# Patient Record
Sex: FEMALE | Race: WHITE | NOT HISPANIC OR LATINO | Employment: OTHER | URBAN - METROPOLITAN AREA
[De-identification: names, ages, dates, MRNs, and addresses within clinical notes are randomized per-mention and may not be internally consistent; named-entity substitution may affect disease eponyms.]

---

## 2023-07-10 LAB
ALANINE AMINOTRANSFERASE (SGPT) (U/L) IN SER/PLAS: 15 U/L (ref 7–45)
ALBUMIN (G/DL) IN SER/PLAS: 4.5 G/DL (ref 3.4–5)
ALBUMIN (MG/L) IN URINE: <7 MG/L
ALBUMIN/CREATININE (UG/MG) IN URINE: NORMAL UG/MG CRT (ref 0–30)
ALKALINE PHOSPHATASE (U/L) IN SER/PLAS: 101 U/L (ref 33–136)
ANION GAP IN SER/PLAS: 15 MMOL/L (ref 10–20)
APPEARANCE, URINE: CLEAR
ASPARTATE AMINOTRANSFERASE (SGOT) (U/L) IN SER/PLAS: 18 U/L (ref 9–39)
BASOPHILS (10*3/UL) IN BLOOD BY AUTOMATED COUNT: 0.03 X10E9/L (ref 0–0.1)
BASOPHILS/100 LEUKOCYTES IN BLOOD BY AUTOMATED COUNT: 0.4 % (ref 0–2)
BILIRUBIN TOTAL (MG/DL) IN SER/PLAS: 0.5 MG/DL (ref 0–1.2)
BILIRUBIN, URINE: NEGATIVE
BLOOD, URINE: NEGATIVE
CALCIDIOL (25 OH VITAMIN D3) (NG/ML) IN SER/PLAS: 43 NG/ML
CALCIUM (MG/DL) IN SER/PLAS: 9.2 MG/DL (ref 8.6–10.6)
CARBON DIOXIDE, TOTAL (MMOL/L) IN SER/PLAS: 23 MMOL/L (ref 21–32)
CHLORIDE (MMOL/L) IN SER/PLAS: 105 MMOL/L (ref 98–107)
CHOLESTEROL (MG/DL) IN SER/PLAS: 185 MG/DL (ref 0–199)
CHOLESTEROL IN HDL (MG/DL) IN SER/PLAS: 67.8 MG/DL
CHOLESTEROL/HDL RATIO: 2.7
COLOR, URINE: YELLOW
CREATININE (MG/DL) IN SER/PLAS: 1 MG/DL (ref 0.5–1.05)
CREATININE (MG/DL) IN URINE: 38.9 MG/DL (ref 20–320)
EOSINOPHILS (10*3/UL) IN BLOOD BY AUTOMATED COUNT: 0.09 X10E9/L (ref 0–0.7)
EOSINOPHILS/100 LEUKOCYTES IN BLOOD BY AUTOMATED COUNT: 1.3 % (ref 0–6)
ERYTHROCYTE DISTRIBUTION WIDTH (RATIO) BY AUTOMATED COUNT: 13 % (ref 11.5–14.5)
ERYTHROCYTE MEAN CORPUSCULAR HEMOGLOBIN CONCENTRATION (G/DL) BY AUTOMATED: 32.8 G/DL (ref 32–36)
ERYTHROCYTE MEAN CORPUSCULAR VOLUME (FL) BY AUTOMATED COUNT: 89 FL (ref 80–100)
ERYTHROCYTES (10*6/UL) IN BLOOD BY AUTOMATED COUNT: 4.57 X10E12/L (ref 4–5.2)
ESTIMATED AVERAGE GLUCOSE FOR HBA1C: 140 MG/DL
GAMMA GLUTAMYL TRANSFERASE (U/L) IN SER/PLAS: 22 U/L (ref 5–55)
GFR FEMALE: 64 ML/MIN/1.73M2
GLUCOSE (MG/DL) IN SER/PLAS: 149 MG/DL (ref 74–99)
GLUCOSE, URINE: NEGATIVE MG/DL
HEMATOCRIT (%) IN BLOOD BY AUTOMATED COUNT: 40.9 % (ref 36–46)
HEMOGLOBIN (G/DL) IN BLOOD: 13.4 G/DL (ref 12–16)
HEMOGLOBIN A1C/HEMOGLOBIN TOTAL IN BLOOD: 6.5 %
IGE (IU/L) IN SERUM OR PLASMA: <2 IU/ML (ref 0–214)
IMMATURE GRANULOCYTES/100 LEUKOCYTES IN BLOOD BY AUTOMATED COUNT: 0.3 % (ref 0–0.9)
IRON (UG/DL) IN SER/PLAS: 59 UG/DL (ref 35–150)
IRON BINDING CAPACITY (UG/DL) IN SER/PLAS: 366 UG/DL (ref 240–445)
IRON SATURATION (%) IN SER/PLAS: 16 % (ref 25–45)
KETONES, URINE: NEGATIVE MG/DL
LDL: 99 MG/DL (ref 0–99)
LEUKOCYTE ESTERASE, URINE: NEGATIVE
LEUKOCYTES (10*3/UL) IN BLOOD BY AUTOMATED COUNT: 6.8 X10E9/L (ref 4.4–11.3)
LYMPHOCYTES (10*3/UL) IN BLOOD BY AUTOMATED COUNT: 1.41 X10E9/L (ref 1.2–4.8)
LYMPHOCYTES/100 LEUKOCYTES IN BLOOD BY AUTOMATED COUNT: 20.8 % (ref 13–44)
MONOCYTES (10*3/UL) IN BLOOD BY AUTOMATED COUNT: 0.6 X10E9/L (ref 0.1–1)
MONOCYTES/100 LEUKOCYTES IN BLOOD BY AUTOMATED COUNT: 8.8 % (ref 2–10)
NEUTROPHILS (10*3/UL) IN BLOOD BY AUTOMATED COUNT: 4.63 X10E9/L (ref 1.2–7.7)
NEUTROPHILS/100 LEUKOCYTES IN BLOOD BY AUTOMATED COUNT: 68.4 % (ref 40–80)
NITRITE, URINE: NEGATIVE
NRBC (PER 100 WBCS) BY AUTOMATED COUNT: 0 /100 WBC (ref 0–0)
PH, URINE: 5 (ref 5–8)
PLATELETS (10*3/UL) IN BLOOD AUTOMATED COUNT: 222 X10E9/L (ref 150–450)
POTASSIUM (MMOL/L) IN SER/PLAS: 4.6 MMOL/L (ref 3.5–5.3)
PROTEIN TOTAL: 7.3 G/DL (ref 6.4–8.2)
PROTEIN, URINE: NEGATIVE MG/DL
RBC, URINE: NORMAL /HPF (ref 0–5)
SODIUM (MMOL/L) IN SER/PLAS: 138 MMOL/L (ref 136–145)
SPECIFIC GRAVITY, URINE: 1.01 (ref 1–1.03)
SQUAMOUS EPITHELIAL CELLS, URINE: <1 /HPF
THYROTROPIN (MIU/L) IN SER/PLAS BY DETECTION LIMIT <= 0.05 MIU/L: 1.13 MIU/L (ref 0.44–3.98)
TRIGLYCERIDE (MG/DL) IN SER/PLAS: 93 MG/DL (ref 0–149)
UREA NITROGEN (MG/DL) IN SER/PLAS: 20 MG/DL (ref 6–23)
UROBILINOGEN, URINE: <2 MG/DL (ref 0–1.9)
VLDL: 19 MG/DL (ref 0–40)
WBC, URINE: NORMAL /HPF (ref 0–5)

## 2023-07-13 LAB
FUNGAL CULTURE/SMEAR: ABNORMAL
FUNGAL SMEAR: ABNORMAL
RESPIRATORY CULTURE, CYSTIC FIBROSIS: ABNORMAL
RESPIRATORY CULTURE, CYSTIC FIBROSIS: ABNORMAL
VITAMIN A (RETINOL): 0.53 MG/L (ref 0.3–1.2)
VITAMIN A (RETINYL PALMITATE): <0.02 MG/L (ref 0–0.1)
VITAMIN A, INTERPRETATION: NORMAL
VITAMIN E (ALPHA-TOCOPHEROL): 6.2 MG/L (ref 5.5–18)
VITAMIN E (GAMMA-TOCOPHEROL): 2 MG/L (ref 0–6)

## 2023-10-02 DIAGNOSIS — G47.00 INSOMNIA, UNSPECIFIED TYPE: ICD-10-CM

## 2023-10-02 RX ORDER — TRAZODONE HYDROCHLORIDE 100 MG/1
100 TABLET ORAL NIGHTLY
Qty: 30 TABLET | Refills: 0 | Status: SHIPPED | OUTPATIENT
Start: 2023-10-02 | End: 2023-10-26

## 2023-10-02 RX ORDER — TRAZODONE HYDROCHLORIDE 100 MG/1
100 TABLET ORAL NIGHTLY
Status: CANCELLED | OUTPATIENT
Start: 2023-10-02

## 2023-10-25 DIAGNOSIS — G47.00 INSOMNIA, UNSPECIFIED TYPE: ICD-10-CM

## 2023-10-26 PROBLEM — E84.9 CYSTIC FIBROSIS (MULTI): Status: ACTIVE | Noted: 2023-10-26

## 2023-10-26 PROBLEM — E08.9 DIABETES MELLITUS RELATED TO CF (CYSTIC FIBROSIS) (MULTI): Status: ACTIVE | Noted: 2023-10-26

## 2023-10-26 PROBLEM — K86.81 EXOCRINE PANCREATIC INSUFFICIENCY (HHS-HCC): Status: ACTIVE | Noted: 2023-10-26

## 2023-10-26 PROBLEM — K59.00 CONSTIPATION: Status: ACTIVE | Noted: 2023-10-26

## 2023-10-26 PROBLEM — E84.8 DIABETES MELLITUS RELATED TO CF (CYSTIC FIBROSIS) (MULTI): Status: ACTIVE | Noted: 2023-10-26

## 2023-10-26 RX ORDER — TRAZODONE HYDROCHLORIDE 100 MG/1
100 TABLET ORAL NIGHTLY
Qty: 90 TABLET | Refills: 3 | Status: SHIPPED | OUTPATIENT
Start: 2023-10-26 | End: 2024-02-12 | Stop reason: ALTCHOICE

## 2023-11-06 PROBLEM — R68.81 EARLY SATIETY: Status: ACTIVE | Noted: 2023-11-06

## 2023-11-06 PROBLEM — R12 HEARTBURN: Status: ACTIVE | Noted: 2023-11-06

## 2023-11-06 PROBLEM — R13.10 DYSPHAGIA: Status: ACTIVE | Noted: 2023-11-06

## 2023-11-06 PROBLEM — K63.89 INTESTINAL BACTERIAL OVERGROWTH: Status: ACTIVE | Noted: 2023-11-06

## 2023-11-06 PROBLEM — D64.9 ANEMIA: Status: ACTIVE | Noted: 2023-11-06

## 2023-11-06 RX ORDER — BLOOD-GLUCOSE METER
KIT MISCELLANEOUS
COMMUNITY

## 2023-11-06 RX ORDER — ALBUTEROL SULFATE 90 UG/1
2 AEROSOL, METERED RESPIRATORY (INHALATION) EVERY 4 HOURS PRN
COMMUNITY
Start: 2023-07-10 | End: 2023-11-08 | Stop reason: ALTCHOICE

## 2023-11-06 RX ORDER — PANCRELIPASE 24000; 76000; 120000 [USP'U]/1; [USP'U]/1; [USP'U]/1
3-4 CAPSULE, DELAYED RELEASE PELLETS ORAL
COMMUNITY
End: 2024-02-12 | Stop reason: SDUPTHER

## 2023-11-06 RX ORDER — OMEPRAZOLE 40 MG/1
40 CAPSULE, DELAYED RELEASE ORAL DAILY
COMMUNITY
End: 2024-02-12 | Stop reason: SDUPTHER

## 2023-11-06 RX ORDER — PREDNISONE 20 MG/1
40 TABLET ORAL DAILY
COMMUNITY
Start: 2023-08-09 | End: 2023-11-08 | Stop reason: ALTCHOICE

## 2023-11-06 RX ORDER — CHOLECALCIFEROL (VITAMIN D3) 125 MCG
1 CAPSULE ORAL DAILY
COMMUNITY
Start: 2021-06-10

## 2023-11-06 RX ORDER — LOSARTAN POTASSIUM 100 MG/1
100 TABLET ORAL DAILY
COMMUNITY
Start: 2023-08-09 | End: 2024-02-12 | Stop reason: SDUPTHER

## 2023-11-06 RX ORDER — INSULIN GLARGINE 100 [IU]/ML
7 INJECTION, SOLUTION SUBCUTANEOUS DAILY
COMMUNITY
End: 2024-01-05 | Stop reason: SDUPTHER

## 2023-11-06 RX ORDER — HYDROXYZINE HYDROCHLORIDE 25 MG/1
TABLET, FILM COATED ORAL
COMMUNITY
Start: 2023-08-20 | End: 2024-05-29 | Stop reason: SDUPTHER

## 2023-11-06 RX ORDER — POLYETHYLENE GLYCOL 3350, SODIUM SULFATE ANHYDROUS, SODIUM BICARBONATE, SODIUM CHLORIDE, POTASSIUM CHLORIDE 236; 22.74; 6.74; 5.86; 2.97 G/4L; G/4L; G/4L; G/4L; G/4L
POWDER, FOR SOLUTION ORAL
COMMUNITY
Start: 2022-02-21

## 2023-11-06 RX ORDER — POLYETHYLENE GLYCOL 3350 17 G/17G
34 POWDER, FOR SOLUTION ORAL 2 TIMES DAILY
COMMUNITY
Start: 2022-05-09 | End: 2024-02-12 | Stop reason: SDUPTHER

## 2023-11-08 ENCOUNTER — SOCIAL WORK (OUTPATIENT)
Dept: PEDIATRIC PULMONOLOGY | Facility: HOSPITAL | Age: 63
End: 2023-11-08

## 2023-11-08 ENCOUNTER — HOSPITAL ENCOUNTER (OUTPATIENT)
Dept: RESPIRATORY THERAPY | Facility: HOSPITAL | Age: 63
Discharge: HOME | End: 2023-11-08
Payer: MEDICARE

## 2023-11-08 ENCOUNTER — MULTIDISCIPLINARY VISIT (OUTPATIENT)
Dept: PEDIATRIC PULMONOLOGY | Facility: HOSPITAL | Age: 63
End: 2023-11-08
Payer: MEDICARE

## 2023-11-08 VITALS
HEIGHT: 63 IN | WEIGHT: 123.02 LBS | TEMPERATURE: 98.7 F | OXYGEN SATURATION: 96 % | HEART RATE: 52 BPM | RESPIRATION RATE: 16 BRPM | SYSTOLIC BLOOD PRESSURE: 164 MMHG | DIASTOLIC BLOOD PRESSURE: 72 MMHG | BODY MASS INDEX: 21.8 KG/M2

## 2023-11-08 DIAGNOSIS — E84.9 CYSTIC FIBROSIS (MULTI): ICD-10-CM

## 2023-11-08 DIAGNOSIS — M85.80 OSTEOPENIA DETERMINED BY X-RAY: ICD-10-CM

## 2023-11-08 DIAGNOSIS — K59.00 CONSTIPATION, UNSPECIFIED CONSTIPATION TYPE: ICD-10-CM

## 2023-11-08 DIAGNOSIS — E84.8 DIABETES MELLITUS RELATED TO CF (CYSTIC FIBROSIS) (MULTI): ICD-10-CM

## 2023-11-08 DIAGNOSIS — Z00.00 HEALTHCARE MAINTENANCE: ICD-10-CM

## 2023-11-08 DIAGNOSIS — E08.9 DIABETES MELLITUS RELATED TO CF (CYSTIC FIBROSIS) (MULTI): ICD-10-CM

## 2023-11-08 DIAGNOSIS — K86.81 EXOCRINE PANCREATIC INSUFFICIENCY (HHS-HCC): ICD-10-CM

## 2023-11-08 DIAGNOSIS — I10 PRIMARY HYPERTENSION: Primary | ICD-10-CM

## 2023-11-08 DIAGNOSIS — F10.90 ALCOHOL USE DISORDER: ICD-10-CM

## 2023-11-08 LAB
ALBUMIN SERPL BCP-MCNC: 4.8 G/DL (ref 3.4–5)
ANION GAP SERPL CALC-SCNC: 17 MMOL/L (ref 10–20)
BUN SERPL-MCNC: 26 MG/DL (ref 6–23)
CALCIUM SERPL-MCNC: 10.2 MG/DL (ref 8.6–10.6)
CHLORIDE SERPL-SCNC: 104 MMOL/L (ref 98–107)
CO2 SERPL-SCNC: 26 MMOL/L (ref 21–32)
CREAT SERPL-MCNC: 0.94 MG/DL (ref 0.5–1.05)
FEF 25-75: 1.56 L/S
FEV1/FVC: 72 %
FEV1: 2.29 LITERS
FVC: 3.19 LITERS
GFR SERPL CREATININE-BSD FRML MDRD: 69 ML/MIN/1.73M*2
GLUCOSE SERPL-MCNC: 61 MG/DL (ref 74–99)
PEF: 6.49 L/S
PHOSPHATE SERPL-MCNC: 4.1 MG/DL (ref 2.5–4.9)
POTASSIUM SERPL-SCNC: 4.8 MMOL/L (ref 3.5–5.3)
SODIUM SERPL-SCNC: 142 MMOL/L (ref 136–145)

## 2023-11-08 PROCEDURE — 90686 IIV4 VACC NO PRSV 0.5 ML IM: CPT | Performed by: INTERNAL MEDICINE

## 2023-11-08 PROCEDURE — 87186 SC STD MICRODIL/AGAR DIL: CPT | Performed by: INTERNAL MEDICINE

## 2023-11-08 PROCEDURE — 82374 ASSAY BLOOD CARBON DIOXIDE: CPT | Performed by: INTERNAL MEDICINE

## 2023-11-08 PROCEDURE — 84597 ASSAY OF VITAMIN K: CPT | Performed by: INTERNAL MEDICINE

## 2023-11-08 PROCEDURE — 36415 COLL VENOUS BLD VENIPUNCTURE: CPT | Performed by: INTERNAL MEDICINE

## 2023-11-08 PROCEDURE — 99214 OFFICE O/P EST MOD 30 MIN: CPT | Performed by: INTERNAL MEDICINE

## 2023-11-08 PROCEDURE — 99214 OFFICE O/P EST MOD 30 MIN: CPT | Mod: 25 | Performed by: INTERNAL MEDICINE

## 2023-11-08 PROCEDURE — 94010 BREATHING CAPACITY TEST: CPT

## 2023-11-08 RX ORDER — LEVALBUTEROL TARTRATE 45 UG/1
2 AEROSOL, METERED ORAL EVERY 4 HOURS PRN
Qty: 15 G | Refills: 3 | Status: SHIPPED | OUTPATIENT
Start: 2023-11-08 | End: 2024-02-12 | Stop reason: SDUPTHER

## 2023-11-08 RX ORDER — DOXYCYCLINE 100 MG/1
100 TABLET ORAL 2 TIMES DAILY
Qty: 28 TABLET | Refills: 0 | Status: SHIPPED | OUTPATIENT
Start: 2023-11-08 | End: 2024-03-29 | Stop reason: SDUPTHER

## 2023-11-08 NOTE — PATIENT INSTRUCTIONS
"    It was very nice to see you today. We can offer you in-person and \"virtual\" appointments with your cystic fibrosis provider.    If you need to cancel or schedule an appointment, please call the  at the [facility] at [phone] between the hours of 8 AM and 5 PM, Monday-Friday.    To reach the CF nurse, Francesca, please call [phone]. Francesca has a secure voice mail account if you want to leave a message.    If you need to speak with an adult cystic fibrosis provider between the hours of 5 PM and 8 AM (overnight) or anytime on Saturday or Sunday, please call [phone]. When you call this number, you will be connected to an  with the [facility] and [facility] answering service. You should tell the  that you're an adult with cystic fibrosis who needs to speak to the \"cystic fibrosis doctor on-call.\" The  will take your contact information. You should then expect a call back from the cystic fibrosis doctor on-call within a short period of time.      Information on COVID-19 Vaccination:  Vaccines.gov (https://www.vaccines.gov/)  [place] Department of Health (https://gettheshot.coronavirus.[place].gov/)    If you have COVID-19 infection and we decide to treat with anti-viral medications, what pharmacies have medications in-stock?  COVID-19 Therapeutic Distribution  Map (https://covid-19-therapeutics--[facility].[email or web address].Insider Pages.itBit/)    Important Information:  Your CFTR mutations are: [id number] x 2 copies    Your percent-predicted FEV1 today was: 100% (down from 106% predicted)    Your weight adjusted for height (body mass index, BMI) today was: 21.8  (goal for adult males with CF = 23.0 kg/m2, goal for adult females with CF = 22.0 kg/m2)    You seem a little bit congested today, and your lung function is down a bit.  I am prescribing doxycycline 100 mg by mouth twice daily for 14 days.  You're doing a great job with surrounding yourself by people who care " about you. If you are comfortable, please have your counselor send notes to me for review. I think you would really benefit from getting a psychiatrist to help with medication management of the ADHD.  Let's switch from albuterol inhaler to levalbuterol (Xopenex). The Xopenex is not supposed to make you shaky and jittery after inhalation.  I'd like to do an ultrasound test on your kidney arteries to make sure they are not narrowed. That condition, called renal artery stenosis, can cause high blood pressure. I think that stress is certainly causing the blood pressure to be elevated.  Keep taking the MiraLAX daily and eating smaller, more frequent meals to keep bowels moving well.  We still have option of starting Trikafta to help with lung function.      Next appointment: January 2024 after your trips and the holidays.

## 2023-11-08 NOTE — PROGRESS NOTES
Mason Fontanez M.D. 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 Historically normal lung function so Trikafta deferred. 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 EPI on PERT. Constipation.     Other problems: alcohol use disorder (AA); GERD; HTN, insomnia on trazodone    HPI (11/8/2023): Plan at last visit was to increase losartan to 100 mg/day, home BP monitoring. Discussed referral to psychiatry to manage anxiety and depression. Increased trazodone to 100 mg at HS for insomnia. Needed to stick with MiraLAX for prophylaxis. Had poison ivy and put on brief course of prednisone.  Few weeks ago had relapse of drinking. Went out to bar, drank enough to black out, somehow drove home. Scared her. She connected with her sponsor and actually has appointment with a counselor this evening. Feeling increased chest congestion. Coughing occasionally. Thinks she might be headed toward lung infection. MiraLAX has prevented RLQ cramping.     HPI (8/8/2023): Had fun in [facility] but has been sick from respiratory perspective twice. I gave her a prescription for doxycycline to take with her on the trip. She started after returning due to chest congestion. Cleared. Then got poison ivy. Took 3 days of a steroid she had on-hand. Flared up after stopping. Currently having mild chest tightness and wheezing. Only using albuterol once daily, though. BP still higher than desirable. Has been on losartan 50 mg daily. Sleeping poorly on trazodone 50 mg. Took 100 mg one night and slept better.     HPI (7/10/2023): First visit, transfer care from Dr. Fortune. Last seen in November 2022. Issues at that point included intermittent abdominal pain (likely constipation), uncontrolled hypertension despite taking losartan 50 mg daily, CFRD followed by Dr. Ambrose, and recent Burkholderia multivorans infection, consideration of nebulized meropenem. Colonoscopy discussed, would apparently need to  be done in [employer] system for insurance reasons. No interval need for antibiotics. Used Pulmozyme in January for a few weeks due to congestion. Helpful. Still having tendency toward constipation, thinking about using MiraLAX. Sometimes experiences left-sided headache and blurry vision in the left eye when hypertensive. Frequency not accelerating. Thinks that meditation well help with her blood pressure. Leaving for trip to [place] tomorrow.    Current Use of Health Management Strategies:    Respiratory Interventions  Albuterol: Two times per day  Pulmozyme: Does not use  Hypertonic saline: Does not use   HFCWO (percussive vest): Does not use  Oscillatory PEP: Does not use  Exercise: No Regular Exercise  LTE antagonist: No  Azithromycin: Does not use  Aztreonam lysine ([place]): Does not use  Tobramycin: Does not use  Colistin: Does not use  Meropenem (inhaled): Does not use  Vancomycin (inhaled): Does not use  ICS: Does not use  ICS/LABA: Does not use  LAMA: No  CFTR modulator: Trikafta candidate by genotype but have deferred due to preserved lung function and infrequent pulmonary exacerbations, concern about potential effects on mood/cognition.  Oxygen: Does not use    Digestive Health Interventions    Acid-suppressing medication?: Yes  Using CF vitamins?: No  Taking pancreatic enzymes?: Yes - Creon 24; 3 to 4 capsules with meals and 1 to 2 capsules with snacks   Any diarrhea?: No  Any steatorrhea?: No  Any constipation?: Yes - does better with more regular use of MiraLAX  Using laxatives?: Yes  Feeding tube?: No  Supplemental enteral nutrition?: No    Pulmonary Function Test Results    PFT (11/8/2023): FEV1 2.29 L (100%), FVC 3.19 L (109%), FEV1/FVC 72; normal spirometry  PFT (7/10/2023): FEV1 2.45 L (106%), FVC 3.27 L (112%), FEV1/FVC 75; normal spirometry  PFT (10/10/2022): FEV1 2.47 L (106%), FVC 3.25 L (110%), FEV1/FVC 76; normal spirometry  PFT (2/21/2022): FEV1 2.48 L (106%), FVC 3.28 L (110%),  "FEV1/FVC 76; normal spirometry  PFT (6/23/2021): FEV1 2.48 L (103%), FVC 3.39 L (109%), FEV1/FVC 73; normal spirometry    Current Medications     Current Outpatient Medications   Medication Instructions    Basaglar KwikPen U-100 Insulin 7 Units, Daily    Creon 24,000-76,000 -120,000 unit capsule 3-4 capsules, oral, MOUTH WITH MEALS AND TAKE 1-2 CAPSULES WITH SNACKS    dornase Alpha (Pulmozyme) 1 mg/mL nebulizer solution INHALE CONTENTS OF 1 VIAL VIA NEBULIZER DAILY    doxycycline (ADOXA) 100 mg, oral, 2 times daily, Take with a full glass of water and do not lie down for at least 30 minutes after    FreeStyle Lite Strips strip  TEST 6-7 TIMES DAILY    hydrOXYzine HCL (Atarax) 25 mg tablet     levalbuterol (Xopenex HFA) 45 mcg/actuation inhaler 2 puffs, inhalation, Every 4 hours PRN    losartan (COZAAR) 100 mg, oral, Daily    mecobalamin, vitamin B12, 1,000 mcg tablet,disintegrating 1 tablet, sublingual, Daily    omeprazole (PRILOSEC) 40 mg, oral, Daily    polyethylene glycol (GLYCOLAX, MIRALAX) 34 g, oral, 2 times daily,  MIX 34 GM TWICE DAILY AND TAKE AS DIRECTED    polyethylene glycol-electrolytes (polyethylene glycol) 420 gram solution oral,  TAKE AS DIRECTED.    traZODone (DESYREL) 100 mg, oral, Nightly       Physical Examination     /72 (BP Location: Right arm, Patient Position: Sitting, BP Cuff Size: Adult)   Pulse 52   Temp 37.1 °C (98.7 °F) (Oral)   Resp 16   Ht 1.6 m (5' 2.99\")   Wt 55.8 kg (123 lb 0.3 oz)   SpO2 96%   BMI 21.80 kg/m²     General: Pleasant, well-appearing woman in no distress. Normal vocal character.  HEENT: normocephalic; anicteric sclerae; conjunctivae not injected; nasal mucosa was unremarkable; oropharynx was clear without evidence of thrush; (+) dental plate  Neck: supple; no lymphadenopathy or thyromegaly.  Chest: clear to auscultation bilaterally; no chest wall deformity.  Cardiac: regular rhythm; no gallop or murmur.  Abdomen: soft; non-tender; non-distended; no " hepatosplenomegaly.  Extremities: no leg edema; no digital clubbing; 2+ pulses  Psychiatric: Mildly anxious, remorseful. Good eye contact. Not withdrawn.     Metabolic Parameters     Sodium   Date/Time Value Ref Range Status   11/08/2023 11:46  136 - 145 mmol/L Final     Potassium   Date/Time Value Ref Range Status   11/08/2023 11:46 AM 4.8 3.5 - 5.3 mmol/L Final     Chloride   Date/Time Value Ref Range Status   11/08/2023 11:46  98 - 107 mmol/L Final     Bicarbonate   Date/Time Value Ref Range Status   11/08/2023 11:46 AM 26 21 - 32 mmol/L Final     Anion Gap   Date/Time Value Ref Range Status   11/08/2023 11:46 AM 17 10 - 20 mmol/L Final     Urea Nitrogen   Date/Time Value Ref Range Status   11/08/2023 11:46 AM 26 (H) 6 - 23 mg/dL Final     Creatinine   Date/Time Value Ref Range Status   11/08/2023 11:46 AM 0.94 0.50 - 1.05 mg/dL Final     GFR Female   Date/Time Value Ref Range Status   07/10/2023 01:28 PM 64 >90 mL/min/1.73m2 Final     Comment:      CALCULATIONS OF ESTIMATED GFR ARE PERFORMED   USING THE 2021 CKD-EPI STUDY REFIT EQUATION   WITHOUT THE RACE VARIABLE FOR THE IDMS-TRACEABLE   CREATININE METHODS.    https://jasn.asnjournals.org/content/early/2021/09/22/ASN.1159781020       Glucose   Date/Time Value Ref Range Status   11/08/2023 11:46 AM 61 (L) 74 - 99 mg/dL Final     Calcium   Date/Time Value Ref Range Status   11/08/2023 11:46 AM 10.2 8.6 - 10.6 mg/dL Final     Phosphorus   Date/Time Value Ref Range Status   11/08/2023 11:46 AM 4.1 2.5 - 4.9 mg/dL Final     Comment:     The performance characteristics of phosphorus testing in heparinized plasma have been validated by the individual  laboratory site where testing is performed. Testing on heparinized plasma is not approved by the FDA; however, such approval is not necessary.       Hematologic Parameters     WBC   Date/Time Value Ref Range Status   07/10/2023 01:28 PM 6.8 4.4 - 11.3 x10E9/L Final     Neutrophils Absolute   Date/Time Value  Ref Range Status   07/10/2023 01:28 PM 4.63 1.20 - 7.70 x10E9/L Final     Neutrophils %   Date/Time Value Ref Range Status   07/10/2023 01:28 PM 68.4 40.0 - 80.0 % Final     Lymphocytes %   Date/Time Value Ref Range Status   07/10/2023 01:28 PM 20.8 13.0 - 44.0 % Final     Monocytes %   Date/Time Value Ref Range Status   07/10/2023 01:28 PM 8.8 2.0 - 10.0 % Final     Basophils %   Date/Time Value Ref Range Status   07/10/2023 01:28 PM 0.4 0.0 - 2.0 % Final     Eosinophils Absolute   Date/Time Value Ref Range Status   07/10/2023 01:28 PM 0.09 0.00 - 0.70 x10E9/L Final     Eosinophils %   Date/Time Value Ref Range Status   07/10/2023 01:28 PM 1.3 0.0 - 6.0 % Final     Hemoglobin   Date/Time Value Ref Range Status   07/10/2023 01:28 PM 13.4 12.0 - 16.0 g/dL Final     Hematocrit   Date/Time Value Ref Range Status   07/10/2023 01:28 PM 40.9 36.0 - 46.0 % Final     RBC   Date/Time Value Ref Range Status   07/10/2023 01:28 PM 4.57 4.00 - 5.20 x10E12/L Final     MCV   Date/Time Value Ref Range Status   07/10/2023 01:28 PM 89 80 - 100 fL Final     MCHC   Date/Time Value Ref Range Status   07/10/2023 01:28 PM 32.8 32.0 - 36.0 g/dL Final     Platelets   Date/Time Value Ref Range Status   07/10/2023 01:28  150 - 450 x10E9/L Final       Fat-Soluble Vitamin Levels     Vitamin D, 25-Hydroxy   Date/Time Value Ref Range Status   07/10/2023 01:28 PM 43 ng/mL Final     Comment:     .  DEFICIENCY:         < 20   NG/ML  INSUFFICIENCY:      20-29  NG/ML  SUFFICIENCY:         NG/ML    THIS ASSAY ACCURATELY QUANTIFIES THE SUM OF  VITAMIN D3, 25-HYDROXY AND VIT D2,25-HYDROXY.       Vitamin A (Retinol)   Date/Time Value Ref Range Status   07/10/2023 01:28 PM 0.53 0.30 - 1.20 mg/L Final     Vitamin A, Interpretation   Date/Time Value Ref Range Status   07/10/2023 01:28 PM Normal  Final     Comment:     This test was developed and its performance characteristics   determined by [facility]. It has not been cleared or    approved by the US Food and Drug Administration. This test was   performed in a CLIA certified laboratory and is intended for   clinical purposes.  Performed By: [facility]  [address] [place] [id number]  : Tello Pacheco MD, PhD       Vitamin E (Alpha-Tocopherol)   Date/Time Value Ref Range Status   07/10/2023 01:28 PM 6.2 5.5 - 18.0 mg/L Final     Comment:     This test was developed and its performance characteristics   determined by [facility]. It has not been cleared or   approved by the US Food and Drug Administration. This test was   performed in a CLIA certified laboratory and is intended for   clinical purposes.       Vitamin E (Gamma-Tocopherol)   Date/Time Value Ref Range Status   07/10/2023 01:28 PM 2.0 0.0 - 6.0 mg/L Final     Comment:     Performed By: [facility]  [address] [place], [place] [id number]  : Tello Pacheco MD, PhD         LFT and Associated Parameters     AST   Date/Time Value Ref Range Status   07/10/2023 01:28 PM 18 9 - 39 U/L Final     ALT (SGPT)   Date/Time Value Ref Range Status   07/10/2023 01:28 PM 15 7 - 45 U/L Final     Comment:      Patients treated with Sulfasalazine may generate    falsely decreased results for ALT.       Alkaline Phosphatase   Date/Time Value Ref Range Status   07/10/2023 01:28  33 - 136 U/L Final     Total Bilirubin   Date/Time Value Ref Range Status   07/10/2023 01:28 PM 0.5 0.0 - 1.2 mg/dL Final     GGT   Date/Time Value Ref Range Status   07/10/2023 01:28 PM 22 5 - 55 U/L Final     Albumin   Date/Time Value Ref Range Status   07/10/2023 01:28 PM 4.5 3.4 - 5.0 g/dL Final     Total Protein   Date/Time Value Ref Range Status   07/10/2023 01:28 PM 7.3 6.4 - 8.2 g/dL Final       Coagulation Parameters     Protime   Date/Time Value Ref Range Status   11/16/2020 03:16 PM 11.0 10.1 - 13.3 sec Final     INR   Date/Time Value Ref Range Status   11/16/2020 03:16 PM 0.9 0.9 -  1.1 Final       Diabetes Laboratory Tests     Hemoglobin A1C   Date/Time Value Ref Range Status   07/10/2023 01:28 PM 6.5 (A) % Final     Comment:          Diagnosis of Diabetes-Adults   Non-Diabetic: < or = 5.6%   Increased risk for developing diabetes: 5.7-6.4%   Diagnostic of diabetes: > or = 6.5%  .       Monitoring of Diabetes                Age (y)     Therapeutic Goal (%)   Adults:          >18           <7.0   Pediatrics:    13-18           <7.5                   7-12           <8.0                   0- 6            7.5-8.5   American Diabetes Association. Diabetes Care 33(S1), Jan 2010.       Albumin/Creatine Ratio   Date/Time Value Ref Range Status   07/10/2023 01:28 PM SEE COMMENT 0.0 - 30.0 ug/mg crt Final     Comment:     One or more analytes used in this calculation   is outside of the analytical measurement range.  Calculation cannot be performed.          Immunology Laboratory Tests     IgE   Date/Time Value Ref Range Status   07/10/2023 01:28 PM <2 0 - 214 IU/mL Final       DEXA Scan     XR BONE density 10/10/2022    Narrative  Name: OMAR GIPSON  Patient ID: [id number] YOB: 1960 Height: 62.0 in.  Gender:     Female   Exam Date:  10/10/2022 Weight: 118.0 lbs.  Indications: Cystic Fibrosis, DIABETES, family history osteoporosis,  Hysterectomy, Post Menopausal  Fractures:    Treatments:  OMEPRAZOLE    LEFT FEMUR - TOTAL  The bone mineral density : 0.889 g/cm2  T-score : -0.9    % of young normal mean :88%  Z-score : 0.0    % of age matched mean : 100%  % change vs. Previous : N/A    % change vs. Baseline : baseline    LEFT FEMUR - NECK  The bone mineral density : 0.843 g/cm2  T-score : -1.4    % of young normal mean: 81%  Z-score : -0.1    % of age matched mean : 98%  % change vs. Previous : N/A    % change vs. Baseline : baseline    SPINE L1-L4  The bone mineral density is 1.147 g/cm2  T-score : -0.3   % of young normal mean is 97%  Z-score : 1.0    % of age matched mean is 111%  %  "change vs. Previous : -    % change vs. Baseline : baseline    World Health Organization (WHO) criteria for post-menopausal,   Women:  Normal:       T-score at or above -1 SD  Osteopenia:   T-score between -1 and -2.5 SD  Osteoporosis: T-score at or below -2.5 SD    10-Year Fracture Risk:  Major Osteoporotic Fracture 4.0%  Hip Fracture                0.4%  Reported Risk Factors       None    Interpretation:  According to World Health Organization criteria, classification is low bone mass.    Followup recommended on October 2024 or sooner as clinically warranted.     Chest Radiograph     XR chest 2 view 10/10/2022    Narrative  MRN: [id number]  Patient Name: OMAR GIPSON    STUDY:  TH CHEST 2 VIEW PA AND LAT;    INDICATION:  cystic fibrosis, malabsorption  E84.9: Cystic fibrosis E84.0: Cystic fibrosis with pulmonary manifestations.    COMPARISON:  Chest radiograph 05/09/2016    ACCESSION NUMBER(S):  [id number]    ORDERING CLINICIAN:  CELIA MIN    FINDINGS:  Frontal, lateral, and dual energy radiographs of the chest were  provided.  The cardiac silhouette size is within normal limits.  There is no focal consolidation, edema or pneumothorax.  No sizeable pleural effusion.  No acute osseous abnormality.    Impression  No acute cardiopulmonary process.    I personally reviewed the images/study and I agree with the findings as stated.     CF Sputum Culture     No results found for: \"AFBCX\"   Respiratory Culture, Cystic Fibrosis   Date/Time Value Ref Range Status   07/07/2023 09:46 AM 3+ NORMAL THROAT EULALIO. (A)  Final   07/07/2023 09:46 AM Staphylococcus aureus (A)  Final     Comment:       1+~METHICILLIN(OXACILLIN)SUSCEPTIBLE STAPHYLOCOCCI ~ARE SUSCEPTIBLE TO SEMI-SYNTHETIC PENICILLINS~(OXACILLIN,NAFCILLIN, ETC),BETA-LACTAM/BETA-LACTAMASE~INHIBITOR COMBINATIONS(INCLUDING AMPICILLIN/SULBACTAM,~AMOXICILLIN/CLAVULANATE AND   PIPERACILLIN/TAZOBACTAM),~CARBAPENEMS AND CEPHALOSPORINS APPROVED FOR USE BY ~THE FDA " FOR STAPHYLOCOCCAL INFECTIONS.       CF respiratory culture (11/28/2022): MRSA  CF respiratory culture (10/10/2022): MRSA, Burkholderia multivorans  CF respiratory culture (5/9/2022): MRSA  CF respiratory culture (6/3/2015): MSSA, Pseudomonas putida    Susceptibility data from last 2000 days.  Collected Specimen Info Organism Clindamycin Erythromycin Linezolid Oxacillin Tetracycline Trimethoprim/Sulfamethoxazole Vancomycin   07/07/23 Respiratory Staphylococcus aureus R R S S S S S   07/07/23 Respiratory Candida albicans              Problem List Items Addressed This Visit       Alcohol use disorder    Current Assessment & Plan     Lucia has good insight into reasons why she relapsed. Trying to extricate herself from toxic relationship. Now connected with her AA sponsor and seeing counselor. I strongly recommended that Lucia get connected with a psychiatrist to help manage anxiety and depression and to possibly start a medication to promote sobriety, if indicated.         Constipation    Current Assessment & Plan     Should continue to take MiraLAX 17 gm daily to prevent constipation.         Cystic fibrosis (CMS/[facility])    Current Assessment & Plan     ppFEV1 down a bit from baseline and increased respiratory symptoms. Would consider her to have mild pulmonary exacerbation.  Start doxycycline 100 mg PO BID x 14 days.  Should resume taking Pulmozyme daily and performing some form of airway clearance.  Feels shaky (tremulous) and has palpitations with albuterol. She requires some form of bronchodilator. I think that Xopenex (levalbuterol) would be better option for her due to these typical side effects of racemic albuterol.  Surveillance respiratory tract culture today.         Relevant Medications    doxycycline (Adoxa) 100 mg tablet    dornase Alpha (Pulmozyme) 1 mg/mL nebulizer solution    levalbuterol (Xopenex HFA) 45 mcg/actuation inhaler    Other Relevant Orders    Respiratory Culture, Cystic Fibrosis    Flu  "vaccine (IIV4) age 6 months and greater, preservative free (Completed)    Diabetes mellitus related to CF (cystic fibrosis) (CMS/[facility])    Current Assessment & Plan     On basal insulin managed by Dr. Ambrose  No evidence of microalbuminuria  Hemoglobin A1c 6.5%, relatively stable  I think she might have escaped from clinic today without seeing Dr. Ambrose. If this is true, perhaps they can meet by telemedicine.         Exocrine pancreatic insufficiency    Current Assessment & Plan     Continue PERT. Not having obvious symptoms of malabsorption.         Relevant Orders    Vitamin K    Healthcare maintenance    Current Assessment & Plan     DEXA scan (10/10/2022): Osteopenia  Colonoscopy (5/12/2016): Internal and external hemorrhoids, poor prep but negative random biopsies; had adenomatous polyp removed in 2011  EGD (5/12/2016): peptic duodenitis  Influenza vaccination given on 11/8/2023  SARS CoV-2 vaccination: needs         Osteopenia determined by x-ray    Current Assessment & Plan     Vitamin D sufficient; consider checking DCP level  No indication for antiresorptive therapy.  Weightbearing exercise         Primary hypertension - Primary    Current Assessment & Plan     Sounds like component of \"white coat hypertension\" because her self-reported systolic blood pressure readings are in 120-130's at home.   Continue losartan 100 mg daily. Renal function panel is normal today.  Renal artery duplex, exclude renal artery stenosis.         Relevant Orders    Renal Function Panel (Completed)    Vascular US renal artery duplex complete        Follow-up:  2/5/2024    Nicolás Harris MD   11/08/2023  "

## 2023-11-08 NOTE — PROGRESS NOTES
"Mansi is a 63 y.o. female presenting to cystic fibrosis outpatient clinic. Patient reports a relapse in sobriety with alcohol and is 16 days sober. Patient begins somatic therapy tonight with \"Lety\" and is excited. SW will meet with patient during next outpatient visit to check on progress with therapy and sobriety.   -GALE [place]   "

## 2023-11-09 PROBLEM — M85.80 OSTEOPENIA DETERMINED BY X-RAY: Status: ACTIVE | Noted: 2023-11-09

## 2023-11-09 PROBLEM — Z00.00 HEALTHCARE MAINTENANCE: Status: ACTIVE | Noted: 2023-11-09

## 2023-11-09 PROBLEM — F10.90 ALCOHOL USE DISORDER: Status: ACTIVE | Noted: 2023-11-09

## 2023-11-09 NOTE — ASSESSMENT & PLAN NOTE
DEXA scan (10/10/2022): Osteopenia  Colonoscopy (5/12/2016): Internal and external hemorrhoids, poor prep but negative random biopsies; had adenomatous polyp removed in 2011  EGD (5/12/2016): peptic duodenitis  Influenza vaccination given on 11/8/2023  SARS CoV-2 vaccination: needs

## 2023-11-09 NOTE — ASSESSMENT & PLAN NOTE
ppFEV1 down a bit from baseline and increased respiratory symptoms. Would consider her to have mild pulmonary exacerbation.  Start doxycycline 100 mg PO BID x 14 days.  Should resume taking Pulmozyme daily and performing some form of airway clearance.  Feels shaky (tremulous) and has palpitations with albuterol. She requires some form of bronchodilator. I think that Xopenex (levalbuterol) would be better option for her due to these typical side effects of racemic albuterol.  Surveillance respiratory tract culture today.

## 2023-11-09 NOTE — ASSESSMENT & PLAN NOTE
Vitamin D sufficient; consider checking DCP level  No indication for antiresorptive therapy.  Weightbearing exercise

## 2023-11-09 NOTE — ASSESSMENT & PLAN NOTE
Lucia has good insight into reasons why she relapsed. Trying to extricate herself from toxic relationship. Now connected with her AA sponsor and seeing counselor. I strongly recommended that Lucia get connected with a psychiatrist to help manage anxiety and depression and to possibly start a medication to promote sobriety, if indicated.

## 2023-11-09 NOTE — ASSESSMENT & PLAN NOTE
On basal insulin managed by Dr. Ambrose  No evidence of microalbuminuria  Hemoglobin A1c 6.5%, relatively stable  I think she might have escaped from clinic today without seeing Dr. Ambrose. If this is true, perhaps they can meet by telemedicine.

## 2023-11-09 NOTE — ASSESSMENT & PLAN NOTE
"Sounds like component of \"white coat hypertension\" because her self-reported systolic blood pressure readings are in 120-130's at home.   Continue losartan 100 mg daily. Renal function panel is normal today.  Renal artery duplex, exclude renal artery stenosis.  "

## 2023-11-11 LAB
BACTERIA SPT CF RESP CULT: ABNORMAL
BACTERIA SPT CF RESP CULT: ABNORMAL

## 2023-11-21 ENCOUNTER — TELEMEDICINE (OUTPATIENT)
Dept: PEDIATRIC ENDOCRINOLOGY | Facility: HOSPITAL | Age: 63
End: 2023-11-21
Payer: MEDICARE

## 2023-11-21 ENCOUNTER — TELEPHONE (OUTPATIENT)
Dept: PEDIATRIC PULMONOLOGY | Facility: HOSPITAL | Age: 63
End: 2023-11-21
Payer: MEDICARE

## 2023-11-21 DIAGNOSIS — E08.9 DIABETES MELLITUS RELATED TO CF (CYSTIC FIBROSIS) (MULTI): Primary | ICD-10-CM

## 2023-11-21 DIAGNOSIS — E84.8 DIABETES MELLITUS RELATED TO CF (CYSTIC FIBROSIS) (MULTI): Primary | ICD-10-CM

## 2023-11-21 PROCEDURE — 99214 OFFICE O/P EST MOD 30 MIN: CPT | Performed by: PEDIATRICS

## 2023-11-21 RX ORDER — HYDROCHLOROTHIAZIDE 25 MG/1
25 TABLET ORAL
COMMUNITY
Start: 2023-11-20 | End: 2024-02-12 | Stop reason: SDUPTHER

## 2023-11-21 NOTE — ASSESSMENT & PLAN NOTE
62 yo female with CF, PI, CFRD x 10 years, HTN with h/o TIA. A1c of 6.5% which is at target. Her A1c levels have never been significantly elevated. She is only taking a low dose of basal insulin (0.15 unit/kg). Some fasting hypoglycemia when she does not eat during the day but with her AM glucoses up to 130s I do not want to cut her dose down. I would increase her basal insulin to 8 units if she is waking with BG of 140 and above consistently.     Screening: normal urine albumin, has not had RAKEL.  HTN on losartan and hydrochlorothiazide--seeing PCP for further HTN management; likely needs multiple medications which we discussed.   Normal lipids July 2023    Recoimmend:  --continue basaglar 7 units daily. Increase to 8 units if fasting glucoses increase to 140+  --check some glucoses after dinner/before bed to assess need for mealtime insulin  --continue treatment of mild hypoglycemia  --referral for ophtho placed for annual eye exam  --discussed tingling of hands/feet--could be r/t CFRD or not--has not had significantly sustained elevated BG. Does not want trial of gapapentin  --discussed HTN as another risk factor +diabetes for CVD. Should follow with PCP to optimize mamnagement.   --normal lipids in July 2023    RTC 3 mos

## 2023-11-21 NOTE — TELEPHONE ENCOUNTER
Spoke to Lucia today.  She wanted to let you know that she may have had a TIA over the weekend.  She had a pounding headache and neck, blurry vision, slight disorientation and dizziness.  She went to ER, but declined a scan because of the costs.  BP was 210/108.  Saw GP and he ordered a new medication - hydrochlorothiazide 40mg.  Her blood pressure has been slowly coming down.

## 2023-11-21 NOTE — PATIENT INSTRUCTIONS
Great to see you, Lucia!  --continue basaglar 7 units daily. Increase to 8 units if fasting glucoses increase to 140 on most days  --check some glucoses after dinner/before bed  --continue treatment of mild hypoglycemia  --referral placed for annual eye exam  --discussed tingling of hands/feet--could be CFRD or not--has not had significantly sustained elevated BG. No gabapentin for now.  --Should follow with PCP to optimize management of blood pressure. It is a risk factor for heart disease and stroke. Diabetes is also a risk factor.   --normal cholesterol and kidney test in July 2023    Come back and see me in 3 mos    CF Related Diabetes Team Contact Information:  Phone: [phone]  Email: [email or web address]  Answering Service: [phone] (evenings and weekends)  Fax: [phone]    [facility] Physician: Lanie Ambrose MD  [facility] Nurses: Virginia Lewis RN, [facility] and Terese Santos, RN, [facility]  [facility] Medical Assistant: Tamir Malhotra

## 2023-11-21 NOTE — PROGRESS NOTES
Virtual or Telephone Consent    An interactive audio and video telecommunication system which permits real time communications between the patient (at the originating site) and provider (at the distant site) was utilized to provide this telehealth service.   Verbal consent was requested and obtained from Mansi Morrison on this date, 11/21/23 for a telehealth visit.       Subjective    Mansi Morrison is a 63 y.o. year old with a history of Cystic Fibrosis, pancreatic insufficiency (Delta F508 homozygote) presenting for follow up of Cystic Fibrosis-Related diabetes (CFRD).   CFRD was diagnosed in March 2013.  Last A1C was 6.5% (July 2023)  Last visit August 2022.     HPI     Had a TIA on 11/17/2023 with elevated /108; head pounding and neck pain. Negative labs and CXR. Glucose 78. On losartan 100 mg already. Added hydrochlorothiazide 25 mg.      Recent Diabetes Hx:   -130 in AM. No other BG checks. Still has tingling in hands and feet--have discussed this for several years. Better with CBD cream. A1c has never been high but she has had DM x 10 years. She's had a reaction to gabapentin in the past and does not want to try it.   Asked about effects of DM on BP.    Diabetes is treated with:  Insulin Instructions  Fixed Dose Injections   Basaglar KwikPen U-100 Insulin 100 unit/mL (3 mL) insulin pen   Last edited by Lanie Ambrose MD on 11/21/2023 at 11:51 AM      Time of Day Dose (units)   10 pm 7        BG trends:   The patient is currently checking the blood glucose.  Patient is using: glucometer    Diet Hx:   Weight: unchanged  Pulmonary status: Declined dropped from 106-->90 on abx for infection currently    Hypoglycemia:  Hypoglycemia: once a week  Hypoglycemia frequency: occasional  Hypoglycemia awareness: Yes   Symptoms of Hypoglycemia: confusion and weak and nauseated  Timing of Hypoglycemia: Fasting and Before meals--usually before meals if she hasn't eaten  Treats hypoglycemia with: Other: RENÉE  juice  Glucagon prescription:  No       SCREENING FOR COMPLICATIONS:   Urine albumin:  normal in July 2023  Eye doctor:  hasn't been in years---placed referral.     Family History   Problem Relation Name Age of Onset    Other (CF Carrier) Mother      Other (CF Carrier) Father      Colon cancer Father      Esophageal cancer Other      Stomach cancer Other        The patient does not have a known family history of diabetes.  Sister with pre-diabetes          Objective   PHYSICAL EXAM:  There were no vitals taken for this visit.   Physical Exam   General: Well nourished, no acute distress  HEENT: NCAT, MMM, eye movements grossly intact  Neck: Supple  Pulm: Non labored breathing  Skin: No visible rash  MSK: normal ROM  Ext: WWP  Neuro: CN grossly intact  Psych: alert, normal mood     LABS:  A1c:   Hemoglobin A1C   Date Value Ref Range Status   07/10/2023 6.5 (A) % Final     Comment:          Diagnosis of Diabetes-Adults   Non-Diabetic: < or = 5.6%   Increased risk for developing diabetes: 5.7-6.4%   Diagnostic of diabetes: > or = 6.5%  .       Monitoring of Diabetes                Age (y)     Therapeutic Goal (%)   Adults:          >18           <7.0   Pediatrics:    13-18           <7.5                   7-12           <8.0                   0- 6            7.5-8.5   American Diabetes Association. Diabetes Care 33(S1), Jan 2010.       Urine Albumin:   Albumin/Creatine Ratio   Date/Time Value Ref Range Status   07/10/2023 01:28 PM SEE COMMENT 0.0 - 30.0 ug/mg crt Final     Comment:     One or more analytes used in this calculation   is outside of the analytical measurement range.  Calculation cannot be performed.          GLUCOSE DATA:  Patient reported AM glucoses 110-130      ASSESSMENT/PLAN:   Assessment/Plan   Problem List Items Addressed This Visit             ICD-10-CM    Diabetes mellitus related to CF (cystic fibrosis) (CMS/[facility]) - Primary E84.8, E08.9     62 yo female with CF, PI, CFRD x 10 years, HTN with h/o  TIA. A1c of 6.5% which is at target. Her A1c levels have never been significantly elevated. She is only taking a low dose of basal insulin (0.15 unit/kg). Some fasting hypoglycemia when she does not eat during the day but with her AM glucoses up to 130s I do not want to cut her dose down. I would increase her basal insulin to 8 units if she is waking with BG of 140 and above consistently.     Screening: normal urine albumin, has not had RAKEL.  HTN on losartan and hydrochlorothiazide--seeing PCP for further HTN management; likely needs multiple medications which we discussed.   Normal lipids July 2023    Recoimmend:  --continue basaglar 7 units daily. Increase to 8 units if fasting glucoses increase to 140+  --check some glucoses after dinner/before bed to assess need for mealtime insulin  --continue treatment of mild hypoglycemia  --referral for ophtho placed for annual eye exam  --discussed tingling of hands/feet--could be r/t CFRD or not--has not had significantly sustained elevated BG. Does not want trial of gapapentin  --discussed HTN as another risk factor +diabetes for CVD. Should follow with PCP to optimize mamnagement.   --normal lipids in July 2023    RTC 3 mos           Relevant Orders    Referral to Ophthalmology       Insulin Instructions  Fixed Dose Injections   Basaglar KwikPen U-100 Insulin 100 unit/mL (3 mL) insulin pen   Last edited by Lanie Ambrose MD on 11/21/2023 at 11:51 AM      Time of Day Dose (units)   10 pm 7           CF Related Diabetes Team Contact Information:  Phone: [phone]  Email: [email or web address]  Answering Service: [phone] (evenings and weekends)  Fax: [phone]    [facility] Phys[facility]: Lanie Ambrose MD  [facility] Nurses: Virginia Lewis RN, [facility] and Terese Santos, RN, [facility] Medical Assistant: Tamir Malhotra

## 2023-12-05 ENCOUNTER — APPOINTMENT (OUTPATIENT)
Dept: VASCULAR MEDICINE | Facility: CLINIC | Age: 63
End: 2023-12-05
Payer: MEDICARE

## 2023-12-18 DIAGNOSIS — E84.9 CYSTIC FIBROSIS (MULTI): Primary | ICD-10-CM

## 2023-12-18 DIAGNOSIS — R68.89 FLU-LIKE SYMPTOMS: ICD-10-CM

## 2023-12-18 RX ORDER — OSELTAMIVIR PHOSPHATE 75 MG/1
75 CAPSULE ORAL EVERY 12 HOURS
Qty: 10 CAPSULE | Refills: 0 | Status: SHIPPED | OUTPATIENT
Start: 2023-12-18 | End: 2023-12-23

## 2023-12-18 RX ORDER — DOXYCYCLINE 100 MG/1
100 TABLET ORAL 2 TIMES DAILY
Qty: 28 TABLET | Refills: 0 | Status: SHIPPED | OUTPATIENT
Start: 2023-12-18 | End: 2024-01-01

## 2023-12-18 NOTE — TELEPHONE ENCOUNTER
Yesterday, Lucia started with a horrible cough and fever of 103. Last night she was coughing so bad she threw up. Today, cough is still bad, intermittent fever, dizzy, general malaise, body aches, bad headache. She started pulmozyme. Advised Xopenex with vestand pulmozyme at least BID, tylenol and motrin alternating, and start Tamiflu. Recommended getting COVID tested, but I don't think she plans on leaving her house or asking her friend to get her one. She is wondering if this is RSV. Do you want to start Doxy? Her last culture grew MRSA.

## 2023-12-18 NOTE — TELEPHONE ENCOUNTER
COPIED RESPONSE FROM DR. BROWN (secure message)    Sounds like influenza, so agree with Tamiflu. Less likely RSV based on syndrome (and no good treatment anyway). Would start doxycycline 100 mg PO BID x 14 days. She was recently seen at [facility] for hypertensive urgency. I hope she's taking her blood pressure medications and perhaps got those adjusted by her PCP.

## 2023-12-18 NOTE — TELEPHONE ENCOUNTER
Pulmonary Attending  Cystic Fibrosis Service    Requested Prescriptions     Signed Prescriptions Disp Refills    oseltamivir (Tamiflu) 75 mg capsule 10 capsule 0     Sig: Take 1 capsule (75 mg) by mouth every 12 hours for 5 days.     Authorizing Provider: NICOLÁS HARRIS    doxycycline (Adoxa) 100 mg tablet 28 tablet 0     Sig: Take 1 tablet (100 mg) by mouth 2 times a day for 14 days. Take with a full glass of water and do not lie down for at least 30 minutes after     Authorizing Provider: NICOLÁS HARRIS     [facility]/pharmacy #[id number] [address] AT [place] [address]  Phone: [phone] Fax: [phone]    Nicolás Harris MD  12/18/2023  12:26 PM

## 2023-12-20 ENCOUNTER — TELEPHONE (OUTPATIENT)
Dept: PEDIATRIC PULMONOLOGY | Facility: HOSPITAL | Age: 63
End: 2023-12-20
Payer: MEDICARE

## 2023-12-20 NOTE — TELEPHONE ENCOUNTER
"Lucia just called again. She told me that she spoke to Francesca on Monday. She feels like she's \"hit a wall\" Not feeling any better. She has only had 2 days of the antibiotic but continues with fevers, post-cough emesis, productive cough and SOB. She is not eating because of the vomiting and isn't drinking much. She is not sleeping because of the coughing. Doing her vest and pulmozyme BID. Should I have her go to the ED?     Per Dr. Harris, pt should go to the ED, pt may be dehydrated or at high risk.    Called pt with plan, pt reluctantly agreed to go to a  ED. I also explained that she should have ED contact Dr. Harris. Pt verbalized understanding.  "

## 2024-01-04 DIAGNOSIS — E08.9 DIABETES MELLITUS RELATED TO CF (CYSTIC FIBROSIS) (MULTI): ICD-10-CM

## 2024-01-04 DIAGNOSIS — E84.8 DIABETES MELLITUS RELATED TO CF (CYSTIC FIBROSIS) (MULTI): ICD-10-CM

## 2024-01-05 RX ORDER — INSULIN GLARGINE 100 [IU]/ML
INJECTION, SOLUTION SUBCUTANEOUS
Qty: 15 ML | Refills: 6 | Status: SHIPPED | OUTPATIENT
Start: 2024-01-05 | End: 2024-04-16 | Stop reason: SDUPTHER

## 2024-01-17 ENCOUNTER — APPOINTMENT (OUTPATIENT)
Dept: OPHTHALMOLOGY | Facility: CLINIC | Age: 64
End: 2024-01-17
Payer: MEDICARE

## 2024-02-01 ENCOUNTER — APPOINTMENT (OUTPATIENT)
Dept: RESPIRATORY THERAPY | Facility: HOSPITAL | Age: 64
End: 2024-02-01
Payer: MEDICARE

## 2024-02-05 ENCOUNTER — APPOINTMENT (OUTPATIENT)
Dept: RESPIRATORY THERAPY | Facility: HOSPITAL | Age: 64
End: 2024-02-05
Payer: MEDICARE

## 2024-02-05 ENCOUNTER — APPOINTMENT (OUTPATIENT)
Dept: PEDIATRIC PULMONOLOGY | Facility: HOSPITAL | Age: 64
End: 2024-02-05
Payer: MEDICARE

## 2024-02-09 DIAGNOSIS — E84.9 CYSTIC FIBROSIS (MULTI): ICD-10-CM

## 2024-02-11 PROBLEM — H02.831 DERMATOCHALASIS OF BOTH UPPER EYELIDS: Status: ACTIVE | Noted: 2019-09-27

## 2024-02-11 PROBLEM — R51.9 HEADACHE: Status: ACTIVE | Noted: 2019-08-27

## 2024-02-11 PROBLEM — L90.0 LICHEN SCLEROSUS ET ATROPHICUS: Status: ACTIVE | Noted: 2018-08-07

## 2024-02-11 PROBLEM — D12.6 TUBULAR ADENOMA OF COLON: Status: ACTIVE | Noted: 2017-04-17

## 2024-02-11 PROBLEM — K44.9 HIATAL HERNIA: Status: ACTIVE | Noted: 2018-03-27

## 2024-02-11 PROBLEM — H02.834 DERMATOCHALASIS OF BOTH UPPER EYELIDS: Status: ACTIVE | Noted: 2019-09-27

## 2024-02-11 PROBLEM — Z22.322 MRSA (METHICILLIN RESISTANT STAPH AUREUS) CULTURE POSITIVE: Status: ACTIVE | Noted: 2020-10-28

## 2024-02-11 PROBLEM — K86.1 CHRONIC PANCREATITIS (MULTI): Status: ACTIVE | Noted: 2017-03-15

## 2024-02-11 PROBLEM — F41.1 GAD (GENERALIZED ANXIETY DISORDER): Status: ACTIVE | Noted: 2017-03-15

## 2024-02-11 PROBLEM — H57.813 BROW PTOSIS, BILATERAL: Status: ACTIVE | Noted: 2019-09-27

## 2024-02-11 PROBLEM — G43.109 MIGRAINE WITH AURA AND WITHOUT STATUS MIGRAINOSUS, NOT INTRACTABLE: Status: ACTIVE | Noted: 2019-08-28

## 2024-02-11 PROBLEM — S36.209S: Status: ACTIVE | Noted: 2020-08-16

## 2024-02-11 PROBLEM — F33.2 SEVERE EPISODE OF RECURRENT MAJOR DEPRESSIVE DISORDER, WITHOUT PSYCHOTIC FEATURES (MULTI): Status: ACTIVE | Noted: 2017-05-16

## 2024-02-11 PROBLEM — E13.9: Status: ACTIVE | Noted: 2020-08-16

## 2024-02-11 PROBLEM — E08.21: Status: ACTIVE | Noted: 2020-08-16

## 2024-02-11 PROBLEM — G62.9 PERIPHERAL POLYNEUROPATHY: Status: ACTIVE | Noted: 2017-03-17

## 2024-02-11 NOTE — PROGRESS NOTES
Mason Fontanez M.D. 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 Historically normal lung function (ppFEV1 100-106%) so Trikafta deferred. 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 EPI on PERT. Constipation.  Other problems: alcohol use disorder (AA); GERD; osteopenia; generalized anxiety disorder (2017); poorly-controlled hypertension, insomnia on trazodone    HPI (2/12/2024): ED at [facility] on 11/17/2023 with hypertensive urgency (SBP ~200 at home, /94 documented); had headache and chest pain; troponin, BNP, EKG (sinus bradycardia), CXR were unremarkable.  Saw PCP, tried amlodipine added to the losartan, was having symptomatic hypotension, switched to hydrochlorothiazide plus losartan and stopped the amlodipine, much better.  Started seeing new therapist after last visit, very helpful, good relationship.  Says that she has been sober for 3 months and 4 days.  Brought on a colleague to help with work.  Had influenza diagnosed by NP rapid-antigen test at a pharmacy, around 12/20/2023. Felt really poorly, had fever, cough, post-tussive emesis, dyspnea, anorexia. Started doing Pulmozyme and HFCWO again on most days. Prescribed course of doxycycline. Since that infection, however, generally feels more central chest mucus and pressure. Because ppFEV1 was also down today, she is concerned about deteriorating lung function. Generally feels tired since the influenza. Weight down 1.3 kg since August 2023.     HPI (11/8/2023): Plan at last visit was to increase losartan to 100 mg/day, home BP monitoring. Discussed referral to psychiatry to manage anxiety and depression. Increased trazodone to 100 mg at HS for insomnia. Needed to stick with MiraLAX for prophylaxis. Had poison ivy and put on brief course of prednisone.  Few weeks ago had relapse of drinking. Went out to bar, drank enough to black out, somehow drove home. Scared her. She connected with her sponsor  and actually has appointment with a counselor this evening. Feeling increased chest congestion. Coughing occasionally. Thinks she might be headed toward lung infection. MiraLAX has prevented RLQ cramping.     HPI (8/8/2023): Had fun in [facility] but has been sick from respiratory perspective twice. I gave her a prescription for doxycycline to take with her on the trip. She started after returning due to chest congestion. Cleared. Then got poison ivy. Took 3 days of a steroid she had on-hand. Flared up after stopping. Currently having mild chest tightness and wheezing. Only using albuterol once daily, though. BP still higher than desirable. Has been on losartan 50 mg daily. Sleeping poorly on trazodone 50 mg. Took 100 mg one night and slept better.     HPI (7/10/2023): First visit, transfer care from Dr. Fortune. Last seen in November 2022. Issues at that point included intermittent abdominal pain (likely constipation), uncontrolled hypertension despite taking losartan 50 mg daily, CFRD followed by Dr. Ambrose, and recent Burkholderia multivorans infection, consideration of nebulized meropenem. Colonoscopy discussed, would apparently need to be done in [facility] for insurance reasons. No interval need for antibiotics. Used Pulmozyme in January for a few weeks due to congestion. Helpful. Still having tendency toward constipation, thinking about using MiraLAX. Sometimes experiences left-sided headache and blurry vision in the left eye when hypertensive. Frequency not accelerating. Thinks that meditation well help with her blood pressure. Leaving for trip to [place] tomorrow.    Current Use of Health Management Strategies:    Respiratory Interventions  Albuterol: Two times per day  Pulmozyme: Once daily - every other day since influenza in December 2023  Hypertonic saline: Does not use   HFCWO (percussive vest): The Vest ([place]) Daily - every other day   Oscillatory PEP: Does not use  Exercise: No  Regular Exercise  LTE antagonist: No  Azithromycin: Does not use  Aztreonam lysine ([place]): Does not use  Tobramycin: Does not use  Colistin: Does not use  Meropenem (inhaled): Does not use  Vancomycin (inhaled): Does not use  ICS: Does not use  ICS/LABA: Does not use  LAMA: No  CFTR modulator: Trikafta candidate by genotype but have deferred due to preserved lung function and infrequent pulmonary exacerbations, concern about potential effects on mood/cognition.  Oxygen: Does not use    Digestive Health Interventions    Acid-suppressing medication?: Yes  Using CF vitamins?: No  Taking pancreatic enzymes?: Yes - Creon 24; 3 to 4 capsules with meals and 1 to 2 capsules with snacks   Any diarrhea?: No  Any steatorrhea?: No  Any constipation?: Yes - does better with more regular use of MiraLAX  Using laxatives?: Yes  Feeding tube?: No  Supplemental enteral nutrition?: No    Pulmonary Function Test Results    PFT (2/12/2024): FEV1 2.29 L (94%), FVC 3.19 L (109%), FEV1/FVC 72; normal spirometry  PFT (11/8/2023): FEV1 2.29 L (100%), FVC 3.19 L (109%), FEV1/FVC 72; normal spirometry  PFT (7/10/2023): FEV1 2.45 L (106%), FVC 3.27 L (112%), FEV1/FVC 75; normal spirometry  PFT (10/10/2022): FEV1 2.47 L (106%), FVC 3.25 L (110%), FEV1/FVC 76; normal spirometry  PFT (2/21/2022): FEV1 2.48 L (106%), FVC 3.28 L (110%), FEV1/FVC 76; normal spirometry  PFT (6/23/2021): FEV1 2.48 L (103%), FVC 3.39 L (109%), FEV1/FVC 73; normal spirometry    Current Medications     Current Outpatient Medications   Medication Instructions    Creon 24,000-76,000 -120,000 unit capsule 3-4 capsules, oral, 3 times daily with meals, MOUTH WITH MEALS AND TAKE 1-2 CAPSULES WITH SNACKS    dornase Alpha (Pulmozyme) 1 mg/mL nebulizer solution INHALE CONTENTS OF 1 VIAL VIA NEBULIZER DAILY    elexacaftor-tezacaftor-ivacaft (Trikafta) 100-50-75 mg tablet Take 2 orange tablets by mouth in the morning with fat-containing food and 1 blue tablet by mouth in the  "evening with fat-containing food, approximately 12 hours apart.    FreeStyle Lite Strips strip  TEST 6-7 TIMES DAILY    hydroCHLOROthiazide (HYDRODIURIL) 25 mg, oral, Daily    hydrOXYzine HCL (Atarax) 25 mg tablet     insulin glargine (Basaglar KwikPen U-100 Insulin) 100 unit/mL (3 mL) pen Inject 8 units daily as directed    levalbuterol (Xopenex HFA) 45 mcg/actuation inhaler 2 puffs, inhalation, Every 4 hours PRN    losartan (COZAAR) 100 mg, oral, Daily    mecobalamin, vitamin B12, 1,000 mcg tablet,disintegrating 1 tablet, sublingual, Daily    omeprazole (PRILOSEC) 40 mg, oral, Daily    polyethylene glycol (GLYCOLAX, MIRALAX) 34 g, oral, 2 times daily,  MIX 34 GM TWICE DAILY AND TAKE AS DIRECTED    polyethylene glycol-electrolytes (polyethylene glycol) 420 gram solution oral,  TAKE AS DIRECTED.    traZODone (DESYREL) 50 mg, oral, Nightly PRN       Physical Examination     /74 (BP Location: Right arm, Patient Position: Sitting, BP Cuff Size: Adult)   Pulse 60   Temp 36.4 °C (97.6 °F) (Oral)   Resp 20   Ht 1.572 m (5' 1.89\")   Wt 55.3 kg (121 lb 12.9 oz)   SpO2 95%   BMI 22.36 kg/m²     General: Pleasant, tired-appearing woman in no distress. Normal vocal character.  HEENT: normocephalic; anicteric sclerae; conjunctivae not injected; nasal mucosa was unremarkable; oropharynx was clear without evidence of thrush; (+) dental plate  Neck: supple; no lymphadenopathy or thyromegaly.  Chest: clear to auscultation bilaterally; no chest wall deformity.  Cardiac: regular rhythm; no gallop or murmur.  Abdomen: soft; non-tender; non-distended; no hepatosplenomegaly, no renal bruit.  Extremities: no leg edema; no digital clubbing; 2+ pulses  Psychiatric: Good eye contact; spontaneous social smile     Metabolic Parameters     Sodium   Date/Time Value Ref Range Status   02/12/2024 02:43  136 - 145 mmol/L Final     Potassium   Date/Time Value Ref Range Status   02/12/2024 02:43 PM 4.0 3.5 - 5.3 mmol/L Final "     Chloride   Date/Time Value Ref Range Status   02/12/2024 02:43  98 - 107 mmol/L Final     Bicarbonate   Date/Time Value Ref Range Status   02/12/2024 02:43 PM 29 21 - 32 mmol/L Final     Anion Gap   Date/Time Value Ref Range Status   02/12/2024 02:43 PM 13 10 - 20 mmol/L Final     Urea Nitrogen   Date/Time Value Ref Range Status   02/12/2024 02:43 PM 17 6 - 23 mg/dL Final     Creatinine   Date/Time Value Ref Range Status   02/12/2024 02:43 PM 0.88 0.50 - 1.05 mg/dL Final     GFR Female   Date/Time Value Ref Range Status   07/10/2023 01:28 PM 64 >90 mL/min/1.73m2 Final     Comment:      CALCULATIONS OF ESTIMATED GFR ARE PERFORMED   USING THE 2021 CKD-EPI STUDY REFIT EQUATION   WITHOUT THE RACE VARIABLE FOR THE IDMS-TRACEABLE   CREATININE METHODS.    https://jasn.asnjournals.org/content/early/2021/09/22/ASN.[email or web address]       Glucose   Date/Time Value Ref Range Status   02/12/2024 02:43  (H) 74 - 99 mg/dL Final     Calcium   Date/Time Value Ref Range Status   02/12/2024 02:43 PM 9.9 8.6 - 10.6 mg/dL Final     Thyroid Stimulating Hormone   Date/Time Value Ref Range Status   02/12/2024 02:47 PM 1.02 0.44 - 3.98 mIU/L Final     TSH   Date/Time Value Ref Range Status   07/10/2023 01:28 PM 1.13 0.44 - 3.98 mIU/L Final     Comment:      TSH testing is performed using different testing    methodology at [facility] than at other    system hospitals. Direct result comparisons should    only be made within the same method.     02/21/2022 09:12 AM 2.97 0.44 - 3.98 mIU/L Final     Comment:      TSH testing is performed using different testing    methodology at [facility] than at other    system hospitals. Direct result comparisons should    only be made within the same method.       Phosphorus   Date/Time Value Ref Range Status   02/12/2024 02:43 PM 3.1 2.5 - 4.9 mg/dL Final     Comment:     The performance characteristics of phosphorus testing in heparinized plasma have been validated by  the individual  laboratory site where testing is performed. Testing on heparinized plasma is not approved by the FDA; however, such approval is not necessary.       Hematologic Parameters     WBC   Date/Time Value Ref Range Status   02/12/2024 02:43 PM 7.8 4.4 - 11.3 x10*3/uL Final     Neutrophils Absolute   Date/Time Value Ref Range Status   02/12/2024 02:43 PM 5.08 1.20 - 7.70 x10*3/uL Final     Comment:     Percent differential counts (%) should be interpreted in the context of the absolute cell counts (cells/uL).     Neutrophils %   Date/Time Value Ref Range Status   02/12/2024 02:43 PM 65.2 40.0 - 80.0 % Final     Lymphocytes %   Date/Time Value Ref Range Status   02/12/2024 02:43 PM 25.5 13.0 - 44.0 % Final     Monocytes %   Date/Time Value Ref Range Status   02/12/2024 02:43 PM 6.8 2.0 - 10.0 % Final     Basophils %   Date/Time Value Ref Range Status   02/12/2024 02:43 PM 0.5 0.0 - 2.0 % Final     Eosinophils Absolute   Date/Time Value Ref Range Status   02/12/2024 02:43 PM 0.13 0.00 - 0.70 x10*3/uL Final     Eosinophils %   Date/Time Value Ref Range Status   02/12/2024 02:43 PM 1.7 0.0 - 6.0 % Final     Hemoglobin   Date/Time Value Ref Range Status   02/12/2024 02:43 PM 13.6 12.0 - 16.0 g/dL Final     Hematocrit   Date/Time Value Ref Range Status   02/12/2024 02:43 PM 39.8 36.0 - 46.0 % Final     RBC   Date/Time Value Ref Range Status   02/12/2024 02:43 PM 4.45 4.00 - 5.20 x10*6/uL Final     MCV   Date/Time Value Ref Range Status   02/12/2024 02:43 PM 89 80 - 100 fL Final     MCHC   Date/Time Value Ref Range Status   02/12/2024 02:43 PM 34.2 32.0 - 36.0 g/dL Final     Platelets   Date/Time Value Ref Range Status   02/12/2024 02:43  150 - 450 x10*3/uL Final       Fat-Soluble Vitamin Levels     Vitamin D, 25-Hydroxy, Total   Date/Time Value Ref Range Status   02/12/2024 02:43 PM 52 30 - 100 ng/mL Final     Vitamin A (Retinol)   Date/Time Value Ref Range Status   07/10/2023 01:28 PM 0.53 0.30 - 1.20  mg/L Final     Vitamin A, Interpretation   Date/Time Value Ref Range Status   07/10/2023 01:28 PM Normal  Final     Comment:     This test was developed and its performance characteristics   determined by [facility]. It has not been cleared or   approved by the US Food and Drug Administration. This test was   performed in a CLIA certified laboratory and is intended for   clinical purposes.  Performed By: [facility]  [address] [place], [place] [id number]  : Tello Pacheco MD, PhD       Vitamin E (Alpha-Tocopherol)   Date/Time Value Ref Range Status   07/10/2023 01:28 PM 6.2 5.5 - 18.0 mg/L Final     Comment:     This test was developed and its performance characteristics   determined by [facility]. It has not been cleared or   approved by the US Food and Drug Administration. This test was   performed in a CLIA certified laboratory and is intended for   clinical purposes.       Vitamin E (Gamma-Tocopherol)   Date/Time Value Ref Range Status   07/10/2023 01:28 PM 2.0 0.0 - 6.0 mg/L Final     Comment:     Performed By: [facility]  [address] [place], [place] [id number]  : Tello Pacheco MD, PhD         LFT and Associated Parameters     AST   Date/Time Value Ref Range Status   02/12/2024 02:43 PM 16 9 - 39 U/L Final   07/10/2023 01:28 PM 18 9 - 39 U/L Final   11/21/2022 12:04 PM 18 9 - 39 U/L Final   10/10/2022 01:05 PM 23 9 - 39 U/L Final     ALT   Date/Time Value Ref Range Status   02/12/2024 02:43 PM 17 7 - 45 U/L Final     Comment:     Patients treated with Sulfasalazine may generate falsely decreased results for ALT.     ALT (SGPT)   Date/Time Value Ref Range Status   07/10/2023 01:28 PM 15 7 - 45 U/L Final     Comment:      Patients treated with Sulfasalazine may generate    falsely decreased results for ALT.     11/21/2022 12:04 PM 18 7 - 45 U/L Final     Comment:      Patients treated with Sulfasalazine may generate    falsely  decreased results for ALT.     10/10/2022 01:05 PM 19 7 - 45 U/L Final     Comment:      Patients treated with Sulfasalazine may generate    falsely decreased results for ALT.       Alkaline Phosphatase   Date/Time Value Ref Range Status   02/12/2024 02:43 PM 94 33 - 136 U/L Final   07/10/2023 01:28  33 - 136 U/L Final   11/21/2022 12:04  33 - 136 U/L Final   10/10/2022 01:05  33 - 136 U/L Final     Bilirubin, Total   Date/Time Value Ref Range Status   02/12/2024 02:43 PM 0.3 0.0 - 1.2 mg/dL Final     Total Bilirubin   Date/Time Value Ref Range Status   07/10/2023 01:28 PM 0.5 0.0 - 1.2 mg/dL Final   11/21/2022 12:04 PM 0.6 0.0 - 1.2 mg/dL Final   10/10/2022 01:05 PM 0.5 0.0 - 1.2 mg/dL Final     Bilirubin, Direct   Date/Time Value Ref Range Status   02/12/2024 02:43 PM 0.1 0.0 - 0.3 mg/dL Final     GGT   Date/Time Value Ref Range Status   02/12/2024 02:43 PM 26 5 - 55 U/L Final   07/10/2023 01:28 PM 22 5 - 55 U/L Final   02/21/2022 09:12 AM 33 5 - 55 U/L Final   11/16/2020 03:16 PM 34 5 - 55 U/L Final     Albumin   Date/Time Value Ref Range Status   02/12/2024 02:43 PM 4.5 3.4 - 5.0 g/dL Final   11/08/2023 11:46 AM 4.8 3.4 - 5.0 g/dL Final   07/10/2023 01:28 PM 4.5 3.4 - 5.0 g/dL Final   11/21/2022 12:04 PM 4.4 3.4 - 5.0 g/dL Final   10/10/2022 01:05 PM 4.2 3.4 - 5.0 g/dL Final     Total Protein   Date/Time Value Ref Range Status   02/12/2024 02:43 PM 7.2 6.4 - 8.2 g/dL Final   07/10/2023 01:28 PM 7.3 6.4 - 8.2 g/dL Final   11/21/2022 12:04 PM 7.3 6.4 - 8.2 g/dL Final   10/10/2022 01:05 PM 7.2 6.4 - 8.2 g/dL Final       Coagulation Parameters     Protime   Date/Time Value Ref Range Status   11/16/2020 03:16 PM 11.0 10.1 - 13.3 sec Final     INR   Date/Time Value Ref Range Status   11/16/2020 03:16 PM 0.9 0.9 - 1.1 Final       Diabetes Laboratory Tests     Hemoglobin A1C   Date/Time Value Ref Range Status   02/12/2024 02:43 PM 6.4 (H) see below % Final     Albumin, Urine Random   Date/Time Value  Ref Range Status   02/12/2024 02:43 PM <7.0 Not established mg/L Final     Creatinine, Urine Random   Date/Time Value Ref Range Status   02/12/2024 02:43 PM 67.4 20.0 - 320.0 mg/dL Final     Creatinine, Urine   Date/Time Value Ref Range Status   07/10/2023 01:28 PM 38.9 20.0 - 320.0 mg/dL Final   06/23/2021 01:54 PM 60.7 20.0 - 320.0 mg/dL Final     Albumin/Creatine Ratio   Date/Time Value Ref Range Status   02/12/2024 02:43 PM   Final     Comment:     One or more analytes used in this calculation is outside of the analytical measurement range. Calculation cannot be performed.   07/10/2023 01:28 PM SEE COMMENT 0.0 - 30.0 ug/mg crt Final     Comment:     One or more analytes used in this calculation   is outside of the analytical measurement range.  Calculation cannot be performed.     06/23/2021 01:54 PM SEE COMMENT 0.0 - 30.0 ug/mg crt Final     Comment:     One or more analytes used in this calculation   is outside of the analytical measurement range.  Calculation cannot be performed.          Immunology Laboratory Tests     IgE   Date/Time Value Ref Range Status   02/12/2024 02:43 PM <2 0 - 214 IU/mL Final       DEXA Scan     XR BONE density 10/10/2022    Narrative  Name: OMAR GIPSON  Patient ID: [id number] YOB: 1960 Height: 62.0 in.  Gender:     Female   Exam Date:  10/10/2022 Weight: 118.0 lbs.  Indications: Cystic Fibrosis, DIABETES, family history osteoporosis,  Hysterectomy, Post Menopausal  Fractures:    Treatments:  OMEPRAZOLE    LEFT FEMUR - TOTAL  The bone mineral density : 0.889 g/cm2  T-score : -0.9    % of young normal mean :88%  Z-score : 0.0    % of age matched mean : 100%  % change vs. Previous : N/A    % change vs. Baseline : baseline    LEFT FEMUR - NECK  The bone mineral density : 0.843 g/cm2  T-score : -1.4    % of young normal mean: 81%  Z-score : -0.1    % of age matched mean : 98%  % change vs. Previous : N/A    % change vs. Baseline : baseline    SPINE L1-L4  The bone mineral  "density is 1.147 g/cm2  T-score : -0.3   % of young normal mean is 97%  Z-score : 1.0    % of age matched mean is 111%  % change vs. Previous : -    % change vs. Baseline : baseline    World Health Organization (WHO) criteria for post-menopausal,   Women:  Normal:       T-score at or above -1 SD  Osteopenia:   T-score between -1 and -2.5 SD  Osteoporosis: T-score at or below -2.5 SD    10-Year Fracture Risk:  Major Osteoporotic Fracture 4.0%  Hip Fracture                0.4%  Reported Risk Factors       None    Interpretation:  According to World Health Organization criteria, classification is low bone mass.  Followup recommended on October 2024 or sooner as clinically warranted.     Chest Radiograph     XR chest 2 view 10/10/2022    Narrative  MRN: [id number]  Patient Name: OMAR GIPSON    STUDY:  TH CHEST 2 VIEW PA AND LAT;    INDICATION:  cystic fibrosis, malabsorption  E84.9: Cystic fibrosis E84.0: Cystic fibrosis with pulmonary manifestations.    COMPARISON:  Chest radiograph 05/09/2016    ACCESSION NUMBER(S):  [id number]    ORDERING CLINICIAN:  CELIA MIN    FINDINGS:  Frontal, lateral, and dual energy radiographs of the chest were  provided.  The cardiac silhouette size is within normal limits.  There is no focal consolidation, edema or pneumothorax.  No sizeable pleural effusion.  No acute osseous abnormality.    Impression  No acute cardiopulmonary process.    I personally reviewed the images/study and I agree with the findings as stated.     CF Sputum Culture     No results found for: \"AFBCX\"   Respiratory Culture, Cystic Fibrosis   Date/Time Value Ref Range Status   11/08/2023 12:48 PM (A)  Final    (1+) Rare Methicillin Resistant Staphylococcus aureus (MRSA)     Comment:     Methicillin (Oxacillin) resistant Staphylococci are resistant to all currently available Penicillins, Beta-lactam/Beta-lactamase inhibitor combinations (including Ampicillin/Sulbactam, Amoxicillin/Clavulanate and " Pipercillin/Tazobactam), Carbapenems and   Cephalosporins (except Ceftaroline).   11/08/2023 12:48 PM (2+) Few Normal throat perla  Final     CF respiratory culture (11/28/2022): MRSA  CF respiratory culture (10/10/2022): MRSA, Burkholderia multivorans  CF respiratory culture (5/9/2022): MRSA  CF respiratory culture (6/3/2015): MSSA, Pseudomonas putida    Susceptibility data from last 2000 days.  Collected Specimen Info Organism Clindamycin Erythromycin Linezolid Oxacillin Tetracycline Trimethoprim/Sulfamethoxazole Vancomycin   11/08/23 Fluid from Throat Swab Methicillin Resistant Staphylococcus aureus (MRSA) S R  R S S S     Normal throat perla          07/07/23 Respiratory Staphylococcus aureus R R S S S S S   07/07/23 Respiratory Candida albicans              Problem List Items Addressed This Visit       Alcohol use disorder    Current Assessment & Plan     Congratulated her on sobriety for over 3 months. Counseling relationship seems to be working out very well.   Discussed potential benefit of establishing care with a psychiatrist who could prescribe medications to promote abstinence from alcohol (acamprosate or naltrexone)   Have checked hepatic function panel and Phosphatidylethanol (PEth), Whole Blood, Quantitative to contextualize any test results that could be influenced by regular alcohol consumption.         Relevant Orders    Phosphatidylethanol (PEth), Whole Blood, Quantitative    Constipation    Current Assessment & Plan     Self-managing well with MiraLAX.         Relevant Medications    polyethylene glycol (Glycolax, Miralax) 17 gram/dose powder    Cystic fibrosis (CMS/HCC) - Primary    Current Assessment & Plan     ppFEV1 down although in normal range. Discussed options. She could start Trikafta, which may have more dramatic benefit than continuation of mucoactive agent (Pulmozyme) coupled with chest physiotherapy. The latter is obviously more burdensome than taking Trikafta. Reviewed more common  side effects of Trikafta with Lucia. I think it would be good idea for CF pharmacist, Chantelle Naidu, to call patient later this week to ensure no questions about starting modulator. We decided to pursue Trikafta. [facility]  Start doxycycline 100 mg PO BID x 14 days.  Continue Pulmozyme daily and performing HFCWO.  Xopenex (levalbuterol) has been better for her than racemic albuterol - no tremulousness.  Surveillance respiratory tract culture today.         Relevant Medications    doxycycline (Adoxa) 100 mg tablet    Other Relevant Orders    Hepatic Function Panel (Completed)    Renal Function Panel    Gamma-Glutamyl Transferase (Completed)    Immunoglobulin IgE (Completed)    CBC and Auto Differential (Completed)    Basic Metabolic Panel (Completed)    Phosphorus (Completed)    Diabetes mellitus related to CF (cystic fibrosis) (CMS/[facility])    Overview     CFRD Hx: Diagnosed in March 2013 when A1c was 7.6%. Fasting glucose in the office in April 2013 was 252. we started her on Levemir.   June 2021: 6.9%  Sept 2021: 6.4%  April 2022: 7.3%  July 2023: 6.5%  Microalbumin: WNL 7/2023         Current Assessment & Plan     Taking basal insulin. Follows with Dr. Ambrose. Due for follow-up with Dr. Ambrose. Will alert her.  No evidence of microalbuminuria today.  Hgb A1C was 6.4% today.         Relevant Orders    Hemoglobin A1C (Completed)    Albumin , Urine Random (Completed)    Exocrine pancreatic insufficiency    Current Assessment & Plan     Continue Creon 24, 3-4 capsules by mouth with meals.  Follow-up serum levels of fat-soluble vitamins.         Relevant Medications    Creon 24,000-76,000 -120,000 unit capsule    Other Relevant Orders    Vitamin D 25-Hydroxy,Total (for eval of Vitamin D levels) (Completed)    Vitamin E    Vitamin A    Job-Gamma-Carboxy Prothrombin, Serum (DCP); [facility]; DCP - Miscellaneous Test    Gastroesophageal reflux disease without esophagitis    Relevant  Medications    omeprazole (PriLOSEC) 40 mg DR capsule    MRSA (methicillin resistant staph aureus) culture positive    Current Assessment & Plan     Sick plan - doxycycline 100 mg by mouth BID x 14 days.         Other fatigue    Current Assessment & Plan     Clinically and biochemical euthyroid today.  Hemoglobin normal, refutes anemia.  Consider referral for polysomnography to exclude obstructive sleep apnea.         Relevant Orders    TSH with reflex to Free T4 if abnormal (Completed)    Primary hypertension    Current Assessment & Plan     Blood pressure is better on the combination of hydrochlorothiazide and losartan.  Now managed by her primary care physician.         Relevant Medications    losartan (Cozaar) 100 mg tablet    hydroCHLOROthiazide (HYDRODiuril) 25 mg tablet    Other Relevant Orders    Albumin , Urine Random (Completed)    Primary insomnia    Relevant Medications    traZODone (Desyrel) 50 mg tablet    Tubular adenoma of colon    Overview     Formatting of this note might be different from the original. 2011 Coloscopy. Repeat in 2016 normal- report scanned in to epic. Repeat in 2021 recommended.         Current Assessment & Plan     Reminded Lucia that she is due for colonoscopy to follow-up this finding.             Follow-up: 4/17/2024    Nicolás Harris MD   02/12/2024

## 2024-02-12 ENCOUNTER — DOCUMENTATION (OUTPATIENT)
Dept: PHYSICAL THERAPY | Facility: HOSPITAL | Age: 64
End: 2024-02-12

## 2024-02-12 ENCOUNTER — SOCIAL WORK (OUTPATIENT)
Dept: PEDIATRIC PULMONOLOGY | Facility: HOSPITAL | Age: 64
End: 2024-02-12
Payer: MEDICARE

## 2024-02-12 ENCOUNTER — MULTIDISCIPLINARY VISIT (OUTPATIENT)
Dept: PEDIATRIC PULMONOLOGY | Facility: HOSPITAL | Age: 64
End: 2024-02-12
Payer: MEDICARE

## 2024-02-12 ENCOUNTER — HOSPITAL ENCOUNTER (OUTPATIENT)
Dept: RESPIRATORY THERAPY | Facility: HOSPITAL | Age: 64
Discharge: HOME | End: 2024-02-12
Payer: MEDICARE

## 2024-02-12 ENCOUNTER — DOCUMENTATION (OUTPATIENT)
Dept: PEDIATRIC PULMONOLOGY | Facility: HOSPITAL | Age: 64
End: 2024-02-12

## 2024-02-12 VITALS
SYSTOLIC BLOOD PRESSURE: 145 MMHG | RESPIRATION RATE: 20 BRPM | DIASTOLIC BLOOD PRESSURE: 74 MMHG | HEIGHT: 62 IN | BODY MASS INDEX: 22.41 KG/M2 | HEART RATE: 60 BPM | TEMPERATURE: 97.6 F | OXYGEN SATURATION: 95 % | WEIGHT: 121.8 LBS

## 2024-02-12 DIAGNOSIS — K21.9 GASTROESOPHAGEAL REFLUX DISEASE WITHOUT ESOPHAGITIS: ICD-10-CM

## 2024-02-12 DIAGNOSIS — F10.90 ALCOHOL USE DISORDER: ICD-10-CM

## 2024-02-12 DIAGNOSIS — R53.83 OTHER FATIGUE: ICD-10-CM

## 2024-02-12 DIAGNOSIS — D12.6 TUBULAR ADENOMA OF COLON: ICD-10-CM

## 2024-02-12 DIAGNOSIS — Z22.322 MRSA (METHICILLIN RESISTANT STAPH AUREUS) CULTURE POSITIVE: ICD-10-CM

## 2024-02-12 DIAGNOSIS — F51.01 PRIMARY INSOMNIA: ICD-10-CM

## 2024-02-12 DIAGNOSIS — E08.9 DIABETES MELLITUS RELATED TO CF (CYSTIC FIBROSIS) (MULTI): ICD-10-CM

## 2024-02-12 DIAGNOSIS — K86.81 EXOCRINE PANCREATIC INSUFFICIENCY (HHS-HCC): ICD-10-CM

## 2024-02-12 DIAGNOSIS — E84.9 CF (CYSTIC FIBROSIS) (MULTI): ICD-10-CM

## 2024-02-12 DIAGNOSIS — K59.01 SLOW TRANSIT CONSTIPATION: ICD-10-CM

## 2024-02-12 DIAGNOSIS — E84.9 CYSTIC FIBROSIS (MULTI): Primary | ICD-10-CM

## 2024-02-12 DIAGNOSIS — E84.8 DIABETES MELLITUS RELATED TO CF (CYSTIC FIBROSIS) (MULTI): ICD-10-CM

## 2024-02-12 DIAGNOSIS — I10 PRIMARY HYPERTENSION: ICD-10-CM

## 2024-02-12 PROBLEM — F33.2 SEVERE EPISODE OF RECURRENT MAJOR DEPRESSIVE DISORDER, WITHOUT PSYCHOTIC FEATURES (MULTI): Status: RESOLVED | Noted: 2017-05-16 | Resolved: 2024-02-12

## 2024-02-12 LAB
25(OH)D3 SERPL-MCNC: 52 NG/ML (ref 30–100)
ALBUMIN SERPL BCP-MCNC: 4.5 G/DL (ref 3.4–5)
ALP SERPL-CCNC: 94 U/L (ref 33–136)
ALT SERPL W P-5'-P-CCNC: 17 U/L (ref 7–45)
ANION GAP SERPL CALC-SCNC: 13 MMOL/L (ref 10–20)
AST SERPL W P-5'-P-CCNC: 16 U/L (ref 9–39)
BASOPHILS # BLD AUTO: 0.04 X10*3/UL (ref 0–0.1)
BASOPHILS NFR BLD AUTO: 0.5 %
BILIRUB DIRECT SERPL-MCNC: 0.1 MG/DL (ref 0–0.3)
BILIRUB SERPL-MCNC: 0.3 MG/DL (ref 0–1.2)
BUN SERPL-MCNC: 17 MG/DL (ref 6–23)
CALCIUM SERPL-MCNC: 9.9 MG/DL (ref 8.6–10.6)
CHLORIDE SERPL-SCNC: 103 MMOL/L (ref 98–107)
CO2 SERPL-SCNC: 29 MMOL/L (ref 21–32)
CREAT SERPL-MCNC: 0.88 MG/DL (ref 0.5–1.05)
CREAT UR-MCNC: 67.4 MG/DL (ref 20–320)
EGFRCR SERPLBLD CKD-EPI 2021: 74 ML/MIN/1.73M*2
EOSINOPHIL # BLD AUTO: 0.13 X10*3/UL (ref 0–0.7)
EOSINOPHIL NFR BLD AUTO: 1.7 %
ERYTHROCYTE [DISTWIDTH] IN BLOOD BY AUTOMATED COUNT: 13.2 % (ref 11.5–14.5)
EST. AVERAGE GLUCOSE BLD GHB EST-MCNC: 137 MG/DL
GGT SERPL-CCNC: 26 U/L (ref 5–55)
GLUCOSE SERPL-MCNC: 110 MG/DL (ref 74–99)
HBA1C MFR BLD: 6.4 %
HCT VFR BLD AUTO: 39.8 % (ref 36–46)
HGB BLD-MCNC: 13.6 G/DL (ref 12–16)
IGE SERPL-ACNC: <2 IU/ML (ref 0–214)
IMM GRANULOCYTES # BLD AUTO: 0.02 X10*3/UL (ref 0–0.7)
IMM GRANULOCYTES NFR BLD AUTO: 0.3 % (ref 0–0.9)
LYMPHOCYTES # BLD AUTO: 1.99 X10*3/UL (ref 1.2–4.8)
LYMPHOCYTES NFR BLD AUTO: 25.5 %
MCH RBC QN AUTO: 30.6 PG (ref 26–34)
MCHC RBC AUTO-ENTMCNC: 34.2 G/DL (ref 32–36)
MCV RBC AUTO: 89 FL (ref 80–100)
MICROALBUMIN UR-MCNC: <7 MG/L
MICROALBUMIN/CREAT UR: NORMAL MG/G{CREAT}
MONOCYTES # BLD AUTO: 0.53 X10*3/UL (ref 0.1–1)
MONOCYTES NFR BLD AUTO: 6.8 %
NEUTROPHILS # BLD AUTO: 5.08 X10*3/UL (ref 1.2–7.7)
NEUTROPHILS NFR BLD AUTO: 65.2 %
NRBC BLD-RTO: 0 /100 WBCS (ref 0–0)
PHOSPHATE SERPL-MCNC: 3.1 MG/DL (ref 2.5–4.9)
PLATELET # BLD AUTO: 231 X10*3/UL (ref 150–450)
POTASSIUM SERPL-SCNC: 4 MMOL/L (ref 3.5–5.3)
PROT SERPL-MCNC: 7.2 G/DL (ref 6.4–8.2)
RBC # BLD AUTO: 4.45 X10*6/UL (ref 4–5.2)
SODIUM SERPL-SCNC: 141 MMOL/L (ref 136–145)
TSH SERPL-ACNC: 1.02 MIU/L (ref 0.44–3.98)
WBC # BLD AUTO: 7.8 X10*3/UL (ref 4.4–11.3)

## 2024-02-12 PROCEDURE — 36415 COLL VENOUS BLD VENIPUNCTURE: CPT | Performed by: INTERNAL MEDICINE

## 2024-02-12 PROCEDURE — 94010 BREATHING CAPACITY TEST: CPT

## 2024-02-12 PROCEDURE — 82306 VITAMIN D 25 HYDROXY: CPT | Performed by: INTERNAL MEDICINE

## 2024-02-12 PROCEDURE — 87015 SPECIMEN INFECT AGNT CONCNTJ: CPT | Performed by: INTERNAL MEDICINE

## 2024-02-12 PROCEDURE — 82248 BILIRUBIN DIRECT: CPT | Performed by: INTERNAL MEDICINE

## 2024-02-12 PROCEDURE — 99214 OFFICE O/P EST MOD 30 MIN: CPT | Performed by: INTERNAL MEDICINE

## 2024-02-12 PROCEDURE — 94010 BREATHING CAPACITY TEST: CPT | Performed by: INTERNAL MEDICINE

## 2024-02-12 PROCEDURE — 80321 ALCOHOLS BIOMARKERS 1OR 2: CPT | Performed by: INTERNAL MEDICINE

## 2024-02-12 PROCEDURE — 84590 ASSAY OF VITAMIN A: CPT | Performed by: INTERNAL MEDICINE

## 2024-02-12 PROCEDURE — 83036 HEMOGLOBIN GLYCOSYLATED A1C: CPT | Performed by: INTERNAL MEDICINE

## 2024-02-12 PROCEDURE — 82785 ASSAY OF IGE: CPT | Performed by: INTERNAL MEDICINE

## 2024-02-12 PROCEDURE — 82043 UR ALBUMIN QUANTITATIVE: CPT | Performed by: INTERNAL MEDICINE

## 2024-02-12 PROCEDURE — 82977 ASSAY OF GGT: CPT | Performed by: INTERNAL MEDICINE

## 2024-02-12 PROCEDURE — 84100 ASSAY OF PHOSPHORUS: CPT | Performed by: INTERNAL MEDICINE

## 2024-02-12 PROCEDURE — 84446 ASSAY OF VITAMIN E: CPT | Performed by: INTERNAL MEDICINE

## 2024-02-12 PROCEDURE — 84443 ASSAY THYROID STIM HORMONE: CPT | Performed by: INTERNAL MEDICINE

## 2024-02-12 PROCEDURE — 87070 CULTURE OTHR SPECIMN AEROBIC: CPT | Performed by: INTERNAL MEDICINE

## 2024-02-12 PROCEDURE — 85025 COMPLETE CBC W/AUTO DIFF WBC: CPT | Performed by: INTERNAL MEDICINE

## 2024-02-12 PROCEDURE — 87102 FUNGUS ISOLATION CULTURE: CPT | Performed by: INTERNAL MEDICINE

## 2024-02-12 RX ORDER — HYDROCHLOROTHIAZIDE 25 MG/1
25 TABLET ORAL DAILY
Start: 2024-02-12

## 2024-02-12 RX ORDER — PANCRELIPASE 24000; 76000; 120000 [USP'U]/1; [USP'U]/1; [USP'U]/1
3-4 CAPSULE, DELAYED RELEASE PELLETS ORAL
Start: 2024-02-12

## 2024-02-12 RX ORDER — OMEPRAZOLE 40 MG/1
40 CAPSULE, DELAYED RELEASE ORAL DAILY
Start: 2024-02-12

## 2024-02-12 RX ORDER — POLYETHYLENE GLYCOL 3350 17 G/17G
34 POWDER, FOR SOLUTION ORAL 2 TIMES DAILY
Start: 2024-02-12

## 2024-02-12 RX ORDER — DOXYCYCLINE 100 MG/1
100 TABLET ORAL 2 TIMES DAILY
Qty: 28 TABLET | Refills: 0 | Status: SHIPPED | OUTPATIENT
Start: 2024-02-12 | End: 2024-02-26

## 2024-02-12 RX ORDER — LEVALBUTEROL TARTRATE 45 UG/1
2 AEROSOL, METERED ORAL EVERY 4 HOURS PRN
Qty: 15 G | Refills: 3 | Status: SHIPPED | OUTPATIENT
Start: 2024-02-12 | End: 2024-04-04 | Stop reason: SDUPTHER

## 2024-02-12 RX ORDER — TRAZODONE HYDROCHLORIDE 50 MG/1
50 TABLET ORAL NIGHTLY PRN
Qty: 90 TABLET | Refills: 2 | Status: SHIPPED | OUTPATIENT
Start: 2024-02-12

## 2024-02-12 RX ORDER — ELEXACAFTOR, TEZACAFTOR, AND IVACAFTOR 100-50-75
KIT ORAL
Qty: 84 TABLET | Refills: 3 | Status: SHIPPED | OUTPATIENT
Start: 2024-02-12 | End: 2024-03-13 | Stop reason: SDUPTHER

## 2024-02-12 RX ORDER — LOSARTAN POTASSIUM 100 MG/1
100 TABLET ORAL DAILY
Start: 2024-02-12

## 2024-02-12 ASSESSMENT — PULMONARY FUNCTION TESTS
FEV1 (%PREDICTED): 94
FEV1/FVC: 0.02
FVC (LITERS): 2.87
FVC_PERCENT_PREDICTED: 99
FEV1 (LITERS): 2.17

## 2024-02-12 NOTE — PROGRESS NOTES
Subjective   Patient ID: Mansi Morrison is a 64 y.o. female who presents for Cystic Fibrosis in outpatient clinic.    Social Work Visit Type: This is a Follow-Up visit for Mansi Morrison  Location: [facility]    Identifying Information                              : 1960  Assessment date: 2024    Objective     Patient accompanied by:  Self    Family System / Parents:    Raised by:  with biological parent(s)  Mother:  Name:  Cleopatra ()  Father:  Name:  Arun ()  Siblings & Children Information:  Siblings: brothers: 2, 1  and sisters: 1  Had stepson for 14 years with ex, until he passed away, was killed when a electric wire fell on him    Relationships / Social History:  Patient lives with: patient lives with dog Oly  Relationship status: Relationship Status / Family Dynamic: Single:  Yes  Marital/relationship status:  stabilized since last visit  Does patient have adequate housing?:Owns home  Does patient feel safe in home?: Yes  Supportive Relationships: friends/neighbors, [place] community, and [facility] groups    Education:  Education: Highest Level of Education: Some College  Employer information: 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 no, interested in learning more. Gave info to [facility] to connect with [facility] Legal.     Adherence and Understanding of Health:  Knowledge of health:  fair, Overall adherence:  good, Adherence with medications:  good, Managed by: patient, Understanding of medication:  good, and Adherence with appointments:   good    Cognition/Mood/Affect:  The patient was neatly groomed, appropriately dressed and adequately nourished.  What are the patient's psychosocial stressors:  Brother has MS, personal codependency issues with friends who are not sober, work , concerned about lungs  "    Mood:   How has your mood been lately?: \"good\"  How do you manage stress?: Working to \"let go\" and codependency, pickleball, yoga, walk dog on beach, finding new ways to connect with people   What are your goals for this year?: Go to [place], build business, be \"more of service\", be \"active\" and try new things  What about having CF do you find challenging?  fatigue, lung capacity  How does living with CF affect your social life?  feels tired to hang out with friends    Thought Processes   Organized?  yes  Have you ever been hospitalized in a psychiatric hospital? yes  *If yes, date: age 23 Symptoms: suicide attempt, to monitor, over dose and first time got sober   Do you currently or have you ever seen a therapist/counselor/psychiatrist? yes  *If yes, indicate name/contact information: Seejayy Davidson in [facility]  Have you used medications for mental health issues, sleep and/or pain now or in the past?  yes  *If yes, please indicate names/dosages/prescribing provider information:  see chart - counselor mentioned interest in medication for focus.     Substance Use:  sober    Advance Directives  Do you have an advance directive/living will?  no  has NO advanced directive  - add't info requested. Referral to [facility]: yes - can complete when ready  Strengths:  Working on boundaries, sobriety, sees a counselor she likes, great group of friends.  What are the patient's coping behaviors?  Keeps busy, has peace and quiet time, works on emotional regulation.     Barriers:  working on codependency    Impression:  Patient reports she is 3 months and 4 days sober. Hosts \"Mindful Monday\" group with friends at her house. Group of 5 to talk, provide emotional and sober support. Read \"Four Thousand Weeks: Time Management for Mortals\" and found it helpful.   SW engaged in active listening and supportive counseling. SW facilitated the expression of thoughts and feeling related to the issues noted above. SW reviewed contact " information and availability for on-going support in between CF clinic appointments.      SW participated in a CF clinic huddle during which time the patients care plan, treatment needs and issues were discussed. The information noted above has been shared with the CF team during CF clinic.     PHQ9: 7  (July 2023 was 18)  GAD7: 4  (July 2023 was 14)    Assessment/Plan     Plan: Patient wrote down info for [facility]. SW available for support.   On-going psychosocial and emotional support, supportive counseling and referrals as indicated to maximize adjustment to living with cystic fibrosis.    Heidi Ngo, DUTCH  February 12, 2024

## 2024-02-12 NOTE — PROGRESS NOTES
Respiratory Note:    Patient's Airway Clearance: hill rom vest   Frequency: PRN  Exercise: yes very actice  Oscillating Device: yes flutter - using   Vang Cough: yes PRN reviewed technique today  Primary: exercise  Secondary: flutter    Aerosols: Name of medication, frequency, type of nebulizer used, Mask or Mouthpiece?  Bronchodilators: levalbuterol nebs  Pulmozyme: yes once a day  Hypertonic Saline: no  Antibiotics: no  ICS/Combinations: no    Patient Assistance Program: yes pulmozyme co pay card    Oxygen: no    CPAP, BIPAP, Assisted Breathing (use at home or in-patient): no    Have you smoked cigarettes in the last year?: no     Are you exposed to second hand smoke: no    Does anyone in your household smoke cigarettes?: no    Did you use electronic cigarettes (vape) this year?: no    During the reporting year, how often was this patient vaping?: not at all      Comments:      Requested refills for pulmozyme - done [facility] to [facility]    Reviewed vang cough    Went to [place] for week - had a nice time

## 2024-02-12 NOTE — PROGRESS NOTES
Peripheral venipuncture for labs. Procedure followed per protocal for blood draw. Patient's skin prepared,  Rt AC    Lab specimen obtained per providers orders.  RN applied clean dry adhesive dressing to exit site.  Pt tolerated the procedure with no problems. Advised pt to keep the dressing intact for at least an hour after the draw.

## 2024-02-12 NOTE — LETTER
February 12, 2024       No Recipients    Patient: Mansi Morrison   YOB: 1960   Date of Visit: 2/12/2024       Dear Dr. Crooks:    Thank you for referring Mansi Morrison to me for evaluation. Below are my notes for this consultation.  If you have questions, please do not hesitate to call me. I look forward to following your patient along with you.       Sincerely,     Nicolás Harris MD      CC:   No Recipients  ______________________________________________________________________________________        Mason Fontanez M.D. 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 Historically normal lung function (ppFEV1 100-106%) so Trikafta deferred. 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 EPI on PERT. Constipation.  Other problems: alcohol use disorder (AA); GERD; osteopenia; generalized anxiety disorder (2017); poorly-controlled hypertension, insomnia on trazodone    HPI (2/12/2024): ED at [facility] on 11/17/2023 with hypertensive urgency (SBP ~200 at home, /94 documented); had headache and chest pain; troponin, BNP, EKG (sinus bradycardia), CXR were unremarkable.  Saw PCP, tried amlodipine added to the losartan, was having symptomatic hypotension, switched to hydrochlorothiazide plus losartan and stopped the amlodipine, much better.  Started seeing new therapist after last visit, very helpful, good relationship.  Says that she has been sober for 3 months and 4 days.  Brought on a colleague to help with work.  Had influenza diagnosed by NP rapid-antigen test at a pharmacy, around 12/20/2023. Felt really poorly, had fever, cough, post-tussive emesis, dyspnea, anorexia. Started doing Pulmozyme and HFCWO again on most days. Prescribed course of doxycycline. Since that infection, however, generally feels more central chest mucus and pressure. Because ppFEV1 was also down today, she is concerned about deteriorating lung function. Generally feels tired since  the influenza. Weight down 1.3 kg since August 2023.     HPI (11/8/2023): Plan at last visit was to increase losartan to 100 mg/day, home BP monitoring. Discussed referral to psychiatry to manage anxiety and depression. Increased trazodone to 100 mg at HS for insomnia. Needed to stick with MiraLAX for prophylaxis. Had poison ivy and put on brief course of prednisone.  Few weeks ago had relapse of drinking. Went out to bar, drank enough to black out, somehow drove home. Scared her. She connected with her sponsor and actually has appointment with a counselor this evening. Feeling increased chest congestion. Coughing occasionally. Thinks she might be headed toward lung infection. MiraLAX has prevented RLQ cramping.     HPI (8/8/2023): Had fun in [facility] but has been sick from respiratory perspective twice. I gave her a prescription for doxycycline to take with her on the trip. She started after returning due to chest congestion. Cleared. Then got poison ivy. Took 3 days of a steroid she had on-hand. Flared up after stopping. Currently having mild chest tightness and wheezing. Only using albuterol once daily, though. BP still higher than desirable. Has been on losartan 50 mg daily. Sleeping poorly on trazodone 50 mg. Took 100 mg one night and slept better.     HPI (7/10/2023): First visit, transfer care from Dr. Fortune. Last seen in November 2022. Issues at that point included intermittent abdominal pain (likely constipation), uncontrolled hypertension despite taking losartan 50 mg daily, CFRD followed by Dr. Ambrose, and recent Burkholderia multivorans infection, consideration of nebulized meropenem. Colonoscopy discussed, would apparently need to be done in [facility] system for insurance reasons. No interval need for antibiotics. Used Pulmozyme in January for a few weeks due to congestion. Helpful. Still having tendency toward constipation, thinking about using MiraLAX. Sometimes experiences left-sided  headache and blurry vision in the left eye when hypertensive. Frequency not accelerating. Thinks that meditation well help with her blood pressure. Leaving for trip to [place] tomorrow.    Current Use of Health Management Strategies:    Respiratory Interventions  Albuterol: Two times per day  Pulmozyme: Once daily - every other day since influenza in December 2023  Hypertonic saline: Does not use   HFCWO (percussive vest): The Vest ([place]) Daily - every other day   Oscillatory PEP: Does not use  Exercise: No Regular Exercise  LTE antagonist: No  Azithromycin: Does not use  Aztreonam lysine ([place]): Does not use  Tobramycin: Does not use  Colistin: Does not use  Meropenem (inhaled): Does not use  Vancomycin (inhaled): Does not use  ICS: Does not use  ICS/LABA: Does not use  LAMA: No  CFTR modulator: Trikafta candidate by genotype but have deferred due to preserved lung function and infrequent pulmonary exacerbations, concern about potential effects on mood/cognition.  Oxygen: Does not use    Digestive Health Interventions    Acid-suppressing medication?: Yes  Using CF vitamins?: No  Taking pancreatic enzymes?: Yes - Creon 24; 3 to 4 capsules with meals and 1 to 2 capsules with snacks   Any diarrhea?: No  Any steatorrhea?: No  Any constipation?: Yes - does better with more regular use of MiraLAX  Using laxatives?: Yes  Feeding tube?: No  Supplemental enteral nutrition?: No    Pulmonary Function Test Results    PFT (2/12/2024): FEV1 2.29 L (94%), FVC 3.19 L (109%), FEV1/FVC 72; normal spirometry  PFT (11/8/2023): FEV1 2.29 L (100%), FVC 3.19 L (109%), FEV1/FVC 72; normal spirometry  PFT (7/10/2023): FEV1 2.45 L (106%), FVC 3.27 L (112%), FEV1/FVC 75; normal spirometry  PFT (10/10/2022): FEV1 2.47 L (106%), FVC 3.25 L (110%), FEV1/FVC 76; normal spirometry  PFT (2/21/2022): FEV1 2.48 L (106%), FVC 3.28 L (110%), FEV1/FVC 76; normal spirometry  PFT (6/23/2021): FEV1 2.48 L (103%), FVC 3.39 L (109%), FEV1/FVC 73;  "normal spirometry    Current Medications     Current Outpatient Medications   Medication Instructions   • Creon 24,000-76,000 -120,000 unit capsule 3-4 capsules, oral, 3 times daily with meals, MOUTH WITH MEALS AND TAKE 1-2 CAPSULES WITH SNACKS   • dornase Alpha (Pulmozyme) 1 mg/mL nebulizer solution INHALE CONTENTS OF 1 VIAL VIA NEBULIZER DAILY   • elexacaftor-tezacaftor-ivacaft (Trikafta) 100-50-75 mg tablet Take 2 orange tablets by mouth in the morning with fat-containing food and 1 blue tablet by mouth in the evening with fat-containing food, approximately 12 hours apart.   • FreeStyle Lite Strips strip  TEST 6-7 TIMES DAILY   • hydroCHLOROthiazide (HYDRODIURIL) 25 mg, oral, Daily   • hydrOXYzine HCL (Atarax) 25 mg tablet    • insulin glargine (Basaglar KwikPen U-100 Insulin) 100 unit/mL (3 mL) pen Inject 8 units daily as directed   • levalbuterol (Xopenex HFA) 45 mcg/actuation inhaler 2 puffs, inhalation, Every 4 hours PRN   • losartan (COZAAR) 100 mg, oral, Daily   • mecobalamin, vitamin B12, 1,000 mcg tablet,disintegrating 1 tablet, sublingual, Daily   • omeprazole (PRILOSEC) 40 mg, oral, Daily   • polyethylene glycol (GLYCOLAX, MIRALAX) 34 g, oral, 2 times daily,  MIX 34 GM TWICE DAILY AND TAKE AS DIRECTED   • polyethylene glycol-electrolytes (polyethylene glycol) 420 gram solution oral,  TAKE AS DIRECTED.   • traZODone (DESYREL) 50 mg, oral, Nightly PRN       Physical Examination     /74 (BP Location: Right arm, Patient Position: Sitting, BP Cuff Size: Adult)   Pulse 60   Temp 36.4 °C (97.6 °F) (Oral)   Resp 20   Ht 1.572 m (5' 1.89\")   Wt 55.3 kg (121 lb 12.9 oz)   SpO2 95%   BMI 22.36 kg/m²     General: Pleasant, tired-appearing woman in no distress. Normal vocal character.  HEENT: normocephalic; anicteric sclerae; conjunctivae not injected; nasal mucosa was unremarkable; oropharynx was clear without evidence of thrush; (+) dental plate  Neck: supple; no lymphadenopathy or " thyromegaly.  Chest: clear to auscultation bilaterally; no chest wall deformity.  Cardiac: regular rhythm; no gallop or murmur.  Abdomen: soft; non-tender; non-distended; no hepatosplenomegaly, no renal bruit.  Extremities: no leg edema; no digital clubbing; 2+ pulses  Psychiatric: Good eye contact; spontaneous social smile     Metabolic Parameters     Sodium   Date/Time Value Ref Range Status   02/12/2024 02:43  136 - 145 mmol/L Final     Potassium   Date/Time Value Ref Range Status   02/12/2024 02:43 PM 4.0 3.5 - 5.3 mmol/L Final     Chloride   Date/Time Value Ref Range Status   02/12/2024 02:43  98 - 107 mmol/L Final     Bicarbonate   Date/Time Value Ref Range Status   02/12/2024 02:43 PM 29 21 - 32 mmol/L Final     Anion Gap   Date/Time Value Ref Range Status   02/12/2024 02:43 PM 13 10 - 20 mmol/L Final     Urea Nitrogen   Date/Time Value Ref Range Status   02/12/2024 02:43 PM 17 6 - 23 mg/dL Final     Creatinine   Date/Time Value Ref Range Status   02/12/2024 02:43 PM 0.88 0.50 - 1.05 mg/dL Final     GFR Female   Date/Time Value Ref Range Status   07/10/2023 01:28 PM 64 >90 mL/min/1.73m2 Final     Comment:      CALCULATIONS OF ESTIMATED GFR ARE PERFORMED   USING THE 2021 CKD-EPI STUDY REFIT EQUATION   WITHOUT THE RACE VARIABLE FOR THE IDMS-TRACEABLE   CREATININE METHODS.    https://jasn.asnjournals.org/content/early/2021/09/22/ASN.[email or web address]       Glucose   Date/Time Value Ref Range Status   02/12/2024 02:43  (H) 74 - 99 mg/dL Final     Calcium   Date/Time Value Ref Range Status   02/12/2024 02:43 PM 9.9 8.6 - 10.6 mg/dL Final     Thyroid Stimulating Hormone   Date/Time Value Ref Range Status   02/12/2024 02:47 PM 1.02 0.44 - 3.98 mIU/L Final     TSH   Date/Time Value Ref Range Status   07/10/2023 01:28 PM 1.13 0.44 - 3.98 mIU/L Final     Comment:      TSH testing is performed using different testing    methodology at [facility] than at other    [facility]. Direct result  comparisons should    only be made within the same method.     02/21/2022 09:12 AM 2.97 0.44 - 3.98 mIU/L Final     Comment:      TSH testing is performed using different testing    methodology at [facility] than at other    [facility]. Direct result comparisons should    only be made within the same method.       Phosphorus   Date/Time Value Ref Range Status   02/12/2024 02:43 PM 3.1 2.5 - 4.9 mg/dL Final     Comment:     The performance characteristics of phosphorus testing in heparinized plasma have been validated by the individual  laboratory site where testing is performed. Testing on heparinized plasma is not approved by the FDA; however, such approval is not necessary.       Hematologic Parameters     WBC   Date/Time Value Ref Range Status   02/12/2024 02:43 PM 7.8 4.4 - 11.3 x10*3/uL Final     Neutrophils Absolute   Date/Time Value Ref Range Status   02/12/2024 02:43 PM 5.08 1.20 - 7.70 x10*3/uL Final     Comment:     Percent differential counts (%) should be interpreted in the context of the absolute cell counts (cells/uL).     Neutrophils %   Date/Time Value Ref Range Status   02/12/2024 02:43 PM 65.2 40.0 - 80.0 % Final     Lymphocytes %   Date/Time Value Ref Range Status   02/12/2024 02:43 PM 25.5 13.0 - 44.0 % Final     Monocytes %   Date/Time Value Ref Range Status   02/12/2024 02:43 PM 6.8 2.0 - 10.0 % Final     Basophils %   Date/Time Value Ref Range Status   02/12/2024 02:43 PM 0.5 0.0 - 2.0 % Final     Eosinophils Absolute   Date/Time Value Ref Range Status   02/12/2024 02:43 PM 0.13 0.00 - 0.70 x10*3/uL Final     Eosinophils %   Date/Time Value Ref Range Status   02/12/2024 02:43 PM 1.7 0.0 - 6.0 % Final     Hemoglobin   Date/Time Value Ref Range Status   02/12/2024 02:43 PM 13.6 12.0 - 16.0 g/dL Final     Hematocrit   Date/Time Value Ref Range Status   02/12/2024 02:43 PM 39.8 36.0 - 46.0 % Final     RBC   Date/Time Value Ref Range Status   02/12/2024 02:43 PM 4.45 4.00 -  5.20 x10*6/uL Final     MCV   Date/Time Value Ref Range Status   02/12/2024 02:43 PM 89 80 - 100 fL Final     MCHC   Date/Time Value Ref Range Status   02/12/2024 02:43 PM 34.2 32.0 - 36.0 g/dL Final     Platelets   Date/Time Value Ref Range Status   02/12/2024 02:43  150 - 450 x10*3/uL Final       Fat-Soluble Vitamin Levels     Vitamin D, 25-Hydroxy, Total   Date/Time Value Ref Range Status   02/12/2024 02:43 PM 52 30 - 100 ng/mL Final     Vitamin A (Retinol)   Date/Time Value Ref Range Status   07/10/2023 01:28 PM 0.53 0.30 - 1.20 mg/L Final     Vitamin A, Interpretation   Date/Time Value Ref Range Status   07/10/2023 01:28 PM Normal  Final     Comment:     This test was developed and its performance characteristics   determined by [facility]. It has not been cleared or   approved by the US Food and Drug Administration. This test was   performed in a CLIA certified laboratory and is intended for   clinical purposes.  Performed By: [facility]  [address] [place] [address]  : Tello Pacheco MD, PhD       Vitamin E (Alpha-Tocopherol)   Date/Time Value Ref Range Status   07/10/2023 01:28 PM 6.2 5.5 - 18.0 mg/L Final     Comment:     This test was developed and its performance characteristics   determined by [facility]. It has not been cleared or   approved by the US Food and Drug Administration. This test was   performed in a CLIA certified laboratory and is intended for   clinical purposes.       Vitamin E (Gamma-Tocopherol)   Date/Time Value Ref Range Status   07/10/2023 01:28 PM 2.0 0.0 - 6.0 mg/L Final     Comment:     Performed By: [facility]  [address] [place], [place] [id number]  : Tello Pacheco MD, PhD         LFT and Associated Parameters     AST   Date/Time Value Ref Range Status   02/12/2024 02:43 PM 16 9 - 39 U/L Final   07/10/2023 01:28 PM 18 9 - 39 U/L Final   11/21/2022 12:04 PM 18 9 - 39 U/L Final    10/10/2022 01:05 PM 23 9 - 39 U/L Final     ALT   Date/Time Value Ref Range Status   02/12/2024 02:43 PM 17 7 - 45 U/L Final     Comment:     Patients treated with Sulfasalazine may generate falsely decreased results for ALT.     ALT (SGPT)   Date/Time Value Ref Range Status   07/10/2023 01:28 PM 15 7 - 45 U/L Final     Comment:      Patients treated with Sulfasalazine may generate    falsely decreased results for ALT.     11/21/2022 12:04 PM 18 7 - 45 U/L Final     Comment:      Patients treated with Sulfasalazine may generate    falsely decreased results for ALT.     10/10/2022 01:05 PM 19 7 - 45 U/L Final     Comment:      Patients treated with Sulfasalazine may generate    falsely decreased results for ALT.       Alkaline Phosphatase   Date/Time Value Ref Range Status   02/12/2024 02:43 PM 94 33 - 136 U/L Final   07/10/2023 01:28  33 - 136 U/L Final   11/21/2022 12:04  33 - 136 U/L Final   10/10/2022 01:05  33 - 136 U/L Final     Bilirubin, Total   Date/Time Value Ref Range Status   02/12/2024 02:43 PM 0.3 0.0 - 1.2 mg/dL Final     Total Bilirubin   Date/Time Value Ref Range Status   07/10/2023 01:28 PM 0.5 0.0 - 1.2 mg/dL Final   11/21/2022 12:04 PM 0.6 0.0 - 1.2 mg/dL Final   10/10/2022 01:05 PM 0.5 0.0 - 1.2 mg/dL Final     Bilirubin, Direct   Date/Time Value Ref Range Status   02/12/2024 02:43 PM 0.1 0.0 - 0.3 mg/dL Final     GGT   Date/Time Value Ref Range Status   02/12/2024 02:43 PM 26 5 - 55 U/L Final   07/10/2023 01:28 PM 22 5 - 55 U/L Final   02/21/2022 09:12 AM 33 5 - 55 U/L Final   11/16/2020 03:16 PM 34 5 - 55 U/L Final     Albumin   Date/Time Value Ref Range Status   02/12/2024 02:43 PM 4.5 3.4 - 5.0 g/dL Final   11/08/2023 11:46 AM 4.8 3.4 - 5.0 g/dL Final   07/10/2023 01:28 PM 4.5 3.4 - 5.0 g/dL Final   11/21/2022 12:04 PM 4.4 3.4 - 5.0 g/dL Final   10/10/2022 01:05 PM 4.2 3.4 - 5.0 g/dL Final     Total Protein   Date/Time Value Ref Range Status   02/12/2024 02:43 PM 7.2 6.4 -  8.2 g/dL Final   07/10/2023 01:28 PM 7.3 6.4 - 8.2 g/dL Final   11/21/2022 12:04 PM 7.3 6.4 - 8.2 g/dL Final   10/10/2022 01:05 PM 7.2 6.4 - 8.2 g/dL Final       Coagulation Parameters     Protime   Date/Time Value Ref Range Status   11/16/2020 03:16 PM 11.0 10.1 - 13.3 sec Final     INR   Date/Time Value Ref Range Status   11/16/2020 03:16 PM 0.9 0.9 - 1.1 Final       Diabetes Laboratory Tests     Hemoglobin A1C   Date/Time Value Ref Range Status   02/12/2024 02:43 PM 6.4 (H) see below % Final     Albumin, Urine Random   Date/Time Value Ref Range Status   02/12/2024 02:43 PM <7.0 Not established mg/L Final     Creatinine, Urine Random   Date/Time Value Ref Range Status   02/12/2024 02:43 PM 67.4 20.0 - 320.0 mg/dL Final     Creatinine, Urine   Date/Time Value Ref Range Status   07/10/2023 01:28 PM 38.9 20.0 - 320.0 mg/dL Final   06/23/2021 01:54 PM 60.7 20.0 - 320.0 mg/dL Final     Albumin/Creatine Ratio   Date/Time Value Ref Range Status   02/12/2024 02:43 PM   Final     Comment:     One or more analytes used in this calculation is outside of the analytical measurement range. Calculation cannot be performed.   07/10/2023 01:28 PM SEE COMMENT 0.0 - 30.0 ug/mg crt Final     Comment:     One or more analytes used in this calculation   is outside of the analytical measurement range.  Calculation cannot be performed.     06/23/2021 01:54 PM SEE COMMENT 0.0 - 30.0 ug/mg crt Final     Comment:     One or more analytes used in this calculation   is outside of the analytical measurement range.  Calculation cannot be performed.          Immunology Laboratory Tests     IgE   Date/Time Value Ref Range Status   02/12/2024 02:43 PM <2 0 - 214 IU/mL Final       DEXA Scan     XR BONE density 10/10/2022    Narrative  Name: OMAR GIPSON  Patient ID: [id number] YOB: 1960 Height: 62.0 in.  Gender:     Female   Exam Date:  10/10/2022 Weight: 118.0 lbs.  Indications: Cystic Fibrosis, DIABETES, family history  osteoporosis,  Hysterectomy, Post Menopausal  Fractures:    Treatments:  OMEPRAZOLE    LEFT FEMUR - TOTAL  The bone mineral density : 0.889 g/cm2  T-score : -0.9    % of young normal mean :88%  Z-score : 0.0    % of age matched mean : 100%  % change vs. Previous : N/A    % change vs. Baseline : baseline    LEFT FEMUR - NECK  The bone mineral density : 0.843 g/cm2  T-score : -1.4    % of young normal mean: 81%  Z-score : -0.1    % of age matched mean : 98%  % change vs. Previous : N/A    % change vs. Baseline : baseline    SPINE L1-L4  The bone mineral density is 1.147 g/cm2  T-score : -0.3   % of young normal mean is 97%  Z-score : 1.0    % of age matched mean is 111%  % change vs. Previous : -    % change vs. Baseline : baseline    World Health Organization (WHO) criteria for post-menopausal,   Women:  Normal:       T-score at or above -1 SD  Osteopenia:   T-score between -1 and -2.5 SD  Osteoporosis: T-score at or below -2.5 SD    10-Year Fracture Risk:  Major Osteoporotic Fracture 4.0%  Hip Fracture                0.4%  Reported Risk Factors       None    Interpretation:  According to World Health Organization criteria, classification is low bone mass.  Followup recommended on October 2024 or sooner as clinically warranted.     Chest Radiograph     XR chest 2 view 10/10/2022    Narrative  MRN: [id number]  Patient Name: OMAR GIPSON    STUDY:   CHEST 2 VIEW PA AND LAT;    INDICATION:  cystic fibrosis, malabsorption  E84.9: Cystic fibrosis E84.0: Cystic fibrosis with pulmonary manifestations.    COMPARISON:  Chest radiograph 05/09/2016    ACCESSION NUMBER(S):  [id number]    ORDERING CLINICIAN:  CELIA MIN    FINDINGS:  Frontal, lateral, and dual energy radiographs of the chest were  provided.  The cardiac silhouette size is within normal limits.  There is no focal consolidation, edema or pneumothorax.  No sizeable pleural effusion.  No acute osseous abnormality.    Impression  No acute cardiopulmonary  "process.    I personally reviewed the images/study and I agree with the findings as stated.     CF Sputum Culture     No results found for: \"AFBCX\"   Respiratory Culture, Cystic Fibrosis   Date/Time Value Ref Range Status   11/08/2023 12:48 PM (A)  Final    (1+) Rare Methicillin Resistant Staphylococcus aureus (MRSA)     Comment:     Methicillin (Oxacillin) resistant Staphylococci are resistant to all currently available Penicillins, Beta-lactam/Beta-lactamase inhibitor combinations (including Ampicillin/Sulbactam, Amoxicillin/Clavulanate and Pipercillin/Tazobactam), Carbapenems and   Cephalosporins (except Ceftaroline).   11/08/2023 12:48 PM (2+) Few Normal throat perla  Final     CF respiratory culture (11/28/2022): MRSA  CF respiratory culture (10/10/2022): MRSA, Burkholderia multivorans  CF respiratory culture (5/9/2022): MRSA  CF respiratory culture (6/3/2015): MSSA, Pseudomonas putida    Susceptibility data from last 2000 days.  Collected Specimen Info Organism Clindamycin Erythromycin Linezolid Oxacillin Tetracycline Trimethoprim/Sulfamethoxazole Vancomycin   11/08/23 Fluid from Throat Swab Methicillin Resistant Staphylococcus aureus (MRSA) S R  R S S S     Normal throat perla          07/07/23 Respiratory Staphylococcus aureus R R S S S S S   07/07/23 Respiratory Candida albicans              Problem List Items Addressed This Visit       Alcohol use disorder    Current Assessment & Plan     Congratulated her on sobriety for over 3 months. Counseling relationship seems to be working out very well.   Discussed potential benefit of establishing care with a psychiatrist who could prescribe medications to promote abstinence from alcohol (acamprosate or naltrexone)   Have checked hepatic function panel and Phosphatidylethanol (PEth), Whole Blood, Quantitative to contextualize any test results that could be influenced by regular alcohol consumption.         Relevant Orders    Phosphatidylethanol (PEth), Whole " Blood, Quantitative    Constipation    Current Assessment & Plan     Self-managing well with MiraLAX.         Relevant Medications    polyethylene glycol (Glycolax, Miralax) 17 gram/dose powder    Cystic fibrosis (CMS/[facility]) - Primary    Current Assessment & Plan     ppFEV1 down although in normal range. Discussed options. She could start Trikafta, which may have more dramatic benefit than continuation of mucoactive agent (Pulmozyme) coupled with chest physiotherapy. The latter is obviously more burdensome than taking Trikafta. Reviewed more common side effects of Trikafta with Lucia. I think it would be good idea for CF pharmacist, Chantelle Naidu, to call patient later this week to ensure no questions about starting modulator. We decided to pursue Trikafta. [facility]cription [facility]  Start doxycycline 100 mg PO BID x 14 days.  Continue Pulmozyme daily and performing HFCWO.  Xopenex (levalbuterol) has been better for her than racemic albuterol - no tremulousness.  Surveillance respiratory tract culture today.         Relevant Medications    doxycycline (Adoxa) 100 mg tablet    Other Relevant Orders    Hepatic Function Panel (Completed)    Renal Function Panel    Gamma-Glutamyl Transferase (Completed)    Immunoglobulin IgE (Completed)    CBC and Auto Differential (Completed)    Basic Metabolic Panel (Completed)    Phosphorus (Completed)    Diabetes mellitus related to CF (cystic fibrosis) (CMS/[facility])    Overview     CFRD Hx: Diagnosed in March 2013 when A1c was 7.6%. Fasting glucose in the office in April 2013 was 252. we started her on Levemir.   June 2021: 6.9%  Sept 2021: 6.4%  April 2022: 7.3%  July 2023: 6.5%  Microalbumin: WNL 7/2023         Current Assessment & Plan     Taking basal insulin. Follows with Dr. Ambrose. Due for follow-up with Dr. Ambrose. Will alert her.  No evidence of microalbuminuria today.  Hgb A1C was 6.4% today.         Relevant Orders    Hemoglobin A1C (Completed)    Albumin  , Urine Random (Completed)    Exocrine pancreatic insufficiency    Current Assessment & Plan     Continue Creon 24, 3-4 capsules by mouth with meals.  Follow-up serum levels of fat-soluble vitamins.         Relevant Medications    Creon 24,000-76,000 -120,000 unit capsule    Other Relevant Orders    Vitamin D 25-Hydroxy,Total (for eval of Vitamin D levels) (Completed)    Vitamin E    Vitamin A    Job-Gamma-Carboxy Prothrombin, Serum (DCP); [facility]; DCP - Miscellaneous Test    Gastroesophageal reflux disease without esophagitis    Relevant Medications    omeprazole (PriLOSEC) 40 mg DR capsule    MRSA (methicillin resistant staph aureus) culture positive    Current Assessment & Plan     Sick plan - doxycycline 100 mg by mouth BID x 14 days.         Other fatigue    Current Assessment & Plan     Clinically and biochemical euthyroid today.  Hemoglobin normal, refutes anemia.  Consider referral for polysomnography to exclude obstructive sleep apnea.         Relevant Orders    TSH with reflex to Free T4 if abnormal (Completed)    Primary hypertension    Current Assessment & Plan     Blood pressure is better on the combination of hydrochlorothiazide and losartan.  Now managed by her primary care physician.         Relevant Medications    losartan (Cozaar) 100 mg tablet    hydroCHLOROthiazide (HYDRODiuril) 25 mg tablet    Other Relevant Orders    Albumin , Urine Random (Completed)    Primary insomnia    Relevant Medications    traZODone (Desyrel) 50 mg tablet    Tubular adenoma of colon    Overview     Formatting of this note might be different from the original. 2011 Coloscopy. Repeat in 2016 normal- report scanned in to epic. Repeat in 2021 recommended.         Current Assessment & Plan     Reminded Lucia that she is due for colonoscopy to follow-up this finding.             Follow-up: 4/17/2024    Nicolás Harris MD   02/12/2024

## 2024-02-12 NOTE — PATIENT INSTRUCTIONS
"    It was very nice to see you today. We can offer you in-person and \"virtual\" appointments with your cystic fibrosis provider.    If you need to cancel or schedule an appointment, please call the  at the [facility] at [phone] between the hours of 8 AM and 5 PM, Monday-Friday.    To reach the CF nurse, Francesca, please call [phone]. Francesca has a secure voice mail account if you want to leave a message.    If you need to speak with an adult cystic fibrosis provider between the hours of 5 PM and 8 AM (overnight) or anytime on Saturday or Sunday, please call [phone]. When you call this number, you will be connected to an  with the [facility] and [facility] answering service. You should tell the  that you're an adult with cystic fibrosis who needs to speak to the \"cystic fibrosis doctor on-call.\" The  will take your contact information. You should then expect a call back from the cystic fibrosis doctor on-call within a short period of time.      Information on COVID-19 Vaccination:  Vaccines.gov (https://www.vaccines.gov/)  [place] Department of Health (https://gettheshot.coronavirus.[place].gov/)    If you have COVID-19 infection and we decide to treat with anti-viral medications, what pharmacies have medications in-stock?  COVID-19 Therapeutic Distribution  Map (https://covid-19-therapeutics--[facility].hub.[employer].myinfoQ/)    Important Information:  Your CFTR mutations are: [id number] x 2 copies    Your percent-predicted FEV1 today was: 94% (down from 106% in July 2023)    Your weight adjusted for height (body mass index, BMI) today was: 22.4  (goal for adult males with CF = 23.0 kg/m2, goal for adult females with CF = 22.0 kg/m2)    Sick plan (please call our office if you think you need to take antibiotics or be admitted to the hospital):     Doxycycline 100 mg by mouth twice daily x 14 days    Start Trikafta, 2 orange tablets by mouth in the " morning and 1 blue tablet by mouth in the evening. If you have any side effects from the medication (headache, rash, weight gain, change in mood), please call our office for advice.  Go ahead and take the doxycycline antibiotic.  Do the best you can with doing the Pulmozyme and percussive vest together because you will be more likely to mobilize mucus from your airways.  Very glad to hear about your abstinence from alcohol and support from psychologist.  Blood pressure looks better on the losartan and hydrochlorothiazide.  We will check several blood tests today that investigate fatigue, will exclude anemia, hypothyroidism, kidney or liver problems.  I refilled your trazodone for insomnia.    Next appointment: Late March - Early April

## 2024-02-12 NOTE — Clinical Note
Chantelle - starting Trikafta. Could you call Lucia in near future just to make sure she doesn't have any questions about the medication? Thanks. Lanie - possible for you to see Lucia on 4/17/2024 when she is supposed to come back to CF clinic? She's overdue to check-in. Thanks.

## 2024-02-13 ENCOUNTER — SPECIALTY PHARMACY (OUTPATIENT)
Dept: PHARMACY | Facility: CLINIC | Age: 64
End: 2024-02-13

## 2024-02-13 DIAGNOSIS — E84.9 CYSTIC FIBROSIS (MULTI): ICD-10-CM

## 2024-02-13 DIAGNOSIS — E84.0 CYSTIC FIBROSIS WITH PULMONARY MANIFESTATIONS (MULTI): Primary | ICD-10-CM

## 2024-02-13 NOTE — ASSESSMENT & PLAN NOTE
Clinically and biochemical euthyroid today.  Hemoglobin normal, refutes anemia.  Consider referral for polysomnography to exclude obstructive sleep apnea.

## 2024-02-13 NOTE — ASSESSMENT & PLAN NOTE
Continue Creon 24, 3-4 capsules by mouth with meals.  Follow-up serum levels of fat-soluble vitamins.

## 2024-02-13 NOTE — ASSESSMENT & PLAN NOTE
Congratulated her on sobriety for over 3 months. Counseling relationship seems to be working out very well.   Discussed potential benefit of establishing care with a psychiatrist who could prescribe medications to promote abstinence from alcohol (acamprosate or naltrexone)   Have checked hepatic function panel and Phosphatidylethanol (PEth), Whole Blood, Quantitative to contextualize any test results that could be influenced by regular alcohol consumption.

## 2024-02-13 NOTE — ASSESSMENT & PLAN NOTE
Blood pressure is better on the combination of hydrochlorothiazide and losartan.  Now managed by her primary care physician.

## 2024-02-13 NOTE — ASSESSMENT & PLAN NOTE
ppFEV1 down although in normal range. Discussed options. She could start Trikafta, which may have more dramatic benefit than continuation of mucoactive agent (Pulmozyme) coupled with chest physiotherapy. The latter is obviously more burdensome than taking Trikafta. Reviewed more common side effects of Trikafta with Lucia. I think it would be good idea for CF pharmacist, Chantelle Naidu, to call patient later this week to ensure no questions about starting modulator. We decided to pursue Trikafta. [facility]  Start doxycycline 100 mg PO BID x 14 days.  Continue Pulmozyme daily and performing HFCWO.  Xopenex (levalbuterol) has been better for her than racemic albuterol - no tremulousness.  Surveillance respiratory tract culture today.

## 2024-02-13 NOTE — ASSESSMENT & PLAN NOTE
Taking basal insulin. Follows with Dr. Ambrose. Due for follow-up with Dr. Ambrose. Will alert her.  No evidence of microalbuminuria today.  Hgb A1C was 6.4% today.

## 2024-02-14 NOTE — TELEPHONE ENCOUNTER
Pulmonary Attending  Cystic Fibrosis Service    Requested Prescriptions     Signed Prescriptions Disp Refills    dornase Alpha (Pulmozyme) 1 mg/mL nebulizer solution 75 mL 11     Sig: INHALE CONTENTS OF 1 VIAL VIA NEBULIZER DAILY     Authorizing Provider: NICOLÁS HARRIS     [address] [address]  Phone: [phone] Fax: [phone]      Nicolás Harris MD  2/13/2024  7:27 PM

## 2024-02-15 LAB
A-TOCOPHEROL VIT E SERPL-MCNC: 6.6 MG/L (ref 5.5–18)
ANNOTATION COMMENT IMP: NORMAL
BETA+GAMMA TOCOPHEROL SERPL-MCNC: 1.6 MG/L (ref 0–6)
RETINYL PALMITATE SERPL-MCNC: <0.02 MG/L (ref 0–0.1)
VIT A SERPL-MCNC: 0.52 MG/L (ref 0.3–1.2)

## 2024-02-16 LAB
BACTERIA SPT CF RESP CULT: ABNORMAL
BACTERIA SPT CF RESP CULT: ABNORMAL

## 2024-02-20 PROBLEM — B48.8: Status: ACTIVE | Noted: 2024-02-12

## 2024-03-01 LAB
FUNGUS SPEC CULT: ABNORMAL
FUNGUS SPEC CULT: ABNORMAL
FUNGUS SPEC FUNGUS STN: ABNORMAL
LABORATORY COMMENT REPORT: ABNORMAL

## 2024-03-13 DIAGNOSIS — E84.9 CYSTIC FIBROSIS (MULTI): ICD-10-CM

## 2024-03-13 RX ORDER — ELEXACAFTOR, TEZACAFTOR, AND IVACAFTOR 100-50-75
KIT ORAL
Qty: 84 TABLET | Refills: 3 | Status: SHIPPED | OUTPATIENT
Start: 2024-03-13

## 2024-03-13 NOTE — PROGRESS NOTES
Socorro Odonnell  P [facility] Pulmonol[facility] Clinical Support Staff  Hello,    We received a prescription for Trikafta for Mansi Morrison that has been approved until 6/8/24. Her insurance requires her to fill at [facility].    Can you send the prescription there?    Thanks,  Charla Odonnell   [facility]

## 2024-03-28 ENCOUNTER — TELEPHONE (OUTPATIENT)
Dept: PEDIATRIC PULMONOLOGY | Facility: HOSPITAL | Age: 64
End: 2024-03-28
Payer: MEDICARE

## 2024-03-28 DIAGNOSIS — E84.9 CYSTIC FIBROSIS (MULTI): ICD-10-CM

## 2024-03-28 RX ORDER — DOXYCYCLINE 100 MG/1
100 TABLET ORAL 2 TIMES DAILY
Qty: 28 TABLET | Refills: 0 | Status: SHIPPED | OUTPATIENT
Start: 2024-03-28 | End: 2024-04-17 | Stop reason: WASHOUT

## 2024-03-28 NOTE — TELEPHONE ENCOUNTER
From Kervin Sanchez: If the doxy helped, lets do another week of doxycycline 100mg PO BID. Can she come in for a visit in person next Wednesday at 1pm?     Lucia verbalized understanding and acceptance of plan of care. Very willing to come in Wed. At 1300.

## 2024-03-28 NOTE — TELEPHONE ENCOUNTER
Finished 2 week course of doxycyline 9 days ago.    Doesn't feel good. Body ache, headache, fever last night. Wet, productive cough (green mucus). Went back on Pulmozyme and vest BID. Feels like she's bringing more up with treatments, but not able to get it all out.     Feels like it's been a decline overall since December.    Been working mostly at home, no exposure to illness that she is aware of.    Feels like a lung infection.

## 2024-03-29 RX ORDER — DOXYCYCLINE 100 MG/1
TABLET ORAL
Qty: 28 TABLET | Refills: 0 | Status: SHIPPED | OUTPATIENT
Start: 2024-03-29 | End: 2024-04-17 | Stop reason: ALTCHOICE

## 2024-04-03 ENCOUNTER — DOCUMENTATION (OUTPATIENT)
Dept: PEDIATRIC PULMONOLOGY | Facility: HOSPITAL | Age: 64
End: 2024-04-03

## 2024-04-03 ENCOUNTER — NUTRITION (OUTPATIENT)
Dept: PEDIATRIC PULMONOLOGY | Facility: HOSPITAL | Age: 64
End: 2024-04-03
Payer: MEDICARE

## 2024-04-03 ENCOUNTER — MULTIDISCIPLINARY VISIT (OUTPATIENT)
Dept: PEDIATRIC PULMONOLOGY | Facility: HOSPITAL | Age: 64
End: 2024-04-03
Payer: MEDICARE

## 2024-04-03 ENCOUNTER — HOSPITAL ENCOUNTER (OUTPATIENT)
Dept: RESPIRATORY THERAPY | Facility: HOSPITAL | Age: 64
Discharge: HOME | End: 2024-04-03
Payer: MEDICARE

## 2024-04-03 VITALS
BODY MASS INDEX: 22.21 KG/M2 | SYSTOLIC BLOOD PRESSURE: 139 MMHG | WEIGHT: 120.7 LBS | HEART RATE: 57 BPM | TEMPERATURE: 97.5 F | DIASTOLIC BLOOD PRESSURE: 78 MMHG | HEIGHT: 62 IN | RESPIRATION RATE: 19 BRPM | OXYGEN SATURATION: 97 %

## 2024-04-03 DIAGNOSIS — E84.0 CYSTIC FIBROSIS WITH PULMONARY MANIFESTATIONS (MULTI): ICD-10-CM

## 2024-04-03 DIAGNOSIS — E84.0 CYSTIC FIBROSIS WITH PULMONARY EXACERBATION (MULTI): Primary | ICD-10-CM

## 2024-04-03 DIAGNOSIS — E84.9 CYSTIC FIBROSIS (MULTI): ICD-10-CM

## 2024-04-03 DIAGNOSIS — Z22.322 MRSA (METHICILLIN RESISTANT STAPH AUREUS) CULTURE POSITIVE: ICD-10-CM

## 2024-04-03 DIAGNOSIS — K59.00 CONSTIPATION, UNSPECIFIED CONSTIPATION TYPE: ICD-10-CM

## 2024-04-03 DIAGNOSIS — E84.8 DIABETES MELLITUS RELATED TO CF (CYSTIC FIBROSIS) (MULTI): ICD-10-CM

## 2024-04-03 DIAGNOSIS — E84.0 CYSTIC FIBROSIS WITH PULMONARY MANIFESTATIONS (MULTI): Primary | ICD-10-CM

## 2024-04-03 DIAGNOSIS — F41.1 GAD (GENERALIZED ANXIETY DISORDER): ICD-10-CM

## 2024-04-03 DIAGNOSIS — K86.81 EXOCRINE PANCREATIC INSUFFICIENCY (HHS-HCC): ICD-10-CM

## 2024-04-03 DIAGNOSIS — F10.90 ALCOHOL USE DISORDER: ICD-10-CM

## 2024-04-03 DIAGNOSIS — E08.9 DIABETES MELLITUS RELATED TO CF (CYSTIC FIBROSIS) (MULTI): ICD-10-CM

## 2024-04-03 DIAGNOSIS — I10 PRIMARY HYPERTENSION: ICD-10-CM

## 2024-04-03 LAB
ACID FAST STN SPEC: NORMAL
ALBUMIN SERPL BCP-MCNC: 4.7 G/DL (ref 3.4–5)
ALP SERPL-CCNC: 91 U/L (ref 33–136)
ALT SERPL W P-5'-P-CCNC: 16 U/L (ref 7–45)
ANION GAP SERPL CALC-SCNC: 17 MMOL/L (ref 10–20)
AST SERPL W P-5'-P-CCNC: 19 U/L (ref 9–39)
BASOPHILS # BLD AUTO: 0.05 X10*3/UL (ref 0–0.1)
BASOPHILS NFR BLD AUTO: 0.8 %
BILIRUB DIRECT SERPL-MCNC: 0.1 MG/DL (ref 0–0.3)
BILIRUB SERPL-MCNC: 0.5 MG/DL (ref 0–1.2)
BUN SERPL-MCNC: 21 MG/DL (ref 6–23)
CALCIUM SERPL-MCNC: 9.8 MG/DL (ref 8.6–10.6)
CHLORIDE SERPL-SCNC: 102 MMOL/L (ref 98–107)
CO2 SERPL-SCNC: 26 MMOL/L (ref 21–32)
CREAT SERPL-MCNC: 0.95 MG/DL (ref 0.5–1.05)
CRP SERPL-MCNC: <0.1 MG/DL
EGFRCR SERPLBLD CKD-EPI 2021: 68 ML/MIN/1.73M*2
EOSINOPHIL # BLD AUTO: 0.12 X10*3/UL (ref 0–0.7)
EOSINOPHIL NFR BLD AUTO: 1.9 %
ERYTHROCYTE [DISTWIDTH] IN BLOOD BY AUTOMATED COUNT: 12.9 % (ref 11.5–14.5)
ERYTHROCYTE [SEDIMENTATION RATE] IN BLOOD BY WESTERGREN METHOD: 27 MM/H (ref 0–30)
GLUCOSE SERPL-MCNC: 94 MG/DL (ref 74–99)
HCT VFR BLD AUTO: 42.2 % (ref 36–46)
HGB BLD-MCNC: 13.7 G/DL (ref 12–16)
IMM GRANULOCYTES # BLD AUTO: 0.02 X10*3/UL (ref 0–0.7)
IMM GRANULOCYTES NFR BLD AUTO: 0.3 % (ref 0–0.9)
LYMPHOCYTES # BLD AUTO: 1.71 X10*3/UL (ref 1.2–4.8)
LYMPHOCYTES NFR BLD AUTO: 27.5 %
MCH RBC QN AUTO: 28.8 PG (ref 26–34)
MCHC RBC AUTO-ENTMCNC: 32.5 G/DL (ref 32–36)
MCV RBC AUTO: 89 FL (ref 80–100)
MONOCYTES # BLD AUTO: 0.51 X10*3/UL (ref 0.1–1)
MONOCYTES NFR BLD AUTO: 8.2 %
MYCOBACTERIUM SPEC CULT: NORMAL
NEUTROPHILS # BLD AUTO: 3.81 X10*3/UL (ref 1.2–7.7)
NEUTROPHILS NFR BLD AUTO: 61.3 %
NRBC BLD-RTO: 0 /100 WBCS (ref 0–0)
PHOSPHATE SERPL-MCNC: 4.2 MG/DL (ref 2.5–4.9)
PLATELET # BLD AUTO: 200 X10*3/UL (ref 150–450)
POTASSIUM SERPL-SCNC: 4.5 MMOL/L (ref 3.5–5.3)
PROT SERPL-MCNC: 7.7 G/DL (ref 6.4–8.2)
RBC # BLD AUTO: 4.76 X10*6/UL (ref 4–5.2)
SODIUM SERPL-SCNC: 140 MMOL/L (ref 136–145)
WBC # BLD AUTO: 6.2 X10*3/UL (ref 4.4–11.3)

## 2024-04-03 PROCEDURE — 80048 BASIC METABOLIC PNL TOTAL CA: CPT | Performed by: NURSE PRACTITIONER

## 2024-04-03 PROCEDURE — 36415 COLL VENOUS BLD VENIPUNCTURE: CPT | Performed by: NURSE PRACTITIONER

## 2024-04-03 PROCEDURE — 87116 MYCOBACTERIA CULTURE: CPT | Performed by: NURSE PRACTITIONER

## 2024-04-03 PROCEDURE — 84100 ASSAY OF PHOSPHORUS: CPT | Performed by: NURSE PRACTITIONER

## 2024-04-03 PROCEDURE — 80321 ALCOHOLS BIOMARKERS 1OR 2: CPT | Performed by: NURSE PRACTITIONER

## 2024-04-03 PROCEDURE — 87070 CULTURE OTHR SPECIMN AEROBIC: CPT | Performed by: NURSE PRACTITIONER

## 2024-04-03 PROCEDURE — 99215 OFFICE O/P EST HI 40 MIN: CPT | Performed by: NURSE PRACTITIONER

## 2024-04-03 PROCEDURE — 86140 C-REACTIVE PROTEIN: CPT | Performed by: NURSE PRACTITIONER

## 2024-04-03 PROCEDURE — 85652 RBC SED RATE AUTOMATED: CPT | Performed by: NURSE PRACTITIONER

## 2024-04-03 PROCEDURE — 82248 BILIRUBIN DIRECT: CPT | Performed by: NURSE PRACTITIONER

## 2024-04-03 PROCEDURE — 87102 FUNGUS ISOLATION CULTURE: CPT | Performed by: NURSE PRACTITIONER

## 2024-04-03 PROCEDURE — 85025 COMPLETE CBC W/AUTO DIFF WBC: CPT | Performed by: NURSE PRACTITIONER

## 2024-04-03 NOTE — PATIENT INSTRUCTIONS
"    It was very nice to see you today. We can offer you in-person and \"virtual\" appointments with your cystic fibrosis provider.    If you need to cancel or schedule an appointment, please call the  at the [facility] at [phone] between the hours of 8 AM and 5 PM, Monday-Friday.    To reach the CF nurse, Bisi, please call [phone]. Bisi has a secure voice mail account if you want to leave a message.    If you need to speak with an adult cystic fibrosis provider between the hours of 5 PM and 8 AM (overnight) or anytime on Saturday or Sunday, please call [phone]. When you call this number, you will be connected to an  with the [facility] and [facility] answering service. You should tell the  that you're an adult with cystic fibrosis who needs to speak to the \"cystic fibrosis doctor on-call.\" The  will take your contact information. You should then expect a call back from the cystic fibrosis doctor on-call within a short period of time.      Plan from today's visit:  - Follow up on sputum culture results from today. If you are still growing the fungal organism, then I will treat it with voriconazole.  - Finish the rest of the Doxycycline.  - Use levalbuterol twice a day scheduled and can use it every 6 hours PRN.  - Use your vest at least once a day, ideally twice a day.  - Continue Pulmozyme once daily.  - We will add hypertonic saline 3% to use twice a day inhaled to help thin the mucous that you feel stuck in your chest.  - Since you have been getting sick more frequently, I ordered a cat scan of your chest without contrast. Someone should call you to schedule or you can schedule through Epic.  - We are getting labs today. Will follow up on results with you.  - Will have you come back to clinic in a couple weeks.      Important Information:  Your CFTR variants (mutations) are: [id number]     Your percent-predicted FEV1 today was: Unable " to perform today    Your weight adjusted for height (body mass index, BMI) today was: 22.16 kg/m2  (goal for adult males with CF = 23.0 kg/m2, goal for adult females with CF = 22.0 kg/m2)    Next appointment: Visit date not found

## 2024-04-03 NOTE — PROGRESS NOTES
PT Evaluation: CF Clinic    Patient Name:Mansi Morrison  MRN:[id number]  Today's Date: 2/12/2024     Plan to follow up annually, or sooner if indicated.  Sit to stand=50th percentile  FEV1: preserved lung function at 94%    Pain  0/10    Social History  Lives alone in a 4 story house. Works from home, has own business selling construction equipment. Has many hobbies/interests. Runs meditation groups. [place]     Respiratory  What do you do for Airway clearance?  VEST, has been doing daily recently. Has not been feeling great after having pneumonia in Dec, and has been experiencing more lung involvement than usual. Having more SOB on stairs and feeling 'increased lung pressure'. Noticed her spO2 response to activity has been in the mid 80's when sick.     Modulator Therapy: none (considering starting trikafta)                                          Exercise: current program  Daily dog walks , yoga, ping pong 2x/week, pickleball 1x/week, bike riding in the summer, enjoys flying kites.     FEV1 today: 94%  Baseline FEV1: 106%      Urinary Incontinence/Pelvic Floor  No issues    Vestibular:  No issues     Musculoskeletal    Standing lateral side bend: equal on both sides     Standing trunk rotation, flexion, extension : WFL      MMT:   R LE : hip flex 4/5, knee ext/flex 4/5, ankle DF/PF 5/5  L LE : hip flex 4-/5, knee ext/flex 4/5, ankle DF/PF 5/5    Exercise Tests/Outcome Measures: RPD/RPE on all tests    Pre mobility: HR 56 bpm, spO2 95% on RA    1)Sit to Stand  60 sec STS : 35x  HR 86, spO2 96% on room air   50th percentile for age/sex matched norms  RPD 3/10, RPE 13/20

## 2024-04-03 NOTE — PROGRESS NOTES
[facility] M.D[facility]    Background: Mansi is a 65 y/o female with a history of cystic fibrosis, baseline FEV1 100%, last FEV1 94%, who has been having more frequent pulmonary exacerbations after having pneumonia in December of 2023. 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    HPI (4/4/2024):   Since December she has been sick. Once she gets off the abx, she starts getting sick about a week later.  Gets a high fever, headache, trouble breathing and feels very fatigued.   Fever gets up to 103, then breaks after a day.  All last week she was in bed.  Lungs are filling up. Can't breathe. Recently, she started using Pulmozyme and her vest.  Has also noticed pain in her joints when she feels sick.  Feels mucous is stuck and lungs feel sticky. Something in there and she can't cough it up.    Adding the doxycyline stopped her from coughing up as much. Was able to work all day the other day which is an improvement.    Did not start Trikafta because she was waiting to see if she continues getting sick.    PCP is at [facility]. Manages BP.   The other day BP was 205/98 - had a horrible headache, throbbing. Went to the ED.  Had a TIA years ago.  BP running around 160/80's  Was taking a supplement (tumeric) for her joints and it dropped her BP, so she stopped taking it.    Checking BG every morning, around 110-130.    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     Current Use of Health Management Strategies:    Respiratory Interventions  Albuterol: Two times per day (levalbuterol) - ran out the last 8 days due to back order  Pulmozyme: Once daily  Hypertonic saline: Does not use    HFCWO (percussive vest): The Vest ([place]) Daily  Oscillatory PEP: Aerobika Does not use  Exercise:  Walking 2-3 times per week  LTE antagonist: No  Azithromycin: Does not use  Aztreonam lysine ([place]): Does not  use  Tobramycin: Does not use  Does not use  Colistin: Does not use  Meropenem (inhaled): Does not use  Vancomycin (inhaled): Does not use  ICS: Does not use  ICS/LABA: Does not use  LAMA: No  CFTR modulator: has not started Trikafta yet but willing to consider it  High-dose ibuprofen: No   Oxygen: Does not use  Chronic oral antibiotics: Does not use  Does not use    Digestive Health Interventions    Acid-suppressing medication?: Yes  Using CF vitamins?: No  Taking pancreatic enzymes?: Yes  Any diarrhea?: No  Any steatorrhea?: No  Any constipation?: Yes - taking miralax 1 cap daily; last PETER episode was years ago  Using laxatives?: Yes  Feeding tube?: No  Supplemental enteral nutrition?: No    Pulmonary Function Test Results   Unable to perform because she does not feel well    Current Medications     Current Outpatient Medications   Medication Instructions    Creon 24,000-76,000 -120,000 unit capsule 3-4 capsules, oral, 3 times daily with meals, MOUTH WITH MEALS AND TAKE 1-2 CAPSULES WITH SNACKS    dornase Alpha (Pulmozyme) 1 mg/mL nebulizer solution INHALE CONTENTS OF 1 VIAL VIA NEBULIZER DAILY    doxycycline (Adoxa) 100 mg tablet TAKE 1 TAB BY MOUTH 2X A DAY FOR 14 DAYS. TAKE WITH A FULL GLASS OF WATER AND DO NOT LIE DOWN FOR AT LEAST 30 MINS AFTER    doxycycline (ADOXA) 100 mg, oral, 2 times daily, Take with a full glass of water and do not lie down for at least 30 minutes after    elexacaftor-tezacaftor-ivacaft (Trikafta) 100-50-75 mg tablet Take 2 orange tablets by mouth in the morning with fat-containing food and 1 blue tablet by mouth in the evening with fat-containing food, approximately 12 hours apart.    FreeStyle Lite Strips strip  TEST 6-7 TIMES DAILY    hydroCHLOROthiazide (HYDRODIURIL) 25 mg, oral, Daily    hydrOXYzine HCL (Atarax) 25 mg tablet     insulin glargine (Basaglar KwikPen U-100 Insulin) 100 unit/mL (3 mL) pen Inject 8 units daily as directed    losartan (COZAAR) 100 mg, oral, Daily     "mecobalamin, vitamin B12, 1,000 mcg tablet,disintegrating 1 tablet, sublingual, Daily    omeprazole (PRILOSEC) 40 mg, oral, Daily    polyethylene glycol (GLYCOLAX, MIRALAX) 34 g, oral, 2 times daily,  MIX 34 GM TWICE DAILY AND TAKE AS DIRECTED    polyethylene glycol-electrolytes (polyethylene glycol) 420 gram solution oral,  TAKE AS DIRECTED.    sodium chloride 3% nebulizer solution 4 mL, nebulization, 2 times daily    traZODone (DESYREL) 50 mg, oral, Nightly PRN    Xopenex HFA 45 mcg/actuation inhaler 2 puffs, inhalation, Every 4 hours PRN       Physical Examination     /78 (BP Location: Left arm, Patient Position: Sitting, BP Cuff Size: Adult)   Pulse 57   Temp 36.4 °C (97.5 °F) (Oral)   Resp 19   Ht 1.572 m (5' 1.89\")   Wt 54.8 kg (120 lb 11.2 oz)   SpO2 97%   BMI 22.16 kg/m²     General: well developed; in no distress  HEENT: normocephalic; anicteric sclerae; conjunctivae not injected; nasal mucosa was unremarkable; oropharynx was clear; TM pearly-grey, not injected, no fluid  Neck: supple; no lymphadenopathy or thyromegaly  Chest: clear to auscultation bilaterally; no crackles or wheezes  Cardiac: regular rhythm; no gallop or murmur  Abdomen: soft; non-tender; non-distended; no hepatosplenomegaly  Extremities: no leg edema; no digital clubbing; 2+ pulses  Psychiatric: Tearful at times; similing at times     Metabolic Parameters     Results for orders placed or performed in visit on 04/03/24 (from the past 96 hour(s))   CBC and Auto Differential   Result Value Ref Range    WBC 6.2 4.4 - 11.3 x10*3/uL    nRBC 0.0 0.0 - 0.0 /100 WBCs    RBC 4.76 4.00 - 5.20 x10*6/uL    Hemoglobin 13.7 12.0 - 16.0 g/dL    Hematocrit 42.2 36.0 - 46.0 %    MCV 89 80 - 100 fL    MCH 28.8 26.0 - 34.0 pg    MCHC 32.5 32.0 - 36.0 g/dL    RDW 12.9 11.5 - 14.5 %    Platelets 200 150 - 450 x10*3/uL    Neutrophils % 61.3 40.0 - 80.0 %    Immature Granulocytes %, Automated 0.3 0.0 - 0.9 %    Lymphocytes % 27.5 13.0 - 44.0 %    " Monocytes % 8.2 2.0 - 10.0 %    Eosinophils % 1.9 0.0 - 6.0 %    Basophils % 0.8 0.0 - 2.0 %    Neutrophils Absolute 3.81 1.20 - 7.70 x10*3/uL    Immature Granulocytes Absolute, Automated 0.02 0.00 - 0.70 x10*3/uL    Lymphocytes Absolute 1.71 1.20 - 4.80 x10*3/uL    Monocytes Absolute 0.51 0.10 - 1.00 x10*3/uL    Eosinophils Absolute 0.12 0.00 - 0.70 x10*3/uL    Basophils Absolute 0.05 0.00 - 0.10 x10*3/uL   Sedimentation Rate   Result Value Ref Range    Sedimentation Rate 27 0 - 30 mm/h   C-Reactive Protein   Result Value Ref Range    C-Reactive Protein <0.10 <1.00 mg/dL   Hepatic Function Panel   Result Value Ref Range    Albumin 4.7 3.4 - 5.0 g/dL    Bilirubin, Total 0.5 0.0 - 1.2 mg/dL    Bilirubin, Direct 0.1 0.0 - 0.3 mg/dL    Alkaline Phosphatase 91 33 - 136 U/L    ALT 16 7 - 45 U/L    AST 19 9 - 39 U/L    Total Protein 7.7 6.4 - 8.2 g/dL   Fungal Culture/Smear    Specimen: SPUTUM; Swab   Result Value Ref Range    Fungal Smear No fungal elements seen    Respiratory Culture, Cystic Fibrosis    Specimen: Throat Swab; Fluid   Result Value Ref Range    Respiratory Culture, Cystic Fibrosis (3+) Moderate Normal throat perla    Basic Metabolic Panel   Result Value Ref Range    Glucose 94 74 - 99 mg/dL    Sodium 140 136 - 145 mmol/L    Potassium 4.5 3.5 - 5.3 mmol/L    Chloride 102 98 - 107 mmol/L    Bicarbonate 26 21 - 32 mmol/L    Anion Gap 17 10 - 20 mmol/L    Urea Nitrogen 21 6 - 23 mg/dL    Creatinine 0.95 0.50 - 1.05 mg/dL    eGFR 68 >60 mL/min/1.73m*2    Calcium 9.8 8.6 - 10.6 mg/dL   Phosphorus   Result Value Ref Range    Phosphorus 4.2 2.5 - 4.9 mg/dL      Chest Radiograph     XR chest 2 views 11/17/2023    Narrative  EXAMINATION: XR CHEST PA+LAT 2 VIEWS 11/17/2023 12:54 PM  CLINICAL HISTORY: Chest pain  ASSOCIATED DIAGNOSIS: Chest pain  ORDERING PROVIDER: ROBIN MURRELL  TECHNOLOGISTS NOTE:    COMPARISON: None    FINDINGS:  Cardiomediastinal silhouette: Normal heart size. There is a moderate hiatal  hernia.    Lungs/Pleura: No focal pulmonary consolidation, effusion or pneumothorax. There is subsegmental atelectasis at the right hilum    Musculoskeletal: Degenerative spurring within the thoracic spine.    IMPRESSION:  No acute cardiopulmonary abnormality identified.    MACRO: None     CF Sputum Culture     AFB Culture   Date Value Ref Range Status   02/12/2024 No Mycobacteria isolated.  Final      Respiratory Culture, Cystic Fibrosis   Date/Time Value Ref Range Status   04/03/2024 03:02 PM (3+) Moderate Normal throat perla  Preliminary   02/12/2024 03:36 PM (A)  Final    (3+) Moderate Methicillin Resistant Staphylococcus aureus (MRSA)     Comment:     Methicillin (Oxacillin) resistant Staphylococci are resistant to all currently available Penicillins, Beta-lactam/Beta-lactamase inhibitor combinations (including Ampicillin/Sulbactam, Amoxicillin/Clavulanate and Pipercillin/Tazobactam), Carbapenems and   Cephalosporins (except Ceftaroline).   02/12/2024 03:36 PM (3+) Moderate Normal throat perla  Final     Susceptibility data from last 90 days.  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 Fasting glucose in the office in April 2013 was 252. we started her on Levemir.   June 2021: 6.9%  Sept 2021: 6.4%  April 2022: 7.3%  July 2023: 6.5%  Microalbumin: WNL 7/2023         Current Assessment & Plan      On Lantus         Exocrine pancreatic insufficiency    Relevant Orders    Job-Gamma-Carboxy Prothrombin, Serum (DCP); [facility]; DCP - Miscellaneous Test    Primary hypertension    Overview     Managed by her PCP at [facility]         Alcohol use disorder    Relevant Orders    Phosphatidylethanol (PEth), Whole Blood, Quantitative    BRYON (generalized anxiety disorder)    MRSA (methicillin resistant staph aureus) culture positive    Current Assessment & Plan     Continue Doxycycline 100mg PO twice daily         Cystic fibrosis with pulmonary exacerbation (CMS/HCC) - Primary    Current Assessment & Plan     Has been needing more frequent courses of antibiotics  Grows staph which has been treated with Doxy (currently on)  Has Scedosporium boydii that she grew in Feb 2024 and question if this is a pathogen and why she is getting sick so frequently  Repeat AFB, fungal and bacterial cx today. Would consider treating the scedoporium with voriconazole (would be ideal since she is not on Trikafta now so we don't have to worry about adjusting)  Ordered a noncontrast CT chest given frequent illnesses  Hold off on starting Trikafta right now but would like to start in the near future; will need education by the pharmacist on this (obtained baseline LFTs today)  Has not been cleaning RT equipment properly so Dalila, RT, reviewed proper cleaning  Possible mold in her house so question if that is a problem?         Relevant Orders    Fungal Culture/Smear (Completed)    AFB Culture/Smear    Hepatic Function Panel (Completed)    Renal Function Panel    CBC and Auto Differential (Completed)    C-Reactive Protein (Completed)    Sedimentation Rate (Completed)    Basic Metabolic Panel (Completed)    Phosphorus (Completed)     Other Visit Diagnoses       Cystic fibrosis with pulmonary manifestations (CMS/HCC)        Relevant Orders    Fungal Culture/Smear (Completed)    CT chest wo IV contrast    AFB Culture/Smear    Hepatic  Function Panel (Completed)    Renal Function Panel    CBC and Auto Differential (Completed)    C-Reactive Protein (Completed)    Sedimentation Rate (Completed)    Basic Metabolic Panel (Completed)    Phosphorus (Completed)           Follow-up:  Follow up in x3 weeks.    MARISOL Dorsey-ROSEMARIE   04/03/2024

## 2024-04-03 NOTE — PROGRESS NOTES
Respiratory Note:    Patient's Airway Clearance: hill rom vest 103  Frequency: PRN has been using once a day due to illness, instructed to increase to bid  Settings: pre set 15-20 minutes  Exercise: yes but not lately due to illness  Vang Cough: yes PRN - did today - brought up mucus    Aerosols: Name of medication, frequency, type of nebulizer used, Mask or Mouthpiece?  Bronchodilators: yes levalbuterol inhaler - been out of for 8 days - on back order  Pulmozyme: yes every day during illness  Hypertonic Saline: yes starting today 3%    Order of medication w/airway clearance:  Pulmozyme, vest    Spacer: yes given today vortex    Method of Cleaning Neb Cups: reviewed today    Patient Assistance Program: yes pulmozyme co pay     ID: [id number]  BIN: [id number]  PIN# [id number]  Grp: [id number]      Comments:      - Lucia has not been feeling well. Appointment today with Kervin Sanchez - Plan: increase vest bid, try 3%, get levalbuterol inhaler, review cleaning nebulizer cups    - Reviewed recommended order: Levalbuterol X2 puffs with spacer, 3% during vest, pulmozyme    - reviewed [employer] recommendations for cleaning and disinfecting neb cups - handout given    - X2 sidestreams given    - spacer given - technique reviewed - handout and marlys vortex spacer given    - [facility] phone number given to call and set-up delivery, reviewed pulmozyme co pay card     - levalbuterol generic inhaler on back order, new script sent to  [facility] for brand name Xopenex (confirmed in stock)  
1 pair

## 2024-04-03 NOTE — PROGRESS NOTES
Peripheral venipuncture for labs. Procedure followed per protocal for blood draw. Patient's skin prepared,  Rt AC.  Lab specimen obtained per providers orders.  RN applied clean dry adhesive dressing to exit site.  Pt tolerated the procedure with no problems. Advised pt to keep the dressing intact for at least an hour after the draw.

## 2024-04-03 NOTE — PROGRESS NOTES
Pt. Is here alone today. She celebrated Easter with friends.    Pt. Reports that she has been getting sick lately and feeling fatigued. She has been eating more convenience foods and ordering out because she finds that she is too tired to cook. Pt. Expressed that she would like to eat more fruits and vegetables. RD discussed purchasing frozen and canned fruits and vegetables as these will last longer, can be bought in bulk, and they are less work to prepare. Pt. Was agreeable to this. She reports that when she has been cooking, she does get good protein.    Pt. Reports that she has been taking Miralax to stay regular with her bowels. RD also recommended physical activity and hydration. Pt. Reports that her exercise has decreased because of her level of fatigue, but she would like to go back to that.     Pt. Had no further concerns at this time. RD provided contact info and encouraged pt. To reach out as nutritional concerns arise. Pt. Was agreeable.      Impression:  BMI at goal  Pt. Has decreased fruit and vegetable intake  Pt. Has decreased level of physical activity  Pt. Has been regulating bowels with Miralax    Recommendations:  Recommend purchasing frozen and canned vegetables and bagged salads to increase fruit and vegetable intake  Recommend increase physical activity as able  Recommend continue regular Miralax use  Recommend reach out to RD as nutritional concerns arise

## 2024-04-04 ENCOUNTER — SPECIALTY PHARMACY (OUTPATIENT)
Dept: PHARMACY | Facility: CLINIC | Age: 64
End: 2024-04-04

## 2024-04-04 DIAGNOSIS — E84.0 CYSTIC FIBROSIS WITH PULMONARY MANIFESTATIONS (MULTI): Primary | ICD-10-CM

## 2024-04-04 DIAGNOSIS — E84.9 CYSTIC FIBROSIS (MULTI): ICD-10-CM

## 2024-04-04 RX ORDER — SODIUM CHLORIDE FOR INHALATION 3 %
4 VIAL, NEBULIZER (ML) INHALATION 2 TIMES DAILY
Qty: 240 ML | Refills: 6 | Status: SHIPPED | OUTPATIENT
Start: 2024-04-04 | End: 2024-04-17 | Stop reason: SDUPTHER

## 2024-04-04 RX ORDER — LEVALBUTEROL TARTRATE 45 UG/1
2 AEROSOL, METERED ORAL EVERY 4 HOURS PRN
Qty: 15 G | Refills: 6 | Status: SHIPPED | OUTPATIENT
Start: 2024-04-04 | End: 2024-04-17 | Stop reason: SDUPTHER

## 2024-04-04 NOTE — ASSESSMENT & PLAN NOTE
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 Would consider treating the scedoporium with voriconazole (would be ideal since she is not on Trikafta now so we don't have to worry about adjusting)  Ordered a noncontrast CT chest given frequent illnesses  Hold off on starting Trikafta right now but would like to start in the near future; will need education by the pharmacist on this (obtained baseline LFTs today)  Has not been cleaning RT equipment properly so Dalila, RT, reviewed proper cleaning  Possible mold in her house so question if that is a problem?

## 2024-04-06 LAB
BACTERIA SPT CF RESP CULT: ABNORMAL
BACTERIA SPT CF RESP CULT: ABNORMAL
LABORATORY REPORT: NORMAL
PETH INTERPRETATION: NORMAL
PLPETH BLD-MCNC: 65 NG/ML
POPETH BLD-MCNC: 64 NG/ML

## 2024-04-08 LAB — SCAN RESULT: NORMAL

## 2024-04-09 LAB
FUNGUS SPEC CULT: ABNORMAL
FUNGUS SPEC CULT: ABNORMAL
FUNGUS SPEC FUNGUS STN: ABNORMAL

## 2024-04-10 ENCOUNTER — TELEPHONE (OUTPATIENT)
Dept: PEDIATRIC PULMONOLOGY | Facility: HOSPITAL | Age: 64
End: 2024-04-10
Payer: MEDICARE

## 2024-04-10 NOTE — TELEPHONE ENCOUNTER
----- Message from MARISOL Dorsey-CNP sent at 4/9/2024 10:20 AM EDT -----  Lucia is growing scedosporium boydii again. I tried calling her Friday to check in and left her a message to continue the Doxy in the meantime and that Bisi would be checking in this week. I don't know how she is feeling. Bisi, did you get to touch base with her? I think we may need to treat the scedosporium with voriconazole.

## 2024-04-11 ENCOUNTER — DOCUMENTATION (OUTPATIENT)
Dept: PEDIATRIC PULMONOLOGY | Facility: HOSPITAL | Age: 64
End: 2024-04-11
Payer: MEDICARE

## 2024-04-11 NOTE — PROGRESS NOTES
I called Mansi to check in again. She is feeling much better and has her energy back (at baseline). She has been taking Doxycycline once a day everyday since I prescribed it prior to her last visit. She didn't realize she was to only continue taking it for 7 days. She has also been doing aerosols more consistently and in the proper order. Feels that since Dalila, RT, showed her the proper order of airway clearance, it has been effective.     Mansi did admit that prior to her last apt she was feeling overwhelmed and she had a day where she drank alcohol but that was an isolated event that was due to stress. She has been seeing her therapist and has a sponsor so she feels she is in a better spot.    I discussed with Mansi that her fungal culture was growing scedosporium again and that I think we need to treat it. We discussed given the current situation of her feeling overwhelmed, and she is feeling better that she would like to hold off on starting voriconazole. She would like to see how she feels at her follow up visit on 4/17. I did let her know that I would like to see the noncontrast CT scan and that starting voriconazole may just be what we need to do. She verbalized understanding but again wants to see how she feels off of the doxy. Now too, she is properly doing aerosols. Discussed it would be prudent to continue doing this consistently and to keep up the good work. I did bring up Trikafta again and this is something she is still considering but can be further discussed at her follow up. Will need education on it.    Kervin Sanchez, CNP

## 2024-04-12 NOTE — PROGRESS NOTES
"    Mason Fontanez M.D. 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 Historically normal lung function (ppFEV1 100-106%) so Trikafta deferred. 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 EPI on PERT. Constipation.  Other problems: alcohol use disorder (AA); GERD; osteopenia; generalized anxiety disorder (2017); poorly-controlled hypertension, insomnia on trazodone    HPI (4/17/2024): Two-week sick follow-up.  Lucia met with Kervin Sanchez on 4/3/2024.  Symptoms as below.  They discussed the sputum culture in February 2024 (+) for Scedosporium.  Another sputum sample was collected on 4/3/2024, and it also grew Scedosporium and Candida albicans.  CT chest ordered.  Treated with 2-week course of doxycycline targeting MRSA. Due to the possibility of starting Trikafta, hepatic function panel and phosphatidylethanol (Peth) were checked, and the latter suggested recent alcohol ingestion.  Lucia subsequently acknowledged a relapse, now has counseling and sponsor contact.  Today, Lucia says that she never started the nebulized 3% hypertonic saline; took doxycycline course prescribed by Kervin Sanchez; ended 4/12/2024; felt a lot better after the doxycycline, but she felt unusually fatigued yesterday afternoon, which is unlike her. Denies hemoptysis, fevers, chills, pleuritic chest pain.  Occasional wheezing alleviated by Xopenex.  Sputum is light yellow but less voluminous.  She has been doing HFCWO, but she has one of the first [employer] \"The Vest\" models. Also doing Pulmozyme twice daily.  Blood sugars have been pretty good.  Reports -120 before breakfast.  Needs to see Dr. Ambrose for routine follow-up.    HPI (4/4/2024, Kervin Sanchez): \"Since December she has been sick. Once she gets off the abx, she starts getting sick about a week later. Gets a high fever, headache, trouble breathing and feels very fatigued. Fever gets up to 103, then breaks after a day.  All last week " "she was in bed. Lungs are filling up. Can't breathe. Recently, she started using Pulmozyme and her vest. Has also noticed pain in her joints when she feels sick. Feels mucous is stuck and lungs feel sticky. Something in there and she can't cough it up. Adding the doxycyline stopped her from coughing up as much. Was able to work all day the other day which is an improvement. Did not start Trikafta because she was waiting to see if she continues getting sick.\"    HPI (2/12/2024): ED at [facility] on 11/17/2023 with hypertensive urgency (SBP ~200 at home, /94 documented); had headache and chest pain; troponin, BNP, EKG (sinus bradycardia), CXR were unremarkable.  Saw PCP, tried amlodipine added to the losartan, was having symptomatic hypotension, switched to hydrochlorothiazide plus losartan and stopped the amlodipine, much better.  Started seeing new therapist after last visit, very helpful, good relationship.  Says that she has been sober for 3 months and 4 days.  Brought on a colleague to help with work.  Had influenza diagnosed by NP rapid-antigen test at a pharmacy, around 12/20/2023. Felt really poorly, had fever, cough, post-tussive emesis, dyspnea, anorexia. Started doing Pulmozyme and HFCWO again on most days. Prescribed course of doxycycline. Since that infection, however, generally feels more central chest mucus and pressure. Because ppFEV1 was also down today, she is concerned about deteriorating lung function. Generally feels tired since the influenza. Weight down 1.3 kg since August 2023.     HPI (11/8/2023): Plan at last visit was to increase losartan to 100 mg/day, home BP monitoring. Discussed referral to psychiatry to manage anxiety and depression. Increased trazodone to 100 mg at HS for insomnia. Needed to stick with MiraLAX for prophylaxis. Had poison ivy and put on brief course of prednisone.  Few weeks ago had relapse of drinking. Went out to bar, drank enough to black out, somehow drove " home. Scared her. She connected with her sponsor and actually has appointment with a counselor this evening. Feeling increased chest congestion. Coughing occasionally. Thinks she might be headed toward lung infection. MiraLAX has prevented RLQ cramping.     HPI (8/8/2023): Had fun in [facility] but has been sick from respiratory perspective twice. I gave her a prescription for doxycycline to take with her on the trip. She started after returning due to chest congestion. Cleared. Then got poison ivy. Took 3 days of a steroid she had on-hand. Flared up after stopping. Currently having mild chest tightness and wheezing. Only using albuterol once daily, though. BP still higher than desirable. Has been on losartan 50 mg daily. Sleeping poorly on trazodone 50 mg. Took 100 mg one night and slept better.     HPI (7/10/2023): First visit, transfer care from Dr. Fortune. Last seen in November 2022. Issues at that point included intermittent abdominal pain (likely constipation), uncontrolled hypertension despite taking losartan 50 mg daily, CFRD followed by Dr. Ambrose, and recent Burkholderia multivorans infection, consideration of nebulized meropenem. Colonoscopy discussed, would apparently need to be done in [facility] system for insurance reasons. No interval need for antibiotics. Used Pulmozyme in January for a few weeks due to congestion. Helpful. Still having tendency toward constipation, thinking about using MiraLAX. Sometimes experiences left-sided headache and blurry vision in the left eye when hypertensive. Frequency not accelerating. Thinks that meditation well help with her blood pressure. Leaving for trip to [place] tomorrow.    Current Use of Health Management Strategies:    Respiratory Interventions  Albuterol: Two times per day  Pulmozyme: Once daily   Hypertonic saline: Two times per day (to start now - was unavailable since last visit)  HFCWO (percussive vest): The Vest (Chen) Daily - everyday with  Pulmozyme  Oscillatory PEP: Does not use  Exercise: No Regular Exercise  LTE antagonist: No  Azithromycin: Does not use  Aztreonam lysine (Cayston): Does not use  Tobramycin: Does not use  Colistin: Does not use  Meropenem (inhaled): Does not use  Vancomycin (inhaled): Does not use  ICS: Does not use  ICS/LABA: Does not use  LAMA: No  CFTR modulator: Trikafta candidate by genotype but have deferred due to preserved lung function and infrequent pulmonary exacerbations, concern about potential effects on mood/cognition.  Oxygen: Does not use    Digestive Health Interventions    Acid-suppressing medication?: Yes  Using CF vitamins?: No  Taking pancreatic enzymes?: Yes - Creon 24; 3 to 4 capsules with meals and 1 to 2 capsules with snacks   Any diarrhea?: No  Any steatorrhea?: No  Any constipation?: Yes - does better with more regular use of MiraLAX  Using laxatives?: Yes  Feeding tube?: No  Supplemental enteral nutrition?: No    Pulmonary Function Test Results    PFT (4/17/2024): FEV1 2.42 L (106%), FVC 3.27 L (113%), FEV1/FVC 74; normal spirometry  PFT (4/3/2024): did not perform, feeling poorly  PFT (2/12/2024): FEV1 2.29 L (94%), FVC 3.19 L (109%), FEV1/FVC 72; normal spirometry  PFT (11/8/2023): FEV1 2.29 L (100%), FVC 3.19 L (109%), FEV1/FVC 72; normal spirometry  PFT (7/10/2023): FEV1 2.45 L (106%), FVC 3.27 L (112%), FEV1/FVC 75; normal spirometry  PFT (10/10/2022): FEV1 2.47 L (106%), FVC 3.25 L (110%), FEV1/FVC 76; normal spirometry  PFT (2/21/2022): FEV1 2.48 L (106%), FVC 3.28 L (110%), FEV1/FVC 76; normal spirometry  PFT (6/23/2021): FEV1 2.48 L (103%), FVC 3.39 L (109%), FEV1/FVC 73; normal spirometry    Current Medications     Current Outpatient Medications   Medication Instructions    Creon 24,000-76,000 -120,000 unit capsule 3-4 capsules, oral, 3 times daily with meals, MOUTH WITH MEALS AND TAKE 1-2 CAPSULES WITH SNACKS    dornase Alpha (Pulmozyme) 1 mg/mL nebulizer solution INHALE CONTENTS OF 1 VIAL  "VIA NEBULIZER DAILY    doxycycline (Adoxa) 100 mg tablet TAKE 1 TAB BY MOUTH 2X A DAY FOR 14 DAYS. TAKE WITH A FULL GLASS OF WATER AND DO NOT LIE DOWN FOR AT LEAST 30 MINS AFTER    elexacaftor-tezacaftor-ivacaft (Trikafta) 100-50-75 mg tablet Take 2 orange tablets by mouth in the morning with fat-containing food and 1 blue tablet by mouth in the evening with fat-containing food, approximately 12 hours apart.    FreeStyle Lite Strips strip  TEST 6-7 TIMES DAILY    hydroCHLOROthiazide (HYDRODIURIL) 25 mg, oral, Daily    hydrOXYzine HCL (Atarax) 25 mg tablet     insulin glargine (Basaglar KwikPen U-100 Insulin) 100 unit/mL (3 mL) pen Inject 8 units daily as directed    losartan (COZAAR) 100 mg, oral, Daily    mecobalamin, vitamin B12, 1,000 mcg tablet,disintegrating 1 tablet, sublingual, Daily    omeprazole (PRILOSEC) 40 mg, oral, Daily    polyethylene glycol (GLYCOLAX, MIRALAX) 34 g, oral, 2 times daily,  MIX 34 GM TWICE DAILY AND TAKE AS DIRECTED    polyethylene glycol-electrolytes (polyethylene glycol) 420 gram solution oral,  TAKE AS DIRECTED.    sodium chloride 3 % nebulizer solution Inhale 4 mL by nebulization 2 times a day.    traZODone (DESYREL) 50 mg, oral, Nightly PRN    Xopenex HFA 45 mcg/actuation inhaler 2 puffs, inhalation, Every 4 hours PRN       Physical Examination     /84 (BP Location: Right arm, Patient Position: Sitting, BP Cuff Size: Adult)   Pulse 56   Temp 36.8 °C (98.3 °F) (Oral)   Resp 20   Ht 1.571 m (5' 1.85\")   Wt 55 kg (121 lb 4.1 oz)   SpO2 95%   BMI 22.29 kg/m²      11/28/22 11:02 07/10/23 12:31 08/09/23 14:42 11/08/23 11:32 02/12/24 11:31 04/03/24 12:59 04/17/24 12:25   Weight 55.2 kg (121 lb 11.1 oz) 56.6 kg (124 lb 12.5 oz) 56.6 kg (124 lb 12.5 oz) 55.8 kg (123 lb 0.3 oz) 55.3 kg (121 lb 12.9 oz) 54.8 kg (120 lb 11.2 oz) 55 kg (121 lb 4.1 oz)     General: Looked a bit more tired that usual but otherwise similar  HEENT: normocephalic; anicteric sclerae; conjunctivae not " injected; nasal mucosa was unremarkable; oropharynx was clear without evidence of thrush; (+) dental plate  Neck: supple; no lymphadenopathy or thyromegaly.  Chest: (+) soft, distant expiratory wheeze in mid-fields posteriorly; otherwise clear to auscultation.  Cardiac: regular rhythm; no gallop or murmur.  Abdomen: soft; non-tender; non-distended; no hepatosplenomegaly, no renal bruit.  Extremities: no leg edema; no digital clubbing; 2+ pulses  Psychiatric: Good eye contact; little more restricted and remorseful when discussing the recent relapse with alcohol     Metabolic Parameters     Sodium   Date/Time Value Ref Range Status   04/03/2024 03:02  136 - 145 mmol/L Final     Potassium   Date/Time Value Ref Range Status   04/03/2024 03:02 PM 4.5 3.5 - 5.3 mmol/L Final     Chloride   Date/Time Value Ref Range Status   04/03/2024 03:02  98 - 107 mmol/L Final     Bicarbonate   Date/Time Value Ref Range Status   04/03/2024 03:02 PM 26 21 - 32 mmol/L Final     Anion Gap   Date/Time Value Ref Range Status   04/03/2024 03:02 PM 17 10 - 20 mmol/L Final     Urea Nitrogen   Date/Time Value Ref Range Status   04/03/2024 03:02 PM 21 6 - 23 mg/dL Final     Creatinine   Date/Time Value Ref Range Status   04/03/2024 03:02 PM 0.95 0.50 - 1.05 mg/dL Final     GFR Female   Date/Time Value Ref Range Status   07/10/2023 01:28 PM 64 >90 mL/min/1.73m2 Final     Comment:      CALCULATIONS OF ESTIMATED GFR ARE PERFORMED   USING THE 2021 CKD-EPI STUDY REFIT EQUATION   WITHOUT THE RACE VARIABLE FOR THE IDMS-TRACEABLE   CREATININE METHODS.    https://jasn.asnjournals.org/content/early/2021/09/22/[email or web address]       Glucose   Date/Time Value Ref Range Status   04/03/2024 03:02 PM 94 74 - 99 mg/dL Final     Calcium   Date/Time Value Ref Range Status   04/03/2024 03:02 PM 9.8 8.6 - 10.6 mg/dL Final     Thyroid Stimulating Hormone   Date/Time Value Ref Range Status   02/12/2024 02:47 PM 1.02 0.44 - 3.98 mIU/L Final     TSH    Date/Time Value Ref Range Status   07/10/2023 01:28 PM 1.13 0.44 - 3.98 mIU/L Final     Comment:      TSH testing is performed using different testing    methodology at [facility] than at other    [facility]. Direct result comparisons should    only be made within the same method.     02/21/2022 09:12 AM 2.97 0.44 - 3.98 mIU/L Final     Comment:      TSH testing is performed using different testing    methodology at [facility] than at other    [facility]. Direct result comparisons should    only be made within the same method.       Phosphorus   Date/Time Value Ref Range Status   04/03/2024 03:02 PM 4.2 2.5 - 4.9 mg/dL Final     Comment:     The performance characteristics of phosphorus testing in heparinized plasma have been validated by the individual  laboratory site where testing is performed. Testing on heparinized plasma is not approved by the FDA; however, such approval is not necessary.       Hematologic Parameters     WBC   Date/Time Value Ref Range Status   04/03/2024 03:02 PM 6.2 4.4 - 11.3 x10*3/uL Final     Neutrophils Absolute   Date/Time Value Ref Range Status   04/03/2024 03:02 PM 3.81 1.20 - 7.70 x10*3/uL Final     Comment:     Percent differential counts (%) should be interpreted in the context of the absolute cell counts (cells/uL).     Neutrophils %   Date/Time Value Ref Range Status   04/03/2024 03:02 PM 61.3 40.0 - 80.0 % Final     Lymphocytes %   Date/Time Value Ref Range Status   04/03/2024 03:02 PM 27.5 13.0 - 44.0 % Final     Monocytes %   Date/Time Value Ref Range Status   04/03/2024 03:02 PM 8.2 2.0 - 10.0 % Final     Basophils %   Date/Time Value Ref Range Status   04/03/2024 03:02 PM 0.8 0.0 - 2.0 % Final     Eosinophils Absolute   Date/Time Value Ref Range Status   04/03/2024 03:02 PM 0.12 0.00 - 0.70 x10*3/uL Final     Eosinophils %   Date/Time Value Ref Range Status   04/03/2024 03:02 PM 1.9 0.0 - 6.0 % Final     Hemoglobin   Date/Time  Value Ref Range Status   04/03/2024 03:02 PM 13.7 12.0 - 16.0 g/dL Final     Hematocrit   Date/Time Value Ref Range Status   04/03/2024 03:02 PM 42.2 36.0 - 46.0 % Final     RBC   Date/Time Value Ref Range Status   04/03/2024 03:02 PM 4.76 4.00 - 5.20 x10*6/uL Final     MCV   Date/Time Value Ref Range Status   04/03/2024 03:02 PM 89 80 - 100 fL Final     MCHC   Date/Time Value Ref Range Status   04/03/2024 03:02 PM 32.5 32.0 - 36.0 g/dL Final     Platelets   Date/Time Value Ref Range Status   04/03/2024 03:02  150 - 450 x10*3/uL Final       Fat-Soluble Vitamin Levels     Vitamin D, 25-Hydroxy, Total   Date/Time Value Ref Range Status   02/12/2024 02:43 PM 52 30 - 100 ng/mL Final     Vitamin A (Retinol)   Date/Time Value Ref Range Status   02/12/2024 02:43 PM 0.52 0.30 - 1.20 mg/L Final     Vitamin A, Interpretation   Date/Time Value Ref Range Status   02/12/2024 02:43 PM Normal  Final     Comment:       This test was developed and its performance characteristics   determined by [facility]. It has not been cleared or   approved by the US Food and Drug Administration. This test was   performed in a CLIA certified laboratory and is intended for   clinical purposes.  Performed By: [facility]  [address] [place] [id number]  : Tello Pacheco MD, PhD  CLIA Number: [id number]     Vitamin E (Alpha-Tocopherol)   Date/Time Value Ref Range Status   02/12/2024 02:43 PM 6.6 5.5 - 18.0 mg/L Final     Comment:       This test was developed and its performance characteristics   determined by [facility]. It has not been cleared or   approved by the US Food and Drug Administration. This test was   performed in a CLIA certified laboratory and is intended for   clinical purposes.     Vitamin E (Gamma-Tocopherol)   Date/Time Value Ref Range Status   02/12/2024 02:43 PM 1.6 0.0 - 6.0 mg/L Final     Comment:     Performed By: [facility]  [address] [place] [address]  [place] [address]  : Tello Pacheco MD, PhD  IA Number: [id number]       LFT and Associated Parameters     AST   Date/Time Value Ref Range Status   04/03/2024 03:02 PM 19 9 - 39 U/L Final   02/12/2024 02:43 PM 16 9 - 39 U/L Final   07/10/2023 01:28 PM 18 9 - 39 U/L Final   11/21/2022 12:04 PM 18 9 - 39 U/L Final   10/10/2022 01:05 PM 23 9 - 39 U/L Final     ALT   Date/Time Value Ref Range Status   04/03/2024 03:02 PM 16 7 - 45 U/L Final     Comment:     Patients treated with Sulfasalazine may generate falsely decreased results for ALT.   02/12/2024 02:43 PM 17 7 - 45 U/L Final     Comment:     Patients treated with Sulfasalazine may generate falsely decreased results for ALT.     ALT (SGPT)   Date/Time Value Ref Range Status   07/10/2023 01:28 PM 15 7 - 45 U/L Final     Comment:      Patients treated with Sulfasalazine may generate    falsely decreased results for ALT.     11/21/2022 12:04 PM 18 7 - 45 U/L Final     Comment:      Patients treated with Sulfasalazine may generate    falsely decreased results for ALT.     10/10/2022 01:05 PM 19 7 - 45 U/L Final     Comment:      Patients treated with Sulfasalazine may generate    falsely decreased results for ALT.       Alkaline Phosphatase   Date/Time Value Ref Range Status   04/03/2024 03:02 PM 91 33 - 136 U/L Final   02/12/2024 02:43 PM 94 33 - 136 U/L Final   07/10/2023 01:28  33 - 136 U/L Final   11/21/2022 12:04  33 - 136 U/L Final   10/10/2022 01:05  33 - 136 U/L Final     Bilirubin, Total   Date/Time Value Ref Range Status   04/03/2024 03:02 PM 0.5 0.0 - 1.2 mg/dL Final   02/12/2024 02:43 PM 0.3 0.0 - 1.2 mg/dL Final     Total Bilirubin   Date/Time Value Ref Range Status   07/10/2023 01:28 PM 0.5 0.0 - 1.2 mg/dL Final   11/21/2022 12:04 PM 0.6 0.0 - 1.2 mg/dL Final   10/10/2022 01:05 PM 0.5 0.0 - 1.2 mg/dL Final     Bilirubin, Direct   Date/Time Value Ref Range Status   04/03/2024 03:02 PM 0.1 0.0 - 0.3 mg/dL Final    02/12/2024 02:43 PM 0.1 0.0 - 0.3 mg/dL Final     GGT   Date/Time Value Ref Range Status   02/12/2024 02:43 PM 26 5 - 55 U/L Final   07/10/2023 01:28 PM 22 5 - 55 U/L Final   02/21/2022 09:12 AM 33 5 - 55 U/L Final   11/16/2020 03:16 PM 34 5 - 55 U/L Final     Albumin   Date/Time Value Ref Range Status   04/03/2024 03:02 PM 4.7 3.4 - 5.0 g/dL Final   02/12/2024 02:43 PM 4.5 3.4 - 5.0 g/dL Final   11/08/2023 11:46 AM 4.8 3.4 - 5.0 g/dL Final     Total Protein   Date/Time Value Ref Range Status   04/03/2024 03:02 PM 7.7 6.4 - 8.2 g/dL Final   02/12/2024 02:43 PM 7.2 6.4 - 8.2 g/dL Final   07/10/2023 01:28 PM 7.3 6.4 - 8.2 g/dL Final   11/21/2022 12:04 PM 7.3 6.4 - 8.2 g/dL Final   10/10/2022 01:05 PM 7.2 6.4 - 8.2 g/dL Final       Coagulation Parameters     Protime   Date/Time Value Ref Range Status   11/16/2020 03:16 PM 11.0 10.1 - 13.3 sec Final     INR   Date/Time Value Ref Range Status   11/16/2020 03:16 PM 0.9 0.9 - 1.1 Final       Diabetes Laboratory Tests     Hemoglobin A1C   Date/Time Value Ref Range Status   02/12/2024 02:43 PM 6.4 (H) see below % Final     Albumin, Urine Random   Date/Time Value Ref Range Status   02/12/2024 02:43 PM <7.0 Not established mg/L Final     Creatinine, Urine Random   Date/Time Value Ref Range Status   02/12/2024 02:43 PM 67.4 20.0 - 320.0 mg/dL Final     Creatinine, Urine   Date/Time Value Ref Range Status   07/10/2023 01:28 PM 38.9 20.0 - 320.0 mg/dL Final   06/23/2021 01:54 PM 60.7 20.0 - 320.0 mg/dL Final     Albumin/Creatine Ratio   Date/Time Value Ref Range Status   02/12/2024 02:43 PM   Final     Comment:     One or more analytes used in this calculation is outside of the analytical measurement range. Calculation cannot be performed.   07/10/2023 01:28 PM SEE COMMENT 0.0 - 30.0 ug/mg crt Final     Comment:     One or more analytes used in this calculation   is outside of the analytical measurement range.  Calculation cannot be performed.     06/23/2021 01:54 PM SEE  COMMENT 0.0 - 30.0 ug/mg crt Final     Comment:     One or more analytes used in this calculation   is outside of the analytical measurement range.  Calculation cannot be performed.          Immunology Laboratory Tests     IgE   Date/Time Value Ref Range Status   02/12/2024 02:43 PM <2 0 - 214 IU/mL Final     Alcohol Abstinence Monitoring     EER PEth   Date Value Ref Range Status   04/03/2024 See Note  Final     Comment:     Authorized individuals can access the [facility]   Enhanced Report using the following link:      [email or web address]     PEth 16:0/18:1 (POPEth)   Date Value Ref Range Status   04/03/2024 64 ng/mL Final     Comment:     PEth 16:0/18:1 (POPEth)  Less than 10 ng/mL............Not detected  Less than 20 ng/mL............Abstinence or light alcohol   consumption  20 - 200 ng/mL................Moderate alcohol consumption  Greater than 200 ng/mL........Heavy alcohol consumption or chronic   alcohol use    (Reference: DANTE Stacy and SUHAS Carballo 2018 J. Forensic Sci)     PEth 16:0/18:2 (PLPEth)   Date Value Ref Range Status   04/03/2024 65 ng/mL Final     Comment:     Reference ranges are not well established.     PEth Interpretation   Date Value Ref Range Status   04/03/2024 See Comment  Final     Comment:     Phosphatidylethanol (PEth) is a group of phospholipids formed in   the presence of ethanol, phospholipase D and phosphatidylcholine.   PEth is known to be a direct alcohol biomarker. The predominant   PEth homologues are PEth 16:0/18:1 (POPEth) and PEth 16:0/18:2   (PLPEth), which account for 37-46% and 26-28% of the total PEth   homologues, respectively. PEth is incorporated into the   phospholipid membrane of red blood cells and has a general   half-life of 4-10 days and a window of detection of 2-4 weeks.   However, the window of detection is longer in individuals who   chronically or excessively consume alcohol. The limit of   quantification is 10 ng/mL. Serial  monitoring of PEth may be   helpful in monitoring alcohol abstinence over time. PEth results   should be interpreted in the context of the patient's clinical and   behavioral history.  Patients with advanced liver disease may have falsely elevated   PEth concentrations (Senait STAUFFER et al 2018, Alcoholism Clinical &   Experimental Research).    This test was developed and its performance characteristics   determined by [facility]. It has not been cleared or   approved by the U.S. Food and Drug Administration. This test was   performed in a CLIA-certified laboratory and is intended for   clinical purposes.  Performed By: [facility]  [address] [place] [id number]  : Tello Pacheco MD, PhD  CLIA Number: [id number]     DEXA Scan     XR BONE density 10/10/2022    Narrative  Name: OMAR GIPSON  Patient ID: [id number] YOB: 1960 Height: 62.0 in.  Gender:     Female   Exam Date:  10/10/2022 Weight: 118.0 lbs.  Indications: Cystic Fibrosis, DIABETES, family history osteoporosis,  Hysterectomy, Post Menopausal  Fractures:    Treatments:  OMEPRAZOLE    LEFT FEMUR - TOTAL  The bone mineral density : 0.889 g/cm2  T-score : -0.9    % of young normal mean :88%  Z-score : 0.0    % of age matched mean : 100%  % change vs. Previous : N/A    % change vs. Baseline : baseline    LEFT FEMUR - NECK  The bone mineral density : 0.843 g/cm2  T-score : -1.4    % of young normal mean: 81%  Z-score : -0.1    % of age matched mean : 98%  % change vs. Previous : N/A    % change vs. Baseline : baseline    SPINE L1-L4  The bone mineral density is 1.147 g/cm2  T-score : -0.3   % of young normal mean is 97%  Z-score : 1.0    % of age matched mean is 111%  % change vs. Previous : -    % change vs. Baseline : baseline    World Health Organization (WHO) criteria for post-menopausal,   Women:  Normal:       T-score at or above -1 SD  Osteopenia:   T-score between -1 and -2.5  SD  Osteoporosis: T-score at or below -2.5 SD    10-Year Fracture Risk:  Major Osteoporotic Fracture 4.0%  Hip Fracture                0.4%  Reported Risk Factors       None    Interpretation:  According to World Health Organization criteria, classification is low bone mass.  Followup recommended on October 2024 or sooner as clinically warranted.     Chest Radiograph     XR chest 2 view 10/10/2022    Narrative  MRN: [id number]  Patient Name: OMAR GIPSON    STUDY:  TH CHEST 2 VIEW PA AND LAT;    INDICATION:  cystic fibrosis, malabsorption  E84.9: Cystic fibrosis E84.0: Cystic fibrosis with pulmonary manifestations.    COMPARISON:  Chest radiograph 05/09/2016    ACCESSION NUMBER(S):  [id number]    ORDERING CLINICIAN:  CELIA MIN    FINDINGS:  Frontal, lateral, and dual energy radiographs of the chest were  provided.  The cardiac silhouette size is within normal limits.  There is no focal consolidation, edema or pneumothorax.  No sizeable pleural effusion.  No acute osseous abnormality.    Impression  No acute cardiopulmonary process.  I personally reviewed the images/study and I agree with the findings as stated.     CF Sputum Culture     AFB Culture   Date Value Ref Range Status   04/03/2024   Preliminary    Culture in progress and will be examined weekly. A result will be issued either when positive or after 8 weeks incubation.   02/12/2024 No Mycobacteria isolated.  Final      Respiratory Culture, Cystic Fibrosis   Date/Time Value Ref Range Status   04/03/2024 03:02 PM (3+) Moderate Normal throat perla  Final   04/03/2024 03:02 PM (A)  Final    (4+) Abundant Methicillin Resistant Staphylococcus aureus (MRSA)     Comment:     Methicillin (Oxacillin) resistant Staphylococci are resistant to all currently available Penicillins, Beta-lactam/Beta-lactamase inhibitor combinations (including Ampicillin/Sulbactam, Amoxicillin/Clavulanate and Pipercillin/Tazobactam), Carbapenems and   Cephalosporins (except Ceftaroline).      CF respiratory culture (11/28/2022): MRSA  CF respiratory culture (10/10/2022): MRSA, Burkholderia multivorans  CF respiratory culture (5/9/2022): MRSA  CF respiratory culture (6/3/2015): MSSA, Pseudomonas putida    Susceptibility data from last 2000 days.  Collected Specimen Info Organism Clindamycin Erythromycin Linezolid Oxacillin Tetracycline Trimethoprim/Sulfamethoxazole Vancomycin   04/03/24 Swab from SPUTUM Candida albicans            Scedosporium boydii          04/03/24 Fluid from Throat Swab Methicillin Resistant Staphylococcus aureus (MRSA) S R  R S S S     Normal throat perla          02/12/24 Fluid from SPUTUM Methicillin Resistant Staphylococcus aureus (MRSA) S R  R S S S     Normal throat perla          02/12/24 Swab from SPUTUM Candida albicans            Scedosporium boydii          11/08/23 Fluid from Throat Swab Methicillin Resistant Staphylococcus aureus (MRSA) S R  R S S S     Normal throat perla          07/07/23 Respiratory Staphylococcus aureus R R S S S S S   07/07/23 Respiratory Candida albicans                  Problem List Items Addressed This Visit       Alcohol use disorder, moderate, in early remission (Multi)    Current Assessment & Plan     Lucia has turned to her support system and has stopped drinking.         Constipation    Current Assessment & Plan     Self-managing well with MiraLAX.  No current problems.         Cystic fibrosis (Multi)    Current Assessment & Plan     Again discussed her candidacy for Trikafta, which could independently improve lung function, reduce risk of pulmonary exacerbations, and improve daily symptom burden.  She would like to keep this in her back pocket for now.  Continue Pulmozyme twice daily and performing HFCWO.  See about getting a new vest, possibly one of the portable models.  Xopenex (levalbuterol) has been better for her than racemic albuterol - no tremulousness.  Start nebulized hypertonic saline 3% twice a day  Surveillance respiratory  tract culture today.         Relevant Medications    sodium chloride 3 % nebulizer solution    Xopenex HFA 45 mcg/actuation inhaler    Cystic fibrosis with pulmonary manifestations (Multi)    Relevant Medications    sodium chloride 3 % nebulizer solution    Xopenex HFA 45 mcg/actuation inhaler    Diabetes mellitus related to CF (cystic fibrosis) (Multi) - Primary    Overview     CFRD Hx: Diagnosed in March 2013 when A1c was 7.6%. Fasting glucose in the office in April 2013 was 252. we started her on Levemir.   June 2021: 6.9%  Sept 2021: 6.4%  April 2022: 7.3%  July 2023: 6.5%  Microalbumin: WNL 7/2023         Current Assessment & Plan     Needs to see Dr. Ambrose    Insulin Instructions  Fixed Dose Injections   Basaglar KwikPen U-100 Insulin 100 unit/mL (3 mL) insulin pen   Last edited by Lanie Ambrose MD on 11/21/2023 at 11:51 AM      Time of Day Dose (units)   10 pm 7              Exocrine pancreatic insufficiency (HHS-HCC)    Current Assessment & Plan     Continue Creon 24, 3-4 capsules by mouth with meals.  Serum levels of fat-soluble vitamins were in good range in February 2024.         MRSA infection (methicillin-resistant Staphylococcus aureus)    Current Assessment & Plan     Recent favorable clinical responses to doxycycline suggest that this pathogen is causing problems for her.  MRSA is associated with accelerated rate of lung function decline, and in some studies, increased mortality risk.  Sick plan: doxycycline 100 mg by mouth twice daily x 14 days  Could consider nebulized vancomycin as suppressive strategy.         Primary hypertension    Overview     Managed [facility]         Current Assessment & Plan     Blood pressure is better on the combination of hydrochlorothiazide and losartan.  Now managed by her primary care physician.         Scedosporiosis (Multi)    Overview     Sputum sample collected 2/12/2024 in CF Clinic grew 1+ 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)          Current Assessment & Plan     We have (+) fungal sputum cultures from 2/12/2024 and 4/3/2024.  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   Will obtain the chest CT scan to evaluate lung parenchyma.  Hold off on antifungal for now.            Follow-up: 4/17/2024    Nicolás Harris MD   04/17/2024

## 2024-04-16 DIAGNOSIS — E08.9 DIABETES MELLITUS RELATED TO CF (CYSTIC FIBROSIS) (MULTI): ICD-10-CM

## 2024-04-16 DIAGNOSIS — E84.8 DIABETES MELLITUS RELATED TO CF (CYSTIC FIBROSIS) (MULTI): ICD-10-CM

## 2024-04-16 RX ORDER — INSULIN GLARGINE 100 [IU]/ML
INJECTION, SOLUTION SUBCUTANEOUS
Qty: 15 ML | Refills: 3 | Status: SHIPPED | OUTPATIENT
Start: 2024-04-16 | End: 2025-04-16

## 2024-04-17 ENCOUNTER — DOCUMENTATION (OUTPATIENT)
Dept: PHYSICAL THERAPY | Facility: HOSPITAL | Age: 64
End: 2024-04-17

## 2024-04-17 ENCOUNTER — HOSPITAL ENCOUNTER (OUTPATIENT)
Dept: RESPIRATORY THERAPY | Facility: HOSPITAL | Age: 64
Discharge: HOME | End: 2024-04-17
Payer: MEDICARE

## 2024-04-17 ENCOUNTER — MULTIDISCIPLINARY VISIT (OUTPATIENT)
Dept: PEDIATRIC PULMONOLOGY | Facility: HOSPITAL | Age: 64
End: 2024-04-17
Payer: MEDICARE

## 2024-04-17 ENCOUNTER — APPOINTMENT (OUTPATIENT)
Dept: RESPIRATORY THERAPY | Facility: HOSPITAL | Age: 64
End: 2024-04-17
Payer: MEDICARE

## 2024-04-17 ENCOUNTER — SOCIAL WORK (OUTPATIENT)
Dept: PEDIATRIC PULMONOLOGY | Facility: HOSPITAL | Age: 64
End: 2024-04-17
Payer: MEDICARE

## 2024-04-17 ENCOUNTER — SPECIALTY PHARMACY (OUTPATIENT)
Dept: PHARMACY | Facility: CLINIC | Age: 64
End: 2024-04-17

## 2024-04-17 ENCOUNTER — DOCUMENTATION (OUTPATIENT)
Dept: PEDIATRIC PULMONOLOGY | Facility: HOSPITAL | Age: 64
End: 2024-04-17

## 2024-04-17 VITALS
HEIGHT: 62 IN | OXYGEN SATURATION: 95 % | WEIGHT: 121.25 LBS | DIASTOLIC BLOOD PRESSURE: 84 MMHG | HEART RATE: 56 BPM | BODY MASS INDEX: 22.31 KG/M2 | TEMPERATURE: 98.3 F | RESPIRATION RATE: 20 BRPM | SYSTOLIC BLOOD PRESSURE: 143 MMHG

## 2024-04-17 DIAGNOSIS — E84.8 DIABETES MELLITUS RELATED TO CF (CYSTIC FIBROSIS) (MULTI): Primary | ICD-10-CM

## 2024-04-17 DIAGNOSIS — A49.02 MRSA INFECTION (METHICILLIN-RESISTANT STAPHYLOCOCCUS AUREUS): ICD-10-CM

## 2024-04-17 DIAGNOSIS — B48.8: ICD-10-CM

## 2024-04-17 DIAGNOSIS — E08.9 DIABETES MELLITUS RELATED TO CF (CYSTIC FIBROSIS) (MULTI): Primary | ICD-10-CM

## 2024-04-17 DIAGNOSIS — I10 PRIMARY HYPERTENSION: ICD-10-CM

## 2024-04-17 DIAGNOSIS — E84.0 CYSTIC FIBROSIS WITH PULMONARY MANIFESTATIONS (MULTI): ICD-10-CM

## 2024-04-17 DIAGNOSIS — E84.9 CF (CYSTIC FIBROSIS) (MULTI): ICD-10-CM

## 2024-04-17 DIAGNOSIS — K59.00 CONSTIPATION, UNSPECIFIED CONSTIPATION TYPE: ICD-10-CM

## 2024-04-17 DIAGNOSIS — F10.21 ALCOHOL USE DISORDER, MODERATE, IN EARLY REMISSION (MULTI): ICD-10-CM

## 2024-04-17 DIAGNOSIS — K86.81 EXOCRINE PANCREATIC INSUFFICIENCY (HHS-HCC): ICD-10-CM

## 2024-04-17 DIAGNOSIS — E84.9 CYSTIC FIBROSIS (MULTI): ICD-10-CM

## 2024-04-17 LAB
FEF 25-75: 1.82 L/S
FEV1/FVC: 74 %
FEV1: 2.42 LITERS
FVC: 3.27 LITERS
PEF: 6.17 L/S

## 2024-04-17 PROCEDURE — 87186 SC STD MICRODIL/AGAR DIL: CPT | Performed by: INTERNAL MEDICINE

## 2024-04-17 PROCEDURE — 94010 BREATHING CAPACITY TEST: CPT | Performed by: INTERNAL MEDICINE

## 2024-04-17 PROCEDURE — 94010 BREATHING CAPACITY TEST: CPT

## 2024-04-17 PROCEDURE — 99214 OFFICE O/P EST MOD 30 MIN: CPT | Performed by: INTERNAL MEDICINE

## 2024-04-17 PROCEDURE — 99214 OFFICE O/P EST MOD 30 MIN: CPT | Mod: 25 | Performed by: INTERNAL MEDICINE

## 2024-04-17 RX ORDER — SODIUM CHLORIDE FOR INHALATION 3 %
4 VIAL, NEBULIZER (ML) INHALATION 2 TIMES DAILY
Qty: 240 ML | Refills: 6 | Status: SHIPPED | OUTPATIENT
Start: 2024-04-17

## 2024-04-17 RX ORDER — LEVALBUTEROL TARTRATE 45 UG/1
2 AEROSOL, METERED ORAL EVERY 4 HOURS PRN
Qty: 15 G | Refills: 6 | Status: SHIPPED | OUTPATIENT
Start: 2024-04-17 | End: 2024-05-29 | Stop reason: SDUPTHER

## 2024-04-17 NOTE — PROGRESS NOTES
[facility]  Patient Management Program- New start education Trikafta      Clinical Intervention: New Start Patient Education    Medication receipt date: n/a Planned Initiation date: still unsure of when Trikafta will be initiated. This visit was to inform Mansi about Trikafta to decide on risk/benefit    Date of education: 4/17/24  I spoke with Mansi about the relevant patient medication education for the new medication Trikafta.  We discussed indications, mechanism of action, dose, frequency, administer with fat containing food, drug interactions, avoid grapefruit, common side effects, monitoring parameters, potential benefits, what to do if a dose is missed, and how supplied. We also discussed the [employer] patient assistance program.   Mansi expressed expressed understanding of the educational points. Prior authorization was already obtained for Trikafta and likely will be filled through [facility] pharmacy if it is started.    Patient was educated on the administration, warnings, precautions, common adverse effects, proper storage, handling, and disposal of Trikafta. Discussed patient's goals of therapy for treatment with a CFTR modulator.  Please see details below.  Follow up needed: decide whether to start Trikafta or not    Patient Discussion:     Introduction:   - Patient contacted in person to conduct first fill assessment and new start patient education on Trikafta      Clinical Background:  - CFTR Mutations: [id number] and [id number]      - The Patient's medication list, allergies, and comorbid conditions were verified. No contraindications to therapy noted at this time.  - Patient advised to contact the pharmacy if there are any changes to her medication list, including prescriptions, OTC medications, herbal products, or supplements.   - Current clinically significant drug-drug interactions include:   Strong CYP3A inducers should not be used, Moderate to strong inhibitors of  CYP3A necessitate dose adjustment    - Labs were reviewed and no concerns were noted regarding her therapy with Trikafta  Drug specific labs: Baseline LFTs and every 3 months x 1 year, then annually      -Medication is clinically appropriate.    Education/Discussion:  Patient accepted the pharmacist's offer of counseling.    Indication: Cystic Fibrosis treatment    Trikafta    Elexacaftor/tezacaftor/ivacaftor 100-50-75mg tablets: DOSE: Take 2 orange tablets every morning  Ivacaftor 150mg tablets: DOSE: Take 1 blue tablet every evening    Mechanism of action: CFTR Modulator    Duration of therapy: Life-long      Administration:   Dietary considerations:  Take with fat-containing food or drink    Does the patient have any barriers to self-administration (including physical and mental)? No       Patient/caregiver counseled on proper technique for self-administration: Yes      Missed Doses:  Importance of adherence discussed with patient and they were advised to use a calendar to keep track of doses. Missed doses should be taken as soon as possible after remembering, but no closer than 6 hours between doses.  If the morning dose was missed, and nighttime dose cannot be taken at least 6 hours later, take only the morning dose and skip nighttime dose for that day. Resume usual dosing the next day.    Encouraged patient to call their physician office if they have a question on a missed dose.    What barriers to adherence does the patient have? (answer Y/N for all):  Patient has a difficult time remembering to take medications? N  Difficult administration technique? N  Medication cost? N  Patient does not think medication is beneficial? N  Other?     What actions were taken to address barriers?      Warnings, Precautions, and Adverse Reactions:   - Discussed common adverse effects, warnings and precautions pertinent to the medication including but not limited to: headache, upper respiratory tract infection symptoms, stomach  pain, diarrhea, rash, nasal congestion, and increases in liver enzymes  - Patient was encouraged to reach out to their doctor's office if they develop: any severe side effects such as head-to-toe rash      Handling and Storage   Store in the original container at room temperature in a cool dry place.     Disposal:  Unused,  or damaged doses should be properly disposed    Goals of therapy:  Improve quality of life  Improve lung function    Efficacy timeline:   Every person responds and reacts to therapy differently  Adverse effects or efficacy to treatment can occur at any point during therapy      Conclusion:  - Quality of life: doing better    - High risk status (pregnancy, geriatric, pediatric): n/a    Patient was advised of [facility]'s dispensing process, refill timeline, contact information ([phone]), and patient management follow up. Likely will need to fill Trikafta through [facility], may be getting levalbuterol from  Specialty.    Patient confirmed understanding of education conducted during assessment. All patient questions and concerns were addressed to the best of my ability. Patient was encouraged to contact the specialty pharmacy with any questions or concerns.

## 2024-04-17 NOTE — PATIENT INSTRUCTIONS
"    It was very nice to see you today. We can offer you in-person and \"virtual\" appointments with your cystic fibrosis provider.    If you need to cancel or schedule an appointment, please call the  at the [facility] at [phone] between the hours of 8 AM and 5 PM, Monday-Friday.    To reach the CF nurse, Bisi, please call [phone]. Bisi has a secure voice mail account if you want to leave a message.    If you need to speak with an adult cystic fibrosis provider between the hours of 5 PM and 8 AM (overnight) or anytime on Saturday or Sunday, please call [phone]. When you call this number, you will be connected to an  with the [facility] and [facility] answering service. You should tell the  that you're an adult with cystic fibrosis who needs to speak to the \"cystic fibrosis doctor on-call.\" The  will take your contact information. You should then expect a call back from the cystic fibrosis doctor on-call within a short period of time.      Plan from today's visit:    - If sickness, start doxycycline 100 mg by mouth twice daily x 14 days  - Use levalbuterol twice a day scheduled and can use it every 6 hours PRN.  - Use your vest at least once a day  - Continue Pulmozyme once daily.  - We will add hypertonic saline 3% to use twice a day inhaled to help thin the mucous that you feel stuck in your chest.  - CAT scan of chest.  - Schedule appointment with Dr. Ambrose at the desk.    Important Information:  Your CFTR variants (mutations) are: [id number]     Your percent-predicted FEV1 today was: 106%    Your weight adjusted for height (body mass index, BMI) today was: 22.16 kg/m2  (goal for adult males with CF = 23.0 kg/m2, goal for adult females with CF = 22.0 kg/m2)    Next appointment: 2-3 months; Dr. Harris  "

## 2024-04-17 NOTE — PROGRESS NOTES
RT Check in:    Lucia is feeling better. She is balancing her health and work, eating healthier and walking more.     She has been batch cleaning neb cups, I let Lucia know she can also use the  to disinfect neb cups.    She has a few puffs left on her xopenex inhaler    I let Lucia know, unfortunately generic xopenex is on back order, brand is available, prior authorization done and approved, but co pay is $64-70. 4/22/24 local pharmacy ordering today, should be ready tomorrow    3% saline ordered and ready to be picked up at local pharmacy - confirmed 4/22/24    Kayleigh s trek given today xpressnebs - explained it has a 3 year warranty    Dr. Harris discussed portable vests - brochures given on both monarch and afflo vest - Lucia to review - option to try out next clinic visit if interested or can arrange separate appointment with CF RT - recommend trying before ordering

## 2024-04-17 NOTE — PROGRESS NOTES
Sw met with pt today at clinic. 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 Pt stated she is getting tired and has not  feel well in several months. Pt descries has having a good support of friends and friends who encourage her sobriety. Pt admitted that   she went out wiht friends and she did drink and did drink the Sunday before her labs were drawn. Pt seemed to recognize the reason why  she went back out drinking and did call her sponsor. Pt is working on setting limits  and boundaries for herself so she is not in those situations in the future . Pt appreciative of the team and all the support. SW will continue to follow for support. Pt was engaged and receptive to SW

## 2024-04-19 PROBLEM — E84.0 CYSTIC FIBROSIS WITH PULMONARY MANIFESTATIONS (MULTI): Status: ACTIVE | Noted: 2024-04-19

## 2024-04-19 PROBLEM — F10.21 ALCOHOL USE DISORDER, MODERATE, IN EARLY REMISSION (MULTI): Status: ACTIVE | Noted: 2024-04-19

## 2024-04-19 PROBLEM — A49.02 MRSA INFECTION (METHICILLIN-RESISTANT STAPHYLOCOCCUS AUREUS): Status: ACTIVE | Noted: 2020-10-28

## 2024-04-19 PROBLEM — F10.90 ALCOHOL USE DISORDER: Status: RESOLVED | Noted: 2023-11-09 | Resolved: 2024-04-19

## 2024-04-19 NOTE — ASSESSMENT & PLAN NOTE
Continue Creon 24, 3-4 capsules by mouth with meals.  Serum levels of fat-soluble vitamins were in good range in February 2024.

## 2024-04-19 NOTE — ASSESSMENT & PLAN NOTE
Again discussed her candidacy for Trikafta, which could independently improve lung function, reduce risk of pulmonary exacerbations, and improve daily symptom burden.  She would like to keep this in her back pocket for now.  Continue Pulmozyme twice daily and performing HFCWO.  See about getting a new vest, possibly one of the portable models.  Xopenex (levalbuterol) has been better for her than racemic albuterol - no tremulousness.  Start nebulized hypertonic saline 3% twice a day  Surveillance respiratory tract culture today.

## 2024-04-19 NOTE — ASSESSMENT & PLAN NOTE
Recent favorable clinical responses to doxycycline suggest that this pathogen is causing problems for her.  MRSA is associated with accelerated rate of lung function decline, and in some studies, increased mortality risk.  Sick plan: doxycycline 100 mg by mouth twice daily x 14 days  Could consider nebulized vancomycin as suppressive strategy.

## 2024-04-19 NOTE — ASSESSMENT & PLAN NOTE
We have (+) fungal sputum cultures from 2/12/2024 and 4/3/2024.  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   Will obtain the chest CT scan to evaluate lung parenchyma.  Hold off on antifungal for now.

## 2024-04-19 NOTE — ASSESSMENT & PLAN NOTE
Needs to see Dr. Ambrose    Insulin Instructions  Fixed Dose Injections   Basaglar KwikPen U-100 Insulin 100 unit/mL (3 mL) insulin pen   Last edited by Lanie Ambrose MD on 11/21/2023 at 11:51 AM      Time of Day Dose (units)   10 pm 7

## 2024-04-20 LAB
BACTERIA SPT CF RESP CULT: ABNORMAL
BACTERIA SPT CF RESP CULT: ABNORMAL

## 2024-04-24 ENCOUNTER — TELEMEDICINE (OUTPATIENT)
Dept: PEDIATRIC ENDOCRINOLOGY | Facility: HOSPITAL | Age: 64
End: 2024-04-24
Payer: MEDICARE

## 2024-04-24 DIAGNOSIS — E08.9 DIABETES MELLITUS RELATED TO CF (CYSTIC FIBROSIS) (MULTI): Primary | ICD-10-CM

## 2024-04-24 DIAGNOSIS — E84.8 DIABETES MELLITUS RELATED TO CF (CYSTIC FIBROSIS) (MULTI): Primary | ICD-10-CM

## 2024-04-24 PROCEDURE — 99214 OFFICE O/P EST MOD 30 MIN: CPT | Performed by: PEDIATRICS

## 2024-04-24 PROCEDURE — 4010F ACE/ARB THERAPY RXD/TAKEN: CPT | Performed by: PEDIATRICS

## 2024-04-24 PROCEDURE — 3044F HG A1C LEVEL LT 7.0%: CPT | Performed by: PEDIATRICS

## 2024-04-24 PROCEDURE — 3062F POS MACROALBUMINURIA REV: CPT | Performed by: PEDIATRICS

## 2024-04-24 RX ORDER — AMLODIPINE BESYLATE 2.5 MG/1
2.5 TABLET ORAL DAILY
COMMUNITY
Start: 2024-04-04

## 2024-04-24 NOTE — PROGRESS NOTES
"Virtual or Telephone Consent    An interactive audio and video telecommunication system which permits real time communications between the patient (at the originating site) and provider (at the distant site) was utilized to provide this telehealth service.   Verbal consent was requested and obtained from Mansi Morrison on this date, 04/25/24 for a telehealth visit.       Subjective    Mansi Morrison is a 64 y.o. year old with a history of Cystic Fibrosis, pancreatic insufficiency (Delta F508 homozygote) presenting for follow up of Cystic Fibrosis-Related diabetes (CFRD).   CFRD was diagnosed in March 2013.  Last A1C was 6.4% (Feb 2024)  Last visit Nov 2023.     HPI     Doing really well. Feeling a lot, lot better!   Last seen in Nov 2023 just after a TIA. Had a rough winter with repeated infections in Dec. Now seems to have \"come out the other end\".  Reports that she is consistently doing her treatments, taking insulin, checking morning glucoses.   Diabetes is stable.  Glucoses in morning are around 115 mg/dl.    HTN: previously on losartan 100 mg and  hydrochlorothiazide 25 mg.  Amlodipine 2.5 mg added 2 months ago.   BPs at home are running 145/80 now (improved from 170s).    Tingling in hands and feet is drastically improved--feels this is related to dietary changes.  Has resuced processed foods and take-out. Cooking more and eating more nutritious, whole-foods. Feels that it must be improving her inflammation. Has a good friend who is helping her with this and motivating her.    Diabetes is treated with:  Insulin Instructions  Fixed Dose Injections   Basaglar KwikPen U-100 Insulin 100 unit/mL (3 mL) insulin pen   Last edited by Lanie Ambrose MD on 11/21/2023 at 11:51 AM      Time of Day Dose (units)   10 pm 8        BG trends:   The patient is currently checking the blood glucose.  Patient is using: glucometer    Diet Hx:   Weight: unchanged  Pulmonary status: Improved--back to baseline last week "     Hypoglycemia:  Hypoglycemia: none recently   Hypoglycemia frequency: occasional  Hypoglycemia awareness: Yes   Symptoms of Hypoglycemia: confusion and weak and nauseated  Timing of Hypoglycemia: Fasting and Before meals--usually before meals if she hasn't eaten  Treats hypoglycemia with: Other: PBJ, juice  Glucagon prescription:  No       SCREENING FOR COMPLICATIONS:   Urine albumin:  normal in July 2023  Eye doctor: fundoscopic exam normal, 4/18/24 at [facility]    Family History   Problem Relation Name Age of Onset    Other (CF Carrier) Mother      Other (CF Carrier) Father      Colon cancer Father      Esophageal cancer Other      Stomach cancer Other        The patient does not have a known family history of diabetes.  Sister with pre-diabetes    Social History     Tobacco Use    Smoking status: Never    Smokeless tobacco: Never   Substance Use Topics    Alcohol use: Not Currently        Objective   PHYSICAL EXAM:  There were no vitals taken for this visit.   Physical Exam   General: Well nourished, no acute distress. Looks good--very upbeat and happy  HEENT: NCAT, MMM, eye movements grossly intact  Neck: Supple  Pulm: Non labored breathing  Skin: No visible rash  MSK: normal ROM  Ext: WWP  Neuro: CN grossly intact  Psych: alert, normal mood     LABS:  A1c:   Hemoglobin A1C   Date Value Ref Range Status   02/12/2024 6.4 (H) see below % Final     Urine Albumin:   Albumin/Creatine Ratio   Date/Time Value Ref Range Status   02/12/2024 02:43 PM   Final     Comment:     One or more analytes used in this calculation is outside of the analytical measurement range. Calculation cannot be performed.        GLUCOSE DATA:  Patient reported AM glucoses 110-115      ASSESSMENT/PLAN:   Assessment/Plan   Problem List Items Addressed This Visit             ICD-10-CM    Diabetes mellitus related to CF (cystic fibrosis) (Multi) - Primary E84.8, E08.9     65 yo female with CF, PI, CFRD x 11 years, HTN with h/o TIA. A1c of 6.4% which  is at target. Her A1c levels have never been significantly elevated. Reported fasting glucoses in range. No current evidence of diabetic retinopathy (normal exam 4/18/24) or diabetic kidney disease (DKD) other than HTN.     Screening: normal urine albumin, normal RAKEL  HTN on losartan, hydrochlorothiazide, amlodipine--followed by PCP. GFR 68  Normal lipids July 2023 (LDL 99)  Tingling of extremities improved with dietary changes.     Recoimmend:  --continue basaglar 8 units daily.  --check some glucoses after lunch/dinner to assess need for mealtime insulin  --continue treatment of mild hypoglycemia  --annual eye exam  --annual urine albumin--no evidence of DKD now but can have non-proteinuric DKD with decline in GFR aj with HTN.   --Should follow with PCP to optimize HTN management.   --normal lipids in July 2023    RTC 3 mos (can see me in conjunction with Dr. Harris on 6/12 if convenient)           Relevant Orders    Follow Up In Pediatric Endocrinology         Insulin Instructions  Fixed Dose Injections   Basaglar KwikPen U-100 Insulin 100 unit/mL (3 mL) insulin pen   Last edited by Lanie Ambrose MD on 4/24/2024 at 3:47 PM      Time of Day Dose (units)   10 pm 8           CF Related Diabetes Team Contact Information:  Phone: [phone]  Email: [email or web address]  Answering Service: [phone] (evenings and weekends)  Fax: [phone]    [facility] Physician: Lanie Ambrose MD  [facility] Nurses: Virginia Lewis RN, [facility] and Terese Santos, ANIKA, [facility] Medical Assistant: Tamir Malhotra

## 2024-04-25 NOTE — ASSESSMENT & PLAN NOTE
65 yo female with CF, PI, CFRD x 11 years, HTN with h/o TIA. A1c of 6.4% which is at target. Her A1c levels have never been significantly elevated. Reported fasting glucoses in range. No current evidence of diabetic retinopathy (normal exam 4/18/24) or diabetic kidney disease (DKD) other than HTN.     Screening: normal urine albumin, normal RAKEL  HTN on losartan, hydrochlorothiazide, amlodipine--followed by PCP. GFR 68  Normal lipids July 2023 (LDL 99)  Tingling of extremities improved with dietary changes.     Recoimmend:  --continue basaglar 8 units daily.  --check some glucoses after lunch/dinner to assess need for mealtime insulin  --continue treatment of mild hypoglycemia  --annual eye exam  --annual urine albumin--no evidence of DKD now but can have non-proteinuric DKD with decline in GFR aj with HTN.   --Should follow with PCP to optimize HTN management.   --normal lipids in July 2023    RTC 3 mos (can see me in conjunction with Dr. Harris on 6/12 if convenient)

## 2024-04-26 ENCOUNTER — HOSPITAL ENCOUNTER (OUTPATIENT)
Dept: RADIOLOGY | Facility: CLINIC | Age: 64
Discharge: HOME | End: 2024-04-26
Payer: MEDICARE

## 2024-04-26 DIAGNOSIS — E84.0 CYSTIC FIBROSIS WITH PULMONARY MANIFESTATIONS (MULTI): ICD-10-CM

## 2024-04-26 PROCEDURE — 71250 CT THORAX DX C-: CPT

## 2024-04-26 PROCEDURE — 71250 CT THORAX DX C-: CPT | Performed by: STUDENT IN AN ORGANIZED HEALTH CARE EDUCATION/TRAINING PROGRAM

## 2024-05-01 ENCOUNTER — DOCUMENTATION (OUTPATIENT)
Dept: PEDIATRIC PULMONOLOGY | Facility: HOSPITAL | Age: 64
End: 2024-05-01
Payer: MEDICARE

## 2024-05-01 NOTE — PROGRESS NOTES
"Pulmonary Attending  Cystic Fibrosis Service    Patient recently had chest CT scan done to work-up more frequent pulmonary exacerbations over the past 6-12 months and evaluate lung parenchyma in light of (+) sputum cultures for Scedosporium boydii on 2/12/2024 and 4/3/2024.  The lung parenchyma has changes consistent with CF bronchiectasis. No cavitary fungal disease.  The literature pertaining to Scedosporium boydii infection is largely populated by case-series and retrospective case-control studies (and their attendant limitations to causal inference).  This fungal species is not clearly associated with worse health outcomes in people with CF.  The more curious finding on the scan is the hiatal hernia.  This is not new, but my question now is whether she is having frequent and perhaps subclinical gastroesophageal reflux and/or aspiration events causing symptoms that \"look like\" a CF pulmonary exacerbation.  She had an EGD in May 2016 by Dr. Greenberg.  The study showed diverticulum in lower third of esophagus, Nissen fundoplication, antral gastritis, and erosive gastropathy in the fundus.  I recommend evaluation by Dr. Latonia Snyder at an upcoming CF clinic with question of whether she agrees with the aforementioned hypothesis and recommendations for diagnosis and treatment.    Nicolás Harris MD  5/1/2024  4:03 PM    CT scan of abdomen and pelvis - 5/4/2016    CT scan of chest - 4/26/2024        Contrast Esophagram - 10/25/2013     Contrast Esophagram - 10/25/2013                   "

## 2024-05-01 NOTE — Clinical Note
Nita or Reba - I'd like Lucia to see Dr. Snyder in CF clinic soon. I'm scheduled to see Lucia on 6/12/2024.  If Dr. Snyder has clinic that day, please schedule with her. If Dr. Snyder is unavailable on 6/12/2024, please book Lucia with soonest available, and I'll possibly adjust my appointment date to coincide.  Bisi - could you let Lucia know why I'd like her to see Dr. Snyder (see my note)?  Thanks.

## 2024-05-13 NOTE — PROGRESS NOTES
PT Follow Up: CF Clinic    Patient Name:Mansi Morrison  MRN:[id number]  Today's Date: 4/17/2024       PT followed up with Lucia today, during CF clinic appointment.  At last visit, Lucia was able to perform a 1 min sit to stand test , ranking in the 50th percentile for age/sex matched norms. Lucia was able to again perform a sit to stand with similar values (35-->34x), which is not  a clinically significant change.  Lucia has been trying to get back to regular activities/exercise  but still not feeling her best. Will plan to follow up with Lucia at future visits, to complete exercise testing when she is back to her baseline.     Pain  0/10, no pain, but feeling achy in her bones and tired today     Social History  No changes from eval     Respiratory  What do you do for Airway clearance?  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

## 2024-05-22 LAB
ACID FAST STN SPEC: NORMAL
MYCOBACTERIUM SPEC CULT: NORMAL

## 2024-05-23 ENCOUNTER — MULTIDISCIPLINARY MEETING (OUTPATIENT)
Dept: PEDIATRIC PULMONOLOGY | Facility: HOSPITAL | Age: 64
End: 2024-05-23
Payer: MEDICARE

## 2024-05-23 NOTE — PROGRESS NOTES
Cystic Fibrosis Multidisciplinary Care Team Flowsheet    Today's Date: 5/24/2024  Attendees:      Education        Nursing  Confirmatory Genetics: Yes ([id number])  Confirmatory Genetics Date: 10/09/62  Sweat Test: Yes (108)  Sweat Test Date: 10/09/62  Number of Visits this Year: 1  Next appointment: 05/29/24  Last Visit: 04/17/24  Last/frequent culture growth: MSRA  Sick plan: Doxycycline (Could consider nebulized vancomycin as suppressive strategy)  Annual labs: Yes  Annual labs Date: 04/03/24    Nutrition  RD Annual Assessment: No  Nutritional Status: BMI at goal  Programs: Careforward  Enzymes: Creon  Diet: Low vegetable intake  Calcium: Inadequate-Recommended increasing dairy intake  Labs (vit, lipids, CBC/iron): Yes (WNL; GRP 0.24, APRI 0.24)  OGTT: Not Applicable  GI: Yes  GI Date: 05/29/24  Endo: Yes  Endo Date: 04/24/24 (F/U 7/24)  Body Composition: Yes (SMM 46.1 (fit), PBF 31.6%)  Body Composition Date:  (Repeat due)  DXA: Yes  DXA Date:  (Osteopenia 2022; Repeat 25)  Colonoscopy: Yes (WNL 16)  Colonoscopy Date:  (Repeat due)  Liver Ultrasound: Yes  Liver Ultrasound Date:  (2022)  Preg flow sheet, wt chart, food safety: Not Applicable  Transplant food safety: Not Applicable  Body Image Concerns: No  Needs to see GI - question aspiration events through hiatal hernia on CT scan.  Annual    Insulin Instructions  Fixed Dose Injections   Basaglar KwikPen U-100 Insulin 100 unit/mL (3 mL) insulin pen   Last edited by Lanie Ambrose MD on 4/24/2024 at 3:47 PM      Time of Day Dose (units)   10 pm 8       Respiratory Care     Follow-up on upping the game with aerosols and airway clearance.    Pharmacy     She has had full education about Trika[employer] (has not started)  Psychosocial     Drinking alcohol more frequently than acknowledged? Peth level. Acamprosate/naltrexone?    Physical Therapy       Chronic Respiratory Infection Symptom Score (GREG)       Sino-Nasal Outcomes Test (SNOT-22)

## 2024-05-28 LAB
FEF 25-75: 1.72 L/S
FEV1/FVC: 75 %
FEV1: 2.17 LITERS
FVC: 2.87 LITERS
PEF: 5.66 L/S

## 2024-05-28 NOTE — PROGRESS NOTES
Mason Fontanez M.D. 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 Historically normal lung function (ppFEV1 100-106%) so Trikafta deferred. 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 EPI on PERT. Constipation.  Other problems: alcohol use disorder (AA); GERD; osteopenia; generalized anxiety disorder (2017); poorly-controlled hypertension, insomnia treated with trazodone    HPI (5/29/2024): Unaccompanied.  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  Has been doing the aerosolized mucoactive medications and HFCWO with The Vest, although burdensome.  Still has not started Trikafta.  She had one alcoholic beverage on Memorial Day.  I asked her how she managed to stop at one, and she said that she had her support system and felt guilty about it.  Generally more anxious.  Had telehealth visit with Dr. Ambrose in April.  Plan to continue Basaglar 8 units daily and perform prandial FSG measurements.  She felt feverish again a couple times since last visit.  Sputum is light yellow, no hemoptysis.  Not expectorating all the time.     HPI (5/1/2024): Patient recently had chest CT scan done to work-up more frequent pulmonary exacerbations over the past 6-12 months and evaluate lung parenchyma in light of (+) sputum cultures for Scedosporium boydii on 2/12/2024 and 4/3/2024.  The lung parenchyma has changes consistent with CF bronchiectasis. No cavitary fungal disease.  The literature pertaining to Scedosporium boydii infection is largely populated by case-series and retrospective case-control studies (and their attendant limitations to causal inference).  This fungal species is not clearly associated with worse health outcomes in people with CF.  The more curious finding on the scan is the hiatal hernia.  This is not new, but my question now is whether she is having frequent and perhaps subclinical gastroesophageal reflux and/or aspiration  "events causing symptoms that \"look like\" a CF pulmonary exacerbation.  She had an EGD in May 2016 by Dr. Greenberg.  The study showed diverticulum in lower third of esophagus, Nissen fundoplication, antral gastritis, and erosive gastropathy in the fundus.  I recommend evaluation by Dr. Latonia Snyder at an upcoming CF clinic with question of whether she agrees with the aforementioned hypothesis and recommendations for diagnosis and treatment.     HPI (4/17/2024): Two-week sick follow-up.  Lucia met with Kervin Sanchez on 4/3/2024.  Symptoms as below.  They discussed the sputum culture in February 2024 (+) for Scedosporium.  Another sputum sample was collected on 4/3/2024, and it also grew Scedosporium and Candida albicans.  CT chest ordered.  Treated with 2-week course of doxycycline targeting MRSA. Due to the possibility of starting Trikafta, hepatic function panel and phosphatidylethanol (Peth) were checked, and the latter suggested recent alcohol ingestion.  Lucia subsequently acknowledged a relapse, now has counseling and sponsor contact.  Today, Lucia says that she never started the nebulized 3% hypertonic saline; took doxycycline course prescribed by Kervin Sanchez; ended 4/12/2024; felt a lot better after the doxycycline, but she felt unusually fatigued yesterday afternoon, which is unlike her. Denies hemoptysis, fevers, chills, pleuritic chest pain.  Occasional wheezing alleviated by Xopenex.  Sputum is light yellow but less voluminous.  She has been doing HFCWO, but she has one of the first [employer] \"The Vest\" models. Also doing Pulmozyme twice daily.  Blood sugars have been pretty good.  Reports -120 before breakfast.  Needs to see Dr. Ambrose for routine follow-up.    HPI (4/4/2024, Kervin Sanchez): \"Since December she has been sick. Once she gets off the abx, she starts getting sick about a week later. Gets a high fever, headache, trouble breathing and feels very fatigued. Fever gets up to 103, then breaks " "after a day.  All last week she was in bed. Lungs are filling up. Can't breathe. Recently, she started using Pulmozyme and her vest. Has also noticed pain in her joints when she feels sick. Feels mucous is stuck and lungs feel sticky. Something in there and she can't cough it up. Adding the doxycyline stopped her from coughing up as much. Was able to work all day the other day which is an improvement. Did not start Trikafta because she was waiting to see if she continues getting sick.\"    HPI (2/12/2024): ED at [facility] on 11/17/2023 with hypertensive urgency (SBP ~200 at home, /94 documented); had headache and chest pain; troponin, BNP, EKG (sinus bradycardia), CXR were unremarkable.  Saw PCP, tried amlodipine added to the losartan, was having symptomatic hypotension, switched to hydrochlorothiazide plus losartan and stopped the amlodipine, much better.  Started seeing new therapist after last visit, very helpful, good relationship.  Says that she has been sober for 3 months and 4 days.  Brought on a colleague to help with work.  Had influenza diagnosed by NP rapid-antigen test at a pharmacy, around 12/20/2023. Felt really poorly, had fever, cough, post-tussive emesis, dyspnea, anorexia. Started doing Pulmozyme and HFCWO again on most days. Prescribed course of doxycycline. Since that infection, however, generally feels more central chest mucus and pressure. Because ppFEV1 was also down today, she is concerned about deteriorating lung function. Generally feels tired since the influenza. Weight down 1.3 kg since August 2023.     HPI (11/8/2023): Plan at last visit was to increase losartan to 100 mg/day, home BP monitoring. Discussed referral to psychiatry to manage anxiety and depression. Increased trazodone to 100 mg at HS for insomnia. Needed to stick with MiraLAX for prophylaxis. Had poison ivy and put on brief course of prednisone.  Few weeks ago had relapse of drinking. Went out to bar, drank enough to " black out, somehow drove home. Scared her. She connected with her sponsor and actually has appointment with a counselor this evening. Feeling increased chest congestion. Coughing occasionally. Thinks she might be headed toward lung infection. MiraLAX has prevented RLQ cramping.     HPI (8/8/2023): Had fun in [facility] but has been sick from respiratory perspective twice. I gave her a prescription for doxycycline to take with her on the trip. She started after returning due to chest congestion. Cleared. Then got poison ivy. Took 3 days of a steroid she had on-hand. Flared up after stopping. Currently having mild chest tightness and wheezing. Only using albuterol once daily, though. BP still higher than desirable. Has been on losartan 50 mg daily. Sleeping poorly on trazodone 50 mg. Took 100 mg one night and slept better.     HPI (7/10/2023): First visit, transfer care from Dr. Fortune. Last seen in November 2022. Issues at that point included intermittent abdominal pain (likely constipation), uncontrolled hypertension despite taking losartan 50 mg daily, CFRD followed by Dr. Ambrose, and recent Burkholderia multivorans infection, consideration of nebulized meropenem. Colonoscopy discussed, would apparently need to be done in [facility] system for insurance reasons. No interval need for antibiotics. Used Pulmozyme in January for a few weeks due to congestion. Helpful. Still having tendency toward constipation, thinking about using MiraLAX. Sometimes experiences left-sided headache and blurry vision in the left eye when hypertensive. Frequency not accelerating. Thinks that meditation well help with her blood pressure. Leaving for trip to [place] tomorrow.    Current Use of Health Management Strategies:    Respiratory Interventions  Albuterol: Two times per day  Pulmozyme: Twice daily   Hypertonic saline: Two times per day  HFCWO (percussive vest): Soiba The Vest - now non-functional  Oscillatory PEP: Does not  use  Exercise: No Regular Exercise  LTE antagonist: No  Azithromycin: Does not use  Aztreonam lysine ([place]): Does not use  Tobramycin: Does not use  Colistin: Does not use  Meropenem (inhaled): Does not use  Vancomycin (inhaled): Does not use  ICS: Does not use  ICS/LABA: Does not use  LAMA: No  CFTR modulator: Trikafta candidate by genotype but have deferred due to preserved lung function and infrequent pulmonary exacerbations, concern about potential effects on mood/cognition.  Oxygen: Does not use    Digestive Health Interventions    Acid-suppressing medication?: Yes  Using CF vitamins?: No  Taking pancreatic enzymes?: Yes - Creon 24; 3 to 4 capsules with meals and 1 to 2 capsules with snacks   Any diarrhea?: No  Any steatorrhea?: No  Any constipation?: Yes - does better with more regular use of MiraLAX  Using laxatives?: Yes  Feeding tube?: No  Supplemental enteral nutrition?: No    Pulmonary Function Test Results    PFT (5/29/2024): FEV1 2.17 L (95%), FVC 3.07 L (106%), FEV1/FVC 71; normal spirometry  PFT (4/17/2024): FEV1 2.42 L (106%), FVC 3.27 L (113%), FEV1/FVC 74; normal spirometry  PFT (4/3/2024): did not perform, feeling poorly  PFT (2/12/2024): FEV1 2.29 L (94%), FVC 3.19 L (109%), FEV1/FVC 72; normal spirometry  PFT (11/8/2023): FEV1 2.29 L (100%), FVC 3.19 L (109%), FEV1/FVC 72; normal spirometry  PFT (7/10/2023): FEV1 2.45 L (106%), FVC 3.27 L (112%), FEV1/FVC 75; normal spirometry  PFT (10/10/2022): FEV1 2.47 L (106%), FVC 3.25 L (110%), FEV1/FVC 76; normal spirometry  PFT (2/21/2022): FEV1 2.48 L (106%), FVC 3.28 L (110%), FEV1/FVC 76; normal spirometry  PFT (6/23/2021): FEV1 2.48 L (103%), FVC 3.39 L (109%), FEV1/FVC 73; normal spirometry    Current Medications     Current Outpatient Medications   Medication Instructions    amLODIPine (NORVASC) 2.5 mg, oral, Daily    bisacodyl (DULCOLAX) 10 mg, oral, Daily, Do not crush, chew, or split.    Creon 24,000-76,000 -120,000 unit capsule 3-4 capsules,  "oral, 3 times daily (morning, midday, late afternoon), MOUTH WITH MEALS AND TAKE 1-2 CAPSULES WITH SNACKS    dornase Alpha (Pulmozyme) 1 mg/mL nebulizer solution INHALE CONTENTS OF 1 VIAL VIA NEBULIZER DAILY    doxycycline (ADOXA) 100 mg, oral, 2 times daily, Take with a full glass of water and do not lie down for at least 30 minutes after.  Start taking after you finish the linezolid.    elexacaftor-tezacaftor-ivacaft (Trikafta) 100-50-75 mg tablet Take 2 orange tablets by mouth in the morning with fat-containing food and 1 blue tablet by mouth in the evening with fat-containing food, approximately 12 hours apart.    famotidine (PEPCID) 40 mg, oral, 2 times daily PRN, Take as needed twice a day in the week prior to EGD/Bravo test.    FreeStyle Lite Strips strip  TEST 6-7 TIMES DAILY    hydroCHLOROthiazide (HYDRODIURIL) 25 mg, oral, Daily    hydrOXYzine HCL (ATARAX) 25 mg, oral, Every 8 hours PRN    insulin glargine (Basaglar KwikPen U-100 Insulin) 100 unit/mL (3 mL) pen Inject 8 units daily as directed    linezolid (ZYVOX) 600 mg, oral, 2 times daily, Take until finished, then take doxycycline as prescribed.    losartan (COZAAR) 100 mg, oral, Daily    mecobalamin, vitamin B12, 1,000 mcg tablet,disintegrating 1 tablet, sublingual, Daily    omeprazole (PRILOSEC) 40 mg, oral, Daily    polyethylene glycol (GLYCOLAX, MIRALAX) 34 g, oral, 2 times daily,  MIX 34 GM TWICE DAILY AND TAKE AS DIRECTED    polyethylene glycol-electrolytes (polyethylene glycol) 420 gram solution oral,  TAKE AS DIRECTED.    sodium chloride 3 % nebulizer solution Inhale 4 mL by nebulization 2 times a day.    traZODone (DESYREL) 50 mg, oral, Nightly PRN    Xopenex HFA 45 mcg/actuation inhaler 2 puffs, inhalation, Every 4 hours PRN       Physical Examination     BP (!) 148/92 (BP Location: Right arm, Patient Position: Sitting, BP Cuff Size: Adult)   Pulse 55   Temp 36.7 °C (98 °F) (Oral)   Resp 20   Ht 1.58 m (5' 2.21\")   Wt 55.7 kg (122 lb " 14.5 oz)   SpO2 95%   BMI 22.33 kg/m²     General: Tired appearing, no coughing or wheezing.  HEENT: normocephalic; anicteric sclerae; conjunctivae not injected; nasal mucosa was unremarkable; oropharynx was clear without evidence of thrush; (+) dental plate  Neck: supple; no lymphadenopathy or thyromegaly.  Chest: clear to auscultation today.  Cardiac: regular rhythm; no gallop or murmur.  Abdomen: soft; non-tender; non-distended; no hepatosplenomegaly, no renal bruit.  Extremities: no leg edema; no digital clubbing; 2+ pulses  Psychiatric: Good eye contact; little more anxious today     Metabolic Parameters     Sodium   Date/Time Value Ref Range Status   04/03/2024 03:02  136 - 145 mmol/L Final     Potassium   Date/Time Value Ref Range Status   04/03/2024 03:02 PM 4.5 3.5 - 5.3 mmol/L Final     Chloride   Date/Time Value Ref Range Status   04/03/2024 03:02  98 - 107 mmol/L Final     Bicarbonate   Date/Time Value Ref Range Status   04/03/2024 03:02 PM 26 21 - 32 mmol/L Final     Anion Gap   Date/Time Value Ref Range Status   04/03/2024 03:02 PM 17 10 - 20 mmol/L Final     Urea Nitrogen   Date/Time Value Ref Range Status   04/03/2024 03:02 PM 21 6 - 23 mg/dL Final     Creatinine   Date/Time Value Ref Range Status   04/03/2024 03:02 PM 0.95 0.50 - 1.05 mg/dL Final     GFR Female   Date/Time Value Ref Range Status   07/10/2023 01:28 PM 64 >90 mL/min/1.73m2 Final     Comment:      CALCULATIONS OF ESTIMATED GFR ARE PERFORMED   USING THE 2021 CKD-EPI STUDY REFIT EQUATION   WITHOUT THE RACE VARIABLE FOR THE IDMS-TRACEABLE   CREATININE METHODS.    https://jasn.asnjournals.org/content/early/2021/09/22/[email or web address]       Glucose   Date/Time Value Ref Range Status   04/03/2024 03:02 PM 94 74 - 99 mg/dL Final     Calcium   Date/Time Value Ref Range Status   04/03/2024 03:02 PM 9.8 8.6 - 10.6 mg/dL Final     Thyroid Stimulating Hormone   Date/Time Value Ref Range Status   02/12/2024 02:47 PM 1.02 0.44 - 3.98  mIU/L Final     TSH   Date/Time Value Ref Range Status   07/10/2023 01:28 PM 1.13 0.44 - 3.98 mIU/L Final     Comment:      TSH testing is performed using different testing    methodology at [facility] than at other    [facility]. Direct result comparisons should    only be made within the same method.     02/21/2022 09:12 AM 2.97 0.44 - 3.98 mIU/L Final     Comment:      TSH testing is performed using different testing    methodology at [facility] than at other    [facility]. Direct result comparisons should    only be made within the same method.       Phosphorus   Date/Time Value Ref Range Status   04/03/2024 03:02 PM 4.2 2.5 - 4.9 mg/dL Final     Comment:     The performance characteristics of phosphorus testing in heparinized plasma have been validated by the individual  laboratory site where testing is performed. Testing on heparinized plasma is not approved by the FDA; however, such approval is not necessary.       Hematologic Parameters     WBC   Date/Time Value Ref Range Status   04/03/2024 03:02 PM 6.2 4.4 - 11.3 x10*3/uL Final     Neutrophils Absolute   Date/Time Value Ref Range Status   04/03/2024 03:02 PM 3.81 1.20 - 7.70 x10*3/uL Final     Comment:     Percent differential counts (%) should be interpreted in the context of the absolute cell counts (cells/uL).     Neutrophils %   Date/Time Value Ref Range Status   04/03/2024 03:02 PM 61.3 40.0 - 80.0 % Final     Lymphocytes %   Date/Time Value Ref Range Status   04/03/2024 03:02 PM 27.5 13.0 - 44.0 % Final     Monocytes %   Date/Time Value Ref Range Status   04/03/2024 03:02 PM 8.2 2.0 - 10.0 % Final     Basophils %   Date/Time Value Ref Range Status   04/03/2024 03:02 PM 0.8 0.0 - 2.0 % Final     Eosinophils Absolute   Date/Time Value Ref Range Status   04/03/2024 03:02 PM 0.12 0.00 - 0.70 x10*3/uL Final     Eosinophils %   Date/Time Value Ref Range Status   04/03/2024 03:02 PM 1.9 0.0 - 6.0 % Final      Hemoglobin   Date/Time Value Ref Range Status   04/03/2024 03:02 PM 13.7 12.0 - 16.0 g/dL Final     Hematocrit   Date/Time Value Ref Range Status   04/03/2024 03:02 PM 42.2 36.0 - 46.0 % Final     RBC   Date/Time Value Ref Range Status   04/03/2024 03:02 PM 4.76 4.00 - 5.20 x10*6/uL Final     MCV   Date/Time Value Ref Range Status   04/03/2024 03:02 PM 89 80 - 100 fL Final     MCHC   Date/Time Value Ref Range Status   04/03/2024 03:02 PM 32.5 32.0 - 36.0 g/dL Final     Platelets   Date/Time Value Ref Range Status   04/03/2024 03:02  150 - 450 x10*3/uL Final       Fat-Soluble Vitamin Levels     Vitamin D, 25-Hydroxy, Total   Date/Time Value Ref Range Status   02/12/2024 02:43 PM 52 30 - 100 ng/mL Final     Vitamin A (Retinol)   Date/Time Value Ref Range Status   02/12/2024 02:43 PM 0.52 0.30 - 1.20 mg/L Final     Vitamin A, Interpretation   Date/Time Value Ref Range Status   02/12/2024 02:43 PM Normal  Final     Comment:       This test was developed and its performance characteristics   determined by [facility]. It has not been cleared or   approved by the US Food and Drug Administration. This test was   performed in a CLIA certified laboratory and is intended for   clinical purposes.  Performed By: [facility]  [address] [place] [address]  : Tello Pacheco MD, PhD  CLIA Number: [id number]     Vitamin E (Alpha-Tocopherol)   Date/Time Value Ref Range Status   02/12/2024 02:43 PM 6.6 5.5 - 18.0 mg/L Final     Comment:       This test was developed and its performance characteristics   determined by [facility]. It has not been cleared or   approved by the US Food and Drug Administration. This test was   performed in a CLIA certified laboratory and is intended for   clinical purposes.     Vitamin E (Gamma-Tocopherol)   Date/Time Value Ref Range Status   02/12/2024 02:43 PM 1.6 0.0 - 6.0 mg/L Final     Comment:     Performed By: [facility]  [facility] [place] [address]  : Tello Pacheco MD, PhD  CLIA Number: [id number]       LFT and Associated Parameters     AST   Date/Time Value Ref Range Status   04/03/2024 03:02 PM 19 9 - 39 U/L Final   02/12/2024 02:43 PM 16 9 - 39 U/L Final   07/10/2023 01:28 PM 18 9 - 39 U/L Final   11/21/2022 12:04 PM 18 9 - 39 U/L Final   10/10/2022 01:05 PM 23 9 - 39 U/L Final     ALT   Date/Time Value Ref Range Status   04/03/2024 03:02 PM 16 7 - 45 U/L Final     Comment:     Patients treated with Sulfasalazine may generate falsely decreased results for ALT.   02/12/2024 02:43 PM 17 7 - 45 U/L Final     Comment:     Patients treated with Sulfasalazine may generate falsely decreased results for ALT.     ALT (SGPT)   Date/Time Value Ref Range Status   07/10/2023 01:28 PM 15 7 - 45 U/L Final     Comment:      Patients treated with Sulfasalazine may generate    falsely decreased results for ALT.     11/21/2022 12:04 PM 18 7 - 45 U/L Final     Comment:      Patients treated with Sulfasalazine may generate    falsely decreased results for ALT.     10/10/2022 01:05 PM 19 7 - 45 U/L Final     Comment:      Patients treated with Sulfasalazine may generate    falsely decreased results for ALT.       Alkaline Phosphatase   Date/Time Value Ref Range Status   04/03/2024 03:02 PM 91 33 - 136 U/L Final   02/12/2024 02:43 PM 94 33 - 136 U/L Final   07/10/2023 01:28  33 - 136 U/L Final   11/21/2022 12:04  33 - 136 U/L Final   10/10/2022 01:05  33 - 136 U/L Final     Bilirubin, Total   Date/Time Value Ref Range Status   04/03/2024 03:02 PM 0.5 0.0 - 1.2 mg/dL Final   02/12/2024 02:43 PM 0.3 0.0 - 1.2 mg/dL Final     Total Bilirubin   Date/Time Value Ref Range Status   07/10/2023 01:28 PM 0.5 0.0 - 1.2 mg/dL Final   11/21/2022 12:04 PM 0.6 0.0 - 1.2 mg/dL Final   10/10/2022 01:05 PM 0.5 0.0 - 1.2 mg/dL Final     Bilirubin, Direct   Date/Time Value Ref Range Status   04/03/2024 03:02 PM 0.1 0.0  - 0.3 mg/dL Final   02/12/2024 02:43 PM 0.1 0.0 - 0.3 mg/dL Final     GGT   Date/Time Value Ref Range Status   02/12/2024 02:43 PM 26 5 - 55 U/L Final   07/10/2023 01:28 PM 22 5 - 55 U/L Final   02/21/2022 09:12 AM 33 5 - 55 U/L Final   11/16/2020 03:16 PM 34 5 - 55 U/L Final     Albumin   Date/Time Value Ref Range Status   04/03/2024 03:02 PM 4.7 3.4 - 5.0 g/dL Final   02/12/2024 02:43 PM 4.5 3.4 - 5.0 g/dL Final   11/08/2023 11:46 AM 4.8 3.4 - 5.0 g/dL Final     Total Protein   Date/Time Value Ref Range Status   04/03/2024 03:02 PM 7.7 6.4 - 8.2 g/dL Final   02/12/2024 02:43 PM 7.2 6.4 - 8.2 g/dL Final   07/10/2023 01:28 PM 7.3 6.4 - 8.2 g/dL Final   11/21/2022 12:04 PM 7.3 6.4 - 8.2 g/dL Final   10/10/2022 01:05 PM 7.2 6.4 - 8.2 g/dL Final       Coagulation Parameters           Diabetes Laboratory Tests     Hemoglobin A1C   Date/Time Value Ref Range Status   02/12/2024 02:43 PM 6.4 (H) see below % Final   07/10/2023 01:28 PM 6.5 (A) % Final     Comment:          Diagnosis of Diabetes-Adults   Non-Diabetic: < or = 5.6%   Increased risk for developing diabetes: 5.7-6.4%   Diagnostic of diabetes: > or = 6.5%  .       Monitoring of Diabetes                Age (y)     Therapeutic Goal (%)   Adults:          >18           <7.0   Pediatrics:    13-18           <7.5                   7-12           <8.0                   0- 6            7.5-8.5   American Diabetes Association. Diabetes Care 33(S1), Jan 2010.     04/09/2022 05:28 AM 7.0 (H) 4.0 - 5.6 % Final   11/16/2020 03:16 PM 6.4 % Final     Comment:          Diagnosis of Diabetes-Adults   Non-Diabetic: < or = 5.6%   Increased risk for developing diabetes: 5.7-6.4%   Diagnostic of diabetes: > or = 6.5%  .       Monitoring of Diabetes                Age (y)     Therapeutic Goal (%)   Adults:          >18           <7.0   Pediatrics:    13-18           <7.5                   7-12           <8.0                   0- 6            7.5-8.5   American Diabetes Association.  Diabetes Care 33(S1), Jan 2010.       Albumin, Urine Random   Date/Time Value Ref Range Status   02/12/2024 02:43 PM <7.0 Not established mg/L Final     Creatinine, Urine Random   Date/Time Value Ref Range Status   02/12/2024 02:43 PM 67.4 20.0 - 320.0 mg/dL Final     Creatinine, Urine   Date/Time Value Ref Range Status   07/10/2023 01:28 PM 38.9 20.0 - 320.0 mg/dL Final   06/23/2021 01:54 PM 60.7 20.0 - 320.0 mg/dL Final     Albumin/Creatine Ratio   Date/Time Value Ref Range Status   02/12/2024 02:43 PM   Final     Comment:     One or more analytes used in this calculation is outside of the analytical measurement range. Calculation cannot be performed.   07/10/2023 01:28 PM SEE COMMENT 0.0 - 30.0 ug/mg crt Final     Comment:     One or more analytes used in this calculation   is outside of the analytical measurement range.  Calculation cannot be performed.     06/23/2021 01:54 PM SEE COMMENT 0.0 - 30.0 ug/mg crt Final     Comment:     One or more analytes used in this calculation   is outside of the analytical measurement range.  Calculation cannot be performed.          Immunology Laboratory Tests     IgE   Date/Time Value Ref Range Status   02/12/2024 02:43 PM <2 0 - 214 IU/mL Final     Alcohol Abstinence Monitoring     EER PEth   Date Value Ref Range Status   04/03/2024 See Note  Final     Comment:     Authorized individuals can access the [facility]   Enhanced Report using the following link:      [email or web address]     PEth 16:0/18:1 (POPEth)   Date Value Ref Range Status   04/03/2024 64 ng/mL Final     Comment:     PEth 16:0/18:1 (POPEth)  Less than 10 ng/mL............Not detected  Less than 20 ng/mL............Abstinence or light alcohol   consumption  20 - 200 ng/mL................Moderate alcohol consumption  Greater than 200 ng/mL........Heavy alcohol consumption or chronic   alcohol use    (Reference: DANTE Stacy and SUHAS Carballo 2018 J. Forensic Sci)     PEth 16:0/18:2 (PLPEth)    Date Value Ref Range Status   04/03/2024 65 ng/mL Final     Comment:     Reference ranges are not well established.     PEth Interpretation   Date Value Ref Range Status   04/03/2024 See Comment  Final     Comment:     Phosphatidylethanol (PEth) is a group of phospholipids formed in   the presence of ethanol, phospholipase D and phosphatidylcholine.   PEth is known to be a direct alcohol biomarker. The predominant   PEth homologues are PEth 16:0/18:1 (POPEth) and PEth 16:0/18:2   (PLPEth), which account for 37-46% and 26-28% of the total PEth   homologues, respectively. PEth is incorporated into the   phospholipid membrane of red blood cells and has a general   half-life of 4-10 days and a window of detection of 2-4 weeks.   However, the window of detection is longer in individuals who   chronically or excessively consume alcohol. The limit of   quantification is 10 ng/mL. Serial monitoring of PEth may be   helpful in monitoring alcohol abstinence over time. PEth results   should be interpreted in the context of the patient's clinical and   behavioral history.  Patients with advanced liver disease may have falsely elevated   PEth concentrations (Senait STAUFFER et al 2018, Alcoholism Clinical &   Experimental Research).    This test was developed and its performance characteristics   determined by [facility]. It has not been cleared or   approved by the U.S. Food and Drug Administration. This test was   performed in a CLIA-certified laboratory and is intended for   clinical purposes.  Performed By: [facility]  [address] [place] [address]  : Tello Pacheco MD, PhD  CLIA Number: [id number]     DEXA Scan     XR BONE density 10/10/2022    Narrative  Name: OMAR GIPSON  Patient ID: [id number] YOB: 1960 Height: 62.0 in.  Gender:     Female   Exam Date:  10/10/2022 Weight: 118.0 lbs.  Indications: Cystic Fibrosis, DIABETES, family history  osteoporosis,  Hysterectomy, Post Menopausal  Fractures:    Treatments:  OMEPRAZOLE    LEFT FEMUR - TOTAL  The bone mineral density : 0.889 g/cm2  T-score : -0.9    % of young normal mean :88%  Z-score : 0.0    % of age matched mean : 100%  % change vs. Previous : N/A    % change vs. Baseline : baseline    LEFT FEMUR - NECK  The bone mineral density : 0.843 g/cm2  T-score : -1.4    % of young normal mean: 81%  Z-score : -0.1    % of age matched mean : 98%  % change vs. Previous : N/A    % change vs. Baseline : baseline    SPINE L1-L4  The bone mineral density is 1.147 g/cm2  T-score : -0.3   % of young normal mean is 97%  Z-score : 1.0    % of age matched mean is 111%  % change vs. Previous : -    % change vs. Baseline : baseline    World Health Organization (WHO) criteria for post-menopausal,   Women:  Normal:       T-score at or above -1 SD  Osteopenia:   T-score between -1 and -2.5 SD  Osteoporosis: T-score at or below -2.5 SD    10-Year Fracture Risk:  Major Osteoporotic Fracture 4.0%  Hip Fracture                0.4%  Reported Risk Factors       None    Interpretation:  According to World Health Organization criteria, classification is low bone mass.  Followup recommended on October 2024 or sooner as clinically warranted.     Chest Radiograph     XR chest 2 view 10/10/2022    Narrative  MRN: [id number]  Patient Name: OMAR GIPSON    STUDY:   CHEST 2 VIEW PA AND LAT;    INDICATION:  cystic fibrosis, malabsorption  E84.9: Cystic fibrosis E84.0: Cystic fibrosis with pulmonary manifestations.    COMPARISON:  Chest radiograph 05/09/2016    ACCESSION NUMBER(S):  [id number]    ORDERING CLINICIAN:  CELIA MIN    FINDINGS:  Frontal, lateral, and dual energy radiographs of the chest were  provided.  The cardiac silhouette size is within normal limits.  There is no focal consolidation, edema or pneumothorax.  No sizeable pleural effusion.  No acute osseous abnormality.    Impression  No acute cardiopulmonary  process.  I personally reviewed the images/study and I agree with the findings as stated.     CF Sputum Culture     AFB Culture   Date Value Ref Range Status   04/03/2024 No Mycobacteria isolated.  Final   02/12/2024 No Mycobacteria isolated.  Final      Respiratory Culture, Cystic Fibrosis   Date/Time Value Ref Range Status   05/29/2024 12:35 PM (3+) Moderate Normal throat perla  Preliminary   05/29/2024 12:35 PM (A)  Preliminary    (2+) Few Methicillin Resistant Staphylococcus aureus (MRSA)     Comment:     Methicillin (Oxacillin) resistant Staphylococci are resistant to all currently available Penicillins, Beta-lactam/Beta-lactamase inhibitor combinations (including Ampicillin/Sulbactam, Amoxicillin/Clavulanate and Pipercillin/Tazobactam), Carbapenems and   Cephalosporins (except Ceftaroline).     CF respiratory culture (11/28/2022): MRSA  CF respiratory culture (10/10/2022): MRSA, Burkholderia multivorans  CF respiratory culture (5/9/2022): MRSA  CF respiratory culture (6/3/2015): MSSA, Pseudomonas putida    Susceptibility data from last 2000 days.  Collected Specimen Info Organism Clindamycin Erythromycin Linezolid Oxacillin Tetracycline Trimethoprim/Sulfamethoxazole Vancomycin   05/29/24 Fluid from Throat Swab Methicillin Resistant Staphylococcus aureus (MRSA)  S  R   R  S  S  S     Normal throat perla          04/17/24 Fluid from Throat Swab Methicillin Resistant Staphylococcus aureus (MRSA) S R  R S S S     Normal throat perla          04/03/24 Swab from SPUTUM Candida albicans            Scedosporium boydii          04/03/24 Fluid from Throat Swab Methicillin Resistant Staphylococcus aureus (MRSA) S R  R S S S     Normal throat perla          02/12/24 Fluid from SPUTUM Methicillin Resistant Staphylococcus aureus (MRSA) S R  R S S S     Normal throat perla          02/12/24 Swab from SPUTUM Candida albicans            Scedosporium boydii          11/08/23 Fluid from Throat Swab Methicillin Resistant  Staphylococcus aureus (MRSA) S R  R S S S     Normal throat perla          07/07/23 Respiratory Staphylococcus aureus R R S S S S S   07/07/23 Respiratory Candida albicans                  Problem List Items Addressed This Visit       Tubular adenoma of colon    Overview     Formatting of this note might be different from the original. 2011 Coloscopy. Repeat in 2016 normal- report scanned in to epic. Repeat in 2021 recommended.         Current Assessment & Plan     Lucia discussed colonoscopy with Dr. Snyder today.  I told Lucia that we could admit her for respiratory optimization prior to general anesthesia, during which time she can also prep for the procedure with benefits of having options for symptom-relieving medications.         Primary hypertension    Overview     Managed by [facility] P[facility] at [facility]         Current Assessment & Plan     Blood pressure still not quite at goal, but taking amlodipine, hydrochlorothiazide, and losartan with room for dose increases.  Now managed by her primary care physician.         MRSA (methicillin resistant Staphylococcus aureus) infection    Current Assessment & Plan     We now see persistent infection with MRSA, and I think this pathogen has been undermining her lung function and contributing to symptoms, both of which are associated with this pathogen in the literature.  She is struggling to manage burden of increased aerosolized medication and chest physiotherapy regimen, so I don't think adding nebulized vancomycin is good option, at least right now.  I try to avoid cycling oral antibiotics due to potential hastening of antibiotic resistance, but I think we need to be more aggressive with MRSA treatment.  I told Lucia that the eradication regimens that have been shown to work in children with CF are very difficult to accomplish and are less likely to work in adults with CF due to extent of lung damage.  Instructions:  I would like you to start taking the linezolid (Zyvox).   "This antibiotic is designed to fight MRSA.  As we discussed, I think that MRSA has been making you sick over the past few months, and it has not spontaneously gone away in your respiratory cultures.  Please see the handout for a review of potential side effects of linezolid (Zyvox).  Assuming you tolerate the Zyvox, finish the 14-day course.  Right after you finish the Zyvox, start taking a 14-day course of doxycycline.  Having you do a full month of oral antibiotics targeting MRSA is meant to see if you feel better (no fevers, better energy, less mucus, and less frequent cough).         Relevant Medications    doxycycline (Adoxa) 100 mg tablet    linezolid (Zyvox) 600 mg tablet    Hiatal hernia    Current Assessment & Plan     We reviewed your CT scan of the chest.  I think that the hiatal hernia could be causing reflux again because the Nissen fundoplication has loosened.  See my note from 5/1/2024 for details, but question aspiration pneumonia/pneumonitis events as cause of these \"fever blips.\"  Lucia met with Dr. Snyder today.  Plan for EGD and Bravo pH probe off proton-pump inhibitor.         Diabetes mellitus related to CF (cystic fibrosis) (Multi)    Overview     CFRD Hx: Diagnosed in March 2013 when A1c was 7.6%. Fasting glucose in the office in April 2013 was 252. we started her on Levemir.   June 2021: 6.9%  Sept 2021: 6.4%  April 2022: 7.3%  July 2023: 6.5%  Feb 2024: 6.4%  Microalbumin: WNL 7/2023, 2/2024 (undetectable)         Current Assessment & Plan     Met with Dr. Ambrose on 4/24/2024 with plan for 3-month follow-up.  Need to ensure that appointment is scheduled (not currently listed).  To continue Basaglar.  Should be instructed to abstain from long-acting insulin before fasting for the endoscopic procedures.         Cystic fibrosis (Multi) - Primary    Current Assessment & Plan     Again discussed her candidacy for Trikafta, which could independently improve lung function, reduce risk of " pulmonary exacerbations, and improve daily symptom burden.  She would like to keep this in her back pocket for now.  Continue Pulmozyme twice daily and performing HFCWO.  Lucia tried the AffloVest during her clinic visit today, 5/29/24.  Lucia's current vest system, the [employer], quit working and does not have a warranty for replacement.  Lucia felt that the AffloVest was more effective for mobilizing mucous as compared to her older HFCWO system. Airway clearance therapies (ACT) are designed to loosen thick, sticky mucus so it can be cleared from the lungs by coughing.  ACT is considered a cornerstone therapy for CF patients that aims to minimize mucus buildup to help prevent inflammation and infection. I would like to order the AffloVest for in-home ACT for Lucia.   Xopenex (levalbuterol) has been better for her than racemic albuterol - no tremulousness.  Continue nebulized hypertonic saline 3% twice a day  Surveillance respiratory tract culture today.         Relevant Medications    doxycycline (Adoxa) 100 mg tablet    linezolid (Zyvox) 600 mg tablet    Xopenex HFA 45 mcg/actuation inhaler    Anxiety    Current Assessment & Plan     Not controlled at present due to stressors.  I prescribed/refilled hydroxyzine because patient said it had worked for her in the past.         Relevant Medications    hydrOXYzine HCL (Atarax) 25 mg tablet    Alcohol use disorder, moderate, in early remission (Multi)    Current Assessment & Plan     Reviewed importance of maintaining sobriety.  She relies on her support system and went through 12-step plan employed by AA and other groups.  I mentioned that there are medications like naloxone and acamprosate that might be useful if she has never tried them.  She would need a referral to psychiatrist, though, as I do not manage these medications.            Follow-up: 7/24/2024    Nicolás Harris MD   05/29/2024

## 2024-05-29 ENCOUNTER — ALLIED HEALTH (OUTPATIENT)
Dept: PEDIATRIC PULMONOLOGY | Facility: HOSPITAL | Age: 64
End: 2024-05-29

## 2024-05-29 ENCOUNTER — MULTIDISCIPLINARY VISIT (OUTPATIENT)
Dept: PEDIATRIC PULMONOLOGY | Facility: HOSPITAL | Age: 64
End: 2024-05-29
Payer: MEDICARE

## 2024-05-29 ENCOUNTER — MULTIDISCIPLINARY VISIT (OUTPATIENT)
Dept: GASTROENTEROLOGY | Facility: HOSPITAL | Age: 64
End: 2024-05-29
Payer: MEDICARE

## 2024-05-29 ENCOUNTER — NUTRITION (OUTPATIENT)
Dept: PEDIATRIC PULMONOLOGY | Facility: HOSPITAL | Age: 64
End: 2024-05-29
Payer: MEDICARE

## 2024-05-29 ENCOUNTER — HOSPITAL ENCOUNTER (OUTPATIENT)
Dept: RESPIRATORY THERAPY | Facility: HOSPITAL | Age: 64
Discharge: HOME | End: 2024-05-29
Payer: MEDICARE

## 2024-05-29 ENCOUNTER — SOCIAL WORK (OUTPATIENT)
Dept: PEDIATRIC PULMONOLOGY | Facility: HOSPITAL | Age: 64
End: 2024-05-29
Payer: MEDICARE

## 2024-05-29 VITALS
OXYGEN SATURATION: 95 % | TEMPERATURE: 98 F | SYSTOLIC BLOOD PRESSURE: 148 MMHG | BODY MASS INDEX: 22.62 KG/M2 | WEIGHT: 122.91 LBS | HEART RATE: 55 BPM | RESPIRATION RATE: 20 BRPM | HEIGHT: 62 IN | DIASTOLIC BLOOD PRESSURE: 92 MMHG

## 2024-05-29 DIAGNOSIS — F10.21 ALCOHOL USE DISORDER, MODERATE, IN EARLY REMISSION (MULTI): ICD-10-CM

## 2024-05-29 DIAGNOSIS — E84.0 CYSTIC FIBROSIS WITH PULMONARY MANIFESTATIONS (MULTI): ICD-10-CM

## 2024-05-29 DIAGNOSIS — D12.6 TUBULAR ADENOMA OF COLON: ICD-10-CM

## 2024-05-29 DIAGNOSIS — F41.9 ANXIETY: ICD-10-CM

## 2024-05-29 DIAGNOSIS — E84.9 CYSTIC FIBROSIS (MULTI): Primary | ICD-10-CM

## 2024-05-29 DIAGNOSIS — K44.9 HIATAL HERNIA: ICD-10-CM

## 2024-05-29 DIAGNOSIS — Z86.010 HX OF ADENOMATOUS POLYP OF COLON: ICD-10-CM

## 2024-05-29 DIAGNOSIS — K59.00 CONSTIPATION, UNSPECIFIED CONSTIPATION TYPE: ICD-10-CM

## 2024-05-29 DIAGNOSIS — A49.02 MRSA (METHICILLIN RESISTANT STAPHYLOCOCCUS AUREUS) INFECTION: ICD-10-CM

## 2024-05-29 DIAGNOSIS — E84.8 DIABETES MELLITUS RELATED TO CF (CYSTIC FIBROSIS) (MULTI): ICD-10-CM

## 2024-05-29 DIAGNOSIS — K21.9 GASTROESOPHAGEAL REFLUX DISEASE WITHOUT ESOPHAGITIS: ICD-10-CM

## 2024-05-29 DIAGNOSIS — E84.9 CF (CYSTIC FIBROSIS) (MULTI): ICD-10-CM

## 2024-05-29 DIAGNOSIS — I10 PRIMARY HYPERTENSION: ICD-10-CM

## 2024-05-29 DIAGNOSIS — E84.19 CYSTIC FIBROSIS WITH GASTROINTESTINAL MANIFESTATIONS (MULTI): ICD-10-CM

## 2024-05-29 DIAGNOSIS — E08.9 DIABETES MELLITUS RELATED TO CF (CYSTIC FIBROSIS) (MULTI): ICD-10-CM

## 2024-05-29 LAB
FEF 25-75: 1.43 L/S
FEV1/FVC: 71 %
FEV1: 2.17 LITERS
FVC: 3.07 LITERS
PEF: 5.67 L/S

## 2024-05-29 PROCEDURE — 99417 PROLNG OP E/M EACH 15 MIN: CPT | Performed by: INTERNAL MEDICINE

## 2024-05-29 PROCEDURE — 87181 SC STD AGAR DILUTION PER AGT: CPT | Performed by: INTERNAL MEDICINE

## 2024-05-29 PROCEDURE — 99215 OFFICE O/P EST HI 40 MIN: CPT | Performed by: INTERNAL MEDICINE

## 2024-05-29 PROCEDURE — 99214 OFFICE O/P EST MOD 30 MIN: CPT | Performed by: INTERNAL MEDICINE

## 2024-05-29 PROCEDURE — 94010 BREATHING CAPACITY TEST: CPT | Performed by: INTERNAL MEDICINE

## 2024-05-29 PROCEDURE — 94010 BREATHING CAPACITY TEST: CPT

## 2024-05-29 RX ORDER — FAMOTIDINE 40 MG/1
40 TABLET, FILM COATED ORAL 2 TIMES DAILY PRN
Qty: 14 TABLET | Refills: 0 | Status: SHIPPED | OUTPATIENT
Start: 2024-05-29 | End: 2024-06-05

## 2024-05-29 RX ORDER — LEVALBUTEROL TARTRATE 45 UG/1
2 AEROSOL, METERED ORAL EVERY 4 HOURS PRN
Qty: 15 G | Refills: 6 | Status: SHIPPED | OUTPATIENT
Start: 2024-05-29 | End: 2024-06-28

## 2024-05-29 RX ORDER — BISACODYL 5 MG
10 TABLET, DELAYED RELEASE (ENTERIC COATED) ORAL DAILY
Qty: 180 TABLET | Refills: 3 | Status: SHIPPED | OUTPATIENT
Start: 2024-05-29 | End: 2025-05-29

## 2024-05-29 RX ORDER — HYDROXYZINE HYDROCHLORIDE 25 MG/1
25 TABLET, FILM COATED ORAL EVERY 8 HOURS PRN
Qty: 90 TABLET | Refills: 3 | Status: SHIPPED | OUTPATIENT
Start: 2024-05-29 | End: 2025-05-29

## 2024-05-29 RX ORDER — POLYETHYLENE GLYCOL-3350 AND ELECTROLYTES WITH FLAVOR PACK 240; 5.84; 2.98; 6.72; 22.72 G/278.26G; G/278.26G; G/278.26G; G/278.26G; G/278.26G
8000 POWDER, FOR SOLUTION ORAL ONCE
Qty: 8000 ML | Refills: 0 | Status: SHIPPED | OUTPATIENT
Start: 2024-05-29 | End: 2024-05-29

## 2024-05-29 RX ORDER — BISACODYL 5 MG
15 TABLET, DELAYED RELEASE (ENTERIC COATED) ORAL ONCE
Qty: 3 TABLET | Refills: 0 | Status: SHIPPED | OUTPATIENT
Start: 2024-05-29 | End: 2024-05-29

## 2024-05-29 RX ORDER — DOXYCYCLINE 100 MG/1
100 TABLET ORAL 2 TIMES DAILY
Qty: 56 TABLET | Refills: 0 | Status: SHIPPED | OUTPATIENT
Start: 2024-05-29 | End: 2024-07-02

## 2024-05-29 RX ORDER — LINEZOLID 600 MG/1
600 TABLET, FILM COATED ORAL 2 TIMES DAILY
Qty: 28 TABLET | Refills: 0 | Status: SHIPPED | OUTPATIENT
Start: 2024-05-29 | End: 2024-06-17

## 2024-05-29 NOTE — PROGRESS NOTES
"Cystic Fibrosis Nutrition Consult  Subjective    Feeling tired with infections, new antibiotic; Selling construction equipment, hired new employee    Nutrition Concerns/Questions:  A lot of GI concerns; Saw GI today, has new meds prescribed, planning to do BRAVO test, EGD, and colonoscopy    Exercise: Pickle ball; Walks dog daily    Anthropometrics:  BMI      Estimated body mass index is 22.33 kg/m² as calculated from the following:    Height as of an earlier encounter on 5/29/24: 1.58 m (5' 2.21\").    Weight as of an earlier encounter on 5/29/24: 55.7 kg (122 lb 14.5 oz).     Highest wt? 56.6 kg  Pt goal for wt: Maintain      Comment:  Not concerned, G\"get in shape\"    Seeing GI?  Yes      (last appt: 5/29/24)  Liver US: 2022      Additional Diagnosis  CFRD: Yes  Osteoporosis/penia: Osteopenia  Kidney Stones: Yes-Has received handout  PETER: Yes-2012 (Reviewed prevention)  Pancreatitis: No  Fracture: No  Food allergies/intolerances: NKFA  Sinus affecting taste or smell: No  Colonoscopy: Repeat due  Other/describe: No    Current/Past Feeding History:  Appetite: excellent    Diet Recall: Meals/day: 2-3 Snacks/Day: 0  B: 1/2, Bagel, tomato, avocado, egg  L:   D: [place], salad; Eats out  Snack: Fruit  Drinks: Juice, tea, carbonated soda water    Supplements: None  Calcium intake: cottage cheese, yogurt, cheese-Willing to add more  Sodium intake: Yes      CF Specific Vitamins: None   Other: Vitamin B12, MVI, Vit D  Acid Blocker: Omeprazole  Modulators (taking w fat/enzymes?): None  Other PERT medications / Appetite stimulants / insulin / ADHD / CAM: Basaglar     Gastrointestinal History:  Enzyme: Brand Creon  Dose: w/meal 3     w/snacks: 2         Lipase units/kg/meal: 1292.6 Lipase units/kg/day: 3447.0  Enzyme Program: Careforward  Takes enzymes at: Beginning     Adjusting dose for size/fat content? Sometimes    GI / Absorption (GI symptom tracker date:   5/29/24)  Sx:   Abdominal Pain     GERD   Gas/Bloating    " Nausea/stomachaches     Passes large stools   Oily, light-colored loose stools   Strong-smelling stools     Constipation      # stools/d:  1    Description Varies, looser, Hard    CFRD/Endocrine  Endo MD and year diagnosed: Arnol Mcguire  Last seen: 4/24/24  Blood sugar range: 113-115 mornings  Lows? Rarely-candy or juice, RD recommended juice   Reactive Hypoglycemia? No      Objective   Labs:         Fat soluble Vits Measured: WNL   Liver Enzymes: WNL  DEXA: Repeat 25  Fasting Glu / HgbA1C: 94/6.4  Other: GRP 0.24, APRI 0.24    PF Readings from Last 1 Encounters:   No data found for PF       Vitamin D, 25-Hydroxy, Total   Date/Time Value Ref Range Status   02/12/2024 02:43 PM 52 30 - 100 ng/mL Final     Vitamin A (Retinol)   Date/Time Value Ref Range Status   02/12/2024 02:43 PM 0.52 0.30 - 1.20 mg/L Final     Vitamin A, Interpretation   Date/Time Value Ref Range Status   02/12/2024 02:43 PM Normal  Final     Comment:       This test was developed and its performance characteristics   determined by [facility]. It has not been cleared or   approved by the US Food and Drug Administration. This test was   performed in a CLIA certified laboratory and is intended for   clinical purposes.  Performed By: [facility]  [address] [place] [id number]  : Tello Pacheco MD, PhD  CLIA Number: [id number]     Vitamin E (Alpha-Tocopherol)   Date/Time Value Ref Range Status   02/12/2024 02:43 PM 6.6 5.5 - 18.0 mg/L Final     Comment:       This test was developed and its performance characteristics   determined by [facility]. It has not been cleared or   approved by the US Food and Drug Administration. This test was   performed in a CLIA certified laboratory and is intended for   clinical purposes.     Vitamin E (Gamma-Tocopherol)   Date/Time Value Ref Range Status   02/12/2024 02:43 PM 1.6 0.0 - 6.0 mg/L Final     Comment:     Performed By: [facility]  [address] [place] [address]  [place] [address]  : Tello Pacheco MD, PhD  CLIA Number: [id number]         AST   Date/Time Value Ref Range Status   04/03/2024 03:02 PM 19 9 - 39 U/L Final     ALT   Date/Time Value Ref Range Status   04/03/2024 03:02 PM 16 7 - 45 U/L Final     Comment:     Patients treated with Sulfasalazine may generate falsely decreased results for ALT.     Alkaline Phosphatase   Date/Time Value Ref Range Status   04/03/2024 03:02 PM 91 33 - 136 U/L Final     Bilirubin, Total   Date/Time Value Ref Range Status   04/03/2024 03:02 PM 0.5 0.0 - 1.2 mg/dL Final     Bilirubin, Direct   Date/Time Value Ref Range Status   04/03/2024 03:02 PM 0.1 0.0 - 0.3 mg/dL Final     GGT   Date/Time Value Ref Range Status   02/12/2024 02:43 PM 26 5 - 55 U/L Final     Albumin   Date/Time Value Ref Range Status   04/03/2024 03:02 PM 4.7 3.4 - 5.0 g/dL Final     Total Protein   Date/Time Value Ref Range Status   04/03/2024 03:02 PM 7.7 6.4 - 8.2 g/dL Final       WBC   Date/Time Value Ref Range Status   04/03/2024 03:02 PM 6.2 4.4 - 11.3 x10*3/uL Final     Neutrophils Absolute   Date/Time Value Ref Range Status   04/03/2024 03:02 PM 3.81 1.20 - 7.70 x10*3/uL Final     Comment:     Percent differential counts (%) should be interpreted in the context of the absolute cell counts (cells/uL).     Neutrophils %   Date/Time Value Ref Range Status   04/03/2024 03:02 PM 61.3 40.0 - 80.0 % Final     Lymphocytes %   Date/Time Value Ref Range Status   04/03/2024 03:02 PM 27.5 13.0 - 44.0 % Final     Monocytes %   Date/Time Value Ref Range Status   04/03/2024 03:02 PM 8.2 2.0 - 10.0 % Final     Basophils %   Date/Time Value Ref Range Status   04/03/2024 03:02 PM 0.8 0.0 - 2.0 % Final     Eosinophils Absolute   Date/Time Value Ref Range Status   04/03/2024 03:02 PM 0.12 0.00 - 0.70 x10*3/uL Final     Eosinophils %   Date/Time Value Ref Range Status   04/03/2024 03:02 PM 1.9 0.0 - 6.0 % Final     Hemoglobin   Date/Time Value Ref  Range Status   04/03/2024 03:02 PM 13.7 12.0 - 16.0 g/dL Final     Hematocrit   Date/Time Value Ref Range Status   04/03/2024 03:02 PM 42.2 36.0 - 46.0 % Final     RBC   Date/Time Value Ref Range Status   04/03/2024 03:02 PM 4.76 4.00 - 5.20 x10*6/uL Final     MCV   Date/Time Value Ref Range Status   04/03/2024 03:02 PM 89 80 - 100 fL Final     MCHC   Date/Time Value Ref Range Status   04/03/2024 03:02 PM 32.5 32.0 - 36.0 g/dL Final     Platelets   Date/Time Value Ref Range Status   04/03/2024 03:02  150 - 450 x10*3/uL Final       Sodium   Date/Time Value Ref Range Status   04/03/2024 03:02  136 - 145 mmol/L Final     Potassium   Date/Time Value Ref Range Status   04/03/2024 03:02 PM 4.5 3.5 - 5.3 mmol/L Final     Chloride   Date/Time Value Ref Range Status   04/03/2024 03:02  98 - 107 mmol/L Final     Bicarbonate   Date/Time Value Ref Range Status   04/03/2024 03:02 PM 26 21 - 32 mmol/L Final     Anion Gap   Date/Time Value Ref Range Status   04/03/2024 03:02 PM 17 10 - 20 mmol/L Final     Urea Nitrogen   Date/Time Value Ref Range Status   04/03/2024 03:02 PM 21 6 - 23 mg/dL Final     Creatinine   Date/Time Value Ref Range Status   04/03/2024 03:02 PM 0.95 0.50 - 1.05 mg/dL Final     GFR Female   Date/Time Value Ref Range Status   07/10/2023 01:28 PM 64 >90 mL/min/1.73m2 Final     Comment:      CALCULATIONS OF ESTIMATED GFR ARE PERFORMED   USING THE 2021 CKD-EPI STUDY REFIT EQUATION   WITHOUT THE RACE VARIABLE FOR THE IDMS-TRACEABLE   CREATININE METHODS.    https://jasn.asnjournals.org/content/early/2021/09/22/ASN.[email or web address]       Glucose   Date/Time Value Ref Range Status   04/03/2024 03:02 PM 94 74 - 99 mg/dL Final     Calcium   Date/Time Value Ref Range Status   04/03/2024 03:02 PM 9.8 8.6 - 10.6 mg/dL Final     Phosphorus   Date/Time Value Ref Range Status   04/03/2024 03:02 PM 4.2 2.5 - 4.9 mg/dL Final     Comment:     The performance characteristics of phosphorus testing in heparinized  "plasma have been validated by the individual  laboratory site where testing is performed. Testing on heparinized plasma is not approved by the FDA; however, such approval is not necessary.       Protime   Date/Time Value Ref Range Status   11/16/2020 03:16 PM 11.0 10.1 - 13.3 sec Final     INR   Date/Time Value Ref Range Status   11/16/2020 03:16 PM 0.9 0.9 - 1.1 Final       Hemoglobin A1C   Date/Time Value Ref Range Status   02/12/2024 02:43 PM 6.4 (H) see below % Final     Albumin/Creatine Ratio   Date/Time Value Ref Range Status   02/12/2024 02:43 PM   Final     Comment:     One or more analytes used in this calculation is outside of the analytical measurement range. Calculation cannot be performed.       Lab Results   Component Value Date    CHOL 185 07/10/2023    CHOL 198 02/21/2022     Lab Results   Component Value Date    HDL 67.8 07/10/2023    HDL 74.2 02/21/2022     No results found for: \"LDLCALC\"  Lab Results   Component Value Date    TRIG 93 07/10/2023    TRIG 75 02/21/2022     No components found for: \"CHOLHDL\"    No results found for this or any previous visit from the past 1000 days.      Assessment/Plan       Assessment:  Nutritional Status Category: low    Goals:  Eating better, drink more water, add ice cream    Plan:  BMI at goal  Vitamins WNL  BS well-managed  Pt. Has low calcium intake  Pt. Is physically active    Altered GI function related to pancreatic insufficiency as evidenced by signs and symptoms of malabsorption    Adequate weight as evidenced by BMI > 22 (Cystic Fibrosis Foundation goal)    Recommendations:   Recommend maintain level of physical activity  Recommend adding water at meals and snacks  Recommend have ice cream daily for calcium intake  Recommend regular F/U with GI and Endo  Recommend complete tests ordered by GI  Recommend reach out to RD as nutritional concerns arise  "

## 2024-05-29 NOTE — PROGRESS NOTES
ESTABLISHED CF GI PATIENT NOTE    Mansi Morrison  1960   [id number]     Chief Complaint:   Chief Complaint   Patient presents with    Cystic Fibrosis      HPI:   Patient is a 64 y.o. year old female with a PMH significant for cystic fibrosis (genotype [id number] homozygous)    not on modulator therapy , exocrine pancreatic insufficiency, adenomatous colon polyp, Fhx CRC, constipation here for followup.  Patient was last seen in CF GI clinic 5/9/22.  At that visit, patient was reporting change in bowel habits and early satiety.  Patient was overdue for surveillance colonoscopy and due to early satiety both EGD and colonoscopy were ordered.  It does not appear that patient has had these procedures done.  For insurance purposes she needed these procedures done through [facility] but now states that her insurance issue is resolved and she should be able to have her procedures at  now.  She had an upper respiratory illness in December 2023 and since then has had a lot of pulmonary symptoms.  She has been having aches, joint pains, and fever.  Had a fever 2 weeks ago.  She has had a lot of coughing and thinks this is contributing to the hiatal hernia recurring.    Has nocturnal GERD symptoms.  She has been taking omeprazole once a day in the morning.  She has changed her diet - reduced bread intake, eating more vegetables and fruit.  Also helps to eat a smaller amount of food.  Eats dinner around 7:00 PM. Lies down around 9:00 PM.  No real early satiety as long as she doesn't eat too much at one time.  Sometimes has a feeling that something is stuck.  No GERD symptoms during the day.    Has alternating diarrhea and constipation.  Sometimes has a tear and sees blood in stool. Then takes Miralax, stops Miralax once the stool gets loose.  Occasionally has grease in stool.  Takes Creon 24,000 3 with meals.  Does not really snack much.  Had a lot of abdominal pain last night though.    She drinks about 24 ounces of water  and 3 cups of tea per day.  Drinks soda water sometimes.    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  She just recently hired someone to assist her.    No family history of esophagus or stomach cancer.      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 MOUTH WITH MEALS AND TAKE 1-2 CAPSULES WITH SNACKS, Disp: , Rfl:     dornase Alpha (Pulmozyme) 1 mg/mL nebulizer solution, INHALE CONTENTS OF 1 VIAL VIA NEBULIZER DAILY, Disp: 75 mL, Rfl: 11    elexacaftor-tezacaftor-ivacaft (Trikafta) 100-50-75 mg tablet, Take 2 orange tablets by mouth in the morning with fat-containing food and 1 blue tablet by mouth in the evening with fat-containing food, approximately 12 hours apart., Disp: 84 tablet, Rfl: 3    FreeStyle Lite Strips strip, TEST 6-7 TIMES DAILY, Disp: , Rfl:     hydroCHLOROthiazide (HYDRODiuril) 25 mg tablet, Take 1 tablet (25 mg) by mouth once daily., Disp: , Rfl:     hydrOXYzine HCL (Atarax) 25 mg tablet, , Disp: , Rfl:     insulin glargine (Basaglar KwikPen U-100 Insulin) 100 unit/mL (3 mL) pen, Inject 8 units daily as directed, Disp: 15 mL, Rfl: 3    losartan (Cozaar) 100 mg tablet, Take 1 tablet (100 mg) by mouth once daily., Disp: , Rfl:     mecobalamin, vitamin B12, 1,000 mcg tablet,disintegrating, Place 1 tablet under the tongue once daily., Disp: , Rfl:     omeprazole (PriLOSEC) 40 mg DR capsule, Take 1 capsule (40 mg) by mouth once daily., Disp: , Rfl:      "polyethylene glycol (Glycolax, Miralax) 17 gram/dose powder, Take 34 g by mouth 2 times a day.  MIX 34 GM TWICE DAILY AND TAKE AS DIRECTED, Disp: , Rfl:     polyethylene glycol-electrolytes (polyethylene glycol) 420 gram solution, Take by mouth.  TAKE AS DIRECTED., Disp: , Rfl:     sodium chloride 3 % nebulizer solution, Inhale 4 mL by nebulization 2 times a day., Disp: 240 mL, Rfl: 6    traZODone (Desyrel) 50 mg tablet, Take 1 tablet (50 mg) by mouth as needed at bedtime for sleep., Disp: 90 tablet, Rfl: 2    Xopenex HFA 45 mcg/actuation inhaler, Inhale 2 puffs every 4 hours if needed for wheezing or shortness of breath., Disp: 15 g, Rfl: 6    Past Medical History:  Past Medical History:   Diagnosis Date    Alcohol use disorder 11/09/2023    Personal history of other benign neoplasm 05/09/2022    History of other benign neoplasm    Scedosporiosis (Multi) 02/12/2024    Sputum sample collected 2/12/2024 in CF Clinic grew 1+ 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)       Past Surgical History:  Past Surgical History:   Procedure Laterality Date    HYSTERECTOMY  05/27/2016    Hysterectomy    OTHER SURGICAL HISTORY  12/04/2013    Repair Of Paraesophageal Hiatus Hernia       Family History:  Family History   Problem Relation Name Age of Onset    Other (CF Carrier) Mother      Other (CF Carrier) Father      Colon cancer Father      Esophageal cancer Other      Stomach cancer Other          Vitals      7/10/2023    12:31 PM 8/9/2023     2:42 PM 11/8/2023    11:32 AM 2/12/2024    11:31 AM 4/3/2024    12:59 PM 4/17/2024    12:25 PM 5/29/2024    10:09 AM   Vitals   Systolic 161 152 164 145 139 143 148   Diastolic 74 81 72 74 78 84 92   Heart Rate 62 60 52 60 57 56 55   Temp 36.6 °C (97.8 °F) 36.5 °C (97.7 °F) 37.1 °C (98.7 °F) 36.4 °C (97.6 °F) 36.4 °C (97.5 °F) 36.8 °C (98.3 °F) 36.7 °C (98 °F)   Resp 16 19 16 20 19 20 20   Height (in) 1.575 m (5' 2.01\") 1.575 m (5' 2.01\") 1.6 m (5' 2.99\") 1.572 m (5' 1.89\") 1.572 m " "(5' 1.89\") 1.571 m (5' 1.85\") 1.58 m (5' 2.21\")   Weight (lb) 124.78 124.78 123.02 121.8 120.7 121.25 122.91   BMI 22.82 kg/m2 22.82 kg/m2 21.8 kg/m2 22.36 kg/m2 22.16 kg/m2 22.29 kg/m2 22.33 kg/m2   BSA (m2) 1.57 m2 1.57 m2 1.57 m2 1.55 m2 1.55 m2 1.55 m2 1.56 m2     Physical Exam:  No acute distress  No scleral icterus  Oropharynx clear  Lungs CTA bilaterally  RRR S1 S2  Abd soft, mild tenderness on the right side (RUQ and RLQ)  No edema    Labs:    WBC   Date/Time Value Ref Range Status   04/03/2024 03:02 PM 6.2 4.4 - 11.3 x10*3/uL Final     Hemoglobin   Date/Time Value Ref Range Status   04/03/2024 03:02 PM 13.7 12.0 - 16.0 g/dL Final     Hematocrit   Date/Time Value Ref Range Status   04/03/2024 03:02 PM 42.2 36.0 - 46.0 % Final     MCV   Date/Time Value Ref Range Status   04/03/2024 03:02 PM 89 80 - 100 fL Final     Platelets   Date/Time Value Ref Range Status   04/03/2024 03:02  150 - 450 x10*3/uL Final        Sodium   Date/Time Value Ref Range Status   04/03/2024 03:02  136 - 145 mmol/L Final     Potassium   Date/Time Value Ref Range Status   04/03/2024 03:02 PM 4.5 3.5 - 5.3 mmol/L Final     Chloride   Date/Time Value Ref Range Status   04/03/2024 03:02  98 - 107 mmol/L Final     Urea Nitrogen   Date/Time Value Ref Range Status   04/03/2024 03:02 PM 21 6 - 23 mg/dL Final     Creatinine   Date/Time Value Ref Range Status   04/03/2024 03:02 PM 0.95 0.50 - 1.05 mg/dL Final     Glucose   Date/Time Value Ref Range Status   04/03/2024 03:02 PM 94 74 - 99 mg/dL Final       AST   Date/Time Value Ref Range Status   04/03/2024 03:02 PM 19 9 - 39 U/L Final     ALT   Date/Time Value Ref Range Status   04/03/2024 03:02 PM 16 7 - 45 U/L Final     Comment:     Patients treated with Sulfasalazine may generate falsely decreased results for ALT.     Alkaline Phosphatase   Date/Time Value Ref Range Status   04/03/2024 03:02 PM 91 33 - 136 U/L Final     Bilirubin, Total   Date/Time Value Ref Range Status "   04/03/2024 03:02 PM 0.5 0.0 - 1.2 mg/dL Final     Bilirubin, Direct   Date/Time Value Ref Range Status   04/03/2024 03:02 PM 0.1 0.0 - 0.3 mg/dL Final     GGT   Date/Time Value Ref Range Status   02/12/2024 02:43 PM 26 5 - 55 U/L Final     Albumin   Date/Time Value Ref Range Status   04/03/2024 03:02 PM 4.7 3.4 - 5.0 g/dL Final     Total Protein   Date/Time Value Ref Range Status   04/03/2024 03:02 PM 7.7 6.4 - 8.2 g/dL Final        Protime   Date/Time Value Ref Range Status   11/16/2020 03:16 PM 11.0 10.1 - 13.3 sec Final     INR   Date/Time Value Ref Range Status   11/16/2020 03:16 PM 0.9 0.9 - 1.1 Final        Vitamin D, 25-Hydroxy, Total   Date/Time Value Ref Range Status   02/12/2024 02:43 PM 52 30 - 100 ng/mL Final     Vitamin A (Retinol)   Date/Time Value Ref Range Status   02/12/2024 02:43 PM 0.52 0.30 - 1.20 mg/L Final     Vitamin A, Interpretation   Date/Time Value Ref Range Status   02/12/2024 02:43 PM Normal  Final     Comment:       This test was developed and its performance characteristics   determined by [facility]. It has not been cleared or   approved by the [place] Food and Drug Administration. This test was   performed in a CLIA certified laboratory and is intended for   clinical purposes.  Performed By: [facility]  [address] [place] [id number]  : Tello Pacheco MD, PhD  CLIA Number: [id number]     Vitamin E (Alpha-Tocopherol)   Date/Time Value Ref Range Status   02/12/2024 02:43 PM 6.6 5.5 - 18.0 mg/L Final     Comment:       This test was developed and its performance characteristics   determined by [facility]. It has not been cleared or   approved by the US Food and Drug Administration. This test was   performed in a CLIA certified laboratory and is intended for   clinical purposes.     Vitamin E (Gamma-Tocopherol)   Date/Time Value Ref Range Status   02/12/2024 02:43 PM 1.6 0.0 - 6.0 mg/L Final     Comment:     Performed By: [facility]  [address] [place] [id number]  : Tello Pacheco MD, PhD  CLIA Number: [id number]        No results found for: \"PANCRELASTFE\"    Ferritin   Date/Time Value Ref Range Status   2022 09:12 AM 47 8 - 150 ug/L Final     Iron   Date/Time Value Ref Range Status   07/10/2023 01:28 PM 59 35 - 150 ug/dL Final     TIBC   Date/Time Value Ref Range Status   07/10/2023 01:28  240 - 445 ug/dL Final     Iron Saturation   Date/Time Value Ref Range Status   07/10/2023 01:28 PM 16 (L) 25 - 45 % Final       Imagin/1/13 Gastric emptying study - normal    22 - Abd ultrasound ([facility]):  EXAMINATION: US LIVER/GALL BLADDER/PANCREAS  CLINICAL HISTORY: Reason for Exam: as recommended per CT; history of inflammatory changes along the falciform  ligament in chronically dilated common bile duct  ASSOCIATED DIAGNOSIS:  COMPARISON: Gallbladder ultrasound 2017; CT abdomen/pelvis 2022  TECHNIQUE: Ultrasound real time scan with image documentation of the right upper quadrant including the liver,  gallbladder, and pancreas was performed.  FINDINGS:  Liver  Craniocaudal length: 14.8 cm.  Echogenicity: Normal  Surface nodularity: Present  Mass (size and location): None.  Bile ducts  Intrahepatic ducts: No biliary dilatation.  Common bile duct: Diameter 14 mm, stable compared to 2017 ultrasound.  Gallbladder  Size and morphology: The gallbladder is contracted limiting evaluation.  Cholelithiasis: Multiple echogenic foci with posterior acoustic shadowing, compatible with gallstones.  Pericholecystic fluid: None.  Sonographic Arrington sign: Absent.  Pancreas  Pancreas is not visualized consistent with the complete fatty replacement on recent CT.  Other findings  A right pleural effusion is incidentally noted.  IMPRESSION:  1. Chronically dilated CBD measuring approximately 14 mm. No choledocholithiasis seen.  2. The liver has a lobulated contour which may " relate to underlying cirrhosis. Further evaluation with shear  wave elastography or fibroscan can be obtained to obtain a Metavir score.  3. Contracted gallbladder with cholelithiasis. No sonographic findings of acute cholecystitis.  4. Partially visualized right pleural effusion.  5. Pancreas is not visualized consistent with the complete fatty replacement on recent CT.     4/11/22 ([facility]) Renal stone protocol CT  FINDINGS:  Included images of the lower thorax: No focal lung consolidation or pleural effusion.  Hepatobiliary: There is nonspecific fat stranding in the fissure for the falciform ligament and adjacent to the  falciform ligament (series 2 image 11). There appears to be mild edematous gallbladder wall thickening. Common  bile duct measures up to 1.5 cm, previously 0.7 cm.  Pancreas: Fatty replacement of the pancreas consistent with history of cystic fibrosis. The previously described  cystic lesion in the region of the inferior pancreatic head is stable measuring up to 1.6 cm series 2 image 36.  Spleen: Unremarkable  Adrenal Glands: Unremarkable  Kidneys, ureters, and bladder: There is a 1.5 x 1.0 x 2.1 cm obstructing calculus just distal to the left  ureteropelvic junction causing moderate left hydronephrosis. There are multiple other nonobstructing calculi in  the left kidney collecting system measuring up to 0.7 cm. Stable left kidney upper pole cortical scarring. No  right-sided urinary tract stones are identified.  Abdominal and pelvic vasculature: Unremarkable  GI tract: No evidence of obstruction. The appendix is within normal limits.  Peritoneum and retroperitoneum: There is small volume ascites in the pelvis.  Lymph Nodes: No abdominal or pelvic lymphadenopathy is evident.  Uterus and adnexa: Status post hysterectomy.  Visualized musculoskeletal structures: No acute fracture or destructive osseous lesion is identified.  IMPRESSION:  1. There is a 2.1 cm obstructing proximal left ureteral  stone causing moderate left hydronephrosis. The stone is  just distal to the left ureteropelvic junction.  2. Multiple left-sided kidney stones.  3. Nonspecific fat stranding about the falciform ligament, potentially due to fluid overload. Increased common  bile duct dilatation and possible gallbladder wall thickening. Correlation with liver function tests is  recommended. Findings may also be further evaluated by right upper quadrant ultrasound if clinically indicated.  4. Small volume ascites in the pelvis.     4/26/24 Chest CT without IV contrast - large hiatal hernia, fatty replacement of the pancreas, colonic diverticulosis    Previous Endoscopic Evaluation:  6/3/16 Hydrogen breath test showed high baseline hydrogen level (12 ppm) without any increase which could be consistent with small intestinal bacterial overgrowth due to elevated baseline level.     5/12/16 - EGD - evidence of Nissen fundoplication; erosive gastropathy; antral gastritis; normal duodenum  5/12/16 - Colonoscopy - BBPS 6, fair prep, normal TI, stool lavaged resulting in clearance with fair visualization; internal and external hemorrhoids     A. DUODENUM, BIOPSY:  --PEPTIC DUODENITIS.     B. STOMACH, ANTRUM, BIOPSY:  --CHEMICAL GASTROPATHY, NO HELICOBACTER PYLORI ORGANISMS IDENTIFIED.     C. TERMINAL ILEUM, BIOPSY:  --ILEAL MUCOSA WITH NO SIGNIFICANT PATHOLOGIC FINDINGS.     D. RIGHT COLON, BIOPSY:  --COLONIC MUCOSA WITH NO SIGNIFICANT PATHOLOGIC FINDINGS.     E. TRANSVERSE, BIOPSY:  --COLONIC MUCOSA WITH NO SIGNIFICANT PATHOLOGIC FINDINGS.     F. LEFT COLON, BIOPSY:  --COLONIC MUCOSA WITH NO SIGNIFICANT PATHOLOGIC FINDINGS.     11/13/13 EGD (Dr. Casillas) - normal esophagus, erosions in antrum, normal duodenum, small posterior paraesophageal hernia on retroflexion (gastric biopsies showed reactive gastropathy; esophageal biopsies revealed esophageal squamous mucosa and gastric type mucosa, negative for intestinal metaplasia     4/30/13 EGD  (Dr. Santoyo) - large 6 cm hiatal hernia, atrophic gastritis (biopsies showed reactive gastropathy), normal duodenum       10/4/11 Colonoscopy (Dr. Snyder) - prep good in colon except cecum which had fair prep; 2 mm transverse colon tubular adenoma; solid stool at IC valve    Assessment and Plan:  Patient is a 64 y.o. year old female with  cystic fibrosis (genotype [id number] homozygous)    not on modulator therapy , exocrine pancreatic insufficiency, adenomatous colon polyp, Fhx CRC, constipation here for followup.  Has not had EGD/colonoscopy ordered 2 years ago.  Now having alternating diarrhea and constipation as well as hematochezia.     GERD - Change timing of omeprazole to 30 minutes prior to dinner since patient's worst symptoms are at night.  Recommended eating at least 3 hours prior to bedtime.  Plan for EGD with Bravo placement with anesthesia assistance to assess hernia and assess whether patient truly has acid reflux.  Suspect based on chest CT showing large hiatal hernia that patient's fundoplication is no longer intact.  Discussed with patient the need to hold omeprazole 5-7 days prior to procedure.  Prescription for famotidine placed to take while patient is off omeprazole prior to Bravo.  Constipation - Patient reports constipation and what sounds like an anal fissure leading to bleeding, but then gets diarrhea after taking Miralax so she stops taking Miralax.  Suspect diarrhea is overflow diarrhea.  Recommended to patient that she take something regularly for constipation.  Different options discussed including a stimulant laxative or a newer agent (however many of these have a mechanism that includes CFTR so theoretically may not work as well in someone with CF).  Patient would like to try bisacodyl.  Start bisacodyl 10 mg daily for constipation.  History of adenomatous polyp 2011 and family history CRC - Patient is overdue for surveillance.  Last colonoscopy 2016 without polyps.  Now having  hematochezia which may be secondary to hemorrhoids (visualized on last colonoscopy) or anal fissure.  Plan colonoscopy for surveillance along with EGD/Bravo.  Patient was given [place] prep instructions.      RTC after procedures.    Latonia Snyder MD, MS  Gastroenterologist

## 2024-05-29 NOTE — Clinical Note
Latonia - if you think of it, please let us know when you get Lucia scheduled for EGD/Bravo/colonoscopy.  We could admit her for optimization before the procedure. Nita/Reba - Dr. Ambrose wanted to see patient in July.  I don't see appointment with Dr. Ambrose.  Could you schedule with her? Thanks to all.

## 2024-05-29 NOTE — PATIENT INSTRUCTIONS
"    It was very nice to see you today. We can offer you in-person and \"virtual\" appointments with your cystic fibrosis provider.    If you need to cancel or schedule an appointment, please call the  at the [facility] at [phone] between the hours of 8 AM and 5 PM, Monday-Friday.    To reach the CF nurse, Chiquis, please call [phone]. Chiquis has a secure voice mail account if you want to leave a message.    If you need to speak with an adult cystic fibrosis provider between the hours of 5 PM and 8 AM (overnight) or anytime on Saturday or Sunday, please call [phone]. When you call this number, you will be connected to an  with the [facility] and [facility] answering service. You should tell the  that you're an adult with cystic fibrosis who needs to speak to the \"cystic fibrosis doctor on-call.\" The  will take your contact information. You should then expect a call back from the cystic fibrosis doctor on-call within a short period of time.      Plan from today's visit:    I sent your prescriptions for levalbuterol (Xopenex) HFA inhaler, doxycycline, and linezolid (Zyvox) to  specialty pharmacy.  They should be able to handle any prior authorization or other insurance problem.  I would like you to start taking the linezolid (Zyvox).  This antibiotic is designed to fight MRSA.  As we discussed, I think that MRSA has been making you sick over the past few months, and it has not spontaneously gone away in your respiratory cultures.  Please see the handout for a review of potential side effects of linezolid (Zyvox).  Assuming you tolerate the Zyvox, finish the 14-day course.  Right after you finish the Zyvox, start taking a 14-day course of doxycycline.  Having you do a full month of oral antibiotics targeting MRSA is meant to see if you feel better (no fevers, better energy, less mucus, and less frequent cough).  We reviewed your CT scan of the chest.  " I think that the hiatal hernia could be causing reflux again because the Nissen fundoplication has loosened.  Dr. Snyder, the gastroenterologist, would be able to figure this out by doing the endoscopy (EGD).  You are due for a colonoscopy.  I appreciate that the cleanout (prep) is intense.  We sometimes admit patients to the hospital to treat them with IV antibiotics and other therapies to improve their lung health before undergoing anesthesia for the procedure.  This also gives us an opportunity to help you perform a more thorough cleanout.  Please let us know if we should plan for admission closer to when your colonoscopy ends up being scheduled.  I am refilling your hydroxyzine.  This is the medication that you can use occasionally for breakthrough anxiety.  Please talk with Amy about the portable, self-contained percussive vest for airway clearance.  I appreciate that we are increasing your treatment burden by prescribing the aerosols and vest therapy, and this may be more difficult than anticipated due to the stress in your life.  If you conclude that the aerosols and vest therapy is too much, we can always start Trikafta.  The Trikafta could be very beneficial and less burdensome.    Important Information:  Your CFTR variants (mutations) are: [id number]     Your percent-predicted FEV1 today was: 95%    Your weight adjusted for height (body mass index, BMI) today was: 22.33 kg/m2  (goal for adult males with CF = 23.0 kg/m2, goal for adult females with CF = 22.0 kg/m2)    Next appointment: 1 month, in-office, Dr. Harris or Kervin Sanchez (sick follow-up)

## 2024-05-29 NOTE — PROGRESS NOTES
SW met patient in outpatient clinic. Discussed patient's recent relapse. Patient uses edibles to help her relax, says she sponsor is not in agreement that marijuana keeps sobriety. Patient sees sponsor once a week. Patient is no longer seeing therapist, Lety, due to her not taking her insurance and being unable to pay out of pocket at this time. Patient agreeable to receiving therapy and psych referrals. Dicussed concerns over taking Trikafta in the future and fears of increases anxiety that could lead to a relapse with alcohol. Patient and SW decided it would be best to have a supportive CF team, therapist and psychiatrist to provide support if she notices an increase in anxiety if starts Trikafta. SW to email referrals.   -GALE Madrid

## 2024-05-29 NOTE — PROGRESS NOTES
Mnasi had received the portable vest system brochures at her last clinic visit and is interested in trying them on today.    The [place] Vest system is too big to fit Lucia properly.    Lucia's current vest system the HillRom 103 quit working and does not have a warranty for replacement. Lucia felt that the AffloVest was more effective mobilizing mucous as compared to her older HFCWO system. Airway clearance therapies (ACT) are designed to loosen thick, sticky mucus so it can be cleared from the lungs by coughing. ACT is considered a cornerstone therapy for Cystic Fibrosis patients that aims to minimize mucus buildup to help prevent inflammation and infection. Dr. Harris would like to order the AffloVest for in-home ACT for Lucia.    The following order was sent to [facility]:        6/7/24 Update:  I called Pat from [facility] [place] to confirm r/c of the Afflo order. I left a message requesting a return phone. I also left info in the message regarding confirmation of co-pay prior to sending the AffloVest to Mansi's home.    Pat returned my phone call and let me know that there is a $200. Per month co-pay for a 10 month period. He is checking into financial hardship for support for the co-pay.    6/13/24 Update:    I called Lucia to give her an update regarding the co-pay. She has not yet received a call from Pat at [facility]. I think he is waiting to hear back from co-pay support.    Lucia would like to hold off for now due to the large co-pay.

## 2024-05-29 NOTE — PATIENT INSTRUCTIONS
We will order an EGD/Bravo (pH testing) and colonoscopy to be done with anesthesia assistance.  For the Bravo, you will need to stop the omeprazole 5-7 days prior to the procedure.  You can take famotidine instead in those few days.  This prescription has been placed to your pharmacy.  We have ordered the bowel prep (bisacodyl and a Golytely-type prep) to your pharmacy.  If you do not hear from the schedulers, the number to call to schedule procedures is [phone].    Start bisacodyl 2 tablets (10 mg) daily for constipation.  If your stools are loose this could be a sign of overflow, not necessarily that you are cleared out.    Change the timing of omeprazole to 30 minutes prior to dinner and see if this works better for reflux symptoms.  Also, try to eat at least 3 hours before bedtime.

## 2024-05-30 ENCOUNTER — SPECIALTY PHARMACY (OUTPATIENT)
Dept: PHARMACY | Facility: CLINIC | Age: 64
End: 2024-05-30

## 2024-05-30 PROCEDURE — RXMED WILLOW AMBULATORY MEDICATION CHARGE

## 2024-05-30 RX ORDER — LOSARTAN POTASSIUM 100 MG/1
100 TABLET ORAL DAILY
Qty: 90 TABLET | Refills: 3 | OUTPATIENT
Start: 2024-05-30

## 2024-05-30 NOTE — TELEPHONE ENCOUNTER
Pulmonary Attending  Cystic Fibrosis Service    Chiquis Sam Lucia's PCP has assumed management of her blood pressure.  I have filled this medication (and amlodipine, I think) previously because it was medically necessary.  I have discussed the need for this medical problem to be managed by her PCP with Lucia.  Please let her know that Dr. Johnson should refill this medication at discretion.  Thanks!    Requested Prescriptions     Refused Prescriptions Disp Refills    losartan (Cozaar) 100 mg tablet [Pharmacy Med Name: LOSARTAN POTASSIUM 100 MG TAB] 90 tablet 3     Sig: TAKE 1 TABLET BY MOUTH EVERY DAY     Refused By: FRANCISCO J HARRIS     Reason for Refusal: Prescriber not at this practice         [id number] - [place], [place] - [address] [place] [address] AT [place] [address]  Phone: [phone] Fax: [phone]    [address] [address]  Phone: [phone] Fax: [phone]     Specialty Pharmacy  [address] [place] [id number]  Phone: [phone] Fax: [phone]      Francisco J Harris MD  5/30/2024  6:25 PM

## 2024-05-31 ENCOUNTER — TELEPHONE (OUTPATIENT)
Dept: PEDIATRIC PULMONOLOGY | Facility: HOSPITAL | Age: 64
End: 2024-05-31
Payer: MEDICARE

## 2024-05-31 NOTE — TELEPHONE ENCOUNTER
RN called Lucia and verbalized to her that she will need her PCP to refill her Losartan do to them being the ones handling that medical problem. Lucia verbalized understanding and stated that she had already got it taken care of. She asked about  Specialty Pharmacy and how she would get her medication. I verbalized that they should call her to go over any information about delivering and if she hasn't heard anything today to call on Monday. Lucia verbalized understanding.

## 2024-05-31 NOTE — TELEPHONE ENCOUNTER
RN note reviewed and understood.  Appreciate the communication.    Nicolás Harris MD  5/31/2024  2:11 PM

## 2024-06-02 NOTE — ASSESSMENT & PLAN NOTE
Again discussed her candidacy for Trikafta, which could independently improve lung function, reduce risk of pulmonary exacerbations, and improve daily symptom burden.  She would like to keep this in her back pocket for now.  Continue Pulmozyme twice daily and performing HFCWO.  Lucia tried the AffloVest during her clinic visit today, 5/29/24.  Lucia's current vest system, the iCIMS 103, quit working and does not have a warranty for replacement.  Lucia felt that the AffloVest was more effective for mobilizing mucous as compared to her older HFCWO system. Airway clearance therapies (ACT) are designed to loosen thick, sticky mucus so it can be cleared from the lungs by coughing.  ACT is considered a cornerstone therapy for CF patients that aims to minimize mucus buildup to help prevent inflammation and infection. I would like to order the AffloVest for in-home ACT for Lucia.   Xopenex (levalbuterol) has been better for her than racemic albuterol - no tremulousness.  Continue nebulized hypertonic saline 3% twice a day  Surveillance respiratory tract culture today.

## 2024-06-02 NOTE — ASSESSMENT & PLAN NOTE
Met with Dr. Ambrose on 4/24/2024 with plan for 3-month follow-up.  Need to ensure that appointment is scheduled (not currently listed).  To continue Basaglar.  Should be instructed to abstain from long-acting insulin before fasting for the endoscopic procedures.

## 2024-06-02 NOTE — ASSESSMENT & PLAN NOTE
"We reviewed your CT scan of the chest.  I think that the hiatal hernia could be causing reflux again because the Nissen fundoplication has loosened.  See my note from 5/1/2024 for details, but question aspiration pneumonia/pneumonitis events as cause of these \"fever blips.\"  Lucia met with Dr. Snyder today.  Plan for EGD and Bravo pH probe off proton-pump inhibitor.  "

## 2024-06-02 NOTE — ASSESSMENT & PLAN NOTE
Reviewed importance of maintaining sobriety.  She relies on her support system and went through 12-step plan employed by AA and other groups.  I mentioned that there are medications like naloxone and acamprosate that might be useful if she has never tried them.  She would need a referral to psychiatrist, though, as I do not manage these medications.

## 2024-06-02 NOTE — ASSESSMENT & PLAN NOTE
Not controlled at present due to stressors.  I prescribed/refilled hydroxyzine because patient said it had worked for her in the past.

## 2024-06-02 NOTE — ASSESSMENT & PLAN NOTE
Lucia tried the AffloVest during her clinic visit on 5/29/24. If you could please add to your clinical note:   Lucia's current vest system the HillRom 103 quit working and does not have a warranty for replacement. Lucia felt that the AffloVest was more effective mobilizing mucous as compared to her older HFCWO system. Airway clearance therapies (ACT) are designed to loosen thick, sticky mucus so it can be cleared from the lungs by coughing. ACT is considered a cornerstone therapy for Cystic Fibrosis patients that aims to minimize mucus buildup to help prevent inflammation and infection. I would like to order the AffloVest for in-home ACT for Lucia.

## 2024-06-02 NOTE — ASSESSMENT & PLAN NOTE
Lucia discussed colonoscopy with Dr. Snyder today.  I told Lucia that we could admit her for respiratory optimization prior to general anesthesia, during which time she can also prep for the procedure with benefits of having options for symptom-relieving medications.

## 2024-06-02 NOTE — ASSESSMENT & PLAN NOTE
Blood pressure still not quite at goal, but taking amlodipine, hydrochlorothiazide, and losartan with room for dose increases.  Now managed by her primary care physician.

## 2024-06-02 NOTE — ASSESSMENT & PLAN NOTE
We now see persistent infection with MRSA, and I think this pathogen has been undermining her lung function and contributing to symptoms, both of which are associated with this pathogen in the literature.  She is struggling to manage burden of increased aerosolized medication and chest physiotherapy regimen, so I don't think adding nebulized vancomycin is good option, at least right now.  I try to avoid cycling oral antibiotics due to potential hastening of antibiotic resistance, but I think we need to be more aggressive with MRSA treatment.  I told Lucia that the eradication regimens that have been shown to work in children with CF are very difficult to accomplish and are less likely to work in adults with CF due to extent of lung damage.  Instructions:  I would like you to start taking the linezolid (Zyvox).  This antibiotic is designed to fight MRSA.  As we discussed, I think that MRSA has been making you sick over the past few months, and it has not spontaneously gone away in your respiratory cultures.  Please see the handout for a review of potential side effects of linezolid (Zyvox).  Assuming you tolerate the Zyvox, finish the 14-day course.  Right after you finish the Zyvox, start taking a 14-day course of doxycycline.  Having you do a full month of oral antibiotics targeting MRSA is meant to see if you feel better (no fevers, better energy, less mucus, and less frequent cough).

## 2024-06-04 ENCOUNTER — PHARMACY VISIT (OUTPATIENT)
Dept: PHARMACY | Facility: CLINIC | Age: 64
End: 2024-06-04
Payer: COMMERCIAL

## 2024-06-04 LAB
BACTERIA SPT CF RESP CULT: ABNORMAL

## 2024-06-07 ENCOUNTER — DOCUMENTATION (OUTPATIENT)
Dept: PEDIATRIC PULMONOLOGY | Facility: HOSPITAL | Age: 64
End: 2024-06-07
Payer: MEDICARE

## 2024-06-12 ENCOUNTER — APPOINTMENT (OUTPATIENT)
Dept: PEDIATRIC PULMONOLOGY | Facility: HOSPITAL | Age: 64
End: 2024-06-12
Payer: MEDICARE

## 2024-06-12 ENCOUNTER — TELEPHONE (OUTPATIENT)
Dept: PEDIATRIC PULMONOLOGY | Facility: HOSPITAL | Age: 64
End: 2024-06-12
Payer: MEDICARE

## 2024-06-12 ENCOUNTER — SPECIALTY PHARMACY (OUTPATIENT)
Dept: PHARMACY | Facility: CLINIC | Age: 64
End: 2024-06-12

## 2024-06-12 LAB
BACTERIA SPT CF RESP CULT: ABNORMAL
LABORATORY COMMENT REPORT: ABNORMAL

## 2024-06-12 NOTE — TELEPHONE ENCOUNTER
Pt called to report: Three nights ago started having fevers and body aches - she started Doxy 100mg bid 3 days ago c/c: coughing, SOB, feels like an elephant on her chest ~ still having fevers 100.3 and body aches and chills with fatigue, HA.  Pt stated she is having a hard time working due to the fatigue. Lucia checked with  pharmacy and they are still working on a PA for the Zyvox so she has not received the medication as of date.  She received notification via [employer] that she is growing B. C multivorans which concerns her. This RN will send a message to [facility] and  specialty pharmacy to check on the status of the Zyvox.

## 2024-06-13 DIAGNOSIS — A49.02 MRSA (METHICILLIN RESISTANT STAPHYLOCOCCUS AUREUS) INFECTION: ICD-10-CM

## 2024-06-13 DIAGNOSIS — E84.0 CYSTIC FIBROSIS WITH PULMONARY EXACERBATION (MULTI): ICD-10-CM

## 2024-06-13 RX ORDER — SULFAMETHOXAZOLE AND TRIMETHOPRIM 800; 160 MG/1; MG/1
1 TABLET ORAL 2 TIMES DAILY
Qty: 28 TABLET | Refills: 0 | Status: CANCELLED | OUTPATIENT
Start: 2024-06-13 | End: 2024-06-27

## 2024-06-13 NOTE — TELEPHONE ENCOUNTER
RN called Lucia and told her the plan is to Stop the Doxycyline and we will send in Bactrim DS PO twice a day x14 days. Lucia verbalized understanding.

## 2024-06-14 NOTE — TELEPHONE ENCOUNTER
I spoke to Mansi this morning in regards to the Bactrim that I was considering prescribing her. Given her age and the potential adverse effects of Bactrim in older adults that I would prefer she continue taking the Doxycyline for a total of x2 weeks. By that time, the linezolid will be mailed to her and she should take that for x2 weeks. She was agreeable to the plan. She then stated that when she took Bactrim in the past it made her feel bad. She agreed it would be better to remain on Doxycycline and that today she is actually feeling better. Last night she didn't have a fever. I told her if anything changes or she starts feeling bad again, then she needs to call our office.     Kervin Sanchez, CNP

## 2024-06-18 NOTE — TELEPHONE ENCOUNTER
Lucia returned call and stated that yesterday 6/17 around 2:00 she felt sick again with fever and bodyaches and slept all day. She stated her lungs feel fine and is not coughing anything up. She thought over the weekend that she was healing and getting better.

## 2024-06-19 ENCOUNTER — SPECIALTY PHARMACY (OUTPATIENT)
Dept: PHARMACY | Facility: CLINIC | Age: 64
End: 2024-06-19

## 2024-06-19 ENCOUNTER — TELEPHONE (OUTPATIENT)
Dept: PEDIATRIC PULMONOLOGY | Facility: HOSPITAL | Age: 64
End: 2024-06-19
Payer: MEDICARE

## 2024-06-19 PROCEDURE — RXMED WILLOW AMBULATORY MEDICATION CHARGE

## 2024-06-19 NOTE — TELEPHONE ENCOUNTER
----- Message from Chiquis Zamudio RN sent at 6/19/2024  1:48 PM EDT -----  Regarding: FW: Linzeolid interaction with trazodone    ----- Message -----  From: Chantelle Naidu PharmSHIVA  Sent: 6/19/2024   1:31 PM EDT  To: Nicolás Harris MD; Louisa Rodríguez, RN; #  Subject: RE: Linzeolid interaction with trazodone         Good afternoon,    Thanks, William.     For the trazodone/linezolid interaction:   Prescribing information for mirtazapine and trazodone lists initiation of mirtazapine and trazodone in patients treated with linezolid as contraindicated.13,14 However, despite the labeled contraindications, some reports suggest that serotonin syndrome with such a combination is uncommon (0.06% to 3% risk), leading some authors to conclude that these agents can be coadministered when necessary.15,16,17    I'm including Dr. Harris and Chiquis on the message to see what they also think about stopping trazodone while treating with linezolid. It seems like it's PRN so it would make sense to just stop trazodone while on linezolid. Thoughts?    Thanks,  Ady    ----- Message -----  From: Zachary Luna PharmD  Sent: 6/19/2024  11:11 AM EDT  To: Chantelle Naidu, PharmD; Louisa Rodríguez, RN; #  Subject: Linzeolid interaction with trazodone             Good morning,    We were working on setting up delivery of linezolid for Mansi Morrison. During review it was noted that there was a category X drug interaction between linezolid and trazodone due to risk of serotonin syndrome. The  notes that this combination is contraindicated. It is generally recommended to hold trazodone while she is on the linezolid.     Please advise how we should proceed with the linezolid prescription.    Thanks,    William Luna

## 2024-06-19 NOTE — TELEPHONE ENCOUNTER
I spoke to Lucia and she is feeling very exhausted. She went to sleep at 4pm yesterday. Last week when I spoke to her she said the Doxycycline was making her feel better but today she is back to feeling bad. She is coughing and has shortness of breath.    We discussed the potential interaction of linezolid and trazadone. She states that she only takes it 3 days a week and will stop taking it while on linezolid. She is willing to do anything in order to feel healthy.    I told her if she is feeling significantly worse by the end of the week to call and if not, then give us a call next week even if she's feeling better.    Kervin Sanchez, CNP

## 2024-06-20 ENCOUNTER — PHARMACY VISIT (OUTPATIENT)
Dept: PHARMACY | Facility: CLINIC | Age: 64
End: 2024-06-20
Payer: COMMERCIAL

## 2024-06-28 DIAGNOSIS — Z86.010 HX OF ADENOMATOUS POLYP OF COLON: Primary | ICD-10-CM

## 2024-06-28 DIAGNOSIS — E84.19 CYSTIC FIBROSIS WITH GASTROINTESTINAL MANIFESTATIONS (MULTI): ICD-10-CM

## 2024-06-28 RX ORDER — BISACODYL 5 MG
15 TABLET, DELAYED RELEASE (ENTERIC COATED) ORAL ONCE
Qty: 3 TABLET | Refills: 0 | Status: SHIPPED | OUTPATIENT
Start: 2024-06-28 | End: 2024-06-28

## 2024-06-28 RX ORDER — POLYETHYLENE GLYCOL-3350 AND ELECTROLYTES WITH FLAVOR PACK 240; 5.84; 2.98; 6.72; 22.72 G/278.26G; G/278.26G; G/278.26G; G/278.26G; G/278.26G
8000 POWDER, FOR SOLUTION ORAL ONCE
Qty: 8000 ML | Refills: 0 | Status: SHIPPED | OUTPATIENT
Start: 2024-06-28 | End: 2024-06-28

## 2024-06-28 NOTE — TELEPHONE ENCOUNTER
RN called Lucia to see how she was feeling. She stated that she started the linezolid about 4 days ago and has been having terrible diarrhea every day, nauseous and not eating well, and feels very weak. She stated she stopped the antibiotic yesterday because she is having a colonoscopy and endoscopy on Tuesday and will need to do a cleanse Monday night and she doesn't think she will tolerate the cleanse while being on the antibiotic.     I told her I would pass this information along and to take a probiotic and drink some pedialyte or liquid IV. I told her I would call back by the end of the day on what to do with about the antibiotic. Lucia verbalized understanding.

## 2024-07-02 ENCOUNTER — ANESTHESIA EVENT (OUTPATIENT)
Dept: GASTROENTEROLOGY | Facility: HOSPITAL | Age: 64
End: 2024-07-02
Payer: MEDICARE

## 2024-07-03 ENCOUNTER — ANESTHESIA (OUTPATIENT)
Dept: GASTROENTEROLOGY | Facility: HOSPITAL | Age: 64
End: 2024-07-03
Payer: MEDICARE

## 2024-07-03 ENCOUNTER — HOSPITAL ENCOUNTER (OUTPATIENT)
Dept: GASTROENTEROLOGY | Facility: HOSPITAL | Age: 64
Setting detail: OUTPATIENT SURGERY
Discharge: HOME | End: 2024-07-03
Payer: MEDICARE

## 2024-07-03 VITALS
RESPIRATION RATE: 13 BRPM | BODY MASS INDEX: 22.76 KG/M2 | HEIGHT: 62 IN | DIASTOLIC BLOOD PRESSURE: 60 MMHG | SYSTOLIC BLOOD PRESSURE: 165 MMHG | OXYGEN SATURATION: 98 % | WEIGHT: 123.68 LBS | HEART RATE: 66 BPM | TEMPERATURE: 97.3 F

## 2024-07-03 DIAGNOSIS — Z86.010 HX OF ADENOMATOUS POLYP OF COLON: ICD-10-CM

## 2024-07-03 DIAGNOSIS — K38.8 MASS OF APPENDIX: Primary | ICD-10-CM

## 2024-07-03 DIAGNOSIS — K21.9 GASTROESOPHAGEAL REFLUX DISEASE WITHOUT ESOPHAGITIS: ICD-10-CM

## 2024-07-03 PROBLEM — J18.9 PNEUMONIA: Status: ACTIVE | Noted: 2024-07-03

## 2024-07-03 PROBLEM — Z98.890 PONV (POSTOPERATIVE NAUSEA AND VOMITING): Status: ACTIVE | Noted: 2024-07-03

## 2024-07-03 PROBLEM — E11.9 DIABETES MELLITUS, TYPE 2 (MULTI): Status: ACTIVE | Noted: 2024-07-03

## 2024-07-03 PROBLEM — R11.2 PONV (POSTOPERATIVE NAUSEA AND VOMITING): Status: ACTIVE | Noted: 2024-07-03

## 2024-07-03 LAB — GLUCOSE BLD MANUAL STRIP-MCNC: 86 MG/DL (ref 74–99)

## 2024-07-03 PROCEDURE — 45380 COLONOSCOPY AND BIOPSY: CPT | Performed by: INTERNAL MEDICINE

## 2024-07-03 PROCEDURE — 3700000002 HC GENERAL ANESTHESIA TIME - EACH INCREMENTAL 1 MINUTE

## 2024-07-03 PROCEDURE — 3700000001 HC GENERAL ANESTHESIA TIME - INITIAL BASE CHARGE

## 2024-07-03 PROCEDURE — 82947 ASSAY GLUCOSE BLOOD QUANT: CPT

## 2024-07-03 PROCEDURE — 7100000009 HC PHASE TWO TIME - INITIAL BASE CHARGE

## 2024-07-03 PROCEDURE — 2500000004 HC RX 250 GENERAL PHARMACY W/ HCPCS (ALT 636 FOR OP/ED): Performed by: NURSE ANESTHETIST, CERTIFIED REGISTERED

## 2024-07-03 PROCEDURE — 45385 COLONOSCOPY W/LESION REMOVAL: CPT | Performed by: INTERNAL MEDICINE

## 2024-07-03 PROCEDURE — 2500000004 HC RX 250 GENERAL PHARMACY W/ HCPCS (ALT 636 FOR OP/ED): Performed by: ANESTHESIOLOGY

## 2024-07-03 PROCEDURE — 7100000010 HC PHASE TWO TIME - EACH INCREMENTAL 1 MINUTE

## 2024-07-03 PROCEDURE — 2500000005 HC RX 250 GENERAL PHARMACY W/O HCPCS: Performed by: NURSE ANESTHETIST, CERTIFIED REGISTERED

## 2024-07-03 PROCEDURE — 43239 EGD BIOPSY SINGLE/MULTIPLE: CPT | Performed by: INTERNAL MEDICINE

## 2024-07-03 RX ORDER — PROPOFOL 10 MG/ML
INJECTION, EMULSION INTRAVENOUS CONTINUOUS PRN
Status: DISCONTINUED | OUTPATIENT
Start: 2024-07-03 | End: 2024-07-03

## 2024-07-03 RX ORDER — HYDRALAZINE HYDROCHLORIDE 20 MG/ML
10 INJECTION INTRAMUSCULAR; INTRAVENOUS ONCE
Status: COMPLETED | OUTPATIENT
Start: 2024-07-03 | End: 2024-07-03

## 2024-07-03 RX ORDER — NORETHINDRONE AND ETHINYL ESTRADIOL 0.5-0.035
KIT ORAL AS NEEDED
Status: DISCONTINUED | OUTPATIENT
Start: 2024-07-03 | End: 2024-07-03

## 2024-07-03 RX ORDER — SODIUM CHLORIDE, SODIUM LACTATE, POTASSIUM CHLORIDE, CALCIUM CHLORIDE 600; 310; 30; 20 MG/100ML; MG/100ML; MG/100ML; MG/100ML
INJECTION, SOLUTION INTRAVENOUS CONTINUOUS PRN
Status: DISCONTINUED | OUTPATIENT
Start: 2024-07-03 | End: 2024-07-03

## 2024-07-03 RX ORDER — LIDOCAINE HYDROCHLORIDE 20 MG/ML
INJECTION, SOLUTION EPIDURAL; INFILTRATION; INTRACAUDAL; PERINEURAL AS NEEDED
Status: DISCONTINUED | OUTPATIENT
Start: 2024-07-03 | End: 2024-07-03

## 2024-07-03 RX ORDER — HYDRALAZINE HYDROCHLORIDE 20 MG/ML
INJECTION INTRAMUSCULAR; INTRAVENOUS AS NEEDED
Status: DISCONTINUED | OUTPATIENT
Start: 2024-07-03 | End: 2024-07-03

## 2024-07-03 ASSESSMENT — PAIN SCALES - GENERAL
PAINLEVEL_OUTOF10: 0 - NO PAIN

## 2024-07-03 ASSESSMENT — PAIN - FUNCTIONAL ASSESSMENT
PAIN_FUNCTIONAL_ASSESSMENT: 0-10

## 2024-07-03 ASSESSMENT — COLUMBIA-SUICIDE SEVERITY RATING SCALE - C-SSRS
1. IN THE PAST MONTH, HAVE YOU WISHED YOU WERE DEAD OR WISHED YOU COULD GO TO SLEEP AND NOT WAKE UP?: NO
6. HAVE YOU EVER DONE ANYTHING, STARTED TO DO ANYTHING, OR PREPARED TO DO ANYTHING TO END YOUR LIFE?: NO
2. HAVE YOU ACTUALLY HAD ANY THOUGHTS OF KILLING YOURSELF?: NO

## 2024-07-03 NOTE — ANESTHESIA POSTPROCEDURE EVALUATION
Patient: Mansi Morrison    Procedure Summary       Date: 07/03/24 Room / Location: Formerly named [facility]    Anesthesia Start: 1032 Anesthesia Stop: 1209    Procedures:       BRAVO      EGD      COLONOSCOPY Diagnosis:       Gastroesophageal reflux disease without esophagitis      Hx of adenomatous polyp of colon    Scheduled Providers: Latonia Snyder MD; Rhett Salazar MD; CAL Yoo DNP; Mora Joyce RN Responsible Provider: Rhett Salazar MD    Anesthesia Type: MAC ASA Status: 3            Anesthesia Type: MAC    Vitals Value Taken Time   /68 07/03/24 1208   Temp 36.4 °C (97.5 °F) 07/03/24 1157   Pulse 61 07/03/24 1209   Resp 10 07/03/24 1209   SpO2 97 % 07/03/24 1209   Vitals shown include unfiled device data.    Anesthesia Post Evaluation    Patient location during evaluation: PACU  Patient participation: complete - patient participated  Level of consciousness: awake and alert  Pain management: adequate  Airway patency: patent  Cardiovascular status: acceptable  Respiratory status: acceptable  Hydration status: acceptable  Postoperative Nausea and Vomiting: none      No notable events documented.  Hydralazine given for increased BP in PACU.

## 2024-07-03 NOTE — DISCHARGE INSTRUCTIONS

## 2024-07-03 NOTE — H&P
History Of Present Illness  Mansi Morrison is a 64 y.o. female presenting with GERD, history of adenomatous polyp here for EGD/Bravo and colonoscopy.  Patient did ok with regards to GERD symptoms off omeprazole.  Did not have a lot of GERD but was very careful about what she ate the past week.  She finished drinking the prep last night.  History of hiatal hernia repair 2013.  No recent URI.     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 She has never been exposed to tobacco smoke. She has never used smokeless tobacco. She reports that she does not currently use alcohol. She reports current drug use. Frequency: 2.00 times per week. Drug: Marijuana.    Family History  Family History   Problem Relation Name Age of Onset    Other (CF Carrier) Mother      Other (CF Carrier) Father      Colon cancer Father      Esophageal cancer Neg Hx      Stomach cancer Neg Hx          Allergies  Allergies   Allergen Reactions    Codeine Shortness of breath    Lexapro [Escitalopram Oxalate] Other     Manic behavior     Linezolid Diarrhea and Nausea/vomiting  "   Lisinopril Cough    Moxifloxacin Rash     Did okay with levofloxacin.     Physical Exam  Cardiovascular:      Rate and Rhythm: Normal rate and regular rhythm.   Pulmonary:      Breath sounds: Normal breath sounds. No wheezing.   Abdominal:      Palpations: Abdomen is soft.      Tenderness: There is no abdominal tenderness.          Last Recorded Vitals  Blood pressure (!) 176/99, pulse 57, temperature 36.6 °C (97.9 °F), temperature source Temporal, resp. rate 17, height 1.575 m (5' 2\"), weight 56.1 kg (123 lb 10.9 oz), SpO2 97%, not currently breastfeeding.    Assessment/Plan   EGD with Bravo placement unless severe esophagitis visualized  Colonoscopy     Latonia Snyder MD  "

## 2024-07-03 NOTE — ANESTHESIA PREPROCEDURE EVALUATION
Patient: Mansi Morrison    Procedure Information       Date/Time: 07/03/24 1000    Scheduled providers: Latonia Snyder MD; Rhett Salazar MD; CAL Yoo DNP; Mora Joyce RN    Procedures:       BRAVO      EGD      COLONOSCOPY    Location: [facility]            Relevant Problems   Anesthesia   (+) PONV (postoperative nausea and vomiting)      Cardiac   (+) Primary hypertension      Pulmonary  Cystic fibrosis  Remote smoking   (+) Pneumonia (remote)      Neuro  insomnia   (+) Anxiety   (+) BRYON (generalized anxiety disorder)   (+) Peripheral polyneuropathy      GI  Colon polyps   (+) Dysphagia   (+) Gastroesophageal reflux disease without esophagitis   (+) Hiatal hernia      Liver  Former binge drinking until 1 mo ago   (+) Chronic pancreatitis (Multi)      Endocrine   (+) Diabetes mellitus, type 2 (Multi)   (+) Diabetic nephropathy associated with diabetes mellitus due to underlying condition (Multi)      Hematology   (+) Anemia      ID   (+) Intestinal bacterial overgrowth   (+) MRSA (methicillin resistant Staphylococcus aureus) infection   (+) Scedosporiosis (Multi)       Clinical information reviewed:   Tobacco  Allergies  Meds   Med Hx  Surg Hx  OB Status  Fam Hx  Soc   Hx        NPO Detail:  NPO/Void Status  Carbohydrate Drink Given Prior to Surgery? : N  Date of Last Liquid: 07/03/24  Time of Last Liquid: 0100  Date of Last Solid: 07/02/24  Time of Last Solid: 0900  Last Intake Type: Clear fluids (water)  Time of Last Void: 0900         Physical Exam    Airway  Mallampati: III     Cardiovascular    Dental        Pulmonary    Abdominal            Anesthesia Plan    History of general anesthesia?: yes  History of complications of general anesthesia?: no    ASA 3     MAC     intravenous induction   Anesthetic plan and risks discussed with patient.

## 2024-07-05 DIAGNOSIS — R11.2 NAUSEA AND VOMITING, UNSPECIFIED VOMITING TYPE: Primary | ICD-10-CM

## 2024-07-05 RX ORDER — ONDANSETRON 4 MG/1
4 TABLET, FILM COATED ORAL 4 TIMES DAILY PRN
Qty: 20 TABLET | Refills: 0 | Status: SHIPPED | OUTPATIENT
Start: 2024-07-05 | End: 2024-07-12

## 2024-07-05 ASSESSMENT — PAIN SCALES - GENERAL: PAINLEVEL_OUTOF10: 3

## 2024-07-09 DIAGNOSIS — E84.9 CYSTIC FIBROSIS (MULTI): ICD-10-CM

## 2024-07-09 NOTE — TELEPHONE ENCOUNTER
RN called Lucia to see how she was feeling. She stated that she is not coughing up much mucous anymore but is still worried that her lungs are not better.     She had her colonoscopy/endoscopy and BRAVO last week and she stated her gallbladder is full of stones and has a CT scan of abdomen ordered.    She is asking if she should start back up antibiotic.    I did ask her if she had availability to be seen tomorrow and she said yes.

## 2024-07-10 ENCOUNTER — HOSPITAL ENCOUNTER (OUTPATIENT)
Dept: RESPIRATORY THERAPY | Facility: HOSPITAL | Age: 64
Discharge: HOME | End: 2024-07-10
Payer: MEDICARE

## 2024-07-10 ENCOUNTER — ALLIED HEALTH (OUTPATIENT)
Dept: PEDIATRIC PULMONOLOGY | Facility: HOSPITAL | Age: 64
End: 2024-07-10

## 2024-07-10 ENCOUNTER — MULTIDISCIPLINARY VISIT (OUTPATIENT)
Dept: PEDIATRIC PULMONOLOGY | Facility: HOSPITAL | Age: 64
End: 2024-07-10
Payer: MEDICARE

## 2024-07-10 VITALS
OXYGEN SATURATION: 95 % | SYSTOLIC BLOOD PRESSURE: 164 MMHG | HEIGHT: 62 IN | BODY MASS INDEX: 21.99 KG/M2 | WEIGHT: 119.49 LBS | HEART RATE: 55 BPM | TEMPERATURE: 98.1 F | DIASTOLIC BLOOD PRESSURE: 93 MMHG | RESPIRATION RATE: 20 BRPM

## 2024-07-10 DIAGNOSIS — K38.8 MASS OF APPENDIX: ICD-10-CM

## 2024-07-10 DIAGNOSIS — F10.21 ALCOHOL USE DISORDER, MODERATE, IN EARLY REMISSION (MULTI): ICD-10-CM

## 2024-07-10 DIAGNOSIS — Z79.899 HIGH RISK MEDICATION USE: ICD-10-CM

## 2024-07-10 DIAGNOSIS — E84.9 CYSTIC FIBROSIS (MULTI): ICD-10-CM

## 2024-07-10 DIAGNOSIS — E08.9 DIABETES MELLITUS RELATED TO CF (CYSTIC FIBROSIS) (MULTI): ICD-10-CM

## 2024-07-10 DIAGNOSIS — K21.9 GASTROESOPHAGEAL REFLUX DISEASE WITHOUT ESOPHAGITIS: ICD-10-CM

## 2024-07-10 DIAGNOSIS — Z91.89 AT RISK FOR DEHYDRATION DUE TO POOR FLUID INTAKE: ICD-10-CM

## 2024-07-10 DIAGNOSIS — R51.9 INTRACTABLE EPISODIC HEADACHE, UNSPECIFIED HEADACHE TYPE: ICD-10-CM

## 2024-07-10 DIAGNOSIS — E84.9 CYSTIC FIBROSIS (MULTI): Primary | ICD-10-CM

## 2024-07-10 DIAGNOSIS — A49.02 MRSA (METHICILLIN RESISTANT STAPHYLOCOCCUS AUREUS) INFECTION: ICD-10-CM

## 2024-07-10 DIAGNOSIS — R13.19 ESOPHAGEAL DYSPHAGIA: ICD-10-CM

## 2024-07-10 DIAGNOSIS — E84.8 DIABETES MELLITUS RELATED TO CF (CYSTIC FIBROSIS) (MULTI): ICD-10-CM

## 2024-07-10 DIAGNOSIS — I10 UNCONTROLLED HYPERTENSION: ICD-10-CM

## 2024-07-10 PROBLEM — Z92.89 HISTORY OF SCREENING MAMMOGRAPHY: Status: ACTIVE | Noted: 2024-07-10

## 2024-07-10 LAB
ALBUMIN SERPL BCP-MCNC: 4.3 G/DL (ref 3.4–5)
ALP SERPL-CCNC: 114 U/L (ref 33–136)
ALT SERPL W P-5'-P-CCNC: 18 U/L (ref 7–45)
ANION GAP SERPL CALC-SCNC: 16 MMOL/L (ref 10–20)
APPEARANCE UR: CLEAR
AST SERPL W P-5'-P-CCNC: 15 U/L (ref 9–39)
BASOPHILS # BLD AUTO: 0.03 X10*3/UL (ref 0–0.1)
BASOPHILS NFR BLD AUTO: 0.4 %
BILIRUB DIRECT SERPL-MCNC: 0.1 MG/DL (ref 0–0.3)
BILIRUB SERPL-MCNC: 0.6 MG/DL (ref 0–1.2)
BILIRUB UR STRIP.AUTO-MCNC: NEGATIVE MG/DL
BUN SERPL-MCNC: 15 MG/DL (ref 6–23)
CALCIUM SERPL-MCNC: 9.5 MG/DL (ref 8.6–10.6)
CHLORIDE SERPL-SCNC: 104 MMOL/L (ref 98–107)
CO2 SERPL-SCNC: 24 MMOL/L (ref 21–32)
COLOR UR: ABNORMAL
CREAT SERPL-MCNC: 0.8 MG/DL (ref 0.5–1.05)
EGFRCR SERPLBLD CKD-EPI 2021: 83 ML/MIN/1.73M*2
EOSINOPHIL # BLD AUTO: 0.11 X10*3/UL (ref 0–0.7)
EOSINOPHIL NFR BLD AUTO: 1.4 %
ERYTHROCYTE [DISTWIDTH] IN BLOOD BY AUTOMATED COUNT: 12.9 % (ref 11.5–14.5)
FEF 25-75: 1.69 L/S
FEV1/FVC: 75 %
FEV1: 2.17 LITERS
FVC: 2.89 LITERS
GGT SERPL-CCNC: 26 U/L (ref 5–55)
GLUCOSE SERPL-MCNC: 77 MG/DL (ref 74–99)
GLUCOSE UR STRIP.AUTO-MCNC: NORMAL MG/DL
HCT VFR BLD AUTO: 39.7 % (ref 36–46)
HGB BLD-MCNC: 13 G/DL (ref 12–16)
HOLD SPECIMEN: NORMAL
IMM GRANULOCYTES # BLD AUTO: 0.02 X10*3/UL (ref 0–0.7)
IMM GRANULOCYTES NFR BLD AUTO: 0.3 % (ref 0–0.9)
KETONES UR STRIP.AUTO-MCNC: NEGATIVE MG/DL
LEUKOCYTE ESTERASE UR QL STRIP.AUTO: ABNORMAL
LYMPHOCYTES # BLD AUTO: 1.42 X10*3/UL (ref 1.2–4.8)
LYMPHOCYTES NFR BLD AUTO: 18.4 %
MCH RBC QN AUTO: 28.6 PG (ref 26–34)
MCHC RBC AUTO-ENTMCNC: 32.7 G/DL (ref 32–36)
MCV RBC AUTO: 87 FL (ref 80–100)
MONOCYTES # BLD AUTO: 0.64 X10*3/UL (ref 0.1–1)
MONOCYTES NFR BLD AUTO: 8.3 %
NEUTROPHILS # BLD AUTO: 5.51 X10*3/UL (ref 1.2–7.7)
NEUTROPHILS NFR BLD AUTO: 71.2 %
NITRITE UR QL STRIP.AUTO: NEGATIVE
NRBC BLD-RTO: 0 /100 WBCS (ref 0–0)
PEF: 5.81 L/S
PH UR STRIP.AUTO: 5.5 [PH]
PHOSPHATE SERPL-MCNC: 3.4 MG/DL (ref 2.5–4.9)
PLATELET # BLD AUTO: 213 X10*3/UL (ref 150–450)
POTASSIUM SERPL-SCNC: 4.1 MMOL/L (ref 3.5–5.3)
PROT SERPL-MCNC: 7.5 G/DL (ref 6.4–8.2)
PROT UR STRIP.AUTO-MCNC: NEGATIVE MG/DL
RBC # BLD AUTO: 4.55 X10*6/UL (ref 4–5.2)
RBC # UR STRIP.AUTO: NEGATIVE /UL
RBC #/AREA URNS AUTO: NORMAL /HPF
SODIUM SERPL-SCNC: 140 MMOL/L (ref 136–145)
SP GR UR STRIP.AUTO: 1.01
SQUAMOUS #/AREA URNS AUTO: NORMAL /HPF
UROBILINOGEN UR STRIP.AUTO-MCNC: NORMAL MG/DL
WBC # BLD AUTO: 7.7 X10*3/UL (ref 4.4–11.3)
WBC #/AREA URNS AUTO: NORMAL /HPF

## 2024-07-10 PROCEDURE — 94010 BREATHING CAPACITY TEST: CPT | Performed by: NURSE PRACTITIONER

## 2024-07-10 PROCEDURE — 82977 ASSAY OF GGT: CPT | Performed by: INTERNAL MEDICINE

## 2024-07-10 PROCEDURE — 94010 BREATHING CAPACITY TEST: CPT

## 2024-07-10 PROCEDURE — 99214 OFFICE O/P EST MOD 30 MIN: CPT | Mod: 25 | Performed by: INTERNAL MEDICINE

## 2024-07-10 PROCEDURE — 80321 ALCOHOLS BIOMARKERS 1OR 2: CPT | Performed by: INTERNAL MEDICINE

## 2024-07-10 PROCEDURE — 99214 OFFICE O/P EST MOD 30 MIN: CPT | Performed by: INTERNAL MEDICINE

## 2024-07-10 PROCEDURE — 84100 ASSAY OF PHOSPHORUS: CPT | Performed by: INTERNAL MEDICINE

## 2024-07-10 PROCEDURE — 87102 FUNGUS ISOLATION CULTURE: CPT | Performed by: INTERNAL MEDICINE

## 2024-07-10 PROCEDURE — 81001 URINALYSIS AUTO W/SCOPE: CPT | Performed by: INTERNAL MEDICINE

## 2024-07-10 PROCEDURE — 80053 COMPREHEN METABOLIC PANEL: CPT | Performed by: INTERNAL MEDICINE

## 2024-07-10 PROCEDURE — 87015 SPECIMEN INFECT AGNT CONCNTJ: CPT | Performed by: INTERNAL MEDICINE

## 2024-07-10 PROCEDURE — 85025 COMPLETE CBC W/AUTO DIFF WBC: CPT | Performed by: INTERNAL MEDICINE

## 2024-07-10 PROCEDURE — 84075 ASSAY ALKALINE PHOSPHATASE: CPT | Performed by: INTERNAL MEDICINE

## 2024-07-10 PROCEDURE — 87186 SC STD MICRODIL/AGAR DIL: CPT | Performed by: INTERNAL MEDICINE

## 2024-07-10 PROCEDURE — 36415 COLL VENOUS BLD VENIPUNCTURE: CPT | Performed by: INTERNAL MEDICINE

## 2024-07-10 ASSESSMENT — PULMONARY FUNCTION TESTS
FEV1/FVC: 0.02
FVC (LITERS): 2.89
FEV1 (%PREDICTED): 95
FVC_PERCENT_PREDICTED: 100
FEV1 (LITERS): 2.17

## 2024-07-10 NOTE — ASSESSMENT & PLAN NOTE
Performed 4/23/2024:  IMPRESSION: There is no mammographic evidence of malignancy in either breast. Routine screening mammogram in 1 year is recommended.   BI-RADS Category 1: Negative

## 2024-07-10 NOTE — PATIENT INSTRUCTIONS
"    It was very nice to see you today. We can offer you in-person and \"virtual\" appointments with your cystic fibrosis provider.    If you need to cancel or schedule an appointment, please call the  at the [facility] at [phone] between the hours of 8 AM and 5 PM, Monday-Friday.    To reach the CF nurse, Chiquis, please call [phone]. Chiquis has a secure voice mail account if you want to leave a message.    If you need to speak with an adult cystic fibrosis provider between the hours of 5 PM and 8 AM (overnight) or anytime on Saturday or Sunday, please call [phone]. When you call this number, you will be connected to an  with the [facility] and [facility] answering service. You should tell the  that you're an adult with cystic fibrosis who needs to speak to the \"cystic fibrosis doctor on-call.\" The  will take your contact information. You should then expect a call back from the cystic fibrosis doctor on-call within a short period of time.      Plan from today's visit:    Talk to secretaries about getting CT scan of abdomen and pelvis scheduled in radiology.  Start taking Trikafta.  I can have Chantelle Naidu, the CF pharmacist, call you to review how to take the medication and side effect profile.  Please get CT scan of your head at same time as the abdomen and pelvis CT scan to work-up your headache.  Checking basic blood work today due to starting the Trikafta and because of the nausea and vomiting.  Please reduce use of ibuprofen for headache.  Could be irritating stomach and might actually be worsening headache.  Try Extra-Strength Tylenol 500 mg tablets, 1-2 tablets every 8 hours as needed.  I think you need to get the stomach fixed.  The \"Nissen fundoplication\" wrap has come undone.  I do not want any material from your stomach to come up toward your lungs.    Important Information:  Your CFTR variants (mutations) are: [id number]     Your " percent-predicted FEV1 today was: 95%  Your weight adjusted for height (body mass index, BMI) today was: 21.9 kg/m2  (goal for adult males with CF = 23.0 kg/m2, goal for adult females with CF = 22.0 kg/m2)    Next appointment: Keep your next appointment on 7/24/2024

## 2024-07-10 NOTE — PROGRESS NOTES
Mason Fontanez M.D. 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 Historically normal lung function (ppFEV1 100-106%) so Trikafta deferred. 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 EPI on PERT. Constipation.  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  Other problems: alcohol use disorder (AA); GERD with (+) BRAVO pH probe (7/3/2024), 2 cm hiatal hernia and paraesophageal hernia at EGD; osteopenia; generalized anxiety disorder (2017); poorly-controlled hypertension, insomnia treated with trazodone    HPI (7/10/2024): Telephone call on 6/12/2024 to report 3 days of fevers and body aches, chest heaviness, dyspnea, and cough.  Fatigue prominent.  Had started taking doxycycline 100 mg twice daily 3 days earlier.  Some discussion in chart about switching her to Bactrim DS but ultimately patient started to feel better on doxycycline.  We had considered linezolid for MRSA coverage but DDI with trazodone (small risk of serotonin syndrome), ultimately tried it anyway, and she had diarrhea, so stopped it after 3-4 days.  She subsequently had EGD with Bravo pH probe and colonoscopy with Dr. Snyder on 7/3/2024 (results below).  Multiple findings, but notably mild GERD and temporal association with symptoms, 2 cm hiatal hernia, paraesophageal hernia, several subcentimeter colon polyps (removed, pathology pending). (+) esophageal dysphagia (bite of bagel for example).  Still having episodes of feeling feverish, (++) fatigue.  Says that she is taking all of the anti-hypertensive medications.  Recent headaches.  Does not feel pulse pounding in her ears.  No photophobia, hyperacusis.     HPI (5/29/2024): Unaccompanied.  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  Has been doing the aerosolized mucoactive medications and HFCWO with The Vest, although burdensome.  " Still has not started Trikafta.  She had one alcoholic beverage on Memorial Day.  I asked her how she managed to stop at one, and she said that she had her support system and felt guilty about it.  Generally more anxious.  Had telehealth visit with Dr. Ambrose in April.  Plan to continue Basaglar 8 units daily and perform prandial FSG measurements.  She felt feverish again a couple times since last visit.  Sputum is light yellow, no hemoptysis.  Not expectorating all the time.     HPI (5/1/2024): Patient recently had chest CT scan done to work-up more frequent pulmonary exacerbations over the past 6-12 months and evaluate lung parenchyma in light of (+) sputum cultures for Scedosporium boydii on 2/12/2024 and 4/3/2024.  The lung parenchyma has changes consistent with CF bronchiectasis. No cavitary fungal disease.  The literature pertaining to Scedosporium boydii infection is largely populated by case-series and retrospective case-control studies (and their attendant limitations to causal inference).  This fungal species is not clearly associated with worse health outcomes in people with CF.  The more curious finding on the scan is the hiatal hernia.  This is not new, but my question now is whether she is having frequent and perhaps subclinical gastroesophageal reflux and/or aspiration events causing symptoms that \"look like\" a CF pulmonary exacerbation.  She had an EGD in May 2016 by Dr. Greenberg.  The study showed diverticulum in lower third of esophagus, Nissen fundoplication, antral gastritis, and erosive gastropathy in the fundus.  I recommend evaluation by Dr. Latonia Snyder at an upcoming CF clinic with question of whether she agrees with the aforementioned hypothesis and recommendations for diagnosis and treatment.     HPI (4/17/2024): Two-week sick follow-up.  Lucia met with Kervin Sanchez on 4/3/2024.  Symptoms as below.  They discussed the sputum culture in February 2024 (+) for Scedosporium.  Another sputum " "sample was collected on 4/3/2024, and it also grew Scedosporium and Candida albicans.  CT chest ordered.  Treated with 2-week course of doxycycline targeting MRSA. Due to the possibility of starting Trikafta, hepatic function panel and phosphatidylethanol (Peth) were checked, and the latter suggested recent alcohol ingestion.  Lucia subsequently acknowledged a relapse, now has counseling and sponsor contact.  Today, Lucia says that she never started the nebulized 3% hypertonic saline; took doxycycline course prescribed by Kervin Sanchez; ended 4/12/2024; felt a lot better after the doxycycline, but she felt unusually fatigued yesterday afternoon, which is unlike her. Denies hemoptysis, fevers, chills, pleuritic chest pain.  Occasional wheezing alleviated by Xopenex.  Sputum is light yellow but less voluminous.  She has been doing HFCWO, but she has one of the first [employer] \"The Vest\" models. Also doing Pulmozyme twice daily.  Blood sugars have been pretty good.  Reports -120 before breakfast.  Needs to see Dr. Ambrose for routine follow-up.    HPI (4/4/2024, Kervin Sanchez): \"Since December she has been sick. Once she gets off the abx, she starts getting sick about a week later. Gets a high fever, headache, trouble breathing and feels very fatigued. Fever gets up to 103, then breaks after a day.  All last week she was in bed. Lungs are filling up. Can't breathe. Recently, she started using Pulmozyme and her vest. Has also noticed pain in her joints when she feels sick. Feels mucous is stuck and lungs feel sticky. Something in there and she can't cough it up. Adding the doxycyline stopped her from coughing up as much. Was able to work all day the other day which is an improvement. Did not start Trikafta because she was waiting to see if she continues getting sick.\"    HPI (2/12/2024): ED at [facility] on [facility] with hypertensive urgency (SBP ~200 at home, /94 documented); had headache and chest pain; " troponin, BNP, EKG (sinus bradycardia), CXR were unremarkable.  Saw PCP, tried amlodipine added to the losartan, was having symptomatic hypotension, switched to hydrochlorothiazide plus losartan and stopped the amlodipine, much better.  Started seeing new therapist after last visit, very helpful, good relationship.  Says that she has been sober for 3 months and 4 days.  Brought on a colleague to help with work.  Had influenza diagnosed by NP rapid-antigen test at a pharmacy, around 12/20/2023. Felt really poorly, had fever, cough, post-tussive emesis, dyspnea, anorexia. Started doing Pulmozyme and HFCWO again on most days. Prescribed course of doxycycline. Since that infection, however, generally feels more central chest mucus and pressure. Because ppFEV1 was also down today, she is concerned about deteriorating lung function. Generally feels tired since the influenza. Weight down 1.3 kg since August 2023.     HPI (11/8/2023): Plan at last visit was to increase losartan to 100 mg/day, home BP monitoring. Discussed referral to psychiatry to manage anxiety and depression. Increased trazodone to 100 mg at HS for insomnia. Needed to stick with MiraLAX for prophylaxis. Had poison ivy and put on brief course of prednisone.  Few weeks ago had relapse of drinking. Went out to bar, drank enough to black out, somehow drove home. Scared her. She connected with her sponsor and actually has appointment with a counselor this evening. Feeling increased chest congestion. Coughing occasionally. Thinks she might be headed toward lung infection. MiraLAX has prevented RLQ cramping.     HPI (8/8/2023): Had fun in [facility] but has been sick from respiratory perspective twice. I gave her a prescription for doxycycline to take with her on the trip. She started after returning due to chest congestion. Cleared. Then got poison ivy. Took 3 days of a steroid she had on-hand. Flared up after stopping. Currently having mild chest tightness and  wheezing. Only using albuterol once daily, though. BP still higher than desirable. Has been on losartan 50 mg daily. Sleeping poorly on trazodone 50 mg. Took 100 mg one night and slept better.     HPI (7/10/2023): First visit, transfer care from Dr. Fortune. Last seen in November 2022. Issues at that point included intermittent abdominal pain (likely constipation), uncontrolled hypertension despite taking losartan 50 mg daily, CFRD followed by Dr. Ambrose, and recent Burkholderia multivorans infection, consideration of nebulized meropenem. Colonoscopy discussed, would apparently need to be done in [facility] system for insurance reasons. No interval need for antibiotics. Used Pulmozyme in January for a few weeks due to congestion. Helpful. Still having tendency toward constipation, thinking about using MiraLAX. Sometimes experiences left-sided headache and blurry vision in the left eye when hypertensive. Frequency not accelerating. Thinks that meditation well help with her blood pressure. Leaving for trip to [place] tomorrow.    Current Use of Health Management Strategies:    Respiratory Interventions  Albuterol: Two times per day  Pulmozyme: Twice daily   Hypertonic saline: Two times per day  HFCWO (percussive vest): Byron[employer] The Vest - now non-functional (addressed today).  Oscillatory PEP: Does not use  Exercise: No Regular Exercise  LTE antagonist: No  Azithromycin: Does not use  Aztreonam lysine ([place]): Does not use  Tobramycin: Does not use  Colistin: Does not use  Meropenem (inhaled): Does not use  Vancomycin (inhaled): Does not use  ICS: Does not use  ICS/LABA: Does not use  LAMA: No  CFTR modulator: Trikafta candidate by genotype but have deferred due to preserved lung function and infrequent pulmonary exacerbations, concern about potential effects on mood/cognition.  Oxygen: Does not use    Digestive Health Interventions    Acid-suppressing medication?: Yes  Using CF vitamins?: No  Taking  pancreatic enzymes?: Yes - Creon 24; 3 to 4 capsules with meals and 1 to 2 capsules with snacks   Any diarrhea?: No  Any steatorrhea?: No  Any constipation?: Yes - does better with more regular use of MiraLAX  Using laxatives?: Yes  Feeding tube?: No  Supplemental enteral nutrition?: No    Pulmonary Function Test Results    PFT (7/10/2024): FEV1 2.17 L (95%), FVC 2.89 L (100%), FEV1/FVC 75; normal spirometry  PFT (5/29/2024): FEV1 2.17 L (95%), FVC 3.07 L (106%), FEV1/FVC 71; normal spirometry  PFT (4/17/2024): FEV1 2.42 L (106%), FVC 3.27 L (113%), FEV1/FVC 74; normal spirometry  PFT (4/3/2024): did not perform, feeling poorly  PFT (2/12/2024): FEV1 2.29 L (94%), FVC 3.19 L (109%), FEV1/FVC 72; normal spirometry  PFT (11/8/2023): FEV1 2.29 L (100%), FVC 3.19 L (109%), FEV1/FVC 72; normal spirometry  PFT (7/10/2023): FEV1 2.45 L (106%), FVC 3.27 L (112%), FEV1/FVC 75; normal spirometry  PFT (10/10/2022): FEV1 2.47 L (106%), FVC 3.25 L (110%), FEV1/FVC 76; normal spirometry  PFT (2/21/2022): FEV1 2.48 L (106%), FVC 3.28 L (110%), FEV1/FVC 76; normal spirometry  PFT (6/23/2021): FEV1 2.48 L (103%), FVC 3.39 L (109%), FEV1/FVC 73; normal spirometry    Current Medications     Current Outpatient Medications   Medication Instructions    amLODIPine (NORVASC) 2.5 mg, oral, Daily    bisacodyl (DULCOLAX) 10 mg, oral, Daily, Do not crush, chew, or split.    Creon 24,000-76,000 -120,000 unit capsule 3-4 capsules, oral, 3 times daily (morning, midday, late afternoon), MOUTH WITH MEALS AND TAKE 1-2 CAPSULES WITH SNACKS    dornase Alpha (Pulmozyme) 1 mg/mL nebulizer solution INHALE CONTENTS OF 1 VIAL VIA NEBULIZER DAILY    elexacaftor-tezacaftor-ivacaft (Trikafta) 100-50-75 mg tablet Take 2 orange tablets by mouth in the morning with fat-containing food and 1 blue tablet by mouth in the evening with fat-containing food, approximately 12 hours apart.    FreeStyle Lite Strips strip  TEST 6-7 TIMES DAILY    hydroCHLOROthiazide  "(HYDRODIURIL) 25 mg, oral, Daily    hydrOXYzine HCL (ATARAX) 25 mg, oral, Every 8 hours PRN    insulin glargine (Basaglar KwikPen U-100 Insulin) 100 unit/mL (3 mL) pen Inject 8 units daily as directed    losartan (COZAAR) 100 mg, oral, Daily    mecobalamin, vitamin B12, 1,000 mcg tablet,disintegrating 1 tablet, sublingual, Daily    omeprazole (PRILOSEC) 40 mg, oral, Daily    polyethylene glycol (GLYCOLAX, MIRALAX) 34 g, oral, 2 times daily,  MIX 34 GM TWICE DAILY AND TAKE AS DIRECTED    sodium chloride 3 % nebulizer solution Inhale 4 mL by nebulization 2 times a day.    traZODone (DESYREL) 50 mg, oral, Nightly PRN    Xopenex HFA 45 mcg/actuation inhaler 2 puffs, inhalation, Every 4 hours PRN       Physical Examination     BP (!) 164/93 (BP Location: Right arm, Patient Position: Sitting, BP Cuff Size: Adult)   Pulse 55   Temp 36.7 °C (98.1 °F) (Oral)   Resp 20   Ht 1.571 m (5' 1.85\")   Wt 54.2 kg (119 lb 7.8 oz)   SpO2 95%   BMI 21.96 kg/m²     General: Tired, thin but not cachectic.  HEENT: normocephalic; anicteric sclerae; conjunctivae not injected; nasal mucosa was unremarkable; oropharynx was clear without evidence of thrush; (+) dental plate  Neck: supple; no lymphadenopathy or thyromegaly.  Chest: clear to auscultation today.  Cardiac: regular rhythm; no gallop or murmur.  Abdomen: soft; non-tender; non-distended; no hepatosplenomegaly, no renal bruit.  Extremities: no leg edema; no digital clubbing; 2+ pulses  Psychiatric: Good eye contact; somewhat anxious.     Metabolic Parameters     Sodium   Date/Time Value Ref Range Status   07/10/2024 12:22  136 - 145 mmol/L Final     Potassium   Date/Time Value Ref Range Status   07/10/2024 12:22 PM 4.1 3.5 - 5.3 mmol/L Final     Chloride   Date/Time Value Ref Range Status   07/10/2024 12:22  98 - 107 mmol/L Final     Bicarbonate   Date/Time Value Ref Range Status   07/10/2024 12:22 PM 24 21 - 32 mmol/L Final     Anion Gap   Date/Time Value Ref Range " Status   07/10/2024 12:22 PM 16 10 - 20 mmol/L Final     Urea Nitrogen   Date/Time Value Ref Range Status   07/10/2024 12:22 PM 15 6 - 23 mg/dL Final     Creatinine   Date/Time Value Ref Range Status   07/10/2024 12:22 PM 0.80 0.50 - 1.05 mg/dL Final     GFR Female   Date/Time Value Ref Range Status   07/10/2023 01:28 PM 64 >90 mL/min/1.73m2 Final     Comment:      CALCULATIONS OF ESTIMATED GFR ARE PERFORMED   USING THE 2021 CKD-EPI STUDY REFIT EQUATION   WITHOUT THE RACE VARIABLE FOR THE IDMS-TRACEABLE   CREATININE METHODS.    https://jasn.asnjournals.org/content/early/2021/09/22/ASN.[email or web address]       Glucose   Date/Time Value Ref Range Status   07/10/2024 12:22 PM 77 74 - 99 mg/dL Final     Calcium   Date/Time Value Ref Range Status   07/10/2024 12:22 PM 9.5 8.6 - 10.6 mg/dL Final     Thyroid Stimulating Hormone   Date/Time Value Ref Range Status   02/12/2024 02:47 PM 1.02 0.44 - 3.98 mIU/L Final     TSH   Date/Time Value Ref Range Status   07/10/2023 01:28 PM 1.13 0.44 - 3.98 mIU/L Final     Comment:      TSH testing is performed using different testing    methodology at [facility] than at other    [facility]. Direct result comparisons should    only be made within the same method.     02/21/2022 09:12 AM 2.97 0.44 - 3.98 mIU/L Final     Comment:      TSH testing is performed using different testing    methodology at [facility] than at other    [facility]. Direct result comparisons should    only be made within the same method.       Phosphorus   Date/Time Value Ref Range Status   07/10/2024 12:22 PM 3.4 2.5 - 4.9 mg/dL Final     Comment:     The performance characteristics of phosphorus testing in heparinized plasma have been validated by the individual  laboratory site where testing is performed. Testing on heparinized plasma is not approved by the FDA; however, such approval is not necessary.       Hematologic Parameters     WBC   Date/Time Value Ref Range Status    07/10/2024 12:22 PM 7.7 4.4 - 11.3 x10*3/uL Final     Neutrophils Absolute   Date/Time Value Ref Range Status   07/10/2024 12:22 PM 5.51 1.20 - 7.70 x10*3/uL Final     Comment:     Percent differential counts (%) should be interpreted in the context of the absolute cell counts (cells/uL).     Neutrophils %   Date/Time Value Ref Range Status   07/10/2024 12:22 PM 71.2 40.0 - 80.0 % Final     Lymphocytes %   Date/Time Value Ref Range Status   07/10/2024 12:22 PM 18.4 13.0 - 44.0 % Final     Monocytes %   Date/Time Value Ref Range Status   07/10/2024 12:22 PM 8.3 2.0 - 10.0 % Final     Basophils %   Date/Time Value Ref Range Status   07/10/2024 12:22 PM 0.4 0.0 - 2.0 % Final     Eosinophils Absolute   Date/Time Value Ref Range Status   07/10/2024 12:22 PM 0.11 0.00 - 0.70 x10*3/uL Final     Eosinophils %   Date/Time Value Ref Range Status   07/10/2024 12:22 PM 1.4 0.0 - 6.0 % Final     Hemoglobin   Date/Time Value Ref Range Status   07/10/2024 12:22 PM 13.0 12.0 - 16.0 g/dL Final     Hematocrit   Date/Time Value Ref Range Status   07/10/2024 12:22 PM 39.7 36.0 - 46.0 % Final     RBC   Date/Time Value Ref Range Status   07/10/2024 12:22 PM 4.55 4.00 - 5.20 x10*6/uL Final     MCV   Date/Time Value Ref Range Status   07/10/2024 12:22 PM 87 80 - 100 fL Final     MCHC   Date/Time Value Ref Range Status   07/10/2024 12:22 PM 32.7 32.0 - 36.0 g/dL Final     Platelets   Date/Time Value Ref Range Status   07/10/2024 12:22  150 - 450 x10*3/uL Final       Fat-Soluble Vitamin Levels     Vitamin D, 25-Hydroxy, Total   Date/Time Value Ref Range Status   02/12/2024 02:43 PM 52 30 - 100 ng/mL Final     Vitamin A (Retinol)   Date/Time Value Ref Range Status   02/12/2024 02:43 PM 0.52 0.30 - 1.20 mg/L Final     Vitamin A, Interpretation   Date/Time Value Ref Range Status   02/12/2024 02:43 PM Normal  Final     Comment:       This test was developed and its performance characteristics   determined by [facility]. It has not  been cleared or   approved by the US Food and Drug Administration. This test was   performed in a CLIA certified laboratory and is intended for   clinical purposes.  Performed By: [facility]  [address] [place], [place] [id number]  : Tello Pacheco MD, PhD  CLIA Number: [id number]     Vitamin E (Alpha-Tocopherol)   Date/Time Value Ref Range Status   02/12/2024 02:43 PM 6.6 5.5 - 18.0 mg/L Final     Comment:       This test was developed and its performance characteristics   determined by [facility]. It has not been cleared or   approved by the US Food and Drug Administration. This test was   performed in a CLIA certified laboratory and is intended for   clinical purposes.     Vitamin E (Gamma-Tocopherol)   Date/Time Value Ref Range Status   02/12/2024 02:43 PM 1.6 0.0 - 6.0 mg/L Final     Comment:     Performed By: [facility]  [address] [place] [address]  : Tello Pacheco MD, PhD  CLIA Number: [id number]       LFT and Associated Parameters     AST   Date/Time Value Ref Range Status   07/10/2024 12:22 PM 15 9 - 39 U/L Final   04/03/2024 03:02 PM 19 9 - 39 U/L Final   02/12/2024 02:43 PM 16 9 - 39 U/L Final     ALT   Date/Time Value Ref Range Status   07/10/2024 12:22 PM 18 7 - 45 U/L Final     Comment:     Patients treated with Sulfasalazine may generate falsely decreased results for ALT.   04/03/2024 03:02 PM 16 7 - 45 U/L Final     Comment:     Patients treated with Sulfasalazine may generate falsely decreased results for ALT.   02/12/2024 02:43 PM 17 7 - 45 U/L Final     Comment:     Patients treated with Sulfasalazine may generate falsely decreased results for ALT.     Alkaline Phosphatase   Date/Time Value Ref Range Status   07/10/2024 12:22  33 - 136 U/L Final   04/03/2024 03:02 PM 91 33 - 136 U/L Final   02/12/2024 02:43 PM 94 33 - 136 U/L Final     Bilirubin, Total   Date/Time Value Ref Range Status   07/10/2024 12:22  PM 0.6 0.0 - 1.2 mg/dL Final   04/03/2024 03:02 PM 0.5 0.0 - 1.2 mg/dL Final   02/12/2024 02:43 PM 0.3 0.0 - 1.2 mg/dL Final     Bilirubin, Direct   Date/Time Value Ref Range Status   07/10/2024 12:22 PM 0.1 0.0 - 0.3 mg/dL Final   04/03/2024 03:02 PM 0.1 0.0 - 0.3 mg/dL Final   02/12/2024 02:43 PM 0.1 0.0 - 0.3 mg/dL Final     GGT   Date/Time Value Ref Range Status   07/10/2024 12:22 PM 26 5 - 55 U/L Final   02/12/2024 02:43 PM 26 5 - 55 U/L Final   07/10/2023 01:28 PM 22 5 - 55 U/L Final   02/21/2022 09:12 AM 33 5 - 55 U/L Final   11/16/2020 03:16 PM 34 5 - 55 U/L Final     Albumin   Date/Time Value Ref Range Status   07/10/2024 12:22 PM 4.3 3.4 - 5.0 g/dL Final   04/03/2024 03:02 PM 4.7 3.4 - 5.0 g/dL Final   02/12/2024 02:43 PM 4.5 3.4 - 5.0 g/dL Final     Total Protein   Date/Time Value Ref Range Status   07/10/2024 12:22 PM 7.5 6.4 - 8.2 g/dL Final   04/03/2024 03:02 PM 7.7 6.4 - 8.2 g/dL Final   02/12/2024 02:43 PM 7.2 6.4 - 8.2 g/dL Final       Coagulation Parameters           Diabetes Laboratory Tests     Hemoglobin A1C   Date/Time Value Ref Range Status   02/12/2024 02:43 PM 6.4 (H) see below % Final   07/10/2023 01:28 PM 6.5 (A) % Final     Comment:          Diagnosis of Diabetes-Adults   Non-Diabetic: < or = 5.6%   Increased risk for developing diabetes: 5.7-6.4%   Diagnostic of diabetes: > or = 6.5%  .       Monitoring of Diabetes                Age (y)     Therapeutic Goal (%)   Adults:          >18           <7.0   Pediatrics:    13-18           <7.5                   7-12           <8.0                   0- 6            7.5-8.5   American Diabetes Association. Diabetes Care 33(S1), Jan 2010.     04/09/2022 05:28 AM 7.0 (H) 4.0 - 5.6 % Final   11/16/2020 03:16 PM 6.4 % Final     Comment:          Diagnosis of Diabetes-Adults   Non-Diabetic: < or = 5.6%   Increased risk for developing diabetes: 5.7-6.4%   Diagnostic of diabetes: > or = 6.5%  .       Monitoring of Diabetes                Age (y)      Therapeutic Goal (%)   Adults:          >18           <7.0   Pediatrics:    13-18           <7.5                   7-12           <8.0                   0- 6            7.5-8.5   American Diabetes Association. Diabetes Care 33(S1), Jan 2010.       Albumin, Urine Random   Date/Time Value Ref Range Status   02/12/2024 02:43 PM <7.0 Not established mg/L Final     Creatinine, Urine Random   Date/Time Value Ref Range Status   02/12/2024 02:43 PM 67.4 20.0 - 320.0 mg/dL Final     Creatinine, Urine   Date/Time Value Ref Range Status   07/10/2023 01:28 PM 38.9 20.0 - 320.0 mg/dL Final   06/23/2021 01:54 PM 60.7 20.0 - 320.0 mg/dL Final     Albumin/Creatinine Ratio   Date/Time Value Ref Range Status   02/12/2024 02:43 PM   Final     Comment:     One or more analytes used in this calculation is outside of the analytical measurement range. Calculation cannot be performed.     Albumin/Creatine Ratio   Date/Time Value Ref Range Status   07/10/2023 01:28 PM SEE COMMENT 0.0 - 30.0 ug/mg crt Final     Comment:     One or more analytes used in this calculation   is outside of the analytical measurement range.  Calculation cannot be performed.     06/23/2021 01:54 PM SEE COMMENT 0.0 - 30.0 ug/mg crt Final     Comment:     One or more analytes used in this calculation   is outside of the analytical measurement range.  Calculation cannot be performed.          Immunology Laboratory Tests     IgE   Date/Time Value Ref Range Status   02/12/2024 02:43 PM <2 0 - 214 IU/mL Final     Alcohol Abstinence Monitoring     EER PEth   Date Value Ref Range Status   07/10/2024 See Note  Final     Comment:     Authorized individuals can access the [facility]   Enhanced Report using the following link:      [email or web address]   04/03/2024 See Note  Final     Comment:     Authorized individuals can access the [facility]   Enhanced Report using the following link:      [email or web address]     PEth  16:0/18:1 (POPEth)   Date Value Ref Range Status   07/10/2024 27 ng/mL Final     Comment:     PEth 16:0/18:1 (POPEth)  Less than 10 ng/mL............Not detected  Less than 20 ng/mL............Abstinence or light alcohol   consumption  20 - 200 ng/mL................Moderate alcohol consumption  Greater than 200 ng/mL........Heavy alcohol consumption or chronic   alcohol use    (Reference: DANTE Carballo 2018 J. Forensic Sci)   04/03/2024 64 ng/mL Final     Comment:     PEth 16:0/18:1 (POPEth)  Less than 10 ng/mL............Not detected  Less than 20 ng/mL............Abstinence or light alcohol   consumption  20 - 200 ng/mL................Moderate alcohol consumption  Greater than 200 ng/mL........Heavy alcohol consumption or chronic   alcohol use    (Reference: DANTE Carballo 2018 J. Forensic Sci)     PEth 16:0/18:2 (PLPEth)   Date Value Ref Range Status   07/10/2024 22 ng/mL Final     Comment:     Reference ranges are not well established.   04/03/2024 65 ng/mL Final     Comment:     Reference ranges are not well established.     PEth Interpretation   Date Value Ref Range Status   07/10/2024 See Comment  Final     Comment:     Phosphatidylethanol (PEth) is a group of phospholipids formed in   the presence of ethanol, phospholipase D and phosphatidylcholine.   PEth is known to be a direct alcohol biomarker. The predominant   PEth homologues are PEth 16:0/18:1 (POPEth) and PEth 16:0/18:2   (PLPEth), which account for 37-46% and 26-28% of the total PEth   homologues, respectively. PEth is incorporated into the   phospholipid membrane of red blood cells and has a general   half-life of 4-10 days and a window of detection of 2-4 weeks.   However, the window of detection is longer in individuals who   chronically or excessively consume alcohol. The limit of   quantification is 10 ng/mL. Serial monitoring of PEth may be   helpful in monitoring alcohol abstinence over time. PEth results   should be  interpreted in the context of the patient's clinical and   behavioral history.  Patients with advanced liver disease may have falsely elevated   PEth concentrations (Senait STAUFFER et al 2018, Alcoholism Clinical &   Experimental Research).    This test was developed and its performance characteristics   determined by [facility]. It has not been cleared or   approved by the U.S. Food and Drug Administration. This test was   performed in a CLIA-certified laboratory and is intended for   clinical purposes.  Performed By: [facility]  [address] [place] [id number]  : Tello Pacheco MD, PhD  CLIA Number: [id number]   04/03/2024 See Comment  Final     Comment:     Phosphatidylethanol (PEth) is a group of phospholipids formed in   the presence of ethanol, phospholipase D and phosphatidylcholine.   PEth is known to be a direct alcohol biomarker. The predominant   PEth homologues are PEth 16:0/18:1 (POPEth) and PEth 16:0/18:2   (PLPEth), which account for 37-46% and 26-28% of the total PEth   homologues, respectively. PEth is incorporated into the   phospholipid membrane of red blood cells and has a general   half-life of 4-10 days and a window of detection of 2-4 weeks.   However, the window of detection is longer in individuals who   chronically or excessively consume alcohol. The limit of   quantification is 10 ng/mL. Serial monitoring of PEth may be   helpful in monitoring alcohol abstinence over time. PEth results   should be interpreted in the context of the patient's clinical and   behavioral history.  Patients with advanced liver disease may have falsely elevated   PEth concentrations (Senait STAUFFER et al 2018, Alcoholism Clinical &   Experimental Research).    This test was developed and its performance characteristics   determined by [facility]. It has not been cleared or   approved by the U.S. Food and Drug Administration. This test was   performed in a  CLIA-certified laboratory and is intended for   clinical purposes.  Performed By: [facility]  [address] [place] [id number]  : Tello Pacheco MD, PhD  IA Number: [id number]     DEXA Scan     XR BONE density 10/10/2022    Narrative  Name: OMAR GIPSON  Patient ID: [id number] YOB: 1960 Height: 62.0 in.  Gender:     Female   Exam Date:  10/10/2022 Weight: 118.0 lbs.  Indications: Cystic Fibrosis, DIABETES, family history osteoporosis,  Hysterectomy, Post Menopausal  Fractures:    Treatments:  OMEPRAZOLE    LEFT FEMUR - TOTAL  The bone mineral density : 0.889 g/cm2  T-score : -0.9    % of young normal mean :88%  Z-score : 0.0    % of age matched mean : 100%  % change vs. Previous : N/A    % change vs. Baseline : baseline    LEFT FEMUR - NECK  The bone mineral density : 0.843 g/cm2  T-score : -1.4    % of young normal mean: 81%  Z-score : -0.1    % of age matched mean : 98%  % change vs. Previous : N/A    % change vs. Baseline : baseline    SPINE L1-L4  The bone mineral density is 1.147 g/cm2  T-score : -0.3   % of young normal mean is 97%  Z-score : 1.0    % of age matched mean is 111%  % change vs. Previous : -    % change vs. Baseline : baseline    World Health Organization (WHO) criteria for post-menopausal,   Women:  Normal:       T-score at or above -1 SD  Osteopenia:   T-score between -1 and -2.5 SD  Osteoporosis: T-score at or below -2.5 SD    10-Year Fracture Risk:  Major Osteoporotic Fracture 4.0%  Hip Fracture                0.4%  Reported Risk Factors       None    Interpretation:  According to World Health Organization criteria, classification is low bone mass.  Followup recommended on October 2024 or sooner as clinically warranted.     Endoscopies     Colonoscopy (7/3/2024)  Findings  Rectal exam revealed decreased sphincter tone likely secondary to sedation  External and internal small hemorrhoids observed during retroflexion; no bleeding  was identified  Mild, generalized atrophic mucosa in the terminal ileum; performed cold forceps biopsy;  Large submucosal mass at the appendiceal orifice  A moderate amount of semi-liquid stool was visualized throughout the colon.  Lavage was performed with incomplete clearance and adequate visualization.  One 3 mm polyp vs lymphoid follicle in the ascending colon; performed cold snare with complete en bloc removal and retrieved specimen  Two 4 mm sessile polyps in the transverse colon; performed cold snare with complete en bloc removal and retrieved specimen  Impression  Rectal exam revealed decreased sphincter tone likely secondary to sedation.  Small hemorrhoids  Mild atrophic mucosa in the terminal ileum; performed cold forceps biopsy  Large submucosal mass in the cecum at the appendiceal orifice which could be secondary to stool vs. neoplasm  A moderate amount of semi-liquid stool was visualized throughout the colon.  Lavage was performed with incomplete clearance and adequate visualization.  3 subcentimeter polyps were removed with cold snare  Recommendation    Await pathology results     Repeat colonoscopy in 3 years (2 years if all polyps adenomatous).  Obtain CT scan abdomen and pelvis to further assess appendiceal orifice.     Esophagogastroduodenoscopy (EGD) (7/3/2024)  Findings  Z-line 33 cm from the incisors  An inlet patch was visualized in the proximal esophagus.  2 cm hiatal hernia - GE junction 33 cm from the incisors, diaphragmatic impression 35 cm from the incisors, confirmed by retroflexion. On retroflexion evidence of prior fundoplication was visualized; however, a paraesophageal hernia was also seen.  Single medium diverticulum with no inflammation in the lower third of the esophagus (29 cm from the incisors); no bleeding was identified  Moderate, generalized erythematous mucosa with loss of vascular pattern in the antrum  Performed multiple random forceps biopsies in the incisura and antrum to  rule out H. pylori. Rule out intestinal metaplasia  Performed random forceps biopsies in the body of the stomach, greater curve of the stomach and lesser curve of the stomach to rule out H. pylori. Rule out intestinal metaplasia  Benign-appearing mild intrinsic stenosis in the duodenal sweep (scope was able to traverse without any resistance)  Edematous mucosa in the 2nd part of the duodenum  The duodenum was otherwise normal.  Performed multiple random forceps biopsies in the duodenal bulb and 2nd part of the duodenum to rule out celiac disease  A Bravo pH capsule was placed successfully in the esophagus (27 cm from the incisors, 6 cm from the GE junction).  Successful placement was confirmed endoscopically.  Impression  An inlet patch was visualized in the proximal esophagus.  2 cm hiatal hernia  Diverticulosis in the lower third of the esophagus  Moderate erythematous mucosa with loss of vascular pattern in the antrum  Performed forceps biopsies in the incisura and antrum to rule out H. pylori  Performed forceps biopsies in the body of the stomach, greater curve of the stomach and lesser curve of the stomach to rule out H. pylori  Intrinsic mild stenosis in the duodenal sweep possibly secondary to prior peptic ulcer disease  Edematous mucosa in the 2nd part of the duodenum  The duodenum was otherwise normal.  Performed forceps biopsies in the duodenal bulb and 2nd part of the duodenum to rule out celiac disease  A pH capsule was placed successfully in the esophagus (27 cm from the incisors, 6 cm from the GE junction).     BRAVO Esophageal pH Probe (7/3/2024)  Findings:  -The total AET is 6.9% and the DeMeester score is 26.5 indicating presence of mild to moderate reflux  - Reflux occurred on both days of the study and in both upright and supine positions  - The SAP for regurgitation was 100% indicating high symptom correlation   Impressions:  Overall this is study is positive for mild to moderate acid  reflux  There is high symptom correlation, reflux is the cause of the symptoms     Chest Radiograph     XR chest 2 view 10/10/2022    Narrative  MRN: [id number]  Patient Name: OMAR GIPSON    STUDY:  TH CHEST 2 VIEW PA AND LAT;    INDICATION:  cystic fibrosis, malabsorption  E84.9: Cystic fibrosis E84.0: Cystic fibrosis with pulmonary manifestations.    COMPARISON:  Chest radiograph 05/09/2016    ACCESSION NUMBER(S):  [id number]    ORDERING CLINICIAN:  CELIA MIN    FINDINGS:  Frontal, lateral, and dual energy radiographs of the chest were  provided.  The cardiac silhouette size is within normal limits.  There is no focal consolidation, edema or pneumothorax.  No sizeable pleural effusion.  No acute osseous abnormality.    Impression  No acute cardiopulmonary process.  I personally reviewed the images/study and I agree with the findings as stated.       Chest CT Scan without Contrast     CT CHEST WO IV CONTRAST; 4/26/2024 1:21 pm   1.  Patchy centrilobular nodules throughout the mid to inferior right upper lobe and medial right middle lobe, most consistent with infection.  2. Within the medial aspect of the right upper lobe and right middle lobe, there is are moderately dilated and mucous filled bronchi with surrounding peribronchovascular centrilobular nodules. Findings likely attributable to chronic changes from cystic fibrosis with possible superimposed infection. Attention on follow-up CT studies is  recommended.  3. Diffuse bronchial wall thickening, consistent with the patient's reported cystic fibrosis. Lungs are otherwise clear.  4. Large hiatal hernia.  5. Non-obstructing 5 mm calculi seen in the visualized left kidney.    CF Sputum Culture     AFB Culture   Date Value Ref Range Status   07/10/2024   Preliminary    Culture in progress and will be examined weekly. A result will be issued either when positive or after 8 weeks incubation.   04/03/2024 No Mycobacteria isolated.  Final   02/12/2024 No  Mycobacteria isolated.  Final      Respiratory Culture, Cystic Fibrosis   Date/Time Value Ref Range Status   07/10/2024 10:46 AM (A)  Final    (2+) Few Methicillin Resistant Staphylococcus aureus (MRSA)     Comment:     Methicillin (Oxacillin) resistant Staphylococci are resistant to all currently available Penicillins, Beta-lactam/Beta-lactamase inhibitor combinations (including Ampicillin/Sulbactam, Amoxicillin/Clavulanate and Pipercillin/Tazobactam), Carbapenems and   Cephalosporins (except Ceftaroline).   07/10/2024 10:46 AM (2+) Few Normal throat perla  Final     CF respiratory culture (11/28/2022): MRSA  CF respiratory culture (10/10/2022): MRSA, Burkholderia multivorans  CF respiratory culture (5/9/2022): MRSA  CF respiratory culture (6/3/2015): MSSA, Pseudomonas putida    Susceptibility data from last 2000 days.  Collected Specimen Info Organism Ceftazidime Clindamycin Erythromycin Levofloxacin Linezolid Meropenem Minocycline Oxacillin Tetracycline Trimethoprim/Sulfamethoxazole Vancomycin   07/10/24 Fluid from SPUTUM Methicillin Resistant Staphylococcus aureus (MRSA)   S  R      R  S  S  S     Normal throat perla              05/29/24 Fluid from Throat Swab Methicillin Resistant Staphylococcus aureus (MRSA)   S  R      R  S  S  S     Burkholderia cepacia complex  S    S   S  I         Normal throat perla              04/17/24 Fluid from Throat Swab Methicillin Resistant Staphylococcus aureus (MRSA)  S R     R S S S     Normal throat perla              04/03/24 Swab from SPUTUM Candida albicans                Scedosporium boydii              04/03/24 Fluid from Throat Swab Methicillin Resistant Staphylococcus aureus (MRSA)  S R     R S S S     Normal throat perla              02/12/24 Fluid from SPUTUM Methicillin Resistant Staphylococcus aureus (MRSA)  S R     R S S S     Normal throat perla              02/12/24 Swab from SPUTUM Candida albicans                Scedosporium boydii              11/08/23 Fluid  from Throat Swab Methicillin Resistant Staphylococcus aureus (MRSA)  S R     R S S S     Normal throat perla              07/07/23 Respiratory Staphylococcus aureus  R R  S   S S S S   07/07/23 Respiratory Candida albicans                      Problem List Items Addressed This Visit       Alcohol use disorder, moderate, in early remission (Multi)    Current Assessment & Plan     Unfortunately, the Phosphatidylethanol (Peth) level was detectable today, which suggests recent drinking.  Hepatic function tests were normal today and represent pre-Trikafta baseline.  Would keep closer tabs on hepatic function tests after starting Trikafta due to possibility of intercurrent alcohol use and drug-induced liver injury +/- alcoholic hepatitis.         Relevant Orders    Gamma-Glutamyl Transferase (Completed)    Hepatic Function Panel (Completed)    Phosphatidylethanol (PEth), Whole Blood, Quantitative (Completed)    At risk for dehydration due to poor fluid intake    Current Assessment & Plan     Urinalysis did not show high specific gravity, as might be seen if dehydrated.  Serum sodium and BUN were normal, also arguing against dehydration.         Relevant Orders    Urinalysis with Reflex Microscopic (Completed)    Microscopic Only, Urine (Completed)    Cystic fibrosis (Multi) - Primary    Current Assessment & Plan     We need to start Trikafta.  Other disease-modifying strategies have failed recently.  Continue Pulmozyme twice daily and performing HFCWO with the AffloVest.    Xopenex (levalbuterol) has been better for her than racemic albuterol - no tremulousness.  Continue nebulized hypertonic saline 3% twice a day  Surveillance respiratory tract culture today.         Relevant Orders    AFB Culture/Smear (Completed)    Fungal Culture/Smear (Completed)    Respiratory Culture, Cystic Fibrosis (Completed)    AFB Processed (Completed)    Diabetes mellitus related to CF (cystic fibrosis) (Multi)    Overview     CFRD Hx:  "Diagnosed in March 2013 when A1c was 7.6%. Fasting glucose in the office in April 2013 was 252. we started her on Levemir.   June 2021: 6.9%  Sept 2021: 6.4%  April 2022: 7.3%  July 2023: 6.5%  Feb 2024: 6.4%  Microalbumin: WNL 7/2023, 2/2024 (undetectable)         Current Assessment & Plan     Saw Dr. Ambrose by telehealth in April 2024 with plan for 3 month follow-up (7/24/2024)  No microalbuminuria.  BP needs to be treated aggressively.  Taking Basaglar 8 units daily.  Question whether \"feverish\" episodes correspond to hypoglycemia.  Maybe CGM would capture these episodes and confirm or refute hypoglycemia.         Esophageal dysphagia    Gastroesophageal reflux disease without esophagitis    Current Assessment & Plan     Confirmed by the BRAVO pH probe on 7/3/2024.  Nissen fundoplication has come undone.  Consider surgical revision.  Would like her to follow-up with Dr. Snyder in next 1-2 months.  Multiple biopsy results pending from EGD.         Headache    Current Assessment & Plan     Given history of intermittently controlled hypertension, obtain non-contrast head CT scan to ensure no pathology either contributing to or resulting from hypertension.         Relevant Orders    CT head wo IV contrast    High risk medication use    Relevant Orders    Gamma-Glutamyl Transferase (Completed)    CBC and Auto Differential (Completed)    Hepatic Function Panel (Completed)    Renal Function Panel    Basic Metabolic Panel (Completed)    Phosphorus (Completed)    Mass of appendix    Overview     Submucosal mass at appendiceal orifice on colonoscopy 7/3/24         Current Assessment & Plan     I emphasized the importance of getting the abdominopelvic CT scan (ordered) to characterize this lesion.  Stool accumulation versus neoplasm.          MRSA (methicillin resistant Staphylococcus aureus) infection    Current Assessment & Plan     Did not tolerate ambulatory treatment with oral linezolid (June 2024)  Doxycycline " might be tolerable and somewhat effective sick plan.  Have considered use of nebulized vancomycin, but difficult to rationalize adding yet another burdensome therapy right now (versus starting Trikafta).         Uncontrolled hypertension    Overview     Managed by [facility]CP at [facility]         Current Assessment & Plan     Regimen: amlodipine 2.5 mg daily, hydrochlorothiazide 25 mg daily, losartan 100 mg daily  Unclear whether today's BP indicates a trend warranting anti-hypertensive dose escalation.         Relevant Orders    CT head wo IV contrast     Follow-up: 7/24/2024      Nicolás Harris MD   07/10/2024

## 2024-07-10 NOTE — PROGRESS NOTES
Peripheral venipuncture for labs. Procedure followed per protocal for blood draw. Patient's skin prepared, R AC. Lab specimen obtained per providers orders.  RN applied clean dry adhesive dressing to exit site.  Pt tolerated the procedure with no problems. Advised pt to keep the dressing intact for at least an hour after the draw.

## 2024-07-11 DIAGNOSIS — E84.9 CYSTIC FIBROSIS (MULTI): ICD-10-CM

## 2024-07-11 RX ORDER — ELEXACAFTOR, TEZACAFTOR, AND IVACAFTOR 100-50-75
KIT ORAL
Qty: 84 TABLET | Refills: 3 | Status: SHIPPED | OUTPATIENT
Start: 2024-07-11

## 2024-07-11 NOTE — TELEPHONE ENCOUNTER
Lucia called to inform us that [facility] needs a new prescription for the Trikafta due to the original expiring.     Renewal Request pended to Dr. Harris to sign.

## 2024-07-11 NOTE — TELEPHONE ENCOUNTER
Pulmonary Attending  Cystic Fibrosis Service    Requested Prescriptions     Signed Prescriptions Disp Refills    elexacaftor-tezacaftor-ivacaft (Trikafta) 100-50-75 mg tablet 84 tablet 3     Sig: Take 2 orange tablets by mouth in the morning with fat-containing food and 1 blue tablet by mouth in the evening with fat-containing food, approximately 12 hours apart.     Authorizing Provider: NICOLÁS HARRIS     [address] [address]  Phone: [phone] Fax: [phone]    Nicolás Harris MD  7/11/2024  5:09 PM

## 2024-07-12 LAB
ACID FAST STN SPEC: NORMAL
FUNGUS SPEC CULT: NORMAL
FUNGUS SPEC FUNGUS STN: NORMAL
MYCOBACTERIUM SPEC CULT: NORMAL

## 2024-07-13 LAB
BACTERIA SPT CF RESP CULT: ABNORMAL
BACTERIA SPT CF RESP CULT: ABNORMAL
LABORATORY REPORT: NORMAL
PETH INTERPRETATION: NORMAL
PLPETH BLD-MCNC: 22 NG/ML
POPETH BLD-MCNC: 27 NG/ML

## 2024-07-14 PROBLEM — R12 HEARTBURN: Status: RESOLVED | Noted: 2023-11-06 | Resolved: 2024-07-14

## 2024-07-14 NOTE — ASSESSMENT & PLAN NOTE
We need to start Trikafta.  Other disease-modifying strategies have failed recently.  Continue Pulmozyme twice daily and performing HFCWO with the AffloVest.    Xopenex (levalbuterol) has been better for her than racemic albuterol - no tremulousness.  Continue nebulized hypertonic saline 3% twice a day  Surveillance respiratory tract culture today.

## 2024-07-14 NOTE — ASSESSMENT & PLAN NOTE
Did not tolerate ambulatory treatment with oral linezolid (June 2024)  Doxycycline might be tolerable and somewhat effective sick plan.  Have considered use of nebulized vancomycin, but difficult to rationalize adding yet another burdensome therapy right now (versus starting Trikafta).

## 2024-07-14 NOTE — ASSESSMENT & PLAN NOTE
Regimen: amlodipine 2.5 mg daily, hydrochlorothiazide 25 mg daily, losartan 100 mg daily  Unclear whether today's BP indicates a trend warranting anti-hypertensive dose escalation.

## 2024-07-14 NOTE — ASSESSMENT & PLAN NOTE
Confirmed by the BRAVO pH probe on 7/3/2024.  Nissen fundoplication has come undone.  Consider surgical revision.  Would like her to follow-up with Dr. Snyder in next 1-2 months.  Multiple biopsy results pending from EGD.

## 2024-07-14 NOTE — ASSESSMENT & PLAN NOTE
"Saw Dr. Ambrose by telehealth in April 2024 with plan for 3 month follow-up (7/24/2024)  No microalbuminuria.  BP needs to be treated aggressively.  Taking Basaglar 8 units daily.  Question whether \"feverish\" episodes correspond to hypoglycemia.  Maybe CGM would capture these episodes and confirm or refute hypoglycemia.  "

## 2024-07-14 NOTE — ASSESSMENT & PLAN NOTE
Given history of intermittently controlled hypertension, obtain non-contrast head CT scan to ensure no pathology either contributing to or resulting from hypertension.

## 2024-07-14 NOTE — ASSESSMENT & PLAN NOTE
Urinalysis did not show high specific gravity, as might be seen if dehydrated.  Serum sodium and BUN were normal, also arguing against dehydration.

## 2024-07-14 NOTE — ASSESSMENT & PLAN NOTE
Unfortunately, the Phosphatidylethanol (Peth) level was detectable today, which suggests recent drinking.  Hepatic function tests were normal today and represent pre-Trikafta baseline.  Would keep closer tabs on hepatic function tests after starting Trikafta due to possibility of intercurrent alcohol use and drug-induced liver injury +/- alcoholic hepatitis.

## 2024-07-14 NOTE — ASSESSMENT & PLAN NOTE
I emphasized the importance of getting the abdominopelvic CT scan (ordered) to characterize this lesion.  Stool accumulation versus neoplasm.

## 2024-07-15 NOTE — PROGRESS NOTES
7/10/24 Respiratory Check-In:    Lucia let me know that she needs a refill for the Pulmozyme. I checked her prescription and she has an active script at [facility] with 11 refills.    Lucia let me know that she does not tolerate 3% HTS. It causes prolonged severe coughing.    7/15/24 Update:    I called [facility] to follow up on the Pulmozyme. They confirmed the Pulmozyme was scheduled for delivery for today 7/18/24 and 10 refills remain.

## 2024-07-16 LAB
FUNGUS SPEC CULT: NORMAL
FUNGUS SPEC FUNGUS STN: NORMAL

## 2024-07-17 LAB
ACID FAST STN SPEC: NORMAL
MYCOBACTERIUM SPEC CULT: NORMAL

## 2024-07-19 LAB
LAB AP ASR DISCLAIMER: NORMAL
LABORATORY COMMENT REPORT: NORMAL
PATH REPORT.FINAL DX SPEC: NORMAL
PATH REPORT.GROSS SPEC: NORMAL
PATH REPORT.TOTAL CANCER: NORMAL

## 2024-07-19 NOTE — PROGRESS NOTES
Mason Fontanez M.D. 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 Historically normal lung function (ppFEV1 100-106%) so Trikafta deferred. 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 EPI on PERT. Constipation.  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  Other problems: alcohol use disorder (AA); GERD with (+) BRAVO pH probe (7/3/2024), 2 cm hiatal hernia and paraesophageal hernia at EGD; osteopenia; generalized anxiety disorder (2017); poorly-controlled hypertension, insomnia treated with trazodone    HPI (7/24/2024): As of today's visit she had not yet started taking Trikafta.  Needed to fill out [facility] enrollment form.  Per Qiana Naidu, CF pharmacist, medication currently no pay at [facility].  Scheduled for abdominopelvic CT scan tomorrow.  Has been doing AffloVEST and Pulmozyme with subjective improvement in mucus burden.  Still having fatigue, body aches, and subjective fever usually at night.  She has also been having some postprandial vomiting and right upper quadrant pain.  The pain has a colicky character.  Acknowledges drinking 1 alcoholic beverage on 7/4/2024.    HPI (7/10/2024): Telephone call on 6/12/2024 to report 3 days of fevers and body aches, chest heaviness, dyspnea, and cough.  Fatigue prominent.  Had started taking doxycycline 100 mg twice daily 3 days earlier.  Some discussion in chart about switching her to Bactrim DS but ultimately patient started to feel better on doxycycline.  We had considered linezolid for MRSA coverage but DDI with trazodone (small risk of serotonin syndrome), ultimately tried it anyway, and she had diarrhea, so stopped it after 3-4 days.  She subsequently had EGD with Bravo pH probe and colonoscopy with Dr. Snyder on 7/3/2024 (results below).  Multiple findings, but notably mild GERD and temporal association with symptoms, 2 cm hiatal  "hernia, paraesophageal hernia, several subcentimeter colon polyps (removed, pathology pending). (+) esophageal dysphagia (bite of bagel for example).  Still having episodes of feeling feverish, (++) fatigue.  Says that she is taking all of the anti-hypertensive medications.  Recent headaches.  Does not feel pulse pounding in her ears.  No photophobia, hyperacusis.     HPI (5/29/2024): Unaccompanied.  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  Has been doing the aerosolized mucoactive medications and HFCWO with The Vest, although burdensome.  Still has not started Trikafta.  She had one alcoholic beverage on Memorial Day.  I asked her how she managed to stop at one, and she said that she had her support system and felt guilty about it.  Generally more anxious.  Had telehealth visit with Dr. Ambrose in April.  Plan to continue Basaglar 8 units daily and perform prandial FSG measurements.  She felt feverish again a couple times since last visit.  Sputum is light yellow, no hemoptysis.  Not expectorating all the time.     HPI (5/1/2024): Patient recently had chest CT scan done to work-up more frequent pulmonary exacerbations over the past 6-12 months and evaluate lung parenchyma in light of (+) sputum cultures for Scedosporium boydii on 2/12/2024 and 4/3/2024.  The lung parenchyma has changes consistent with CF bronchiectasis. No cavitary fungal disease.  The literature pertaining to Scedosporium boydii infection is largely populated by case-series and retrospective case-control studies (and their attendant limitations to causal inference).  This fungal species is not clearly associated with worse health outcomes in people with CF.  The more curious finding on the scan is the hiatal hernia.  This is not new, but my question now is whether she is having frequent and perhaps subclinical gastroesophageal reflux and/or aspiration events causing symptoms that \"look like\" a CF pulmonary " "exacerbation.  She had an EGD in May 2016 by Dr. Greenberg.  The study showed diverticulum in lower third of esophagus, Nissen fundoplication, antral gastritis, and erosive gastropathy in the fundus.  I recommend evaluation by Dr. Latonia Snyder at an upcoming CF clinic with question of whether she agrees with the aforementioned hypothesis and recommendations for diagnosis and treatment.     HPI (4/17/2024): Two-week sick follow-up.  Lucia met with Kervin Sanchez on 4/3/2024.  Symptoms as below.  They discussed the sputum culture in February 2024 (+) for Scedosporium.  Another sputum sample was collected on 4/3/2024, and it also grew Scedosporium and Candida albicans.  CT chest ordered.  Treated with 2-week course of doxycycline targeting MRSA. Due to the possibility of starting Trikafta, hepatic function panel and phosphatidylethanol (Peth) were checked, and the latter suggested recent alcohol ingestion.  Lucia subsequently acknowledged a relapse, now has counseling and sponsor contact.  Today, Lucia says that she never started the nebulized 3% hypertonic saline; took doxycycline course prescribed by Kervin Sanchez; ended 4/12/2024; felt a lot better after the doxycycline, but she felt unusually fatigued yesterday afternoon, which is unlike her. Denies hemoptysis, fevers, chills, pleuritic chest pain.  Occasional wheezing alleviated by Xopenex.  Sputum is light yellow but less voluminous.  She has been doing HFCWO, but she has one of the first Hill-[employer] \"The Vest\" models. Also doing Pulmozyme twice daily.  Blood sugars have been pretty good.  Reports -120 before breakfast.  Needs to see Dr. Ambrose for routine follow-up.    HPI (4/4/2024, Kervin Sanchez): \"Since December she has been sick. Once she gets off the abx, she starts getting sick about a week later. Gets a high fever, headache, trouble breathing and feels very fatigued. Fever gets up to 103, then breaks after a day.  All last week she was in bed. Lungs are " "filling up. Can't breathe. Recently, she started using Pulmozyme and her vest. Has also noticed pain in her joints when she feels sick. Feels mucous is stuck and lungs feel sticky. Something in there and she can't cough it up. Adding the doxycyline stopped her from coughing up as much. Was able to work all day the other day which is an improvement. Did not start Trikafta because she was waiting to see if she continues getting sick.\"    HPI (2/12/2024): ED at [facility] on 11/17/2023 with hypertensive urgency (SBP ~200 at home, /94 documented); had headache and chest pain; troponin, BNP, EKG (sinus bradycardia), CXR were unremarkable.  Saw PCP, tried amlodipine added to the losartan, was having symptomatic hypotension, switched to hydrochlorothiazide plus losartan and stopped the amlodipine, much better.  Started seeing new therapist after last visit, very helpful, good relationship.  Says that she has been sober for 3 months and 4 days.  Brought on a colleague to help with work.  Had influenza diagnosed by NP rapid-antigen test at a pharmacy, around 12/20/2023. Felt really poorly, had fever, cough, post-tussive emesis, dyspnea, anorexia. Started doing Pulmozyme and HFCWO again on most days. Prescribed course of doxycycline. Since that infection, however, generally feels more central chest mucus and pressure. Because ppFEV1 was also down today, she is concerned about deteriorating lung function. Generally feels tired since the influenza. Weight down 1.3 kg since August 2023.     HPI (11/8/2023): Plan at last visit was to increase losartan to 100 mg/day, home BP monitoring. Discussed referral to psychiatry to manage anxiety and depression. Increased trazodone to 100 mg at HS for insomnia. Needed to stick with MiraLAX for prophylaxis. Had poison ivy and put on brief course of prednisone.  Few weeks ago had relapse of drinking. Went out to bar, drank enough to black out, somehow drove home. Scared her. She " connected with her sponsor and actually has appointment with a counselor this evening. Feeling increased chest congestion. Coughing occasionally. Thinks she might be headed toward lung infection. MiraLAX has prevented RLQ cramping.     HPI (8/8/2023): Had fun in [facility] but has been sick from respiratory perspective twice. I gave her a prescription for doxycycline to take with her on the trip. She started after returning due to chest congestion. Cleared. Then got poison ivy. Took 3 days of a steroid she had on-hand. Flared up after stopping. Currently having mild chest tightness and wheezing. Only using albuterol once daily, though. BP still higher than desirable. Has been on losartan 50 mg daily. Sleeping poorly on trazodone 50 mg. Took 100 mg one night and slept better.     HPI (7/10/2023): First visit, transfer care from Dr. Fortune. Last seen in November 2022. Issues at that point included intermittent abdominal pain (likely constipation), uncontrolled hypertension despite taking losartan 50 mg daily, CFRD followed by Dr. Ambrose, and recent Burkholderia multivorans infection, consideration of nebulized meropenem. Colonoscopy discussed, would apparently need to be done in [facility] for insurance reasons. No interval need for antibiotics. Used Pulmozyme in January for a few weeks due to congestion. Helpful. Still having tendency toward constipation, thinking about using MiraLAX. Sometimes experiences left-sided headache and blurry vision in the left eye when hypertensive. Frequency not accelerating. Thinks that meditation well help with her blood pressure. Leaving for trip to [place] tomorrow.    Current Use of Health Management Strategies:    Respiratory Interventions  Albuterol: Two times per day  Pulmozyme: Once daily   Hypertonic saline: Daily  HFCWO (percussive vest): Sobia The Vest - once daily since 7/10/2024  Oscillatory PEP: Does not use  Exercise: No Regular Exercise  LTE antagonist:  No  Azithromycin: Does not use  Aztreonam lysine ([place]): Does not use  Tobramycin: Does not use  Colistin: Does not use  Meropenem (inhaled): Does not use  Vancomycin (inhaled): Does not use  ICS: Does not use  ICS/LABA: Does not use  LAMA: No  CFTR modulator: Trikafta candidate by genotype but have deferred due to preserved lung function and infrequent pulmonary exacerbations, concern about potential effects on mood/cognition; now going to start  Oxygen: Does not use    Digestive Health Interventions    Acid-suppressing medication?: Yes  Using CF vitamins?: No  Taking pancreatic enzymes?: Yes - Creon 24; 3 to 4 capsules with meals and 1 to 2 capsules with snacks   Any diarrhea?: No  Any steatorrhea?: No  Any constipation?: Yes - does better with more regular use of MiraLAX  Using laxatives?: Yes  Feeding tube?: No  Supplemental enteral nutrition?: No    Pulmonary Function Test Results    PFT (7/24/2024): FEV1 2.39 L (105%), FVC 3.06 L (106%), FEV1/FVC 78; normal spirometry  PFT (7/10/2024): FEV1 2.17 L (95%), FVC 2.89 L (100%), FEV1/FVC 75; normal spirometry  PFT (5/29/2024): FEV1 2.17 L (95%), FVC 3.07 L (106%), FEV1/FVC 71; normal spirometry  PFT (4/17/2024): FEV1 2.42 L (106%), FVC 3.27 L (113%), FEV1/FVC 74; normal spirometry  PFT (4/3/2024): did not perform, feeling poorly  PFT (2/12/2024): FEV1 2.29 L (94%), FVC 3.19 L (109%), FEV1/FVC 72; normal spirometry  PFT (11/8/2023): FEV1 2.29 L (100%), FVC 3.19 L (109%), FEV1/FVC 72; normal spirometry  PFT (7/10/2023): FEV1 2.45 L (106%), FVC 3.27 L (112%), FEV1/FVC 75; normal spirometry  PFT (10/10/2022): FEV1 2.47 L (106%), FVC 3.25 L (110%), FEV1/FVC 76; normal spirometry  PFT (2/21/2022): FEV1 2.48 L (106%), FVC 3.28 L (110%), FEV1/FVC 76; normal spirometry  PFT (6/23/2021): FEV1 2.48 L (103%), FVC 3.39 L (109%), FEV1/FVC 73; normal spirometry    Current Medications     Current Outpatient Medications   Medication Instructions    amLODIPine (NORVASC) 2.5 mg,  "oral, Daily    bisacodyl (DULCOLAX) 10 mg, oral, Daily, Do not crush, chew, or split.    Creon 24,000-76,000 -120,000 unit capsule 3-4 capsules, oral, 3 times daily (morning, midday, late afternoon), MOUTH WITH MEALS AND TAKE 1-2 CAPSULES WITH SNACKS    dornase Alpha (Pulmozyme) 1 mg/mL nebulizer solution INHALE CONTENTS OF 1 VIAL VIA NEBULIZER DAILY    elexacaftor-tezacaftor-ivacaft (Trikafta) 100-50-75 mg tablet Take 2 orange tablets by mouth in the morning with fat-containing food and 1 blue tablet by mouth in the evening with fat-containing food, approximately 12 hours apart.    FreeStyle Lite Strips strip  TEST 6-7 TIMES DAILY    hydroCHLOROthiazide (HYDRODIURIL) 25 mg, oral, Daily    hydrOXYzine HCL (ATARAX) 25 mg, oral, Every 8 hours PRN    insulin glargine (Basaglar KwikPen U-100 Insulin) 100 unit/mL (3 mL) pen Inject 8 units daily as directed    losartan (COZAAR) 100 mg, oral, Daily    mecobalamin, vitamin B12, 1,000 mcg tablet,disintegrating 1 tablet, sublingual, Daily    omeprazole (PRILOSEC) 40 mg, oral, Daily    polyethylene glycol (GLYCOLAX, MIRALAX) 34 g, oral, 2 times daily,  MIX 34 GM TWICE DAILY AND TAKE AS DIRECTED    sodium chloride 3 % nebulizer solution Inhale 4 mL by nebulization 2 times a day.    traZODone (DESYREL) 50 mg, oral, Nightly PRN    Xopenex HFA 45 mcg/actuation inhaler 2 puffs, inhalation, Every 4 hours PRN       Physical Examination     /80 (BP Location: Right arm, Patient Position: Sitting, BP Cuff Size: Adult)   Pulse 60   Temp 36.5 °C (97.7 °F) (Oral)   Resp 20   Ht 1.566 m (5' 1.65\")   Wt 54.2 kg (119 lb 9.6 oz)   SpO2 95%   BMI 22.12 kg/m²         General: Tired, thin, some weight loss evident in facial musculature.  HEENT: normocephalic; anicteric sclerae; conjunctivae not injected; nasal mucosa was unremarkable; oropharynx was clear without evidence of thrush; (+) dental plate  Neck: supple; no lymphadenopathy or thyromegaly.  Chest: clear to auscultation " today.  Cardiac: regular rhythm; no gallop or murmur.  Abdomen: soft; non-tender; non-distended; no hepatosplenomegaly, no renal bruit.  Extremities: no leg edema; no digital clubbing; 2+ pulses  Psychiatric: Good eye contact; somewhat anxious.     Metabolic Parameters     Sodium   Date/Time Value Ref Range Status   07/10/2024 12:22  136 - 145 mmol/L Final     Potassium   Date/Time Value Ref Range Status   07/10/2024 12:22 PM 4.1 3.5 - 5.3 mmol/L Final     Chloride   Date/Time Value Ref Range Status   07/10/2024 12:22  98 - 107 mmol/L Final     Bicarbonate   Date/Time Value Ref Range Status   07/10/2024 12:22 PM 24 21 - 32 mmol/L Final     Anion Gap   Date/Time Value Ref Range Status   07/10/2024 12:22 PM 16 10 - 20 mmol/L Final     Urea Nitrogen   Date/Time Value Ref Range Status   07/10/2024 12:22 PM 15 6 - 23 mg/dL Final     Creatinine   Date/Time Value Ref Range Status   07/10/2024 12:22 PM 0.80 0.50 - 1.05 mg/dL Final     GFR Female   Date/Time Value Ref Range Status   07/10/2023 01:28 PM 64 >90 mL/min/1.73m2 Final     Comment:      CALCULATIONS OF ESTIMATED GFR ARE PERFORMED   USING THE 2021 CKD-EPI STUDY REFIT EQUATION   WITHOUT THE RACE VARIABLE FOR THE IDMS-TRACEABLE   CREATININE METHODS.    https://jasn.asnjournals.org/content/early/2021/09/22/ASN.[email or web address]       Glucose   Date/Time Value Ref Range Status   07/10/2024 12:22 PM 77 74 - 99 mg/dL Final     Calcium   Date/Time Value Ref Range Status   07/10/2024 12:22 PM 9.5 8.6 - 10.6 mg/dL Final     Thyroid Stimulating Hormone   Date/Time Value Ref Range Status   02/12/2024 02:47 PM 1.02 0.44 - 3.98 mIU/L Final     TSH   Date/Time Value Ref Range Status   07/10/2023 01:28 PM 1.13 0.44 - 3.98 mIU/L Final     Comment:      TSH testing is performed using different testing    methodology at [facility] than at other    system hospitals. Direct result comparisons should    only be made within the same method.     02/21/2022 09:12 AM 2.97  0.44 - 3.98 mIU/L Final     Comment:      TSH testing is performed using different testing    methodology at [facility] than at other    [facility]. Direct result comparisons should    only be made within the same method.       Phosphorus   Date/Time Value Ref Range Status   07/10/2024 12:22 PM 3.4 2.5 - 4.9 mg/dL Final     Comment:     The performance characteristics of phosphorus testing in heparinized plasma have been validated by the individual  laboratory site where testing is performed. Testing on heparinized plasma is not approved by the FDA; however, such approval is not necessary.       Hematologic Parameters     WBC   Date/Time Value Ref Range Status   07/10/2024 12:22 PM 7.7 4.4 - 11.3 x10*3/uL Final     Neutrophils Absolute   Date/Time Value Ref Range Status   07/10/2024 12:22 PM 5.51 1.20 - 7.70 x10*3/uL Final     Comment:     Percent differential counts (%) should be interpreted in the context of the absolute cell counts (cells/uL).     Neutrophils %   Date/Time Value Ref Range Status   07/10/2024 12:22 PM 71.2 40.0 - 80.0 % Final     Lymphocytes %   Date/Time Value Ref Range Status   07/10/2024 12:22 PM 18.4 13.0 - 44.0 % Final     Monocytes %   Date/Time Value Ref Range Status   07/10/2024 12:22 PM 8.3 2.0 - 10.0 % Final     Basophils %   Date/Time Value Ref Range Status   07/10/2024 12:22 PM 0.4 0.0 - 2.0 % Final     Eosinophils Absolute   Date/Time Value Ref Range Status   07/10/2024 12:22 PM 0.11 0.00 - 0.70 x10*3/uL Final     Eosinophils %   Date/Time Value Ref Range Status   07/10/2024 12:22 PM 1.4 0.0 - 6.0 % Final     Hemoglobin   Date/Time Value Ref Range Status   07/10/2024 12:22 PM 13.0 12.0 - 16.0 g/dL Final     Hematocrit   Date/Time Value Ref Range Status   07/10/2024 12:22 PM 39.7 36.0 - 46.0 % Final     RBC   Date/Time Value Ref Range Status   07/10/2024 12:22 PM 4.55 4.00 - 5.20 x10*6/uL Final     MCV   Date/Time Value Ref Range Status   07/10/2024 12:22 PM 87 80  - 100 fL Final     MCHC   Date/Time Value Ref Range Status   07/10/2024 12:22 PM 32.7 32.0 - 36.0 g/dL Final     Platelets   Date/Time Value Ref Range Status   07/10/2024 12:22  150 - 450 x10*3/uL Final       Fat-Soluble Vitamin Levels     Vitamin D, 25-Hydroxy, Total   Date/Time Value Ref Range Status   02/12/2024 02:43 PM 52 30 - 100 ng/mL Final     Vitamin A (Retinol)   Date/Time Value Ref Range Status   02/12/2024 02:43 PM 0.52 0.30 - 1.20 mg/L Final     Vitamin A, Interpretation   Date/Time Value Ref Range Status   02/12/2024 02:43 PM Normal  Final     Comment:       This test was developed and its performance characteristics   determined by [facility]. It has not been cleared or   approved by the US Food and Drug Administration. This test was   performed in a CLIA certified laboratory and is intended for   clinical purposes.  Performed By: [facility]  [address] [place] [address]  : Tello Pacheco MD, PhD  CLIA Number: [id number]     Vitamin E (Alpha-Tocopherol)   Date/Time Value Ref Range Status   02/12/2024 02:43 PM 6.6 5.5 - 18.0 mg/L Final     Comment:       This test was developed and its performance characteristics   determined by [facility]. It has not been cleared or   approved by the US Food and Drug Administration. This test was   performed in a CLIA certified laboratory and is intended for   clinical purposes.     Vitamin E (Gamma-Tocopherol)   Date/Time Value Ref Range Status   02/12/2024 02:43 PM 1.6 0.0 - 6.0 mg/L Final     Comment:     Performed By: [facility]  [address] [place], [place] [id number]  : Tello Pacheco MD, PhD  CLIA Number: [id number]       LFT and Associated Parameters     AST   Date/Time Value Ref Range Status   07/10/2024 12:22 PM 15 9 - 39 U/L Final   04/03/2024 03:02 PM 19 9 - 39 U/L Final   02/12/2024 02:43 PM 16 9 - 39 U/L Final     ALT   Date/Time Value Ref Range Status    07/10/2024 12:22 PM 18 7 - 45 U/L Final     Comment:     Patients treated with Sulfasalazine may generate falsely decreased results for ALT.   04/03/2024 03:02 PM 16 7 - 45 U/L Final     Comment:     Patients treated with Sulfasalazine may generate falsely decreased results for ALT.   02/12/2024 02:43 PM 17 7 - 45 U/L Final     Comment:     Patients treated with Sulfasalazine may generate falsely decreased results for ALT.     Alkaline Phosphatase   Date/Time Value Ref Range Status   07/10/2024 12:22  33 - 136 U/L Final   04/03/2024 03:02 PM 91 33 - 136 U/L Final   02/12/2024 02:43 PM 94 33 - 136 U/L Final     Bilirubin, Total   Date/Time Value Ref Range Status   07/10/2024 12:22 PM 0.6 0.0 - 1.2 mg/dL Final   04/03/2024 03:02 PM 0.5 0.0 - 1.2 mg/dL Final   02/12/2024 02:43 PM 0.3 0.0 - 1.2 mg/dL Final     Bilirubin, Direct   Date/Time Value Ref Range Status   07/10/2024 12:22 PM 0.1 0.0 - 0.3 mg/dL Final   04/03/2024 03:02 PM 0.1 0.0 - 0.3 mg/dL Final   02/12/2024 02:43 PM 0.1 0.0 - 0.3 mg/dL Final     GGT   Date/Time Value Ref Range Status   07/10/2024 12:22 PM 26 5 - 55 U/L Final   02/12/2024 02:43 PM 26 5 - 55 U/L Final   07/10/2023 01:28 PM 22 5 - 55 U/L Final   02/21/2022 09:12 AM 33 5 - 55 U/L Final   11/16/2020 03:16 PM 34 5 - 55 U/L Final     Albumin   Date/Time Value Ref Range Status   07/10/2024 12:22 PM 4.3 3.4 - 5.0 g/dL Final   04/03/2024 03:02 PM 4.7 3.4 - 5.0 g/dL Final   02/12/2024 02:43 PM 4.5 3.4 - 5.0 g/dL Final     Total Protein   Date/Time Value Ref Range Status   07/10/2024 12:22 PM 7.5 6.4 - 8.2 g/dL Final   04/03/2024 03:02 PM 7.7 6.4 - 8.2 g/dL Final   02/12/2024 02:43 PM 7.2 6.4 - 8.2 g/dL Final       Coagulation Parameters           Diabetes Laboratory Tests     Hemoglobin A1C   Date/Time Value Ref Range Status   02/12/2024 02:43 PM 6.4 (H) see below % Final   07/10/2023 01:28 PM 6.5 (A) % Final     Comment:          Diagnosis of Diabetes-Adults   Non-Diabetic: < or = 5.6%    Increased risk for developing diabetes: 5.7-6.4%   Diagnostic of diabetes: > or = 6.5%  .       Monitoring of Diabetes                Age (y)     Therapeutic Goal (%)   Adults:          >18           <7.0   Pediatrics:    13-18           <7.5                   7-12           <8.0                   0- 6            7.5-8.5   American Diabetes Association. Diabetes Care 33(S1), Jan 2010.     04/09/2022 05:28 AM 7.0 (H) 4.0 - 5.6 % Final   11/16/2020 03:16 PM 6.4 % Final     Comment:          Diagnosis of Diabetes-Adults   Non-Diabetic: < or = 5.6%   Increased risk for developing diabetes: 5.7-6.4%   Diagnostic of diabetes: > or = 6.5%  .       Monitoring of Diabetes                Age (y)     Therapeutic Goal (%)   Adults:          >18           <7.0   Pediatrics:    13-18           <7.5                   7-12           <8.0                   0- 6            7.5-8.5   American Diabetes Association. Diabetes Care 33(S1), Jan 2010.       Albumin, Urine Random   Date/Time Value Ref Range Status   02/12/2024 02:43 PM <7.0 Not established mg/L Final     Creatinine, Urine Random   Date/Time Value Ref Range Status   02/12/2024 02:43 PM 67.4 20.0 - 320.0 mg/dL Final     Creatinine, Urine   Date/Time Value Ref Range Status   07/10/2023 01:28 PM 38.9 20.0 - 320.0 mg/dL Final   06/23/2021 01:54 PM 60.7 20.0 - 320.0 mg/dL Final     Albumin/Creatinine Ratio   Date/Time Value Ref Range Status   02/12/2024 02:43 PM   Final     Comment:     One or more analytes used in this calculation is outside of the analytical measurement range. Calculation cannot be performed.     Albumin/Creatine Ratio   Date/Time Value Ref Range Status   07/10/2023 01:28 PM SEE COMMENT 0.0 - 30.0 ug/mg crt Final     Comment:     One or more analytes used in this calculation   is outside of the analytical measurement range.  Calculation cannot be performed.     06/23/2021 01:54 PM SEE COMMENT 0.0 - 30.0 ug/mg crt Final     Comment:     One or more analytes used  in this calculation   is outside of the analytical measurement range.  Calculation cannot be performed.          Immunology Laboratory Tests     IgE   Date/Time Value Ref Range Status   02/12/2024 02:43 PM <2 0 - 214 IU/mL Final     Alcohol Abstinence Monitoring     EER PEth   Date Value Ref Range Status   07/10/2024 See Note  Final     Comment:     Authorized individuals can access the [facility]   Enhanced Report using the following link:      [email or web address]   04/03/2024 See Note  Final     Comment:     Authorized individuals can access the [facility]   Enhanced Report using the following link:      [email or web address]     PEth 16:0/18:1 (POPEth)   Date Value Ref Range Status   07/10/2024 27 ng/mL Final     Comment:     PEth 16:0/18:1 (POPEth)  Less than 10 ng/mL............Not detected  Less than 20 ng/mL............Abstinence or light alcohol   consumption  20 - 200 ng/mL................Moderate alcohol consumption  Greater than 200 ng/mL........Heavy alcohol consumption or chronic   alcohol use    (Reference: DANTE Carballo 2018 J. Forensic Sci)   04/03/2024 64 ng/mL Final     Comment:     PEth 16:0/18:1 (POPEth)  Less than 10 ng/mL............Not detected  Less than 20 ng/mL............Abstinence or light alcohol   consumption  20 - 200 ng/mL................Moderate alcohol consumption  Greater than 200 ng/mL........Heavy alcohol consumption or chronic   alcohol use    (Reference: DANTE Carballo 2018 J. Forensic Sci)     PEth 16:0/18:2 (PLPEth)   Date Value Ref Range Status   07/10/2024 22 ng/mL Final     Comment:     Reference ranges are not well established.   04/03/2024 65 ng/mL Final     Comment:     Reference ranges are not well established.     PEth Interpretation   Date Value Ref Range Status   07/10/2024 See Comment  Final     Comment:     Phosphatidylethanol (PEth) is a group of phospholipids formed in   the presence of  ethanol, phospholipase D and phosphatidylcholine.   PEth is known to be a direct alcohol biomarker. The predominant   PEth homologues are PEth 16:0/18:1 (POPEth) and PEth 16:0/18:2   (PLPEth), which account for 37-46% and 26-28% of the total PEth   homologues, respectively. PEth is incorporated into the   phospholipid membrane of red blood cells and has a general   half-life of 4-10 days and a window of detection of 2-4 weeks.   However, the window of detection is longer in individuals who   chronically or excessively consume alcohol. The limit of   quantification is 10 ng/mL. Serial monitoring of PEth may be   helpful in monitoring alcohol abstinence over time. PEth results   should be interpreted in the context of the patient's clinical and   behavioral history.  Patients with advanced liver disease may have falsely elevated   PEth concentrations (Senait STAUFFER et al 2018, Alcoholism Clinical &   Experimental Research).    This test was developed and its performance characteristics   determined by [facility]. It has not been cleared or   approved by the U.S. Food and Drug Administration. This test was   performed in a CLIA-certified laboratory and is intended for   clinical purposes.  Performed By: [facility]  [address] [place] [id number]  : Tello Pacheco MD, PhD  CLIA Number: [id number]   04/03/2024 See Comment  Final     Comment:     Phosphatidylethanol (PEth) is a group of phospholipids formed in   the presence of ethanol, phospholipase D and phosphatidylcholine.   PEth is known to be a direct alcohol biomarker. The predominant   PEth homologues are PEth 16:0/18:1 (POPEth) and PEth 16:0/18:2   (PLPEth), which account for 37-46% and 26-28% of the total PEth   homologues, respectively. PEth is incorporated into the   phospholipid membrane of red blood cells and has a general   half-life of 4-10 days and a window of detection of 2-4 weeks.   However, the window of  detection is longer in individuals who   chronically or excessively consume alcohol. The limit of   quantification is 10 ng/mL. Serial monitoring of PEth may be   helpful in monitoring alcohol abstinence over time. PEth results   should be interpreted in the context of the patient's clinical and   behavioral history.  Patients with advanced liver disease may have falsely elevated   PEth concentrations (Senait STAUFFER et al 2018, Alcoholism Clinical &   Experimental Research).    This test was developed and its performance characteristics   determined by [facility]. It has not been cleared or   approved by the U.S. Food and Drug Administration. This test was   performed in a CLIA-certified laboratory and is intended for   clinical purposes.  Performed By: [facility]  [address] [place] [id number]  : Tello Pacheco MD, PhD  CLIA Number: [id number]     DEXA Scan     XR BONE density 10/10/2022    Narrative  Name: OMAR GIPSON  Patient ID: [id number] YOB: 1960 Height: 62.0 in.  Gender:     Female   Exam Date:  10/10/2022 Weight: 118.0 lbs.  Indications: Cystic Fibrosis, DIABETES, family history osteoporosis,  Hysterectomy, Post Menopausal  Fractures:    Treatments:  OMEPRAZOLE    LEFT FEMUR - TOTAL  The bone mineral density : 0.889 g/cm2  T-score : -0.9    % of young normal mean :88%  Z-score : 0.0    % of age matched mean : 100%  % change vs. Previous : N/A    % change vs. Baseline : baseline    LEFT FEMUR - NECK  The bone mineral density : 0.843 g/cm2  T-score : -1.4    % of young normal mean: 81%  Z-score : -0.1    % of age matched mean : 98%  % change vs. Previous : N/A    % change vs. Baseline : baseline    SPINE L1-L4  The bone mineral density is 1.147 g/cm2  T-score : -0.3   % of young normal mean is 97%  Z-score : 1.0    % of age matched mean is 111%  % change vs. Previous : -    % change vs. Baseline : baseline    World Health Organization (WHO) criteria  for post-menopausal,   Women:  Normal:       T-score at or above -1 SD  Osteopenia:   T-score between -1 and -2.5 SD  Osteoporosis: T-score at or below -2.5 SD    10-Year Fracture Risk:  Major Osteoporotic Fracture 4.0%  Hip Fracture                0.4%  Reported Risk Factors       None    Interpretation:  According to World Health Organization criteria, classification is low bone mass.  Followup recommended on October 2024 or sooner as clinically warranted.     Endoscopies     Colonoscopy (7/3/2024)  Findings  Rectal exam revealed decreased sphincter tone likely secondary to sedation  External and internal small hemorrhoids observed during retroflexion; no bleeding was identified  Mild, generalized atrophic mucosa in the terminal ileum; performed cold forceps biopsy;  Large submucosal mass at the appendiceal orifice  A moderate amount of semi-liquid stool was visualized throughout the colon.  Lavage was performed with incomplete clearance and adequate visualization.  One 3 mm polyp vs lymphoid follicle in the ascending colon; performed cold snare with complete en bloc removal and retrieved specimen  Two 4 mm sessile polyps in the transverse colon; performed cold snare with complete en bloc removal and retrieved specimen  Impression  Rectal exam revealed decreased sphincter tone likely secondary to sedation.  Small hemorrhoids  Mild atrophic mucosa in the terminal ileum; performed cold forceps biopsy  Large submucosal mass in the cecum at the appendiceal orifice which could be secondary to stool vs. neoplasm  A moderate amount of semi-liquid stool was visualized throughout the colon.  Lavage was performed with incomplete clearance and adequate visualization.  3 subcentimeter polyps were removed with cold snare  Recommendation    Await pathology results     Repeat colonoscopy in 3 years (2 years if all polyps adenomatous).  Obtain CT scan abdomen and pelvis to further assess appendiceal orifice.      Esophagogastroduodenoscopy (EGD) (7/3/2024)  Findings  Z-line 33 cm from the incisors  An inlet patch was visualized in the proximal esophagus.  2 cm hiatal hernia - GE junction 33 cm from the incisors, diaphragmatic impression 35 cm from the incisors, confirmed by retroflexion. On retroflexion evidence of prior fundoplication was visualized; however, a paraesophageal hernia was also seen.  Single medium diverticulum with no inflammation in the lower third of the esophagus (29 cm from the incisors); no bleeding was identified  Moderate, generalized erythematous mucosa with loss of vascular pattern in the antrum  Performed multiple random forceps biopsies in the incisura and antrum to rule out H. pylori. Rule out intestinal metaplasia  Performed random forceps biopsies in the body of the stomach, greater curve of the stomach and lesser curve of the stomach to rule out H. pylori. Rule out intestinal metaplasia  Benign-appearing mild intrinsic stenosis in the duodenal sweep (scope was able to traverse without any resistance)  Edematous mucosa in the 2nd part of the duodenum  The duodenum was otherwise normal.  Performed multiple random forceps biopsies in the duodenal bulb and 2nd part of the duodenum to rule out celiac disease  A Bravo pH capsule was placed successfully in the esophagus (27 cm from the incisors, 6 cm from the GE junction).  Successful placement was confirmed endoscopically.  Impression  An inlet patch was visualized in the proximal esophagus.  2 cm hiatal hernia  Diverticulosis in the lower third of the esophagus  Moderate erythematous mucosa with loss of vascular pattern in the antrum  Performed forceps biopsies in the incisura and antrum to rule out H. pylori  Performed forceps biopsies in the body of the stomach, greater curve of the stomach and lesser curve of the stomach to rule out H. pylori  Intrinsic mild stenosis in the duodenal sweep possibly secondary to prior peptic ulcer  disease  Edematous mucosa in the 2nd part of the duodenum  The duodenum was otherwise normal.  Performed forceps biopsies in the duodenal bulb and 2nd part of the duodenum to rule out celiac disease  A pH capsule was placed successfully in the esophagus (27 cm from the incisors, 6 cm from the GE junction).     BRAVO Esophageal pH Probe (7/3/2024)  Findings:  -The total AET is 6.9% and the DeMeester score is 26.5 indicating presence of mild to moderate reflux  - Reflux occurred on both days of the study and in both upright and supine positions  - The SAP for regurgitation was 100% indicating high symptom correlation   Impressions:  Overall this is study is positive for mild to moderate acid reflux  There is high symptom correlation, reflux is the cause of the symptoms     Chest Radiograph     XR chest 2 view 10/10/2022    Narrative  MRN: [id number]  Patient Name: OMAR GIPSON    STUDY:  TH CHEST 2 VIEW PA AND LAT;    INDICATION:  cystic fibrosis, malabsorption  E84.9: Cystic fibrosis E84.0: Cystic fibrosis with pulmonary manifestations.    COMPARISON:  Chest radiograph 05/09/2016    ACCESSION NUMBER(S):  [id number]    ORDERING CLINICIAN:  CELIA MIN    FINDINGS:  Frontal, lateral, and dual energy radiographs of the chest were  provided.  The cardiac silhouette size is within normal limits.  There is no focal consolidation, edema or pneumothorax.  No sizeable pleural effusion.  No acute osseous abnormality.    Impression  No acute cardiopulmonary process.  I personally reviewed the images/study and I agree with the findings as stated.     US LIVER/GB/PANCREAS     US LIVER/GALL BLADDER/PANCREAS (4/13/2022, [facility])    CLINICAL HISTORY: Reason for Exam: as recommended per CT; history of inflammatory changes along the falciform ligament in chronically dilated common bile duct   ASSOCIATED DIAGNOSIS:     COMPARISON: Gallbladder ultrasound 07/03/2017; CT abdomen/pelvis 04/11/2022     TECHNIQUE: Ultrasound real time  scan with image documentation of the right upper quadrant including the liver, gallbladder, and pancreas was performed.     FINDINGS:   Liver   Craniocaudal length: 14.8 cm.   Echogenicity:  Normal   Surface nodularity: Present   Mass (size and location): None.     Bile ducts   Intrahepatic ducts: No biliary dilatation.   Common bile duct:  Diameter 14 mm, stable compared to July 2017 ultrasound.     Gallbladder   Size and morphology: The gallbladder is contracted limiting evaluation.   Cholelithiasis:  Multiple echogenic foci with posterior acoustic shadowing, compatible with gallstones.   Pericholecystic fluid: None.   Sonographic Arrington sign: Absent.     Pancreas: Pancreas is not visualized consistent with the complete fatty replacement on recent CT.     Other findings : A right pleural effusion is incidentally noted.     IMPRESSION:   1. Chronically dilated CBD measuring approximately 14 mm. No choledocholithiasis seen.   2.  The liver has a lobulated contour which may relate to underlying cirrhosis. Further evaluation with shear wave elastography or fibroscan can be obtained to obtain a Metavir score.   3.  Contracted gallbladder with cholelithiasis. No sonographic findings of acute cholecystitis.   4.  Partially visualized right pleural effusion.   5.  Pancreas is not visualized consistent with the complete fatty replacement on recent CT.     Chest CT Scan without Contrast     CT CHEST WO IV CONTRAST; 4/26/2024 1:21 pm   1.  Patchy centrilobular nodules throughout the mid to inferior right upper lobe and medial right middle lobe, most consistent with infection.  2. Within the medial aspect of the right upper lobe and right middle lobe, there is are moderately dilated and mucous filled bronchi with surrounding peribronchovascular centrilobular nodules. Findings likely attributable to chronic changes from cystic fibrosis with possible superimposed infection. Attention on follow-up CT studies is  recommended.  3.  Diffuse bronchial wall thickening, consistent with the patient's reported cystic fibrosis. Lungs are otherwise clear.  4. Large hiatal hernia.  5. Non-obstructing 5 mm calculi seen in the visualized left kidney.    CF Sputum Culture     AFB Culture   Date Value Ref Range Status   07/10/2024   Preliminary    Culture in progress and will be examined weekly. A result will be issued either when positive or after 8 weeks incubation.   04/03/2024 No Mycobacteria isolated.  Final   02/12/2024 No Mycobacteria isolated.  Final      Respiratory Culture, Cystic Fibrosis   Date/Time Value Ref Range Status   07/10/2024 10:46 AM (A)  Final    (2+) Few Methicillin Resistant Staphylococcus aureus (MRSA)     Comment:     Methicillin (Oxacillin) resistant Staphylococci are resistant to all currently available Penicillins, Beta-lactam/Beta-lactamase inhibitor combinations (including Ampicillin/Sulbactam, Amoxicillin/Clavulanate and Pipercillin/Tazobactam), Carbapenems and   Cephalosporins (except Ceftaroline).   07/10/2024 10:46 AM (2+) Few Normal throat perla  Final     CF respiratory culture (11/28/2022): MRSA  CF respiratory culture (10/10/2022): MRSA, Burkholderia multivorans  CF respiratory culture (5/9/2022): MRSA  CF respiratory culture (6/3/2015): MSSA, Pseudomonas putida    Susceptibility data from last 2000 days.  Collected Specimen Info Organism Ceftazidime Clindamycin Erythromycin Levofloxacin Linezolid Meropenem Minocycline Oxacillin Tetracycline Trimethoprim/Sulfamethoxazole Vancomycin   07/10/24 Fluid from SPUTUM Methicillin Resistant Staphylococcus aureus (MRSA)   S  R      R  S  S  S     Normal throat perla              07/10/24 Fluid from SPUTUM Scedosporium boydii              05/29/24 Fluid from Throat Swab Methicillin Resistant Staphylococcus aureus (MRSA)   S  R      R  S  S  S     Burkholderia cepacia complex  S    S   S  I         Normal throat perla              04/17/24 Fluid from Throat Swab Methicillin  Resistant Staphylococcus aureus (MRSA)  S R     R S S S     Normal throat perla              04/03/24 Swab from SPUTUM Candida albicans                Scedosporium boydii              04/03/24 Fluid from Throat Swab Methicillin Resistant Staphylococcus aureus (MRSA)  S R     R S S S     Normal throat perla              02/12/24 Fluid from SPUTUM Methicillin Resistant Staphylococcus aureus (MRSA)  S R     R S S S     Normal throat perla              02/12/24 Swab from SPUTUM Candida albicans                Scedosporium boydii              11/08/23 Fluid from Throat Swab Methicillin Resistant Staphylococcus aureus (MRSA)  S R     R S S S     Normal throat perla              07/07/23 Respiratory Staphylococcus aureus  R R  S   S S S S   07/07/23 Respiratory Candida albicans                        Problem List Items Addressed This Visit       Calculus of gallbladder without cholecystitis without obstruction    Current Assessment & Plan     Postprandial right upper quadrant pain and nausea and vomiting leading me to question whether she is having biliary colic.  Possible that her fevers are related to chronic cholecystitis.  Will send Dr. Snyder a note asking for input on cholecystectomy decision.         Constipation    Relevant Medications    polyethylene glycol (Glycolax, Miralax) 17 gram/dose powder    Cystic fibrosis (Multi) - Primary    Current Assessment & Plan     She will start Trikafta this week.  Pulmonary exacerbation absent.  Continue Pulmozyme twice daily and performing HFCWO with the AffloVest.  I think that her pulmonary function tests are better today because she is doing this bronchopulmonary hygiene regimen.  Xopenex (levalbuterol) has been better for her than racemic albuterol - no tremulousness.  Continue nebulized hypertonic saline 3% twice a day  Surveillance respiratory tract culture today.         Diabetes mellitus related to CF (cystic fibrosis) (Multi)    Overview     CFRD Hx: Diagnosed in  March 2013 when A1c was 7.6%. Fasting glucose in the office in April 2013 was 252. we started her on Levemir.   June 2021: 6.9%  Sept 2021: 6.4%  April 2022: 7.3%  July 2023: 6.5%  Feb 2024: 6.4%  July 2024: 6.7%  Microalbumin: WNL 7/2023, 2/2024 (undetectable)         Current Assessment & Plan     Dr. Ambrose evaluated her today.  Plan to continue Basaglar 8 units daily and reduce Basaglar dose to 4-5 units the night before any surgery.  Up-to-date on screening for complications.         Mass of appendix    Overview     Submucosal mass at appendiceal orifice on colonoscopy 7/3/24         Current Assessment & Plan     I will send a note to Dr. Snyder in gastroenterology for feedback on how this should be managed based on abdominopelvic CT scan results this week.         MRSA (methicillin resistant Staphylococcus aureus) infection    Current Assessment & Plan     Did not tolerate ambulatory treatment with oral linezolid (June 2024)  Ambulatory sick plan: Doxycycline 100 mg by mouth twice daily for 14 days.  Holding off on treatment today given GI upset and improved lung function.         Nausea and vomiting    Current Assessment & Plan     Suspect that she is having this because of chronic cholecystitis.  Prescribing Zofran today for symptomatic relief.         Relevant Medications    ondansetron (Zofran) 4 mg tablet    Primary hypertension    Overview     Managed by her PCP at [facility]         Relevant Medications    amLODIPine (Norvasc) 2.5 mg tablet    Scedosporiosis (Multi)    Overview     Sputum sample collected 2/12/2024 in CF Clinic grew 1+ 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)         Current Assessment & Plan     We have (+) fungal sputum cultures from 2/12/2024, 4/3/2024, and 7/10/2024.  Drug susceptibility testing performed as recently as this month in case we need to treat.  Scedosporium boydii is considered a potential CF pathogen, but it has not conclusively been associated with accelerated  rate of lung function decline.  Given that her lung function has actually improved with chest physiotherapy and not treating with an antifungal, I favor continued deferral of antifungal therapy, particularly in light of drug-drug interactions with Trikafta.            Follow-up: 3 months      Nicolás Harris MD   07/24/2024

## 2024-07-24 ENCOUNTER — DOCUMENTATION (OUTPATIENT)
Dept: PEDIATRIC PULMONOLOGY | Facility: HOSPITAL | Age: 64
End: 2024-07-24

## 2024-07-24 ENCOUNTER — MULTIDISCIPLINARY VISIT (OUTPATIENT)
Dept: PEDIATRIC PULMONOLOGY | Facility: HOSPITAL | Age: 64
End: 2024-07-24
Payer: MEDICARE

## 2024-07-24 ENCOUNTER — SPECIALTY PHARMACY (OUTPATIENT)
Dept: PHARMACY | Facility: CLINIC | Age: 64
End: 2024-07-24

## 2024-07-24 ENCOUNTER — HOSPITAL ENCOUNTER (OUTPATIENT)
Dept: RESPIRATORY THERAPY | Facility: HOSPITAL | Age: 64
Discharge: HOME | End: 2024-07-24
Payer: MEDICARE

## 2024-07-24 ENCOUNTER — MULTIDISCIPLINARY VISIT (OUTPATIENT)
Dept: PEDIATRIC ENDOCRINOLOGY | Facility: HOSPITAL | Age: 64
End: 2024-07-24
Payer: MEDICARE

## 2024-07-24 ENCOUNTER — SOCIAL WORK (OUTPATIENT)
Dept: PEDIATRIC PULMONOLOGY | Facility: HOSPITAL | Age: 64
End: 2024-07-24

## 2024-07-24 VITALS
TEMPERATURE: 97.7 F | BODY MASS INDEX: 22.01 KG/M2 | DIASTOLIC BLOOD PRESSURE: 80 MMHG | RESPIRATION RATE: 20 BRPM | HEIGHT: 62 IN | WEIGHT: 119.6 LBS | SYSTOLIC BLOOD PRESSURE: 127 MMHG | OXYGEN SATURATION: 95 % | HEART RATE: 60 BPM

## 2024-07-24 DIAGNOSIS — K80.20 CALCULUS OF GALLBLADDER WITHOUT CHOLECYSTITIS WITHOUT OBSTRUCTION: ICD-10-CM

## 2024-07-24 DIAGNOSIS — E84.8 DIABETES MELLITUS RELATED TO CF (CYSTIC FIBROSIS) (MULTI): ICD-10-CM

## 2024-07-24 DIAGNOSIS — R11.2 NAUSEA AND VOMITING, UNSPECIFIED VOMITING TYPE: ICD-10-CM

## 2024-07-24 DIAGNOSIS — E84.9 CYSTIC FIBROSIS (MULTI): ICD-10-CM

## 2024-07-24 DIAGNOSIS — E08.9 DIABETES MELLITUS RELATED TO CF (CYSTIC FIBROSIS) (MULTI): ICD-10-CM

## 2024-07-24 DIAGNOSIS — I10 PRIMARY HYPERTENSION: Primary | ICD-10-CM

## 2024-07-24 DIAGNOSIS — A49.02 MRSA (METHICILLIN RESISTANT STAPHYLOCOCCUS AUREUS) INFECTION: ICD-10-CM

## 2024-07-24 DIAGNOSIS — B48.8: ICD-10-CM

## 2024-07-24 LAB
ACID FAST STN SPEC: NORMAL
FEF 25-75: 1.64 L/S
FEV1/FVC: 78 %
FEV1: 2.39 LITERS
FVC: 3.06 LITERS
MYCOBACTERIUM SPEC CULT: NORMAL
PEF: 6.65 L/S
POC HEMOGLOBIN A1C: 6.7 % (ref 4.2–6.5)

## 2024-07-24 PROCEDURE — 99214 OFFICE O/P EST MOD 30 MIN: CPT | Performed by: PEDIATRICS

## 2024-07-24 PROCEDURE — 94010 BREATHING CAPACITY TEST: CPT | Performed by: NURSE PRACTITIONER

## 2024-07-24 PROCEDURE — 94010 BREATHING CAPACITY TEST: CPT

## 2024-07-24 PROCEDURE — 83036 HEMOGLOBIN GLYCOSYLATED A1C: CPT | Mod: QW | Performed by: PEDIATRICS

## 2024-07-24 RX ORDER — AMLODIPINE BESYLATE 2.5 MG/1
2.5 TABLET ORAL DAILY
Qty: 30 TABLET | Refills: 1 | Status: SHIPPED | OUTPATIENT
Start: 2024-07-24

## 2024-07-24 RX ORDER — ONDANSETRON 4 MG/1
4 TABLET, FILM COATED ORAL EVERY 8 HOURS PRN
Qty: 30 TABLET | Refills: 1 | Status: SHIPPED | OUTPATIENT
Start: 2024-07-24 | End: 2024-08-23

## 2024-07-24 ASSESSMENT — PULMONARY FUNCTION TESTS
FVC_PERCENT_PREDICTED: 106
FEV1 (%PREDICTED): 105
FVC (LITERS): 3.06
FEV1/FVC: 0.02
FEV1 (LITERS): 2.39

## 2024-07-24 NOTE — PATIENT INSTRUCTIONS
"    It was very nice to see you today. We can offer you in-person and \"virtual\" appointments with your cystic fibrosis provider.    If you need to cancel or schedule an appointment, please call the  at the [facility] at [phone] between the hours of 8 AM and 5 PM, Monday-Friday.    To reach the CF nurse, Chiquis, please call [phone]. Chiquis has a secure voice mail account if you want to leave a message.    If you need to speak with an adult cystic fibrosis provider between the hours of 5 PM and 8 AM (overnight) or anytime on Saturday or Sunday, please call [phone]. When you call this number, you will be connected to an  with the [facility] and [facility] answering service. You should tell the  that you're an adult with cystic fibrosis who needs to speak to the \"cystic fibrosis doctor on-call.\" The  will take your contact information. You should then expect a call back from the cystic fibrosis doctor on-call within a short period of time.      Plan from today's visit:    Get the abdominal CT scan tomorrow.  We may need to look at the liver and gall bladder with ultrasound if the CT scan does not show us what we need to see.  I suspect that your gall bladder needs to be removed.  Start taking Trikafta.  I can have Chantelle Naidu, the CF pharmacist, call you to review how to take the medication and side effect profile.  I refilled the ondansetron (Zofran) medication for nausea control.  Great job on doing inhaled medications and vest treatments consistently.  I think that's why your lung function is higher today.    Important Information:  Your CFTR variants (mutations) are: [id number]     Your percent-predicted FEV1 today was: 105%  Your weight adjusted for height (body mass index, BMI) today was: 22.1 kg/m2  (goal for adult males with CF = 23.0 kg/m2, goal for adult females with CF = 22.0 kg/m2)    Next appointment: to be determined based on scan " results and need for other interventions

## 2024-07-24 NOTE — PROGRESS NOTES
CF Pharmacist check in    -will be receiving Trikafta on Friday 7/26/24 from [facility]    - Filled out [employer] enrollment form - currently no co-pay at [facility] but filling out now in case needed    -Reviewed possible side effects from starting Trikafta and potential benefits    Chantelle Naidu, PharmD  07/24/24  4:53 PM

## 2024-07-24 NOTE — PROGRESS NOTES
Using afflo vest 15 minutes P-V-D - really likes it and feels its making an difference    Using pulmozyme - no issues getting from the pharmacy    No issues getting levalbuterol inhaler    Cleaning neb cups with hot soapy water than boiling on the stove top - aware of the baby bottle sterilizer option

## 2024-07-24 NOTE — PROGRESS NOTES
Subjective    Mansi Morrison is a 64 y.o. year old with a history of Cystic Fibrosis (Delta F508 homozygote), exocrine pancreatic insufficiency, presenting for follow up of Cystic Fibrosis-Related diabetes (CFRD).   CFRD was diagnosed in March 2013    Last A1c was 6.4% 2/24  Last visit was virtual in April of 2024  Lab Results   Component Value Date    HGBA1C 6.4 (H) 02/12/2024        HPI   INTERVAL HISTORY  Has Gall stones-will get a CAT Scan tomorrow to confirm  Enlarged appendix  Pain from Gall stones and appendix  Had Colonoscopy/Endoscopy  Has GERD and had a hiatal hernia patch in 2010 that is gone    Gets achy, fever, vomiting about once a week at night for months which led to all the tests  Has been eating small meals often throughout the day, such as watermelon, banana  Has had some weight loss.    Checks blood sugars with a glucometer in the morning  110-120.  Glucoses have been steady.    Takes Basaglar 8 units at around 9pm  Eats latest at 7pm    Diabetes is treated with:  Insulin Instructions  Fixed Dose Injections   Basaglar KwikPen U-100 Insulin 100 unit/mL (3 mL) insulin pen   Last edited by Lanie Ambrose MD on 4/24/2024 at 3:47 PM      Time of Day Dose (units)   10 pm 8        BG trends:   The patient is currently checking the blood glucose.  Patient is using: glucometer    Diabetes Flowsheet Data:   Goals    None       Hyperglycemia:  Symptoms: n/a  Timing:    Hypoglycemia:   Hypoglycemia frequency: not really feeling any lows  Hypoglycemia awareness: Yes   Symptoms of Hypoglycemia: hunger, dizzy  Timing of Hypoglycemia: n/a  Treats hypoglycemia with: Juice and Candy  Glucagon prescription:  No     Nutrition and Pulmonary review:  Diet Hx:   Weight: weight loss of ~4 lbs  Pulmonary status: Improved slightly since July 10, 24.  But before that it was declining    SCREENING FOR COMPLICATIONS:   Urine albumin:  2/12/24  Eye doctor: fundoscopic exam normal April 2024    Family History   Problem  Relation Name Age of Onset    Other (CF Carrier) Mother      Other (CF Carrier) Father      Colon cancer Father      Esophageal cancer Neg Hx      Stomach cancer Neg Hx        The patient does have a known family history of type  2 diabetes. Her brother and sister    Social History     Tobacco Use    Smoking status: Never     Passive exposure: Never    Smokeless tobacco: Never   Vaping Use    Vaping status: Never Used   Substance Use Topics    Alcohol use: Not Currently    Drug use: Yes     Frequency: 2.0 times per week     Types: Marijuana     Comment: gummies at night; last used 5 days ago        Objective   PHYSICAL EXAM:  There were no vitals taken for this visit.   General: tired appearing, uncomfortable  HEENT: NCAT, MMM  Neck: Supple  Pulm: Non labored breathing  Skin: No visible rash  MSK: normal ROM  Ext: WWP  Neuro: CN grossly intact  Psych: alert, normal mood     LABS:  POC HEMOGLOBIN A1c   Date/Time Value Ref Range Status   07/24/2024 04:28 PM 6.7 (A) 4.2 - 6.5 % Final     Hemoglobin A1C   Date/Time Value Ref Range Status   02/12/2024 02:43 PM 6.4 (H) see below % Final   07/10/2023 01:28 PM 6.5 (A) % Final     Comment:          Diagnosis of Diabetes-Adults   Non-Diabetic: < or = 5.6%   Increased risk for developing diabetes: 5.7-6.4%   Diagnostic of diabetes: > or = 6.5%  .       Monitoring of Diabetes                Age (y)     Therapeutic Goal (%)   Adults:          >18           <7.0   Pediatrics:    13-18           <7.5                   7-12           <8.0                   0- 6            7.5-8.5   American Diabetes Association. Diabetes Care 33(S1), Jan 2010.     04/09/2022 05:28 AM 7.0 (H) 4.0 - 5.6 % Final   11/16/2020 03:16 PM 6.4 % Final     Comment:          Diagnosis of Diabetes-Adults   Non-Diabetic: < or = 5.6%   Increased risk for developing diabetes: 5.7-6.4%   Diagnostic of diabetes: > or = 6.5%  .       Monitoring of Diabetes                Age (y)     Therapeutic Goal (%)   Adults:           >18           <7.0   Pediatrics:    13-18           <7.5                   7-12           <8.0                   0- 6            7.5-8.5   American Diabetes Association. Diabetes Care 33(S1), Jan 2010.       Albumin/Creatinine Ratio   Date/Time Value Ref Range Status   02/12/2024 02:43 PM   Final     Comment:     One or more analytes used in this calculation is outside of the analytical measurement range. Calculation cannot be performed.     Cholesterol   Date/Time Value Ref Range Status   07/10/2023 01:28  0 - 199 mg/dL Final     Comment:     .      AGE      DESIRABLE   BORDERLINE HIGH   HIGH     0-19 Y     0 - 169       170 - 199     >/= 200    20-24 Y     0 - 189       190 - 224     >/= 225         >24 Y     0 - 199       200 - 239     >/= 240   **All ranges are based on fasting samples. Specific   therapeutic targets will vary based on patient-specific   cardiac risk.  .   Pediatric guidelines reference:Pediatrics 2011, 128(S5).   Adult guidelines reference: NCEP ATPIII Guidelines,     MICHAEL 2001, 258:2486-97  .   Venipuncture immediately after or during the    administration of Metamizole may lead to falsely   low results. Testing should be performed immediately   prior to Metamizole dosing.       LDL   Date/Time Value Ref Range Status   07/10/2023 01:28 PM 99 0 - 99 mg/dL Final     Comment:     .                           NEAR      BORD      AGE      DESIRABLE  OPTIMAL    HIGH     HIGH     VERY HIGH     0-19 Y     0 - 109     ---    110-129   >/= 130     ----    20-24 Y     0 - 119     ---    120-159   >/= 160     ----      >24 Y     0 -  99   100-129  130-159   160-189     >/=190  .       HDL   Date/Time Value Ref Range Status   07/10/2023 01:28 PM 67.8 mg/dL Final     Comment:     .      AGE      VERY LOW   LOW     NORMAL    HIGH       0-19 Y       < 35   < 40     40-45     ----    20-24 Y       ----   < 40       >45     ----      >24 Y       ----   < 40     40-60      >60  .       Triglycerides    Date/Time Value Ref Range Status   07/10/2023 01:28 PM 93 0 - 149 mg/dL Final     Comment:     .      AGE      DESIRABLE   BORDERLINE HIGH   HIGH     VERY HIGH   0 D-90 D    19 - 174         ----         ----        ----  91 D- 9 Y     0 -  74        75 -  99     >/= 100      ----    10-19 Y     0 -  89        90 - 129     >/= 130      ----    20-24 Y     0 - 114       115 - 149     >/= 150      ----         >24 Y     0 - 149       150 - 199    200- 499    >/= 500  .   Venipuncture immediately after or during the    administration of Metamizole may lead to falsely   low results. Testing should be performed immediately   prior to Metamizole dosing.       FEV1   Date/Time Value Ref Range Status   07/24/2024 03:40 PM 2.39 liters      Comment:     105%        GLUCOSE DATA:  Did not bring meter      ASSESSMENT/PLAN:   Assessment/Plan   64 y.o. female with CF, PI, CFRD x 11 years, HTN with h/o TIA. A1c of 6.7% which is at target. Her A1c levels have never been significantly elevated. Reported fasting glucoses in range. No current evidence of diabetic retinopathy (normal exam 4/18/24) or diabetic kidney disease (DKD) other than HTN.     Screening: normal urine albumin, normal RAKEL. No evidence of DKD now but can have non-proteinuric DKD with decline in GFR aj with HTN. GFR improved from last visit.  HTN on losartan, hydrochlorothiazide, amlodipine--followed by PCP. GFR 83  Normal lipids July 2023 (LDL 99)  Tingling of extremities improved with dietary changes.     Recommend:  --continue basaglar 8 units daily.  --if having surgery, recommend cutting basaglar dose to 4-5 units the night before.  --check some glucoses after lunch/dinner to assess need for mealtime insulin  --annual eye exam due April 2025  --annual urine albumin  --Should follow with PCP to optimize HTN management.   --normal lipids in July 2023    RTC 3 mos   Problem List Items Addressed This Visit             ICD-10-CM    Diabetes mellitus related to CF  (cystic fibrosis) (Multi) E84.8, E08.9                Relevant Orders    POCT glycosylated hemoglobin (Hb A1C) manually resulted (Completed)         Insulin Instructions  Fixed Dose Injections   Basaglar KwikPen U-100 Insulin 100 unit/mL (3 mL) insulin pen   Last edited by Lanie Ambrose MD on 4/24/2024 at 3:47 PM      Time of Day Dose (units)   10 pm 8

## 2024-07-24 NOTE — Clinical Note
Follow up in 3 months with CF Endocrinology In-person (coordinate with Pulm appointment), In-person (CF endo appointment only), or Virtual  [facility]: 1st and 3rd Tuesday Morning 2nd and 4th Wednesday Afternoon

## 2024-07-24 NOTE — PROGRESS NOTES
SW saw pt today. Pt is not feeling well.  Pt is getting a CT scan tomorrow. Pt is having some underlying medical issues and  not eating  as much because she is not feeling well. Pt reported  the last time she had a alcohol was on July 4th. And she had 2 drinks.  Pt said  she   informed her sponsor  Pt working on being transparent with her CF team, herself and her sponsor . Pt is doing CBD and occasional Edibles. Pt seems stressed about her financial state and  contemplating her    past partner  interest in caring for pt . Will request Adult SW to resend  counseling resources.Team has been updated

## 2024-07-25 ENCOUNTER — APPOINTMENT (OUTPATIENT)
Dept: RADIOLOGY | Facility: CLINIC | Age: 64
End: 2024-07-25
Payer: MEDICARE

## 2024-07-25 ENCOUNTER — HOSPITAL ENCOUNTER (OUTPATIENT)
Dept: RADIOLOGY | Facility: CLINIC | Age: 64
Discharge: HOME | End: 2024-07-25
Payer: MEDICARE

## 2024-07-25 DIAGNOSIS — K38.8 MASS OF APPENDIX: ICD-10-CM

## 2024-07-25 PROCEDURE — 74177 CT ABD & PELVIS W/CONTRAST: CPT

## 2024-07-25 PROCEDURE — 2550000001 HC RX 255 CONTRASTS: Performed by: INTERNAL MEDICINE

## 2024-07-25 PROCEDURE — 74177 CT ABD & PELVIS W/CONTRAST: CPT | Performed by: RADIOLOGY

## 2024-07-25 NOTE — PATIENT INSTRUCTIONS
Continue basaglar 8 units daily.  If having surgery/fasting--cut dose to 4-5 units the night before surgery.    Call with questions: [phone]    Follow-up  3 mos

## 2024-07-26 LAB
FUNGUS SPEC CULT: NORMAL
FUNGUS SPEC FUNGUS STN: NORMAL

## 2024-07-29 PROBLEM — E11.9 DIABETES MELLITUS, TYPE 2 (MULTI): Status: RESOLVED | Noted: 2024-07-03 | Resolved: 2024-07-29

## 2024-07-29 NOTE — ASSESSMENT & PLAN NOTE
I will send a note to Dr. Snyder in gastroenterology for feedback on how this should be managed based on abdominopelvic CT scan results this week.

## 2024-07-29 NOTE — ASSESSMENT & PLAN NOTE
We have (+) fungal sputum cultures from 2/12/2024, 4/3/2024, and 7/10/2024.  Drug susceptibility testing performed as recently as this month in case we need to treat.  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  Given that her lung function has actually improved with chest physiotherapy and not treating with an antifungal, I favor continued deferral of antifungal therapy, particularly in light of drug-drug interactions with Trikafta.

## 2024-07-29 NOTE — ASSESSMENT & PLAN NOTE
She will start Trikafta this week.  Pulmonary exacerbation absent.  Continue Pulmozyme twice daily and performing HFCWO with the AffloVest.  I think that her pulmonary function tests are better today because she is doing this bronchopulmonary hygiene regimen.  Xopenex (levalbuterol) has been better for her than racemic albuterol - no tremulousness.  Continue nebulized hypertonic saline 3% twice a day  Surveillance respiratory tract culture today.

## 2024-07-29 NOTE — ASSESSMENT & PLAN NOTE
Suspect that she is having this because of chronic cholecystitis.  Prescribing Zofran today for symptomatic relief.

## 2024-07-29 NOTE — ADDENDUM NOTE
Addended by: FRANCISCO J BROWN on: 7/29/2024 06:49 PM     Modules accepted: Orders, Level of Service

## 2024-07-29 NOTE — ASSESSMENT & PLAN NOTE
Postprandial right upper quadrant pain and nausea and vomiting leading me to question whether she is having biliary colic.  Possible that her fevers are related to chronic cholecystitis.  Will send Dr. Snyder a note asking for input on cholecystectomy decision.

## 2024-07-29 NOTE — ASSESSMENT & PLAN NOTE
Did not tolerate ambulatory treatment with oral linezolid (June 2024)  Ambulatory sick plan: Doxycycline 100 mg by mouth twice daily for 14 days.  Holding off on treatment today given GI upset and improved lung function.

## 2024-07-29 NOTE — ASSESSMENT & PLAN NOTE
Dr. Ambrose evaluated her today.  Plan to continue Basaglar 8 units daily and reduce Basaglar dose to 4-5 units the night before any surgery.  Up-to-date on screening for complications.

## 2024-07-30 ENCOUNTER — TELEPHONE (OUTPATIENT)
Dept: PEDIATRIC PULMONOLOGY | Facility: HOSPITAL | Age: 64
End: 2024-07-30
Payer: MEDICARE

## 2024-07-31 LAB
ACID FAST STN SPEC: NORMAL
MYCOBACTERIUM SPEC CULT: NORMAL

## 2024-08-05 DIAGNOSIS — K21.9 GASTROESOPHAGEAL REFLUX DISEASE WITHOUT ESOPHAGITIS: ICD-10-CM

## 2024-08-05 DIAGNOSIS — R93.5 ABNORMAL CT OF THE ABDOMEN: ICD-10-CM

## 2024-08-05 DIAGNOSIS — K38.8 MASS OF APPENDIX: Primary | ICD-10-CM

## 2024-08-05 DIAGNOSIS — R10.11 RIGHT UPPER QUADRANT ABDOMINAL PAIN: ICD-10-CM

## 2024-08-05 RX ORDER — OMEPRAZOLE 40 MG/1
40 CAPSULE, DELAYED RELEASE ORAL
Qty: 60 CAPSULE | Refills: 1 | Status: SHIPPED | OUTPATIENT
Start: 2024-08-05 | End: 2024-10-04

## 2024-08-07 LAB
ACID FAST STN SPEC: NORMAL
MYCOBACTERIUM SPEC CULT: NORMAL

## 2024-08-11 DIAGNOSIS — I10 PRIMARY HYPERTENSION: ICD-10-CM

## 2024-08-12 RX ORDER — LOSARTAN POTASSIUM 100 MG/1
100 TABLET ORAL DAILY
Qty: 90 TABLET | Refills: 3 | Status: SHIPPED | OUTPATIENT
Start: 2024-08-12

## 2024-08-13 ENCOUNTER — TRANSCRIBE ORDERS (OUTPATIENT)
Dept: RESPIRATORY THERAPY | Facility: HOSPITAL | Age: 64
End: 2024-08-13
Payer: MEDICARE

## 2024-08-13 ENCOUNTER — SPECIALTY PHARMACY (OUTPATIENT)
Dept: PHARMACY | Facility: CLINIC | Age: 64
End: 2024-08-13

## 2024-08-13 DIAGNOSIS — E84.9 CYSTIC FIBROSIS (MULTI): Primary | ICD-10-CM

## 2024-08-13 PROCEDURE — RXMED WILLOW AMBULATORY MEDICATION CHARGE

## 2024-08-14 LAB
ACID FAST STN SPEC: NORMAL
MYCOBACTERIUM SPEC CULT: NORMAL

## 2024-08-15 ENCOUNTER — APPOINTMENT (OUTPATIENT)
Dept: SURGERY | Facility: CLINIC | Age: 64
End: 2024-08-15
Payer: MEDICARE

## 2024-08-15 VITALS
HEART RATE: 60 BPM | BODY MASS INDEX: 22.38 KG/M2 | SYSTOLIC BLOOD PRESSURE: 161 MMHG | DIASTOLIC BLOOD PRESSURE: 74 MMHG | WEIGHT: 121 LBS

## 2024-08-15 DIAGNOSIS — K21.9 GASTROESOPHAGEAL REFLUX DISEASE WITHOUT ESOPHAGITIS: Primary | ICD-10-CM

## 2024-08-15 DIAGNOSIS — R10.11 RIGHT UPPER QUADRANT ABDOMINAL PAIN: ICD-10-CM

## 2024-08-15 DIAGNOSIS — K38.8 MASS OF APPENDIX: ICD-10-CM

## 2024-08-15 PROCEDURE — 3044F HG A1C LEVEL LT 7.0%: CPT | Performed by: SURGERY

## 2024-08-15 PROCEDURE — 99204 OFFICE O/P NEW MOD 45 MIN: CPT | Performed by: SURGERY

## 2024-08-15 PROCEDURE — 3077F SYST BP >= 140 MM HG: CPT | Performed by: SURGERY

## 2024-08-15 PROCEDURE — 4010F ACE/ARB THERAPY RXD/TAKEN: CPT | Performed by: SURGERY

## 2024-08-15 PROCEDURE — 3062F POS MACROALBUMINURIA REV: CPT | Performed by: SURGERY

## 2024-08-15 PROCEDURE — 3078F DIAST BP <80 MM HG: CPT | Performed by: SURGERY

## 2024-08-15 PROCEDURE — 1036F TOBACCO NON-USER: CPT | Performed by: SURGERY

## 2024-08-15 NOTE — PROGRESS NOTES
History Of Present Illness  Mansi Morrison is a 64 y.o. female presenting with right side abdominal pain.  She has cystic fibrosis.  She has 3 distinct problems from my standpoint.  #1 she does have an appendiceal submucosal lesion that was seen on colonoscopy also confirmed on CT scan.  #2 she has this right sided abdominal pain that most likely is secondary to her gallstones or sludge in gallbladder seen on CT scan.  3 she also has chronic history of reflux.  In 2012 at [facility] she had a Nissen fundoplication.  She does have a recurrent paraesophageal hernia seen on the EGD.  She does have a history of dysphagia with this.  She does have reflux is well-controlled.  Interestingly on the upper endoscopy she also has a diverticulum.  She has not had a barium swallow to assess for gross esophageal abnormalities.  On the CT scan he can see this paraesophageal hernia.  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  She had a previous lower midline incision from a hysterectomy for endometriosis.  She had a wound infections of the wound heal by secondary intention that left the scar.  She has recently been started on trifecta for her CF with very good success.  She is a  diabetic.  She also has some pancreatic insufficiency.  The recent CT scans showed pancreatic atrophy.  An MRI was ordered to look at her bile ducts along with the pancreas.  That scheduled in 2 weeks.  She has hypertension.  No history of cardiac problems.     Last Recorded Vitals  Blood pressure 161/74, pulse 60, weight 54.9 kg (121 lb), not currently breastfeeding.  Physical Examination  Awake and alert.  Abdominal examination she has some tenderness in the right side of her abdomen.  She has a lower midline incision.  She has previous laparoscopic incisions from her Nissen.      Relevant Results I reviewed her CT scan along with her recent endoscopy results these results are noted in the shared electronic records      Assessment/Plan 3  specific problems #1.  Periappendiceal mass.  We discussed laparoscopic appendectomy with probable seek ectomy to take this out.  #2 I think her pain is most likely from her gallbladder and we discussed laparoscopic cholecystectomy.  I reviewed risks benefits for that.  And both of these first 2 problems I had pamphlets I discussed with the patient and outlined those with her and her partner.  #3 she does have this dysphagia and reflux history along with recurrent paraesophageal hernia.  That is much more complex operation that I would not do at the same time as her appendix and her cholecystectomy.  We discussed at least getting a barium swallow to assess that.  She may need a manometry in the future.    She agrees with all of this and will plan her surgery accordingly.      Enrrique Canales MD FACS  Professor of Surgery  Tomer Moore Chair in Surgical [place]  [facility] [employer]  [address] [place] [address], [id number]  Phone [phone]  email: [email or web address]

## 2024-08-15 NOTE — H&P (VIEW-ONLY)
History Of Present Illness  Mansi Morrison is a 64 y.o. female presenting with right side abdominal pain.  She has cystic fibrosis.  She has 3 distinct problems from my standpoint.  #1 she does have an appendiceal submucosal lesion that was seen on colonoscopy also confirmed on CT scan.  #2 she has this right sided abdominal pain that most likely is secondary to her gallstones or sludge in gallbladder seen on CT scan.  3 she also has chronic history of reflux.  In 2012 at [facility] she had a Nissen fundoplication.  She does have a recurrent paraesophageal hernia seen on the EGD.  She does have a history of dysphagia with this.  She does have reflux is well-controlled.  Interestingly on the upper endoscopy she also has a diverticulum.  She has not had a barium swallow to assess for gross esophageal abnormalities.  On the CT scan he can see this paraesophageal hernia.  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  She had a previous lower midline incision from a hysterectomy for endometriosis.  She had a wound infections of the wound heal by secondary intention that left the scar.  She has recently been started on trifecta for her CF with very good success.  She is a  diabetic.  She also has some pancreatic insufficiency.  The recent CT scans showed pancreatic atrophy.  An MRI was ordered to look at her bile ducts along with the pancreas.  That scheduled in 2 weeks.  She has hypertension.  No history of cardiac problems.     Last Recorded Vitals  Blood pressure 161/74, pulse 60, weight 54.9 kg (121 lb), not currently breastfeeding.  Physical Examination  Awake and alert.  Abdominal examination she has some tenderness in the right side of her abdomen.  She has a lower midline incision.  She has previous laparoscopic incisions from her Nissen.      Relevant Results I reviewed her CT scan along with her recent endoscopy results these results are noted in the shared electronic records      Assessment/Plan 3  specific problems #1.  Periappendiceal mass.  We discussed laparoscopic appendectomy with probable seek ectomy to take this out.  #2 I think her pain is most likely from her gallbladder and we discussed laparoscopic cholecystectomy.  I reviewed risks benefits for that.  And both of these first 2 problems I had pamphlets I discussed with the patient and outlined those with her and her partner.  #3 she does have this dysphagia and reflux history along with recurrent paraesophageal hernia.  That is much more complex operation that I would not do at the same time as her appendix and her cholecystectomy.  We discussed at least getting a barium swallow to assess that.  She may need a manometry in the future.    She agrees with all of this and will plan her surgery accordingly.      Enrrique Canales MD FACS  Professor of Surgery  Tomer Moore Chair in Surgical [place]  [facility] [employer]  [address] [place] [address], [id number]  Phone [phone]  email: [email or web address]

## 2024-08-15 NOTE — LETTER
August 15, 2024     Latonia Snyder MD  [address]  Department Of Medicine-Gastroenterology  [facility] [address]    Patient: Mansi Morrison   YOB: 1960   Date of Visit: 8/15/2024       Dear Dr. Latonia Snyder MD:    Thank you for referring Mansi Morrison to me for evaluation. Below are my notes for this consultation.  If you have questions, please do not hesitate to call me. I look forward to following your patient along with you.       Sincerely,     Enrrique Canales MD      CC: No Recipients  ______________________________________________________________________________________    History Of Present Illness  Mansi Morrison is a 64 y.o. female presenting with right side abdominal pain.  She has cystic fibrosis.  She has 3 distinct problems from my standpoint.  #1 she does have an appendiceal submucosal lesion that was seen on colonoscopy also confirmed on CT scan.  #2 she has this right sided abdominal pain that most likely is secondary to her gallstones or sludge in gallbladder seen on CT scan.  3 she also has chronic history of reflux.  In 2012 at [facility] she had a Nissen fundoplication.  She does have a recurrent paraesophageal hernia seen on the EGD.  She does have a history of dysphagia with this.  She does have reflux is well-controlled.  Interestingly on the upper endoscopy she also has a diverticulum.  She has not had a barium swallow to assess for gross esophageal abnormalities.  On the CT scan he can see this paraesophageal hernia.  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  She had a previous lower midline incision from a hysterectomy for endometriosis.  She had a wound infections of the wound heal by secondary intention that left the scar.  She has recently been started on trifecta for her CF with very good success.  She is a  diabetic.  She also has some pancreatic insufficiency.  The recent CT scans showed pancreatic atrophy.  An MRI was ordered to look at her  bile ducts along with the pancreas.  That scheduled in 2 weeks.  She has hypertension.  No history of cardiac problems.     Last Recorded Vitals  Blood pressure 161/74, pulse 60, weight 54.9 kg (121 lb), not currently breastfeeding.  Physical Examination  Awake and alert.  Abdominal examination she has some tenderness in the right side of her abdomen.  She has a lower midline incision.  She has previous laparoscopic incisions from her Nissen.      Relevant Results I reviewed her CT scan along with her recent endoscopy results these results are noted in the shared electronic records      Assessment/Plan3 specific problems #1.  Periappendiceal mass.  We discussed laparoscopic appendectomy with probable seek ectomy to take this out.  #2 I think her pain is most likely from her gallbladder and we discussed laparoscopic cholecystectomy.  I reviewed risks benefits for that.  And both of these first 2 problems I had pamphlets I discussed with the patient and outlined those with her and her partner.  #3 she does have this dysphagia and reflux history along with recurrent paraesophageal hernia.  That is much more complex operation that I would not do at the same time as her appendix and her cholecystectomy.  We discussed at least getting a barium swallow to assess that.  She may need a manometry in the future.    She agrees with all of this and will plan her surgery accordingly.      Enrrique Canales MD FACS  Professor of Surgery  Tomer Moore Chair in Surgical [place]  [facility] [employer]  [id number] [place] [address], [address]  Phone [phone]  email: [email or web address]

## 2024-08-23 ENCOUNTER — PHARMACY VISIT (OUTPATIENT)
Dept: PHARMACY | Facility: CLINIC | Age: 64
End: 2024-08-23
Payer: COMMERCIAL

## 2024-08-23 DIAGNOSIS — I10 PRIMARY HYPERTENSION: ICD-10-CM

## 2024-08-23 DIAGNOSIS — K86.81 EXOCRINE PANCREATIC INSUFFICIENCY (HHS-HCC): ICD-10-CM

## 2024-08-23 LAB
ACID FAST STN SPEC: NORMAL
MYCOBACTERIUM SPEC CULT: NORMAL

## 2024-08-23 RX ORDER — HYDROCHLOROTHIAZIDE 25 MG/1
25 TABLET ORAL DAILY
Status: CANCELLED | OUTPATIENT
Start: 2024-08-23

## 2024-08-23 RX ORDER — PANCRELIPASE 24000; 76000; 120000 [USP'U]/1; [USP'U]/1; [USP'U]/1
3-4 CAPSULE, DELAYED RELEASE PELLETS ORAL
Qty: 300 CAPSULE | Refills: 11 | Status: SHIPPED | OUTPATIENT
Start: 2024-08-23

## 2024-08-23 NOTE — TELEPHONE ENCOUNTER
Pulmonary Attending  Cystic Fibrosis Service    Requested Prescriptions     Signed Prescriptions Disp Refills    Creon 24,000-76,000 -120,000 unit capsule 300 capsule 11     Sig: Take 3-4 capsules by mouth 3 times daily (morning, midday, late afternoon). MOUTH WITH MEALS AND TAKE 1-2 CAPSULES WITH SNACKS     Authorizing Provider: NICOLÁS HARRIS     [facility]/pharmacy #[id number] [address] AT [place] [address]  Phone: [phone] Fax: [phone]    Nicolás Harris MD  8/23/2024  2:09 PM

## 2024-08-28 ENCOUNTER — HOSPITAL ENCOUNTER (OUTPATIENT)
Dept: RADIOLOGY | Facility: CLINIC | Age: 64
Discharge: HOME | End: 2024-08-28
Payer: MEDICARE

## 2024-08-28 DIAGNOSIS — R10.11 RIGHT UPPER QUADRANT ABDOMINAL PAIN: ICD-10-CM

## 2024-08-28 DIAGNOSIS — R93.5 ABNORMAL CT OF THE ABDOMEN: ICD-10-CM

## 2024-08-28 LAB
ACID FAST STN SPEC: NORMAL
MYCOBACTERIUM SPEC CULT: NORMAL

## 2024-08-28 PROCEDURE — 2550000001 HC RX 255 CONTRASTS: Performed by: INTERNAL MEDICINE

## 2024-08-28 PROCEDURE — A9575 INJ GADOTERATE MEGLUMI 0.1ML: HCPCS | Performed by: INTERNAL MEDICINE

## 2024-08-28 PROCEDURE — 74183 MRI ABD W/O CNTR FLWD CNTR: CPT

## 2024-08-28 RX ORDER — GADOTERATE MEGLUMINE 376.9 MG/ML
0.2 INJECTION INTRAVENOUS
Status: COMPLETED | OUTPATIENT
Start: 2024-08-28 | End: 2024-08-28

## 2024-09-04 DIAGNOSIS — F51.01 PRIMARY INSOMNIA: ICD-10-CM

## 2024-09-04 RX ORDER — TRAZODONE HYDROCHLORIDE 50 MG/1
100 TABLET ORAL NIGHTLY PRN
Qty: 90 TABLET | Refills: 0 | Status: SHIPPED | OUTPATIENT
Start: 2024-09-04

## 2024-09-04 NOTE — TELEPHONE ENCOUNTER
Lucia called and asked if she could increase her Trazodone to 100 mg. She is really nervous about her surgery on Monday.     Request sent to Dr. Harris to sign.

## 2024-09-04 NOTE — TELEPHONE ENCOUNTER
Pulmonary Attending  Cystic Fibrosis Service    Requested Prescriptions     Signed Prescriptions Disp Refills    traZODone (Desyrel) 50 mg tablet 90 tablet 0     Sig: Take 2 tablets (100 mg) by mouth as needed at bedtime for sleep.     Authorizing Provider: NICOLÁS HARRIS     [facility]/pharmacy #[id number] [address] AT [place] [address]  Phone: [phone] Fax: [phone]      Nicolás Harris MD  9/4/2024  4:36 PM

## 2024-09-09 ENCOUNTER — APPOINTMENT (OUTPATIENT)
Dept: RADIOLOGY | Facility: HOSPITAL | Age: 64
End: 2024-09-09
Payer: MEDICARE

## 2024-09-09 ENCOUNTER — CLINICAL SUPPORT (OUTPATIENT)
Dept: EMERGENCY MEDICINE | Facility: HOSPITAL | Age: 64
End: 2024-09-09
Payer: MEDICARE

## 2024-09-09 ENCOUNTER — HOSPITAL ENCOUNTER (OUTPATIENT)
Facility: HOSPITAL | Age: 64
Setting detail: OUTPATIENT SURGERY
Discharge: STILL ADMITTED OR ADMINISTRATIVE | End: 2024-09-09
Attending: SURGERY | Admitting: SURGERY
Payer: MEDICARE

## 2024-09-09 ENCOUNTER — ANESTHESIA (OUTPATIENT)
Dept: OPERATING ROOM | Facility: HOSPITAL | Age: 64
End: 2024-09-09
Payer: MEDICARE

## 2024-09-09 ENCOUNTER — ANESTHESIA EVENT (OUTPATIENT)
Dept: OPERATING ROOM | Facility: HOSPITAL | Age: 64
End: 2024-09-09
Payer: MEDICARE

## 2024-09-09 ENCOUNTER — HOSPITAL ENCOUNTER (EMERGENCY)
Facility: HOSPITAL | Age: 64
Discharge: HOME | End: 2024-09-09
Attending: EMERGENCY MEDICINE
Payer: MEDICARE

## 2024-09-09 VITALS
HEIGHT: 62 IN | RESPIRATION RATE: 12 BRPM | DIASTOLIC BLOOD PRESSURE: 76 MMHG | SYSTOLIC BLOOD PRESSURE: 175 MMHG | BODY MASS INDEX: 21.53 KG/M2 | WEIGHT: 117 LBS | OXYGEN SATURATION: 95 % | TEMPERATURE: 96.8 F | HEART RATE: 77 BPM

## 2024-09-09 VITALS
RESPIRATION RATE: 19 BRPM | TEMPERATURE: 96.8 F | HEART RATE: 51 BPM | SYSTOLIC BLOOD PRESSURE: 96 MMHG | DIASTOLIC BLOOD PRESSURE: 62 MMHG | OXYGEN SATURATION: 96 %

## 2024-09-09 DIAGNOSIS — R55 SYNCOPE AND COLLAPSE: Primary | ICD-10-CM

## 2024-09-09 DIAGNOSIS — E87.6 HYPOKALEMIA: ICD-10-CM

## 2024-09-09 LAB
ALBUMIN SERPL BCP-MCNC: 3.9 G/DL (ref 3.4–5)
ALP SERPL-CCNC: 63 U/L (ref 33–136)
ALT SERPL W P-5'-P-CCNC: 12 U/L (ref 7–45)
ANION GAP BLDV CALCULATED.4IONS-SCNC: 12 MMOL/L (ref 10–25)
ANION GAP SERPL CALC-SCNC: 12 MMOL/L (ref 10–20)
AST SERPL W P-5'-P-CCNC: 12 U/L (ref 9–39)
BASE EXCESS BLDV CALC-SCNC: -0.7 MMOL/L (ref -2–3)
BASOPHILS # BLD AUTO: 0.02 X10*3/UL (ref 0–0.1)
BASOPHILS NFR BLD AUTO: 0.3 %
BILIRUB SERPL-MCNC: 0.5 MG/DL (ref 0–1.2)
BNP SERPL-MCNC: 19 PG/ML (ref 0–99)
BODY TEMPERATURE: 37 DEGREES CELSIUS
BUN SERPL-MCNC: 26 MG/DL (ref 6–23)
CA-I BLDV-SCNC: 1.15 MMOL/L (ref 1.1–1.33)
CALCIUM SERPL-MCNC: 9.1 MG/DL (ref 8.6–10.6)
CARDIAC TROPONIN I PNL SERPL HS: 4 NG/L (ref 0–34)
CARDIAC TROPONIN I PNL SERPL HS: 4 NG/L (ref 0–34)
CHLORIDE BLDV-SCNC: 107 MMOL/L (ref 98–107)
CHLORIDE SERPL-SCNC: 109 MMOL/L (ref 98–107)
CO2 SERPL-SCNC: 25 MMOL/L (ref 21–32)
CREAT SERPL-MCNC: 0.86 MG/DL (ref 0.5–1.05)
EGFRCR SERPLBLD CKD-EPI 2021: 76 ML/MIN/1.73M*2
EOSINOPHIL # BLD AUTO: 0.14 X10*3/UL (ref 0–0.7)
EOSINOPHIL NFR BLD AUTO: 1.9 %
ERYTHROCYTE [DISTWIDTH] IN BLOOD BY AUTOMATED COUNT: 13.4 % (ref 11.5–14.5)
GLUCOSE BLD MANUAL STRIP-MCNC: 130 MG/DL (ref 74–99)
GLUCOSE BLD MANUAL STRIP-MCNC: 134 MG/DL (ref 74–99)
GLUCOSE BLDV-MCNC: 109 MG/DL (ref 74–99)
GLUCOSE SERPL-MCNC: 109 MG/DL (ref 74–99)
HCO3 BLDV-SCNC: 24.2 MMOL/L (ref 22–26)
HCT VFR BLD AUTO: 33 % (ref 36–46)
HCT VFR BLD EST: 35 % (ref 36–46)
HGB BLD-MCNC: 11.7 G/DL (ref 12–16)
HGB BLDV-MCNC: 11.7 G/DL (ref 12–16)
HOLD SPECIMEN: NORMAL
IMM GRANULOCYTES # BLD AUTO: 0.01 X10*3/UL (ref 0–0.7)
IMM GRANULOCYTES NFR BLD AUTO: 0.1 % (ref 0–0.9)
INHALED O2 CONCENTRATION: 21 %
LACTATE BLDV-SCNC: 0.7 MMOL/L (ref 0.4–2)
LYMPHOCYTES # BLD AUTO: 1.05 X10*3/UL (ref 1.2–4.8)
LYMPHOCYTES NFR BLD AUTO: 13.9 %
MAGNESIUM SERPL-MCNC: 1.88 MG/DL (ref 1.6–2.4)
MCH RBC QN AUTO: 28.9 PG (ref 26–34)
MCHC RBC AUTO-ENTMCNC: 35.5 G/DL (ref 32–36)
MCV RBC AUTO: 82 FL (ref 80–100)
MONOCYTES # BLD AUTO: 0.41 X10*3/UL (ref 0.1–1)
MONOCYTES NFR BLD AUTO: 5.4 %
NEUTROPHILS # BLD AUTO: 5.92 X10*3/UL (ref 1.2–7.7)
NEUTROPHILS NFR BLD AUTO: 78.4 %
NRBC BLD-RTO: 0 /100 WBCS (ref 0–0)
OXYHGB MFR BLDV: 71.7 % (ref 45–75)
PCO2 BLDV: 40 MM HG (ref 41–51)
PH BLDV: 7.39 PH (ref 7.33–7.43)
PHOSPHATE SERPL-MCNC: 3.5 MG/DL (ref 2.5–4.9)
PLATELET # BLD AUTO: 184 X10*3/UL (ref 150–450)
PO2 BLDV: 48 MM HG (ref 35–45)
POTASSIUM BLDV-SCNC: 3 MMOL/L (ref 3.5–5.3)
POTASSIUM SERPL-SCNC: 3.1 MMOL/L (ref 3.5–5.3)
PROT SERPL-MCNC: 6.4 G/DL (ref 6.4–8.2)
RBC # BLD AUTO: 4.05 X10*6/UL (ref 4–5.2)
SAO2 % BLDV: 73 % (ref 45–75)
SODIUM BLDV-SCNC: 140 MMOL/L (ref 136–145)
SODIUM SERPL-SCNC: 143 MMOL/L (ref 136–145)
WBC # BLD AUTO: 7.6 X10*3/UL (ref 4.4–11.3)

## 2024-09-09 PROCEDURE — 70450 CT HEAD/BRAIN W/O DYE: CPT | Performed by: RADIOLOGY

## 2024-09-09 PROCEDURE — 36415 COLL VENOUS BLD VENIPUNCTURE: CPT | Performed by: PHYSICIAN ASSISTANT

## 2024-09-09 PROCEDURE — 73610 X-RAY EXAM OF ANKLE: CPT | Mod: RIGHT SIDE | Performed by: STUDENT IN AN ORGANIZED HEALTH CARE EDUCATION/TRAINING PROGRAM

## 2024-09-09 PROCEDURE — 82947 ASSAY GLUCOSE BLOOD QUANT: CPT | Mod: 59

## 2024-09-09 PROCEDURE — 71046 X-RAY EXAM CHEST 2 VIEWS: CPT

## 2024-09-09 PROCEDURE — 93005 ELECTROCARDIOGRAM TRACING: CPT

## 2024-09-09 PROCEDURE — 83735 ASSAY OF MAGNESIUM: CPT | Performed by: PHYSICIAN ASSISTANT

## 2024-09-09 PROCEDURE — 96360 HYDRATION IV INFUSION INIT: CPT

## 2024-09-09 PROCEDURE — 84100 ASSAY OF PHOSPHORUS: CPT | Performed by: PHYSICIAN ASSISTANT

## 2024-09-09 PROCEDURE — 84132 ASSAY OF SERUM POTASSIUM: CPT | Performed by: PHYSICIAN ASSISTANT

## 2024-09-09 PROCEDURE — 99285 EMERGENCY DEPT VISIT HI MDM: CPT | Mod: 25

## 2024-09-09 PROCEDURE — 73630 X-RAY EXAM OF FOOT: CPT | Mod: RIGHT SIDE | Performed by: STUDENT IN AN ORGANIZED HEALTH CARE EDUCATION/TRAINING PROGRAM

## 2024-09-09 PROCEDURE — 84484 ASSAY OF TROPONIN QUANT: CPT | Performed by: PHYSICIAN ASSISTANT

## 2024-09-09 PROCEDURE — 84132 ASSAY OF SERUM POTASSIUM: CPT | Mod: 59 | Performed by: PHYSICIAN ASSISTANT

## 2024-09-09 PROCEDURE — 71046 X-RAY EXAM CHEST 2 VIEWS: CPT | Performed by: STUDENT IN AN ORGANIZED HEALTH CARE EDUCATION/TRAINING PROGRAM

## 2024-09-09 PROCEDURE — 2500000004 HC RX 250 GENERAL PHARMACY W/ HCPCS (ALT 636 FOR OP/ED): Performed by: PHYSICIAN ASSISTANT

## 2024-09-09 PROCEDURE — 83880 ASSAY OF NATRIURETIC PEPTIDE: CPT | Performed by: PHYSICIAN ASSISTANT

## 2024-09-09 PROCEDURE — 73630 X-RAY EXAM OF FOOT: CPT | Mod: RT

## 2024-09-09 PROCEDURE — 85025 COMPLETE CBC W/AUTO DIFF WBC: CPT | Performed by: PHYSICIAN ASSISTANT

## 2024-09-09 PROCEDURE — 82947 ASSAY GLUCOSE BLOOD QUANT: CPT

## 2024-09-09 PROCEDURE — 70450 CT HEAD/BRAIN W/O DYE: CPT

## 2024-09-09 PROCEDURE — 2500000002 HC RX 250 W HCPCS SELF ADMINISTERED DRUGS (ALT 637 FOR MEDICARE OP, ALT 636 FOR OP/ED): Performed by: PHYSICIAN ASSISTANT

## 2024-09-09 PROCEDURE — 73610 X-RAY EXAM OF ANKLE: CPT | Mod: RT

## 2024-09-09 RX ORDER — METHOCARBAMOL 100 MG/ML
1000 INJECTION, SOLUTION INTRAMUSCULAR; INTRAVENOUS ONCE
Status: CANCELLED | OUTPATIENT
Start: 2024-09-09 | End: 2024-09-09

## 2024-09-09 RX ORDER — FENTANYL CITRATE 50 UG/ML
INJECTION, SOLUTION INTRAMUSCULAR; INTRAVENOUS CONTINUOUS PRN
Status: DISCONTINUED | OUTPATIENT
Start: 2024-09-09 | End: 2024-09-10 | Stop reason: HOSPADM

## 2024-09-09 RX ORDER — PROPOFOL 10 MG/ML
INJECTION, EMULSION INTRAVENOUS CONTINUOUS PRN
Status: DISCONTINUED | OUTPATIENT
Start: 2024-09-09 | End: 2024-09-10 | Stop reason: HOSPADM

## 2024-09-09 RX ORDER — SODIUM CHLORIDE, SODIUM LACTATE, POTASSIUM CHLORIDE, CALCIUM CHLORIDE 600; 310; 30; 20 MG/100ML; MG/100ML; MG/100ML; MG/100ML
100 INJECTION, SOLUTION INTRAVENOUS CONTINUOUS
Status: CANCELLED | OUTPATIENT
Start: 2024-09-09

## 2024-09-09 RX ORDER — HYDROMORPHONE HYDROCHLORIDE 1 MG/ML
0.2 INJECTION, SOLUTION INTRAMUSCULAR; INTRAVENOUS; SUBCUTANEOUS EVERY 5 MIN PRN
Status: CANCELLED | OUTPATIENT
Start: 2024-09-09

## 2024-09-09 RX ORDER — ALBUTEROL SULFATE 0.83 MG/ML
2.5 SOLUTION RESPIRATORY (INHALATION) ONCE AS NEEDED
Status: CANCELLED | OUTPATIENT
Start: 2024-09-09

## 2024-09-09 RX ORDER — OXYCODONE HYDROCHLORIDE 5 MG/1
5 TABLET ORAL EVERY 4 HOURS PRN
Status: CANCELLED | OUTPATIENT
Start: 2024-09-09

## 2024-09-09 RX ORDER — MIDAZOLAM HYDROCHLORIDE 1 MG/ML
INJECTION INTRAMUSCULAR; INTRAVENOUS CONTINUOUS PRN
Status: DISCONTINUED | OUTPATIENT
Start: 2024-09-09 | End: 2024-09-10 | Stop reason: HOSPADM

## 2024-09-09 RX ORDER — HYDROMORPHONE HYDROCHLORIDE 1 MG/ML
0.5 INJECTION, SOLUTION INTRAMUSCULAR; INTRAVENOUS; SUBCUTANEOUS EVERY 5 MIN PRN
Status: CANCELLED | OUTPATIENT
Start: 2024-09-09

## 2024-09-09 RX ORDER — DROPERIDOL 2.5 MG/ML
0.62 INJECTION, SOLUTION INTRAMUSCULAR; INTRAVENOUS ONCE AS NEEDED
Status: CANCELLED | OUTPATIENT
Start: 2024-09-09

## 2024-09-09 RX ORDER — POTASSIUM CHLORIDE 20 MEQ/1
40 TABLET, EXTENDED RELEASE ORAL ONCE
Status: COMPLETED | OUTPATIENT
Start: 2024-09-09 | End: 2024-09-09

## 2024-09-09 RX ORDER — MIDAZOLAM HYDROCHLORIDE 1 MG/ML
1 INJECTION INTRAMUSCULAR; INTRAVENOUS ONCE AS NEEDED
Status: CANCELLED | OUTPATIENT
Start: 2024-09-09

## 2024-09-09 RX ORDER — LIDOCAINE HYDROCHLORIDE 10 MG/ML
0.1 INJECTION, SOLUTION EPIDURAL; INFILTRATION; INTRACAUDAL; PERINEURAL ONCE
Status: CANCELLED | OUTPATIENT
Start: 2024-09-09 | End: 2024-09-09

## 2024-09-09 RX ORDER — MEPERIDINE HYDROCHLORIDE 25 MG/ML
12.5 INJECTION INTRAMUSCULAR; INTRAVENOUS; SUBCUTANEOUS EVERY 10 MIN PRN
Status: CANCELLED | OUTPATIENT
Start: 2024-09-09

## 2024-09-09 RX ORDER — LABETALOL HYDROCHLORIDE 5 MG/ML
5 INJECTION, SOLUTION INTRAVENOUS ONCE AS NEEDED
Status: CANCELLED | OUTPATIENT
Start: 2024-09-09

## 2024-09-09 ASSESSMENT — COLUMBIA-SUICIDE SEVERITY RATING SCALE - C-SSRS
1. IN THE PAST MONTH, HAVE YOU WISHED YOU WERE DEAD OR WISHED YOU COULD GO TO SLEEP AND NOT WAKE UP?: NO
2. HAVE YOU ACTUALLY HAD ANY THOUGHTS OF KILLING YOURSELF?: NO
6. HAVE YOU EVER DONE ANYTHING, STARTED TO DO ANYTHING, OR PREPARED TO DO ANYTHING TO END YOUR LIFE?: NO

## 2024-09-09 ASSESSMENT — PAIN DESCRIPTION - PAIN TYPE: TYPE: ACUTE PAIN

## 2024-09-09 ASSESSMENT — LIFESTYLE VARIABLES
HAVE PEOPLE ANNOYED YOU BY CRITICIZING YOUR DRINKING: NO
HAVE YOU EVER FELT YOU SHOULD CUT DOWN ON YOUR DRINKING: NO
EVER FELT BAD OR GUILTY ABOUT YOUR DRINKING: NO

## 2024-09-09 ASSESSMENT — PAIN SCALES - GENERAL
PAINLEVEL_OUTOF10: 7
PAINLEVEL_OUTOF10: 7
PAINLEVEL_OUTOF10: 5 - MODERATE PAIN

## 2024-09-09 ASSESSMENT — PAIN - FUNCTIONAL ASSESSMENT
PAIN_FUNCTIONAL_ASSESSMENT: 0-10
PAIN_FUNCTIONAL_ASSESSMENT: 0-10

## 2024-09-09 NOTE — ED TRIAGE NOTES
Pt sent to ED from PACU, had a scheduled appy, while in [facility] had a syncopal episode with emesis and was hypotensive. Pt also has right ankle pain, swelling and redness. Pt has been NPO since 1930. Pt arrived with 20g to right hand and NS infusing, awake and alert

## 2024-09-09 NOTE — ED PROVIDER NOTES
"This is a 64-year-old female with a past medical history of cystic fibrosis, diabetes, GERD, hypertension, and appendiceal mass who presents to the ED from outpatient preop for a syncopal episode.  Patient was about to have a laparoscopic cholecystectomy for gallstones as well as appendectomy for this appendiceal mass when she had a syncopal episode.  She states that she had been walking, felt lightheaded and warm and then had a syncopal episode.  The episode was witnessed and was less than 30 seconds.  No witnessed seizure-like activity.  She did lose consciousness.  She did hit her head.  Patient is currently endorsing a headache as well as right ankle pain and she believes she twisted her ankle in the fall.  She denies any associated visual changes, weakness, paresthesias, areas of decrease sensation.  She denied any chest pain or shortness of breath.  Of note patient was initially bradycardic in the 40s, she has no known history of this significant bradycardia.  Patient was also hypotensive upon arrival to the ED with initial blood pressure being 99/62.  She states that she had not eaten or drink anything today due to her scheduled procedure.  Additionally she had a blood glucose in the 130s.  She is an insulin-dependent diabetic and states that she last took her insulin yesterday evening.  She states that she took 7 units of her long-acting.  This is her normal dose.  She denies any other complaints at this time.  She denies ever happening previously.        History provided by:  Patient   used: No             Visit Vitals  /76   Pulse 77   Temp 36 °C (96.8 °F)   Resp 12   Ht 1.575 m (5' 2\")   Wt 53.1 kg (117 lb)   SpO2 95%   BMI 21.40 kg/m²   OB Status Hysterectomy   Smoking Status Never   BSA 1.52 m²          Physical Exam     Physical exam:   General: Vitals noted, no distress. Afebrile.   EENT:  Hearing grossly intact. Normal phonation. MMM. Airway patient. PERRL. EOMI.   Neck: No " midline tenderness or paraspinal tenderness. FROM.   Cardiac: Regular, rate, rhythm. Normal S1 and S2.  No murmurs, gallops, rubs.   Pulmonary: Good air exchange. Lungs clear bilaterally. No wheezes, rhonchi, rales. No accessory muscle use.   Abdomen: Soft, nonsurgical. Nontender. No peritoneal signs. Normoactive bowel sounds.   Back: No CVA tenderness. No midline tenderness or paraspinal tenderness. No obvious deformity.   Extremities: No peripheral edema.  Full range of motion. Moves all extremities freely.  Tenderness palpation to the lateral aspect of the right foot as well as over the lateral malleolus of the right ankle without any obvious deformity or step-off.  Full range of motion of the ankle/foot.  DP and PT pulses full and equal bilaterally.  No other areas of tenderness.  Skin: No rash. Warm and Dry.   Neuro: No focal neurologic deficits. CN 2-12 grossly intact. Sensation equal bilaterally. No weakness.         Labs Reviewed   CBC WITH AUTO DIFFERENTIAL - Abnormal       Result Value    WBC 7.6      nRBC 0.0      RBC 4.05      Hemoglobin 11.7 (*)     Hematocrit 33.0 (*)     MCV 82      MCH 28.9      MCHC 35.5      RDW 13.4      Platelets 184      Neutrophils % 78.4      Immature Granulocytes %, Automated 0.1      Lymphocytes % 13.9      Monocytes % 5.4      Eosinophils % 1.9      Basophils % 0.3      Neutrophils Absolute 5.92      Immature Granulocytes Absolute, Automated 0.01      Lymphocytes Absolute 1.05 (*)     Monocytes Absolute 0.41      Eosinophils Absolute 0.14      Basophils Absolute 0.02     COMPREHENSIVE METABOLIC PANEL - Abnormal    Glucose 109 (*)     Sodium 143      Potassium 3.1 (*)     Chloride 109 (*)     Bicarbonate 25      Anion Gap 12      Urea Nitrogen 26 (*)     Creatinine 0.86      eGFR 76      Calcium 9.1      Albumin 3.9      Alkaline Phosphatase 63      Total Protein 6.4      AST 12      Bilirubin, Total 0.5      ALT 12     BLOOD GAS VENOUS FULL PANEL - Abnormal    POCT pH,  Venous 7.39      POCT pCO2, Venous 40 (*)     POCT pO2, Venous 48 (*)     POCT SO2, Venous 73      POCT Oxy Hemoglobin, Venous 71.7      POCT Hematocrit Calculated, Venous 35.0 (*)     POCT Sodium, Venous 140      POCT Potassium, Venous 3.0 (*)     POCT Chloride, Venous 107      POCT Ionized Calicum, Venous 1.15      POCT Glucose, Venous 109 (*)     POCT Lactate, Venous 0.7      POCT Base Excess, Venous -0.7      POCT HCO3 Calculated, Venous 24.2      POCT Hemoglobin, Venous 11.7 (*)     POCT Anion Gap, Venous 12.0      Patient Temperature 37.0      FiO2 21     POCT GLUCOSE - Abnormal    POCT Glucose 130 (*)    MAGNESIUM - Normal    Magnesium 1.88     PHOSPHORUS - Normal    Phosphorus 3.5     B-TYPE NATRIURETIC PEPTIDE - Normal    BNP 19      Narrative:        <100 pg/mL - Heart failure unlikely  100-299 pg/mL - Intermediate probability of acute heart                  failure exacerbation. Correlate with clinical                  context and patient history.    >=300 pg/mL - Heart Failure likely. Correlate with clinical                  context and patient history.     Biotin interference may cause falsely decreased results. Patients taking a Biotin dose of up to 5 mg/day should refrain from taking Biotin for 24 hours before sample  collection. Providers may contact their local laboratory for further information.   SERIAL TROPONIN-INITIAL - Normal    Troponin I, High Sensitivity (CMC) 4      Narrative:     Less than 99th percentile of normal range cutoff-  Female and children under 18 years old <35 ng/L; Male <54 ng/L: Negative  Repeat testing should be performed if clinically indicated.     Female and children under 18 years old  ng/L; Male  ng/L:  Consistent with possible cardiac damage and possible increased clinical   risk. Serial measurements may help to assess extent of myocardial damage.     >120 ng/L: Consistent with cardiac damage, increased clinical risk and  myocardial infarction. Serial  measurements may help assess extent of   myocardial damage.      NOTE: Children less than 1 year old may have higher baseline troponin   levels and results should be interpreted in conjunction with the overall   clinical context.    NOTE: Troponin I testing is performed using a different   testing methodology at [facility] than at other   [facility]. Direct result comparisons should only   be made within the same method.     SERIAL TROPONIN, 1 HOUR - Normal    Troponin I, High Sensitivity (CMC) 4      Narrative:     Less than 99th percentile of normal range cutoff-  Female and children under 18 years old <35 ng/L; Male <54 ng/L: Negative  Repeat testing should be performed if clinically indicated.     Female and children under 18 years old  ng/L; Male  ng/L:  Consistent with possible cardiac damage and possible increased clinical   risk. Serial measurements may help to assess extent of myocardial damage.     >120 ng/L: Consistent with cardiac damage, increased clinical risk and  myocardial infarction. Serial measurements may help assess extent of   myocardial damage.      NOTE: Children less than 1 year old may have higher baseline troponin   levels and results should be interpreted in conjunction with the overall   clinical context.    NOTE: Troponin I testing is performed using a different   testing methodology at [facility] than at other   [facility]. Direct result comparisons should only   be made within the same method.     TROPONIN SERIES- (INITIAL, 1 HR)    Narrative:     The following orders were created for panel order Troponin I Series, High Sensitivity (0, 1 HR).  Procedure                               Abnormality         Status                     ---------                               -----------         ------                     Troponin I, High Sensiti...[[id number]]  Normal              Final result               Troponin, High  Sensitivi...[[id number]]  Normal              Final result                 Please view results for these tests on the individual orders.       CT head wo IV contrast   Final Result   No acute intracranial hemorrhage, mass effect, or CT apparent acute   infarct.        I personally reviewed the image(s)/study and resident interpretation   as stated by Salvatore Haas MD (Resident Physician). I agree with the   findings as stated. This study was interpreted at [place], [place].        MACRO:   None.        Signed by: Diana Walker 9/9/2024 4:07 PM   Dictation workstation:   [id number]      XR chest 2 views   Final Result   1. No evidence of acute cardiopulmonary process.   2. Moderate-sized hiatal hernia.             I personally reviewed the images/study and I agree with the findings   as stated by Sujit Valdes MD (resident). This study was   interpreted at [place], [place].        MACRO:   None        Signed by: Tiago Sánchez 9/9/2024 3:12 PM   Dictation workstation:   [id number]      XR ankle right 3+ views   Final Result   1. No acute fracture or malalignment.   2. Mild forefoot and midfoot degenerative changes.        MACRO:   None        Signed by: Tiago Sánchez 9/9/2024 3:23 PM   Dictation workstation:   [id number]      XR foot right 3+ views   Final Result   1. No acute fracture or malalignment.   2. Mild forefoot and midfoot degenerative changes.        MACRO:   None        Signed by: Tiago Sánchez 9/9/2024 3:23 PM   Dictation workstation:   [id number]      Point of Care Ultrasound    (Results Pending)           ED Course & MDM     Medical Decision Making  This is a 64-year-old female who presents to the ED after a syncopal episode while awaiting surgery for cholecystectomy as well as appendectomy.  Upon arrival to the ED patient bradycardic to 50 and blood pressure low at 99/62.  Vitals otherwise  within normal limits.  On physical examination she is neurologically intact without deficits.  She was endorsing a headache.  Lungs clear to auscultation.  No audible cardiac murmurs.  No midline C, T, or L-spine tenderness.  No tenderness of the upper extremities.  She did endorse tenderness palpation to the lateral aspect of the right ankle/foot.  CT head ordered as well as x-rays of the patient's chest, ankle, and foot.  Laboratory studies obtained.  EKG ordered.  Patient discussed with the attending physician.  She was ordered 1 L of IV fluids for her symptoms.  Repeat vitals showed improvement of her blood pressure to 121/70 as well as improvement of her heart rate to 52 and then later blood pressure elevated to 175/76 and heart rate elevated to 77 bpm.  CT head showed no acute intercranial abnormality.  X-ray ankle and foot showed no acute fracture or dislocation.  Laboratory studies overall grossly unremarkable other than mild hypokalemia with a potassium of 3.1.  She was ordered oral potassium replacement.  Normal mag.  Normal Phos.  BNP 19.  Troponin normal at 4.  On my reassessment patient was feeling well.  I discussed admission for observation versus discharge with close follow-up.  Patient did not want to be admitted at this time and elected to be discharged home to follow-up with cardiology as an outpatient for further workup of the syncopal episode and her bradycardia.  She was given signs symptoms that she should return to the ED with.  She was advised to follow close with her primary care provider as well.  She was provided with cardiology follow-up information and was discharged from emergency department in stable condition.    Amount and/or Complexity of Data Reviewed  Labs: ordered.  Radiology: ordered and independent interpretation performed.     Details: CT head not any visualized intercranial bleed.  Chest x-ray without visualized pneumonia  ECG/medicine tests: ordered and independent  interpretation performed.     Details: EKG sinus bradycardia 45 bpm without any acute ST elevation or depression.  No T wave inversions.  Normal axis.    Risk  Decision regarding hospitalization.         Diagnoses as of 09/09/24 1831   Syncope and collapse   Hypokalemia       This was a shared visit with an ED attending.  The patient was seen and discussed with the ED attending    Procedures    JAYLEN Caruso, GREGORIA Blackburn PA-C  09/09/24 1831

## 2024-09-09 NOTE — SIGNIFICANT EVENT
10:30  Pt arrived to preop in a wheelchair with staff after rapid called. Resp therapy with patient. Pt in wheelchair with blue emesis bag actively vomiting 2x. Pt self transferred to bed from wheelchair. Pt vitals and blood sugar taken. Anesthesia attending called to the bedside. Surgeon made aware. IV placed and fluids started, and EKG performed per anesthesia attending. Pt in bd with side rails up spouse at bedside. Stated she is feeling better however right ankle feels tight with redness on bottom  foot. Foot elevated and ice pack placed. Awaiting for dr. Canales to clear pt for ED transport

## 2024-09-10 LAB
ATRIAL RATE: 45 BPM
P AXIS: 39 DEGREES
P OFFSET: 194 MS
P ONSET: 144 MS
PR INTERVAL: 168 MS
Q ONSET: 228 MS
QRS COUNT: 8 BEATS
QRS DURATION: 82 MS
QT INTERVAL: 486 MS
QTC CALCULATION(BAZETT): 420 MS
QTC FREDERICIA: 441 MS
R AXIS: 14 DEGREES
T AXIS: 25 DEGREES
T OFFSET: 471 MS
VENTRICULAR RATE: 45 BPM

## 2024-09-13 ENCOUNTER — ANESTHESIA EVENT (OUTPATIENT)
Dept: OPERATING ROOM | Facility: HOSPITAL | Age: 64
End: 2024-09-13
Payer: MEDICARE

## 2024-09-16 ENCOUNTER — HOSPITAL ENCOUNTER (OUTPATIENT)
Facility: HOSPITAL | Age: 64
Setting detail: OUTPATIENT SURGERY
Discharge: HOME | End: 2024-09-16
Attending: SURGERY | Admitting: SURGERY
Payer: MEDICARE

## 2024-09-16 ENCOUNTER — ANESTHESIA (OUTPATIENT)
Dept: OPERATING ROOM | Facility: HOSPITAL | Age: 64
End: 2024-09-16
Payer: MEDICARE

## 2024-09-16 VITALS
RESPIRATION RATE: 12 BRPM | HEIGHT: 62 IN | BODY MASS INDEX: 22.88 KG/M2 | OXYGEN SATURATION: 100 % | DIASTOLIC BLOOD PRESSURE: 75 MMHG | HEART RATE: 59 BPM | TEMPERATURE: 96.8 F | SYSTOLIC BLOOD PRESSURE: 141 MMHG | WEIGHT: 124.34 LBS

## 2024-09-16 DIAGNOSIS — R10.11 RIGHT UPPER QUADRANT ABDOMINAL PAIN: ICD-10-CM

## 2024-09-16 DIAGNOSIS — R11.2 PONV (POSTOPERATIVE NAUSEA AND VOMITING): ICD-10-CM

## 2024-09-16 DIAGNOSIS — Z98.890 PONV (POSTOPERATIVE NAUSEA AND VOMITING): ICD-10-CM

## 2024-09-16 DIAGNOSIS — K38.8 MASS OF APPENDIX: Primary | ICD-10-CM

## 2024-09-16 DIAGNOSIS — K80.20 GALLSTONES: ICD-10-CM

## 2024-09-16 LAB
GLUCOSE BLD MANUAL STRIP-MCNC: 129 MG/DL (ref 74–99)
GLUCOSE BLD MANUAL STRIP-MCNC: 172 MG/DL (ref 74–99)

## 2024-09-16 PROCEDURE — 2780000003 HC OR 278 NO HCPCS: Performed by: SURGERY

## 2024-09-16 PROCEDURE — 47563 LAPARO CHOLECYSTECTOMY/GRAPH: CPT | Performed by: SURGERY

## 2024-09-16 PROCEDURE — 3700000002 HC GENERAL ANESTHESIA TIME - EACH INCREMENTAL 1 MINUTE: Performed by: SURGERY

## 2024-09-16 PROCEDURE — A47563 PR LAP,CHOLECYSTECTOMY/GRAPH: Performed by: ANESTHESIOLOGY

## 2024-09-16 PROCEDURE — 2500000004 HC RX 250 GENERAL PHARMACY W/ HCPCS (ALT 636 FOR OP/ED): Performed by: ANESTHESIOLOGY

## 2024-09-16 PROCEDURE — 3600000004 HC OR TIME - INITIAL BASE CHARGE - PROCEDURE LEVEL FOUR: Performed by: SURGERY

## 2024-09-16 PROCEDURE — 2500000002 HC RX 250 W HCPCS SELF ADMINISTERED DRUGS (ALT 637 FOR MEDICARE OP, ALT 636 FOR OP/ED)

## 2024-09-16 PROCEDURE — 3700000001 HC GENERAL ANESTHESIA TIME - INITIAL BASE CHARGE: Performed by: SURGERY

## 2024-09-16 PROCEDURE — 2500000004 HC RX 250 GENERAL PHARMACY W/ HCPCS (ALT 636 FOR OP/ED)

## 2024-09-16 PROCEDURE — 2720000007 HC OR 272 NO HCPCS: Performed by: SURGERY

## 2024-09-16 PROCEDURE — 7100000002 HC RECOVERY ROOM TIME - EACH INCREMENTAL 1 MINUTE: Performed by: SURGERY

## 2024-09-16 PROCEDURE — 7100000001 HC RECOVERY ROOM TIME - INITIAL BASE CHARGE: Performed by: SURGERY

## 2024-09-16 PROCEDURE — 44970 LAPAROSCOPY APPENDECTOMY: CPT | Performed by: SURGERY

## 2024-09-16 PROCEDURE — 82947 ASSAY GLUCOSE BLOOD QUANT: CPT

## 2024-09-16 PROCEDURE — 2500000005 HC RX 250 GENERAL PHARMACY W/O HCPCS: Performed by: SURGERY

## 2024-09-16 PROCEDURE — 2500000005 HC RX 250 GENERAL PHARMACY W/O HCPCS

## 2024-09-16 PROCEDURE — 3600000009 HC OR TIME - EACH INCREMENTAL 1 MINUTE - PROCEDURE LEVEL FOUR: Performed by: SURGERY

## 2024-09-16 PROCEDURE — 7100000009 HC PHASE TWO TIME - INITIAL BASE CHARGE: Performed by: SURGERY

## 2024-09-16 PROCEDURE — 2500000001 HC RX 250 WO HCPCS SELF ADMINISTERED DRUGS (ALT 637 FOR MEDICARE OP): Performed by: ANESTHESIOLOGY

## 2024-09-16 PROCEDURE — 7100000010 HC PHASE TWO TIME - EACH INCREMENTAL 1 MINUTE: Performed by: SURGERY

## 2024-09-16 RX ORDER — APREPITANT 40 MG/1
40 CAPSULE ORAL ONCE
Status: COMPLETED | OUTPATIENT
Start: 2024-09-16 | End: 2024-09-16

## 2024-09-16 RX ORDER — HYDROMORPHONE HYDROCHLORIDE 1 MG/ML
0.2 INJECTION, SOLUTION INTRAMUSCULAR; INTRAVENOUS; SUBCUTANEOUS EVERY 4 HOURS PRN
Status: DISCONTINUED | OUTPATIENT
Start: 2024-09-16 | End: 2024-09-16 | Stop reason: HOSPADM

## 2024-09-16 RX ORDER — ONDANSETRON HYDROCHLORIDE 2 MG/ML
4 INJECTION, SOLUTION INTRAVENOUS ONCE AS NEEDED
Status: COMPLETED | OUTPATIENT
Start: 2024-09-16 | End: 2024-09-16

## 2024-09-16 RX ORDER — HYDROMORPHONE HYDROCHLORIDE 1 MG/ML
0.2 INJECTION, SOLUTION INTRAMUSCULAR; INTRAVENOUS; SUBCUTANEOUS EVERY 5 MIN PRN
Status: DISCONTINUED | OUTPATIENT
Start: 2024-09-16 | End: 2024-09-16 | Stop reason: HOSPADM

## 2024-09-16 RX ORDER — OXYCODONE HYDROCHLORIDE 5 MG/1
5 TABLET ORAL ONCE
Status: COMPLETED | OUTPATIENT
Start: 2024-09-16 | End: 2024-09-16

## 2024-09-16 RX ORDER — ONDANSETRON 4 MG/1
4 TABLET, ORALLY DISINTEGRATING ORAL EVERY 8 HOURS PRN
Qty: 20 TABLET | Refills: 0 | Status: SHIPPED | OUTPATIENT
Start: 2024-09-16

## 2024-09-16 RX ORDER — SODIUM CHLORIDE 0.9 G/100ML
IRRIGANT IRRIGATION AS NEEDED
Status: DISCONTINUED | OUTPATIENT
Start: 2024-09-16 | End: 2024-09-16 | Stop reason: HOSPADM

## 2024-09-16 RX ORDER — PROPOFOL 10 MG/ML
INJECTION, EMULSION INTRAVENOUS AS NEEDED
Status: DISCONTINUED | OUTPATIENT
Start: 2024-09-16 | End: 2024-09-16

## 2024-09-16 RX ORDER — ROCURONIUM BROMIDE 10 MG/ML
INJECTION, SOLUTION INTRAVENOUS AS NEEDED
Status: DISCONTINUED | OUTPATIENT
Start: 2024-09-16 | End: 2024-09-16

## 2024-09-16 RX ORDER — SODIUM CHLORIDE, SODIUM LACTATE, POTASSIUM CHLORIDE, CALCIUM CHLORIDE 600; 310; 30; 20 MG/100ML; MG/100ML; MG/100ML; MG/100ML
100 INJECTION, SOLUTION INTRAVENOUS CONTINUOUS
Status: DISCONTINUED | OUTPATIENT
Start: 2024-09-16 | End: 2024-09-16 | Stop reason: HOSPADM

## 2024-09-16 RX ORDER — ACETAMINOPHEN 325 MG/1
650 TABLET ORAL EVERY 6 HOURS
Qty: 60 TABLET | Refills: 0 | Status: SHIPPED | OUTPATIENT
Start: 2024-09-16

## 2024-09-16 RX ORDER — MIDAZOLAM HYDROCHLORIDE 1 MG/ML
INJECTION INTRAMUSCULAR; INTRAVENOUS AS NEEDED
Status: DISCONTINUED | OUTPATIENT
Start: 2024-09-16 | End: 2024-09-16

## 2024-09-16 RX ORDER — FENTANYL CITRATE 50 UG/ML
INJECTION, SOLUTION INTRAMUSCULAR; INTRAVENOUS AS NEEDED
Status: DISCONTINUED | OUTPATIENT
Start: 2024-09-16 | End: 2024-09-16

## 2024-09-16 RX ORDER — ONDANSETRON HYDROCHLORIDE 2 MG/ML
INJECTION, SOLUTION INTRAVENOUS AS NEEDED
Status: DISCONTINUED | OUTPATIENT
Start: 2024-09-16 | End: 2024-09-16

## 2024-09-16 RX ORDER — HYDROMORPHONE HYDROCHLORIDE 1 MG/ML
0.5 INJECTION, SOLUTION INTRAMUSCULAR; INTRAVENOUS; SUBCUTANEOUS EVERY 5 MIN PRN
Status: DISCONTINUED | OUTPATIENT
Start: 2024-09-16 | End: 2024-09-16 | Stop reason: HOSPADM

## 2024-09-16 RX ORDER — LIDOCAINE HYDROCHLORIDE 20 MG/ML
INJECTION, SOLUTION INFILTRATION; PERINEURAL AS NEEDED
Status: DISCONTINUED | OUTPATIENT
Start: 2024-09-16 | End: 2024-09-16

## 2024-09-16 RX ORDER — FENTANYL CITRATE 50 UG/ML
25 INJECTION, SOLUTION INTRAMUSCULAR; INTRAVENOUS EVERY 5 MIN PRN
Status: DISCONTINUED | OUTPATIENT
Start: 2024-09-16 | End: 2024-09-16 | Stop reason: HOSPADM

## 2024-09-16 RX ORDER — LABETALOL HYDROCHLORIDE 5 MG/ML
5 INJECTION, SOLUTION INTRAVENOUS ONCE AS NEEDED
Status: DISCONTINUED | OUTPATIENT
Start: 2024-09-16 | End: 2024-09-16 | Stop reason: HOSPADM

## 2024-09-16 RX ORDER — WATER 1 ML/ML
IRRIGANT IRRIGATION AS NEEDED
Status: DISCONTINUED | OUTPATIENT
Start: 2024-09-16 | End: 2024-09-16 | Stop reason: HOSPADM

## 2024-09-16 RX ORDER — BUPIVACAINE HCL/EPINEPHRINE 0.5-1:200K
VIAL (ML) INJECTION AS NEEDED
Status: DISCONTINUED | OUTPATIENT
Start: 2024-09-16 | End: 2024-09-16 | Stop reason: HOSPADM

## 2024-09-16 RX ORDER — METHOCARBAMOL 500 MG/1
500 TABLET, FILM COATED ORAL 4 TIMES DAILY PRN
Qty: 20 TABLET | Refills: 0 | Status: SHIPPED | OUTPATIENT
Start: 2024-09-16

## 2024-09-16 RX ORDER — CEFAZOLIN 1 G/1
INJECTION, POWDER, FOR SOLUTION INTRAVENOUS AS NEEDED
Status: DISCONTINUED | OUTPATIENT
Start: 2024-09-16 | End: 2024-09-16

## 2024-09-16 RX ORDER — OXYCODONE HYDROCHLORIDE 5 MG/1
5 TABLET ORAL EVERY 6 HOURS PRN
Qty: 10 TABLET | Refills: 0 | Status: SHIPPED | OUTPATIENT
Start: 2024-09-16

## 2024-09-16 RX ORDER — HYDRALAZINE HYDROCHLORIDE 20 MG/ML
5 INJECTION INTRAMUSCULAR; INTRAVENOUS EVERY 30 MIN PRN
Status: DISCONTINUED | OUTPATIENT
Start: 2024-09-16 | End: 2024-09-16 | Stop reason: HOSPADM

## 2024-09-16 RX ORDER — ALBUTEROL SULFATE 0.83 MG/ML
2.5 SOLUTION RESPIRATORY (INHALATION) ONCE AS NEEDED
Status: DISCONTINUED | OUTPATIENT
Start: 2024-09-16 | End: 2024-09-16 | Stop reason: HOSPADM

## 2024-09-16 RX ORDER — KETOROLAC TROMETHAMINE 30 MG/ML
30 INJECTION, SOLUTION INTRAMUSCULAR; INTRAVENOUS ONCE
Status: COMPLETED | OUTPATIENT
Start: 2024-09-16 | End: 2024-09-16

## 2024-09-16 RX ORDER — METHOCARBAMOL 100 MG/ML
1000 INJECTION, SOLUTION INTRAMUSCULAR; INTRAVENOUS EVERY 6 HOURS PRN
Status: DISCONTINUED | OUTPATIENT
Start: 2024-09-16 | End: 2024-09-16 | Stop reason: HOSPADM

## 2024-09-16 RX ORDER — HYDROMORPHONE HYDROCHLORIDE 1 MG/ML
INJECTION, SOLUTION INTRAMUSCULAR; INTRAVENOUS; SUBCUTANEOUS CONTINUOUS PRN
Status: DISCONTINUED | OUTPATIENT
Start: 2024-09-16 | End: 2024-09-16

## 2024-09-16 RX ORDER — METHOCARBAMOL 750 MG/1
750 TABLET, FILM COATED ORAL EVERY 6 HOURS PRN
Status: DISCONTINUED | OUTPATIENT
Start: 2024-09-16 | End: 2024-09-16 | Stop reason: HOSPADM

## 2024-09-16 SDOH — HEALTH STABILITY: MENTAL HEALTH: CURRENT SMOKER: 0

## 2024-09-16 ASSESSMENT — PAIN SCALES - GENERAL
PAINLEVEL_OUTOF10: 7
PAINLEVEL_OUTOF10: 6
PAINLEVEL_OUTOF10: 7
PAINLEVEL_OUTOF10: 8
PAINLEVEL_OUTOF10: 6
PAINLEVEL_OUTOF10: 5 - MODERATE PAIN
PAINLEVEL_OUTOF10: 6
PAINLEVEL_OUTOF10: 10 - WORST POSSIBLE PAIN
PAINLEVEL_OUTOF10: 6
PAINLEVEL_OUTOF10: 7

## 2024-09-16 ASSESSMENT — COLUMBIA-SUICIDE SEVERITY RATING SCALE - C-SSRS
2. HAVE YOU ACTUALLY HAD ANY THOUGHTS OF KILLING YOURSELF?: NO
6. HAVE YOU EVER DONE ANYTHING, STARTED TO DO ANYTHING, OR PREPARED TO DO ANYTHING TO END YOUR LIFE?: NO
1. IN THE PAST MONTH, HAVE YOU WISHED YOU WERE DEAD OR WISHED YOU COULD GO TO SLEEP AND NOT WAKE UP?: NO

## 2024-09-16 ASSESSMENT — PAIN - FUNCTIONAL ASSESSMENT
PAIN_FUNCTIONAL_ASSESSMENT: 0-10

## 2024-09-16 NOTE — DISCHARGE INSTRUCTIONS
Okay to shower on postoperative day 2. No baths/swimming/submerging incisions until follow up appointment. Your incisions are closed with steri-strips which will fall off on their own.

## 2024-09-16 NOTE — ANESTHESIA PREPROCEDURE EVALUATION
Patient: Mansi Morrison    Procedure Information       Date/Time: 09/16/24 0700    Procedures:       Appendectomy Laparoscopy (Abdomen)      Cholecystectomy Laparoscopy with Cholangiogram (Abdomen)    Location: [facility] OR 01 / [facility] OR    Surgeons: Enrrique Canales MD            Relevant Problems   Anesthesia   (+) PONV (postoperative nausea and vomiting)      Cardiac   (+) Primary hypertension      Pulmonary   (+) Pneumonia      Neuro   (+) Anxiety   (+) BRYON (generalized anxiety disorder)   (+) Peripheral polyneuropathy      GI   (+) Esophageal dysphagia   (+) Gastroesophageal reflux disease without esophagitis   (+) Hiatal hernia      Liver   (+) Calculus of gallbladder without cholecystitis without obstruction   (+) Chronic pancreatitis (Multi)      Endocrine   (+) Diabetic nephropathy associated with diabetes mellitus due to underlying condition (Multi)      Hematology   (+) Anemia      ID   (+) Intestinal bacterial overgrowth   (+) MRSA (methicillin resistant Staphylococcus aureus) infection   (+) Scedosporiosis (Multi)       Clinical information reviewed:   Tobacco  Allergies  Meds   Med Hx  Surg Hx   Fam Hx  Soc Hx        NPO Detail:  NPO/Void Status  Carbohydrate Drink Given Prior to Surgery? : N  Date of Last Liquid: 09/15/24  Time of Last Liquid: 1830  Date of Last Solid: 09/15/24  Time of Last Solid: 1830  Last Intake Type: Light meal  Time of Last Void: 0636         Physical Exam    Airway  Mallampati: III  TM distance: >3 FB     Cardiovascular   Rhythm: regular     Dental - normal exam     Pulmonary    Abdominal        Anesthesia Plan    History of general anesthesia?: yes  History of complications of general anesthesia?: no    ASA 3     general     The patient is not a current smoker.    intravenous induction   Anesthetic plan and risks discussed with patient.  Use of blood products discussed with patient who consented to blood products.    Plan discussed with attending.

## 2024-09-16 NOTE — ANESTHESIA PROCEDURE NOTES
Airway  Date/Time: 9/16/2024 7:24 AM  Urgency: elective    Airway not difficult    Staffing  Performed: resident   Authorized by: Valentin Nicole MD    Performed by: August Muñoz MD  Patient location during procedure: OR    Indications and Patient Condition  Indications for airway management: anesthesia and airway protection  Spontaneous ventilation: present  Sedation level: deep  Preoxygenated: yes  Patient position: sniffing  Mask difficulty assessment: 1 - vent by mask    Final Airway Details  Final airway type: endotracheal airway      Successful airway: ETT  Cuffed: yes   Successful intubation technique: direct laryngoscopy  Facilitating devices/methods: intubating stylet  Endotracheal tube insertion site: oral  Blade: Cheyanne  Blade size: #3  ETT size (mm): 7.0  Cormack-Lehane Classification: grade IIa - partial view of glottis  Placement verified by: chest auscultation and capnometry   Measured from: lips  ETT to lips (cm): 22  Number of attempts at approach: 2

## 2024-09-16 NOTE — H&P
"History Of Present Illness  Mansi Morrison is a 64 y.o. female presenting with two distinct problems. 1- periappendiceal mass. 2 .gallbladder stones and sludge with right upper quadrant pain.  Previous nissen with recurrence. 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    Surgical History  She has a past surgical history that includes Other surgical history (12/04/2013) and Hysterectomy (05/27/2016).     Social History  She reports that she has never smoked. She has never been exposed to tobacco smoke. She has never used smokeless tobacco. She reports that she does not currently use alcohol. She reports current drug use. Frequency: 2.00 times per week. Drug: Marijuana.    Family History  Family History   Problem Relation Name Age of Onset    Other (CF Carrier) Mother      Other (CF Carrier) Father      Colon cancer Father      Esophageal cancer Neg Hx      Stomach cancer Neg Hx          Allergies  Codeine, Linezolid, Lexapro [escitalopram oxalate], Lisinopril, and Moxifloxacin    Review of Systems     Physical Exam  Awake and alert. Respiration normal.  Abdomen has scars nontender.     Last Recorded Vitals  Blood pressure 134/74, pulse (!) 47, temperature 36.6 °C (97.9 °F), temperature source Temporal, resp. rate 16, height 1.575 m (5' 2\"), weight 56.4 kg (124 lb 5.4 oz), SpO2 96%, not currently breastfeeding.    Assessment/Plan   Appendiceal mass and gallblader stones and sludge  Plan lap appendectomy and cholecystectomy  Assessment & " Plan          Enrrique Canales MD

## 2024-09-16 NOTE — PERIOPERATIVE NURSING NOTE
At bedside speaking with pt. Pt 7/10 pain. Toradol ordered and given as ordered. Pt is able to rest off and on with eyes closed. Family a bedside.

## 2024-09-16 NOTE — OP NOTE
Appendectomy Laparoscopy, Cholecystectomy Laparoscopy with Cholangiogram Operative Note     Date: 2024  OR Location: [facility] OR    Name: Mansi Morrison, : 1960, Age: 64 y.o., MRN: [id number], Sex: female    Diagnosis  Pre-op Diagnosis      * Mass of appendix [K38.8]     * Right upper quadrant abdominal pain [R10.11] Post-op Diagnosis     * Mass of appendix [K38.8]     * Right upper quadrant abdominal pain [R10.11]     * Gallstones [K80.20]     Procedures  Appendectomy Laparoscopy  34576 - [place] LAPAROSCOPIC APPENDECTOMY    Cholecystectomy Laparoscopy with Cholangiogram  38023 - [place] LAPS SURG CHOLECYSTECTOMY W/CHOLANGIOGRAPHY      Surgeons      * Enrrique Canales - Primary    Resident/Fellow/Other Assistant:  Surgeons and Role:  * No surgeons found with a matching role *    Procedure Summary  Anesthesia: General  ASA: III  Anesthesia Staff: Anesthesiologist: Valentin Nicole MD  Anesthesia Resident: August Muñoz MD  Estimated Blood Loss: 15mL  Intra-op Medications:   Administrations occurring from 0700 to 0810 on 24:   Medication Name Total Dose   sodium chloride 0.9 % irrigation solution 1,000 mL   sterile water irrigation solution 500 mL   aprepitant (Emend) capsule 40 mg 40 mg              Anesthesia Record               Intraprocedure I/O Totals       None           Specimen:   ID Type Source Tests Collected by Time   1 : Appendix Tissue APPENDIX SURGICAL PATHOLOGY EXAM Enrrique Canales MD 2024 0806   2 : Gallbladder Tissue GALLBLADDER CHOLECYSTECTOMY SURGICAL PATHOLOGY EXAM Enrrique Canales MD 2024 0807        Staff:   Circulator: Maru Sellersub Person: Ki         Drains and/or Catheters: * None in log *    Tourniquet Times:         Implants:     Findings: Normal cholangiogram.Lower midline adhesions    Indications: Mansi Morrison is an 64 y.o. female who is having surgery for Mass of appendix [K38.8]  Right upper quadrant abdominal pain [R10.11].     The patient was seen in  the preoperative area. The risks, benefits, complications, treatment options, non-operative alternatives, expected recovery and outcomes were discussed with the patient. The possibilities of reaction to medication, pulmonary aspiration, injury to surrounding structures, bleeding, recurrent infection, the need for additional procedures, failure to diagnose a condition, and creating a complication requiring transfusion or operation were discussed with the patient. The patient concurred with the proposed plan, giving informed consent.  The site of surgery was properly noted/marked if necessary per policy. The patient has been actively warmed in preoperative area. Preoperative antibiotics have been ordered and given within 1 hours of incision. Venous thrombosis prophylaxis have been ordered including bilateral sequential compression devices    Procedure Details: Patient was brought to operating room.  General anesthesia endotracheal anesthesia was given.  Patient's abdomen is prepped and draped.  Using Varma technique at her umbilicus her abdomen was insufflated after being entered.  There was omental adhesions to her lower midline incision.  Placed 2 5 trocar upper quadrant.  Left upper gallbladder.  Identified the cystic duct and cystic artery.  Did a critical view and a pause.  I then clipped and divided the cystic duct and cystic artery.  Remove the gallbladder from gallbladder bed with electrocautery.  I then did a 7.5 MHz ultrasound cholangiogram.  The bile duct was at 1 cm but there is no stones or other abnormalities.  Then turned my attention to her appendix.  I took down the omental adhesions with the harmonic scalpel.  I lifted up her appendix.  Took the mesentery with the harmonic scalpel.  Once we mobilized the cecum I identified the ileocecal valve.  I placed a stapler to preserve the ileocecal cecal valve.  That way would take the cecum with appendix with a fire of 6 cm stapler.  We had to do 1 more  fire.  I then put the appendix in the gallbladder in Endobag.  It is of note I saw no bleeding from either of these 2 sites.  I removed the specimens to the umbilical trocar site.  Closed the fascial defect umbilicus 0 Vicryl.  Skin incisions with 4-0 Vicryl.  Complications:  None; patient tolerated the procedure well.    Disposition: PACU - hemodynamically stable.  Condition: stable       Attending Attestation: I was present and scrubbed for the entire procedure.    Enrrique Canales  Phone Number: [phone]

## 2024-09-16 NOTE — ANESTHESIA PROCEDURE NOTES
Peripheral IV  Date/Time: 9/16/2024 7:12 AM      Placement  Needle size: 20 G  Laterality: right  Location: hand  Local anesthetic: injectable  Site prep: alcohol  Technique: anatomical landmarks  Attempts: 1

## 2024-09-16 NOTE — ANESTHESIA POSTPROCEDURE EVALUATION
Patient: Mansi Morrison    Procedure Summary       Date: 09/16/24 Room / Location: [facility] OR 01 / Virtual [facility] OR    Anesthesia Start: 0714 Anesthesia Stop: 0834    Procedures:       Appendectomy Laparoscopy (Abdomen)      Cholecystectomy Laparoscopy with Cholangiogram (Abdomen) Diagnosis:       Mass of appendix      Right upper quadrant abdominal pain      (Mass of appendix [K38.8])      (Right upper quadrant abdominal pain [R10.11])    Surgeons: Enrirque Canales MD Responsible Provider: Valentin Nicole MD    Anesthesia Type: general ASA Status: 3            Anesthesia Type: general    Vitals Value Taken Time   /79 09/16/24 0834   Temp 36.0 09/16/24 0834   Pulse 69 09/16/24 0834   Resp 13 09/16/24 0834   SpO2 100 % 09/16/24 0834       Anesthesia Post Evaluation    Patient location during evaluation: PACU  Patient participation: complete - patient participated  Level of consciousness: awake and alert  Pain management: satisfactory to patient  Multimodal analgesia pain management approach  Airway patency: patent  Two or more strategies used to mitigate risk of obstructive sleep apnea  Cardiovascular status: blood pressure returned to baseline  Respiratory status: acceptable  Hydration status: acceptable  Postoperative Nausea and Vomiting: none      There were no known notable events for this encounter.

## 2024-09-27 LAB
LAB AP ASR DISCLAIMER: NORMAL
LABORATORY COMMENT REPORT: NORMAL
PATH REPORT.FINAL DX SPEC: NORMAL
PATH REPORT.GROSS SPEC: NORMAL
PATH REPORT.RELEVANT HX SPEC: NORMAL
PATH REPORT.TOTAL CANCER: NORMAL

## 2024-10-01 ENCOUNTER — OFFICE VISIT (OUTPATIENT)
Dept: SURGERY | Facility: CLINIC | Age: 64
End: 2024-10-01
Payer: MEDICARE

## 2024-10-01 VITALS
BODY MASS INDEX: 21.16 KG/M2 | SYSTOLIC BLOOD PRESSURE: 143 MMHG | HEART RATE: 56 BPM | DIASTOLIC BLOOD PRESSURE: 84 MMHG | WEIGHT: 115 LBS | TEMPERATURE: 97.3 F | HEIGHT: 62 IN | RESPIRATION RATE: 16 BRPM

## 2024-10-01 DIAGNOSIS — K38.8 MASS OF APPENDIX: Primary | ICD-10-CM

## 2024-10-01 PROCEDURE — 3079F DIAST BP 80-89 MM HG: CPT | Performed by: SURGERY

## 2024-10-01 PROCEDURE — 3008F BODY MASS INDEX DOCD: CPT | Performed by: SURGERY

## 2024-10-01 PROCEDURE — 3044F HG A1C LEVEL LT 7.0%: CPT | Performed by: SURGERY

## 2024-10-01 PROCEDURE — 3062F POS MACROALBUMINURIA REV: CPT | Performed by: SURGERY

## 2024-10-01 PROCEDURE — 4010F ACE/ARB THERAPY RXD/TAKEN: CPT | Performed by: SURGERY

## 2024-10-01 PROCEDURE — 1036F TOBACCO NON-USER: CPT | Performed by: SURGERY

## 2024-10-01 PROCEDURE — 99211 OFF/OP EST MAY X REQ PHY/QHP: CPT | Performed by: SURGERY

## 2024-10-01 PROCEDURE — 3077F SYST BP >= 140 MM HG: CPT | Performed by: SURGERY

## 2024-10-01 ASSESSMENT — PAIN SCALES - GENERAL: PAINLEVEL: 2

## 2024-10-01 NOTE — PROGRESS NOTES
"History Of Present Illness  Mansi Morrison is a 64 y.o. female presenting status post laparoscopic appendectomy and cholecystectomy.  The pathology of her appendix was endometriosis.  Which is good because her worried but some with her mass in this appendix.  Gallbladder showed chronic inflammation.  She is doing fine without much pain.  Just a little loss of her usual spunk she says.      Last Recorded Vitals  Blood pressure 143/84, pulse 56, temperature 36.3 °C (97.3 °F), resp. rate 16, height 1.575 m (5' 2\"), weight 52.2 kg (115 lb), not currently breastfeeding.  Physical Exam incisions well-healed.      Assessment/Plan   She is done well from her surgeries.  She can resume all of her activities.  She will follow-up as needed.    Enrrique Canales MD FACS  Professor of Surgery  [facility] in Surgical [place]  [facility] [employer]  [address] [place] [address], [id number]  Phone [phone]  email: [email or web address]        "

## 2024-10-04 NOTE — PROGRESS NOTES
Mason Fontanez M.D. 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 Historically normal lung function (ppFEV1 100-106%) so Trikafta deferred. 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 EPI on PERT. Constipation.  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  Other problems: alcohol use disorder (AA); GERD with (+) BRAVO pH probe (7/3/2024), 2 cm hiatal hernia and paraesophageal hernia at EGD; osteopenia; generalized anxiety disorder (2017); poorly-controlled hypertension, insomnia treated with trazodone    HPI (10/9/2024): Had episode of syncope on 9/9/2024 after overnight fast for surgery.  Sent to ED and had negative work-up.  Dr. Dirk Borges from cardiology at [facility] saw Lucia in clinic on 10/7/2024 and concluded that she experienced orthostatic hypotension in face of antihypertensive medications with vagal reaction.  Recommended resumption of amlodipine but avoidance of hydrochlorothiazide.  Laparoscopic appendectomy and cholecystectomy with Dr. Canales on 9/16/2024.  Surgical pathology showed:  A.  Appendix, appendectomy:  Patchy acute appendicitis with partial fibrous obliteration of the tip and endometriosis.  See note.  NOTE: Multiple sections show foci of endometriosis confirmed by immunostains estrogen receptor and PAX-8 (blocks A1 and A2).  B.  Gallbladder, cholecystectomy:  Chronic cholecystitis with cholelithiasis.  Since starting Trikafta, has essentially no cough or sputum expectoration.  Energy level is better.  Feels a bit guilty for not doing Pulmozyme, but she does not think it does anything for her now.  Nonetheless, she has experienced MAJOR mood disturbance since starting Trikafta.  (+) vegetative features, (+) anhedonia, (+) sadness, (+) sleeping longer than usual, (+) worried about relapsing with alcohol use.  Denies suicidal or homicidal ideation.  Reports  remote diagnosis of ADHD and recent worsening of executive functioning and short-term memory.    HPI (7/24/2024): As of today's visit she had not yet started taking Trikafta.  Needed to fill out [employer] enrollment form.  Per Qiana Naidu, CF pharmacist, medication currently no pay at [facility].  Scheduled for abdominopelvic CT scan tomorrow.  Has been doing AffloVEST and Pulmozyme with subjective improvement in mucus burden.  Still having fatigue, body aches, and subjective fever usually at night.  She has also been having some postprandial vomiting and right upper quadrant pain.  The pain has a colicky character.  Acknowledges drinking 1 alcoholic beverage on 7/4/2024.    HPI (7/10/2024): Telephone call on 6/12/2024 to report 3 days of fevers and body aches, chest heaviness, dyspnea, and cough.  Fatigue prominent.  Had started taking doxycycline 100 mg twice daily 3 days earlier.  Some discussion in chart about switching her to Bactrim DS but ultimately patient started to feel better on doxycycline.  We had considered linezolid for MRSA coverage but DDI with trazodone (small risk of serotonin syndrome), ultimately tried it anyway, and she had diarrhea, so stopped it after 3-4 days.  She subsequently had EGD with Bravo pH probe and colonoscopy with Dr. Snyder on 7/3/2024 (results below).  Multiple findings, but notably mild GERD and temporal association with symptoms, 2 cm hiatal hernia, paraesophageal hernia, several subcentimeter colon polyps (removed, pathology pending). (+) esophageal dysphagia (bite of bagel for example).  Still having episodes of feeling feverish, (++) fatigue.  Says that she is taking all of the anti-hypertensive medications.  Recent headaches.  Does not feel pulse pounding in her ears.  No photophobia, hyperacusis.     HPI (5/29/2024): Unaccompanied.  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  Has been doing the aerosolized mucoactive medications and  "HFCWO with The Vest, although burdensome.  Still has not started Trikafta.  She had one alcoholic beverage on Memorial Day.  I asked her how she managed to stop at one, and she said that she had her support system and felt guilty about it.  Generally more anxious.  Had telehealth visit with Dr. Ambrose in April.  Plan to continue Basaglar 8 units daily and perform prandial FSG measurements.  She felt feverish again a couple times since last visit.  Sputum is light yellow, no hemoptysis.  Not expectorating all the time.     HPI (5/1/2024): Patient recently had chest CT scan done to work-up more frequent pulmonary exacerbations over the past 6-12 months and evaluate lung parenchyma in light of (+) sputum cultures for Scedosporium boydii on 2/12/2024 and 4/3/2024.  The lung parenchyma has changes consistent with CF bronchiectasis. No cavitary fungal disease.  The literature pertaining to Scedosporium boydii infection is largely populated by case-series and retrospective case-control studies (and their attendant limitations to causal inference).  This fungal species is not clearly associated with worse health outcomes in people with CF.  The more curious finding on the scan is the hiatal hernia.  This is not new, but my question now is whether she is having frequent and perhaps subclinical gastroesophageal reflux and/or aspiration events causing symptoms that \"look like\" a CF pulmonary exacerbation.  She had an EGD in May 2016 by Dr. Greenberg.  The study showed diverticulum in lower third of esophagus, Nissen fundoplication, antral gastritis, and erosive gastropathy in the fundus.  I recommend evaluation by Dr. Latonia Snyder at an upcoming CF clinic with question of whether she agrees with the aforementioned hypothesis and recommendations for diagnosis and treatment.     HPI (4/17/2024): Two-week sick follow-up.  Lucia met with Kervin Sanchez on 4/3/2024.  Symptoms as below.  They discussed the sputum culture in February " "2024 (+) for Scedosporium.  Another sputum sample was collected on 4/3/2024, and it also grew Scedosporium and Candida albicans.  CT chest ordered.  Treated with 2-week course of doxycycline targeting MRSA. Due to the possibility of starting Trikafta, hepatic function panel and phosphatidylethanol (Peth) were checked, and the latter suggested recent alcohol ingestion.  Lucia subsequently acknowledged a relapse, now has counseling and sponsor contact.  Today, Lucia says that she never started the nebulized 3% hypertonic saline; took doxycycline course prescribed by Kervin Sanchez; ended 4/12/2024; felt a lot better after the doxycycline, but she felt unusually fatigued yesterday afternoon, which is unlike her. Denies hemoptysis, fevers, chills, pleuritic chest pain.  Occasional wheezing alleviated by Xopenex.  Sputum is light yellow but less voluminous.  She has been doing HFCWO, but she has one of the first Hill-ROM \"The Vest\" models. Also doing Pulmozyme twice daily.  Blood sugars have been pretty good.  Reports -120 before breakfast.  Needs to see Dr. Ambrose for routine follow-up.    HPI (4/4/2024, Kervin Sanchez): \"Since December she has been sick. Once she gets off the abx, she starts getting sick about a week later. Gets a high fever, headache, trouble breathing and feels very fatigued. Fever gets up to 103, then breaks after a day.  All last week she was in bed. Lungs are filling up. Can't breathe. Recently, she started using Pulmozyme and her vest. Has also noticed pain in her joints when she feels sick. Feels mucous is stuck and lungs feel sticky. Something in there and she can't cough it up. Adding the doxycyline stopped her from coughing up as much. Was able to work all day the other day which is an improvement. Did not start Trikafta because she was waiting to see if she continues getting sick.\"    HPI (2/12/2024): ED at [facility] on 11/17/2023 with hypertensive urgency (SBP ~200 at home, /94 " documented); had headache and chest pain; troponin, BNP, EKG (sinus bradycardia), CXR were unremarkable.  Saw PCP, tried amlodipine added to the losartan, was having symptomatic hypotension, switched to hydrochlorothiazide plus losartan and stopped the amlodipine, much better.  Started seeing new therapist after last visit, very helpful, good relationship.  Says that she has been sober for 3 months and 4 days.  Brought on a colleague to help with work.  Had influenza diagnosed by NP rapid-antigen test at a pharmacy, around 12/20/2023. Felt really poorly, had fever, cough, post-tussive emesis, dyspnea, anorexia. Started doing Pulmozyme and HFCWO again on most days. Prescribed course of doxycycline. Since that infection, however, generally feels more central chest mucus and pressure. Because ppFEV1 was also down today, she is concerned about deteriorating lung function. Generally feels tired since the influenza. Weight down 1.3 kg since August 2023.     HPI (11/8/2023): Plan at last visit was to increase losartan to 100 mg/day, home BP monitoring. Discussed referral to psychiatry to manage anxiety and depression. Increased trazodone to 100 mg at HS for insomnia. Needed to stick with MiraLAX for prophylaxis. Had poison ivy and put on brief course of prednisone.  Few weeks ago had relapse of drinking. Went out to bar, drank enough to black out, somehow drove home. Scared her. She connected with her sponsor and actually has appointment with a counselor this evening. Feeling increased chest congestion. Coughing occasionally. Thinks she might be headed toward lung infection. MiraLAX has prevented RLQ cramping.     HPI (8/8/2023): Had fun in [facility] but has been sick from respiratory perspective twice. I gave her a prescription for doxycycline to take with her on the trip. She started after returning due to chest congestion. Cleared. Then got poison ivy. Took 3 days of a steroid she had on-hand. Flared up after stopping.  Currently having mild chest tightness and wheezing. Only using albuterol once daily, though. BP still higher than desirable. Has been on losartan 50 mg daily. Sleeping poorly on trazodone 50 mg. Took 100 mg one night and slept better.     HPI (7/10/2023): First visit, transfer care from Dr. Fortune. Last seen in November 2022. Issues at that point included intermittent abdominal pain (likely constipation), uncontrolled hypertension despite taking losartan 50 mg daily, CFRD followed by Dr. Ambrose, and recent Burkholderia multivorans infection, consideration of nebulized meropenem. Colonoscopy discussed, would apparently need to be done in [facility] system for insurance reasons. No interval need for antibiotics. Used Pulmozyme in January for a few weeks due to congestion. Helpful. Still having tendency toward constipation, thinking about using MiraLAX. Sometimes experiences left-sided headache and blurry vision in the left eye when hypertensive. Frequency not accelerating. Thinks that meditation well help with her blood pressure. Leaving for trip to [place] tomorrow.    Current Use of Health Management Strategies:    Respiratory Interventions  Albuterol: Two times per day  Pulmozyme: Prescribed, does not use   Hypertonic saline: Does not use  HFCWO (percussive vest): HillROM The Vest - once daily since 7/10/2024  Oscillatory PEP: Does not use  Exercise: No Regular Exercise  LTE antagonist: No  Azithromycin: Does not use  Aztreonam lysine ([place]): Does not use  Tobramycin: Does not use  Colistin: Does not use  Meropenem (inhaled): Does not use  Vancomycin (inhaled): Does not use  ICS: Does not use  ICS/LABA: Does not use  LAMA: No  CFTR modulator:   Oxygen: Does not use    Digestive Health Interventions    Acid-suppressing medication?: Yes  Using CF vitamins?: No  Taking pancreatic enzymes?: Yes - Creon 24; 3 to 4 capsules with meals and 1 to 2 capsules with snacks   Any diarrhea?: No  Any steatorrhea?:  No  Any constipation?: Yes - does better with more regular use of MiraLAX  Using laxatives?: Yes  Feeding tube?: No  Supplemental enteral nutrition?: No    Pulmonary Function Test Results    PFT (10/9/2024, on Trikafta): FEV1 2.47 L (114%), FVC 3.39 L (125%), FEV1/FVC 73; normal spirometry  PFT (7/24/2024): FEV1 2.39 L (105%), FVC 3.06 L (106%), FEV1/FVC 78; normal spirometry  PFT (7/10/2024): FEV1 2.17 L (95%), FVC 2.89 L (100%), FEV1/FVC 75; normal spirometry  PFT (5/29/2024): FEV1 2.17 L (95%), FVC 3.07 L (106%), FEV1/FVC 71; normal spirometry  PFT (4/17/2024): FEV1 2.42 L (106%), FVC 3.27 L (113%), FEV1/FVC 74; normal spirometry  PFT (4/3/2024): did not perform, feeling poorly  PFT (2/12/2024): FEV1 2.29 L (94%), FVC 3.19 L (109%), FEV1/FVC 72; normal spirometry  PFT (11/8/2023): FEV1 2.29 L (100%), FVC 3.19 L (109%), FEV1/FVC 72; normal spirometry  PFT (7/10/2023): FEV1 2.45 L (106%), FVC 3.27 L (112%), FEV1/FVC 75; normal spirometry  PFT (10/10/2022): FEV1 2.47 L (106%), FVC 3.25 L (110%), FEV1/FVC 76; normal spirometry  PFT (2/21/2022): FEV1 2.48 L (106%), FVC 3.28 L (110%), FEV1/FVC 76; normal spirometry  PFT (6/23/2021): FEV1 2.48 L (103%), FVC 3.39 L (109%), FEV1/FVC 73; normal spirometry    Current Medications     Current Outpatient Medications   Medication Instructions    acetaminophen (TYLENOL) 650 mg, oral, Every 6 hours    amLODIPine (NORVASC) 2.5 mg, oral, Daily    bisacodyl (DULCOLAX) 10 mg, oral, Daily, Do not crush, chew, or split.    Creon 24,000-76,000 -120,000 unit capsule 3-4 capsules, oral, 3 times daily (morning, midday, late afternoon), MOUTH WITH MEALS AND TAKE 1-2 CAPSULES WITH SNACKS    dornase Alpha (Pulmozyme) 1 mg/mL nebulizer solution INHALE CONTENTS OF 1 VIAL VIA NEBULIZER DAILY    elexacaftor-tezacaftor-ivacaft (Trikafta) 100-50-75 mg tablet Take 2 orange tablets by mouth in the morning with fat-containing food and 1 blue tablet by mouth in the evening with fat-containing food,  "approximately 12 hours apart.    FreeStyle Lite Strips strip  TEST 6-7 TIMES DAILY    hydroCHLOROthiazide (HYDRODIURIL) 25 mg, oral, Daily    insulin glargine (Basaglar KwikPen U-100 Insulin) 100 unit/mL (3 mL) pen Inject 8 units daily as directed    losartan (COZAAR) 100 mg, oral, Daily    mecobalamin, vitamin B12, 1,000 mcg tablet,disintegrating 1 tablet, sublingual, Daily    methocarbamol (ROBAXIN) 500 mg, oral, 4 times daily PRN    omeprazole (PRILOSEC) 40 mg, oral, 2 times daily before meals, Take at least 30 minutes prior to a meal (before breakfast and before dinner)    ondansetron ODT (ZOFRAN-ODT) 4 mg, oral, Every 8 hours PRN    oxyCODONE (ROXICODONE) 5 mg, oral, Every 6 hours PRN    polyethylene glycol (GLYCOLAX, MIRALAX) 34 g, oral, 2 times daily,  MIX 34 GM TWICE DAILY AND TAKE AS DIRECTED<BR><BR>Taking every other day    traZODone (DESYREL) 100 mg, oral, Nightly PRN    Xopenex HFA 45 mcg/actuation inhaler 2 puffs, inhalation, Every 4 hours PRN       Physical Examination     /82 (BP Location: Right arm, Patient Position: Sitting, BP Cuff Size: Adult)   Pulse 58   Temp 36.8 °C (98.2 °F) (Oral)   Resp 17   Ht 1.575 m (5' 2.01\")   Wt 54 kg (119 lb 0.8 oz)   SpO2 97%   BMI 21.77 kg/m²         General: Tired, thin, some weight loss evident in facial musculature.  No coughing.  No sputum expectoration.  HEENT: normocephalic; anicteric sclerae; conjunctivae not injected; nasal mucosa was unremarkable; oropharynx was clear without evidence of thrush; (+) dental plate  Neck: supple; no lymphadenopathy or thyromegaly.  Chest: clear to auscultation bilaterally; improved bronchovesicular breath sounds compared to pre-Trikafta exams.  Cardiac: regular rhythm; no gallop or murmur.  Abdomen: soft; non-tender; non-distended; no hepatosplenomegaly, no renal bruit.  Extremities: no leg edema; no digital clubbing; 2+ pulses  Psychiatric: (+) restricted affect, (+) no spontaneous joking.     Metabolic " Parameters     Sodium   Date/Time Value Ref Range Status   09/09/2024 12:42  136 - 145 mmol/L Final     Potassium   Date/Time Value Ref Range Status   09/09/2024 12:42 PM 3.1 (L) 3.5 - 5.3 mmol/L Final     Chloride   Date/Time Value Ref Range Status   09/09/2024 12:42  (H) 98 - 107 mmol/L Final     Bicarbonate   Date/Time Value Ref Range Status   09/09/2024 12:42 PM 25 21 - 32 mmol/L Final     Anion Gap   Date/Time Value Ref Range Status   09/09/2024 12:42 PM 12 10 - 20 mmol/L Final     Urea Nitrogen   Date/Time Value Ref Range Status   09/09/2024 12:42 PM 26 (H) 6 - 23 mg/dL Final     Creatinine   Date/Time Value Ref Range Status   09/09/2024 12:42 PM 0.86 0.50 - 1.05 mg/dL Final     GFR Female   Date/Time Value Ref Range Status   07/10/2023 01:28 PM 64 >90 mL/min/1.73m2 Final     Comment:      CALCULATIONS OF ESTIMATED GFR ARE PERFORMED   USING THE 2021 CKD-EPI STUDY REFIT EQUATION   WITHOUT THE RACE VARIABLE FOR THE IDMS-TRACEABLE   CREATININE METHODS.    https://jasn.asnjournals.org/content/early/2021/09/22/[email or web address]       Glucose   Date/Time Value Ref Range Status   09/09/2024 12:42  (H) 74 - 99 mg/dL Final     Calcium   Date/Time Value Ref Range Status   09/09/2024 12:42 PM 9.1 8.6 - 10.6 mg/dL Final     Thyroid Stimulating Hormone   Date/Time Value Ref Range Status   02/12/2024 02:47 PM 1.02 0.44 - 3.98 mIU/L Final     TSH   Date/Time Value Ref Range Status   07/10/2023 01:28 PM 1.13 0.44 - 3.98 mIU/L Final     Comment:      TSH testing is performed using different testing    methodology at [facility] than at other    [facility]. Direct result comparisons should    only be made within the same method.     02/21/2022 09:12 AM 2.97 0.44 - 3.98 mIU/L Final     Comment:      TSH testing is performed using different testing    methodology at [facility] than at other    [facility]. Direct result comparisons should    only be made within the same  method.       Phosphorus   Date/Time Value Ref Range Status   09/09/2024 12:42 PM 3.5 2.5 - 4.9 mg/dL Final     Comment:     The performance characteristics of phosphorus testing in heparinized plasma have been validated by the individual  laboratory site where testing is performed. Testing on heparinized plasma is not approved by the FDA; however, such approval is not necessary.       Hematologic Parameters     WBC   Date/Time Value Ref Range Status   09/09/2024 12:42 PM 7.6 4.4 - 11.3 x10*3/uL Final     Neutrophils Absolute   Date/Time Value Ref Range Status   09/09/2024 12:42 PM 5.92 1.20 - 7.70 x10*3/uL Final     Comment:     Percent differential counts (%) should be interpreted in the context of the absolute cell counts (cells/uL).     Neutrophils %   Date/Time Value Ref Range Status   09/09/2024 12:42 PM 78.4 40.0 - 80.0 % Final     Lymphocytes %   Date/Time Value Ref Range Status   09/09/2024 12:42 PM 13.9 13.0 - 44.0 % Final     Monocytes %   Date/Time Value Ref Range Status   09/09/2024 12:42 PM 5.4 2.0 - 10.0 % Final     Basophils %   Date/Time Value Ref Range Status   09/09/2024 12:42 PM 0.3 0.0 - 2.0 % Final     Eosinophils Absolute   Date/Time Value Ref Range Status   09/09/2024 12:42 PM 0.14 0.00 - 0.70 x10*3/uL Final     Eosinophils %   Date/Time Value Ref Range Status   09/09/2024 12:42 PM 1.9 0.0 - 6.0 % Final     Hemoglobin   Date/Time Value Ref Range Status   09/09/2024 12:42 PM 11.7 (L) 12.0 - 16.0 g/dL Final     Hematocrit   Date/Time Value Ref Range Status   09/09/2024 12:42 PM 33.0 (L) 36.0 - 46.0 % Final     RBC   Date/Time Value Ref Range Status   09/09/2024 12:42 PM 4.05 4.00 - 5.20 x10*6/uL Final     MCV   Date/Time Value Ref Range Status   09/09/2024 12:42 PM 82 80 - 100 fL Final     MCHC   Date/Time Value Ref Range Status   09/09/2024 12:42 PM 35.5 32.0 - 36.0 g/dL Final     Platelets   Date/Time Value Ref Range Status   09/09/2024 12:42  150 - 450 x10*3/uL Final        Fat-Soluble Vitamin Levels     Vitamin D, 25-Hydroxy, Total   Date/Time Value Ref Range Status   02/12/2024 02:43 PM 52 30 - 100 ng/mL Final     Vitamin A (Retinol)   Date/Time Value Ref Range Status   02/12/2024 02:43 PM 0.52 0.30 - 1.20 mg/L Final     Vitamin A, Interpretation   Date/Time Value Ref Range Status   02/12/2024 02:43 PM Normal  Final     Comment:       This test was developed and its performance characteristics   determined by [facility]. It has not been cleared or   approved by the US Food and Drug Administration. This test was   performed in a CLIA certified laboratory and is intended for   clinical purposes.  Performed By: [facility]  [address] [place] [address]  : Tello Pacheco MD, PhD  CLIA Number: [id number]     Vitamin E (Alpha-Tocopherol)   Date/Time Value Ref Range Status   02/12/2024 02:43 PM 6.6 5.5 - 18.0 mg/L Final     Comment:       This test was developed and its performance characteristics   determined by [facility]. It has not been cleared or   approved by the US Food and Drug Administration. This test was   performed in a CLIA certified laboratory and is intended for   clinical purposes.     Vitamin E (Gamma-Tocopherol)   Date/Time Value Ref Range Status   02/12/2024 02:43 PM 1.6 0.0 - 6.0 mg/L Final     Comment:     Performed By: [facility]  [address] [place] [address]  : Tello Pacheco MD, PhD  CLIA Number: [id number]       LFT and Associated Parameters     AST   Date/Time Value Ref Range Status   09/09/2024 12:42 PM 12 9 - 39 U/L Final   07/10/2024 12:22 PM 15 9 - 39 U/L Final   04/03/2024 03:02 PM 19 9 - 39 U/L Final     ALT   Date/Time Value Ref Range Status   09/09/2024 12:42 PM 12 7 - 45 U/L Final     Comment:     Patients treated with Sulfasalazine may generate falsely decreased results for ALT.   07/10/2024 12:22 PM 18 7 - 45 U/L Final     Comment:     Patients  treated with Sulfasalazine may generate falsely decreased results for ALT.   04/03/2024 03:02 PM 16 7 - 45 U/L Final     Comment:     Patients treated with Sulfasalazine may generate falsely decreased results for ALT.     Alkaline Phosphatase   Date/Time Value Ref Range Status   09/09/2024 12:42 PM 63 33 - 136 U/L Final   07/10/2024 12:22  33 - 136 U/L Final   04/03/2024 03:02 PM 91 33 - 136 U/L Final     Bilirubin, Total   Date/Time Value Ref Range Status   09/09/2024 12:42 PM 0.5 0.0 - 1.2 mg/dL Final   07/10/2024 12:22 PM 0.6 0.0 - 1.2 mg/dL Final   04/03/2024 03:02 PM 0.5 0.0 - 1.2 mg/dL Final     Bilirubin, Direct   Date/Time Value Ref Range Status   07/10/2024 12:22 PM 0.1 0.0 - 0.3 mg/dL Final   04/03/2024 03:02 PM 0.1 0.0 - 0.3 mg/dL Final   02/12/2024 02:43 PM 0.1 0.0 - 0.3 mg/dL Final     GGT   Date/Time Value Ref Range Status   07/10/2024 12:22 PM 26 5 - 55 U/L Final   02/12/2024 02:43 PM 26 5 - 55 U/L Final   07/10/2023 01:28 PM 22 5 - 55 U/L Final   02/21/2022 09:12 AM 33 5 - 55 U/L Final   11/16/2020 03:16 PM 34 5 - 55 U/L Final     Albumin   Date/Time Value Ref Range Status   09/09/2024 12:42 PM 3.9 3.4 - 5.0 g/dL Final   07/10/2024 12:22 PM 4.3 3.4 - 5.0 g/dL Final   04/03/2024 03:02 PM 4.7 3.4 - 5.0 g/dL Final     Total Protein   Date/Time Value Ref Range Status   09/09/2024 12:42 PM 6.4 6.4 - 8.2 g/dL Final   07/10/2024 12:22 PM 7.5 6.4 - 8.2 g/dL Final   04/03/2024 03:02 PM 7.7 6.4 - 8.2 g/dL Final       Coagulation Parameters           Diabetes Laboratory Tests     POC HEMOGLOBIN A1c   Date/Time Value Ref Range Status   10/09/2024 02:28 PM 6.6 (A) 4.2 - 6.5 % Final   07/24/2024 04:28 PM 6.7 (A) 4.2 - 6.5 % Final     Hemoglobin A1C   Date/Time Value Ref Range Status   02/12/2024 02:43 PM 6.4 (H) see below % Final   07/10/2023 01:28 PM 6.5 (A) % Final     Comment:          Diagnosis of Diabetes-Adults   Non-Diabetic: < or = 5.6%   Increased risk for developing diabetes: 5.7-6.4%   Diagnostic  of diabetes: > or = 6.5%  .       Monitoring of Diabetes                Age (y)     Therapeutic Goal (%)   Adults:          >18           <7.0   Pediatrics:    13-18           <7.5                   7-12           <8.0                   0- 6            7.5-8.5   American Diabetes Association. Diabetes Care 33(S1), Jan 2010.     04/09/2022 05:28 AM 7.0 (H) 4.0 - 5.6 % Final   11/16/2020 03:16 PM 6.4 % Final     Comment:          Diagnosis of Diabetes-Adults   Non-Diabetic: < or = 5.6%   Increased risk for developing diabetes: 5.7-6.4%   Diagnostic of diabetes: > or = 6.5%  .       Monitoring of Diabetes                Age (y)     Therapeutic Goal (%)   Adults:          >18           <7.0   Pediatrics:    13-18           <7.5                   7-12           <8.0                   0- 6            7.5-8.5   American Diabetes Association. Diabetes Care 33(S1), Jan 2010.       Albumin, Urine Random   Date/Time Value Ref Range Status   02/12/2024 02:43 PM <7.0 Not established mg/L Final     Creatinine, Urine Random   Date/Time Value Ref Range Status   02/12/2024 02:43 PM 67.4 20.0 - 320.0 mg/dL Final     Creatinine, Urine   Date/Time Value Ref Range Status   07/10/2023 01:28 PM 38.9 20.0 - 320.0 mg/dL Final   06/23/2021 01:54 PM 60.7 20.0 - 320.0 mg/dL Final     Albumin/Creatinine Ratio   Date/Time Value Ref Range Status   02/12/2024 02:43 PM   Final     Comment:     One or more analytes used in this calculation is outside of the analytical measurement range. Calculation cannot be performed.     Albumin/Creatine Ratio   Date/Time Value Ref Range Status   07/10/2023 01:28 PM SEE COMMENT 0.0 - 30.0 ug/mg crt Final     Comment:     One or more analytes used in this calculation   is outside of the analytical measurement range.  Calculation cannot be performed.     06/23/2021 01:54 PM SEE COMMENT 0.0 - 30.0 ug/mg crt Final     Comment:     One or more analytes used in this calculation   is outside of the analytical measurement  range.  Calculation cannot be performed.          Immunology Laboratory Tests     IgE   Date/Time Value Ref Range Status   02/12/2024 02:43 PM <2 0 - 214 IU/mL Final     Alcohol Abstinence Monitoring     EER PEth   Date Value Ref Range Status   07/10/2024 See Note  Final     Comment:     Authorized individuals can access the [facility]   Enhanced Report using the following link:      [email or web address]   04/03/2024 See Note  Final     Comment:     Authorized individuals can access the [facility]   Enhanced Report using the following link:      [email or web address]     PEth 16:0/18:1 (POPEth)   Date Value Ref Range Status   07/10/2024 27 ng/mL Final     Comment:     PEth 16:0/18:1 (POPEth)  Less than 10 ng/mL............Not detected  Less than 20 ng/mL............Abstinence or light alcohol   consumption  20 - 200 ng/mL................Moderate alcohol consumption  Greater than 200 ng/mL........Heavy alcohol consumption or chronic   alcohol use    (Reference: DANTE Carballo 2018 J. Forensic Sci)   04/03/2024 64 ng/mL Final     Comment:     PEth 16:0/18:1 (POPEth)  Less than 10 ng/mL............Not detected  Less than 20 ng/mL............Abstinence or light alcohol   consumption  20 - 200 ng/mL................Moderate alcohol consumption  Greater than 200 ng/mL........Heavy alcohol consumption or chronic   alcohol use    (Reference: DANTE Carballo 2018 J. Forensic Sci)     PEth 16:0/18:2 (PLPEth)   Date Value Ref Range Status   07/10/2024 22 ng/mL Final     Comment:     Reference ranges are not well established.   04/03/2024 65 ng/mL Final     Comment:     Reference ranges are not well established.     PEth Interpretation   Date Value Ref Range Status   07/10/2024 See Comment  Final     Comment:     Phosphatidylethanol (PEth) is a group of phospholipids formed in   the presence of ethanol, phospholipase D and phosphatidylcholine.   PEth is known to  be a direct alcohol biomarker. The predominant   PEth homologues are PEth 16:0/18:1 (POPEth) and PEth 16:0/18:2   (PLPEth), which account for 37-46% and 26-28% of the total PEth   homologues, respectively. PEth is incorporated into the   phospholipid membrane of red blood cells and has a general   half-life of 4-10 days and a window of detection of 2-4 weeks.   However, the window of detection is longer in individuals who   chronically or excessively consume alcohol. The limit of   quantification is 10 ng/mL. Serial monitoring of PEth may be   helpful in monitoring alcohol abstinence over time. PEth results   should be interpreted in the context of the patient's clinical and   behavioral history.  Patients with advanced liver disease may have falsely elevated   PEth concentrations (Senait STAUFFER et al 2018, Alcoholism Clinical &   Experimental Research).    This test was developed and its performance characteristics   determined by [facility]. It has not been cleared or   approved by the U.S. Food and Drug Administration. This test was   performed in a CLIA-certified laboratory and is intended for   clinical purposes.  Performed By: [facility]  [address] [place] [address]  : Tello Pacheco MD, PhD  CLIA Number: [id number]   04/03/2024 See Comment  Final     Comment:     Phosphatidylethanol (PEth) is a group of phospholipids formed in   the presence of ethanol, phospholipase D and phosphatidylcholine.   PEth is known to be a direct alcohol biomarker. The predominant   PEth homologues are PEth 16:0/18:1 (POPEth) and PEth 16:0/18:2   (PLPEth), which account for 37-46% and 26-28% of the total PEth   homologues, respectively. PEth is incorporated into the   phospholipid membrane of red blood cells and has a general   half-life of 4-10 days and a window of detection of 2-4 weeks.   However, the window of detection is longer in individuals who   chronically or excessively  consume alcohol. The limit of   quantification is 10 ng/mL. Serial monitoring of PEth may be   helpful in monitoring alcohol abstinence over time. PEth results   should be interpreted in the context of the patient's clinical and   behavioral history.  Patients with advanced liver disease may have falsely elevated   PEth concentrations (Senait STAUFFER et al 2018, Alcoholism Clinical &   Experimental Research).    This test was developed and its performance characteristics   determined by [facility]. It has not been cleared or   approved by the U.S. Food and Drug Administration. This test was   performed in a CLIA-certified laboratory and is intended for   clinical purposes.  Performed By: [facility]  [address] [place] [id number]  : Tello Pacheco MD, PhD  CLIA Number: [id number]     DEXA Scan     XR BONE density 10/10/2022    Narrative  Name: OMAR GIPSON  Patient ID: [id number] YOB: 1960 Height: 62.0 in.  Gender:     Female   Exam Date:  10/10/2022 Weight: 118.0 lbs.  Indications: Cystic Fibrosis, DIABETES, family history osteoporosis,  Hysterectomy, Post Menopausal  Fractures:    Treatments:  OMEPRAZOLE    LEFT FEMUR - TOTAL  The bone mineral density : 0.889 g/cm2  T-score : -0.9    % of young normal mean :88%  Z-score : 0.0    % of age matched mean : 100%  % change vs. Previous : N/A    % change vs. Baseline : baseline    LEFT FEMUR - NECK  The bone mineral density : 0.843 g/cm2  T-score : -1.4    % of young normal mean: 81%  Z-score : -0.1    % of age matched mean : 98%  % change vs. Previous : N/A    % change vs. Baseline : baseline    SPINE L1-L4  The bone mineral density is 1.147 g/cm2  T-score : -0.3   % of young normal mean is 97%  Z-score : 1.0    % of age matched mean is 111%  % change vs. Previous : -    % change vs. Baseline : baseline    World Health Organization (WHO) criteria for post-menopausal,   Women:  Normal:       T-score at or  above -1 SD  Osteopenia:   T-score between -1 and -2.5 SD  Osteoporosis: T-score at or below -2.5 SD    10-Year Fracture Risk:  Major Osteoporotic Fracture 4.0%  Hip Fracture                0.4%  Reported Risk Factors       None    Interpretation:  According to World Health Organization criteria, classification is low bone mass.  Followup recommended on October 2024 or sooner as clinically warranted.     Endoscopies     Colonoscopy (7/3/2024)  Findings  Rectal exam revealed decreased sphincter tone likely secondary to sedation  External and internal small hemorrhoids observed during retroflexion; no bleeding was identified  Mild, generalized atrophic mucosa in the terminal ileum; performed cold forceps biopsy;  Large submucosal mass at the appendiceal orifice  A moderate amount of semi-liquid stool was visualized throughout the colon.  Lavage was performed with incomplete clearance and adequate visualization.  One 3 mm polyp vs lymphoid follicle in the ascending colon; performed cold snare with complete en bloc removal and retrieved specimen  Two 4 mm sessile polyps in the transverse colon; performed cold snare with complete en bloc removal and retrieved specimen  Impression  Rectal exam revealed decreased sphincter tone likely secondary to sedation.  Small hemorrhoids  Mild atrophic mucosa in the terminal ileum; performed cold forceps biopsy  Large submucosal mass in the cecum at the appendiceal orifice which could be secondary to stool vs. neoplasm  A moderate amount of semi-liquid stool was visualized throughout the colon.  Lavage was performed with incomplete clearance and adequate visualization.  3 subcentimeter polyps were removed with cold snare  Recommendation    Await pathology results     Repeat colonoscopy in 3 years (2 years if all polyps adenomatous).  Obtain CT scan abdomen and pelvis to further assess appendiceal orifice.     Esophagogastroduodenoscopy (EGD) (7/3/2024)  Findings  Z-line 33 cm from the  incisors  An inlet patch was visualized in the proximal esophagus.  2 cm hiatal hernia - GE junction 33 cm from the incisors, diaphragmatic impression 35 cm from the incisors, confirmed by retroflexion. On retroflexion evidence of prior fundoplication was visualized; however, a paraesophageal hernia was also seen.  Single medium diverticulum with no inflammation in the lower third of the esophagus (29 cm from the incisors); no bleeding was identified  Moderate, generalized erythematous mucosa with loss of vascular pattern in the antrum  Performed multiple random forceps biopsies in the incisura and antrum to rule out H. pylori. Rule out intestinal metaplasia  Performed random forceps biopsies in the body of the stomach, greater curve of the stomach and lesser curve of the stomach to rule out H. pylori. Rule out intestinal metaplasia  Benign-appearing mild intrinsic stenosis in the duodenal sweep (scope was able to traverse without any resistance)  Edematous mucosa in the 2nd part of the duodenum  The duodenum was otherwise normal.  Performed multiple random forceps biopsies in the duodenal bulb and 2nd part of the duodenum to rule out celiac disease  A Bravo pH capsule was placed successfully in the esophagus (27 cm from the incisors, 6 cm from the GE junction).  Successful placement was confirmed endoscopically.  Impression  An inlet patch was visualized in the proximal esophagus.  2 cm hiatal hernia  Diverticulosis in the lower third of the esophagus  Moderate erythematous mucosa with loss of vascular pattern in the antrum  Performed forceps biopsies in the incisura and antrum to rule out H. pylori  Performed forceps biopsies in the body of the stomach, greater curve of the stomach and lesser curve of the stomach to rule out H. pylori  Intrinsic mild stenosis in the duodenal sweep possibly secondary to prior peptic ulcer disease  Edematous mucosa in the 2nd part of the duodenum  The duodenum was otherwise  normal.  Performed forceps biopsies in the duodenal bulb and 2nd part of the duodenum to rule out celiac disease  A pH capsule was placed successfully in the esophagus (27 cm from the incisors, 6 cm from the GE junction).     BRAVO Esophageal pH Probe (7/3/2024)  Findings:  -The total AET is 6.9% and the DeMeester score is 26.5 indicating presence of mild to moderate reflux  - Reflux occurred on both days of the study and in both upright and supine positions  - The SAP for regurgitation was 100% indicating high symptom correlation   Impressions:  Overall this is study is positive for mild to moderate acid reflux  There is high symptom correlation, reflux is the cause of the symptoms     Chest Radiograph     XR chest 2 view 10/10/2022    Narrative  MRN: [id number]  Patient Name: OMAR GIPSON    STUDY:  TH CHEST 2 VIEW PA AND LAT;    INDICATION:  cystic fibrosis, malabsorption  E84.9: Cystic fibrosis E84.0: Cystic fibrosis with pulmonary manifestations.    COMPARISON:  Chest radiograph 05/09/2016    ACCESSION NUMBER(S):  [id number]    ORDERING CLINICIAN:  CELIA MIN    FINDINGS:  Frontal, lateral, and dual energy radiographs of the chest were  provided.  The cardiac silhouette size is within normal limits.  There is no focal consolidation, edema or pneumothorax.  No sizeable pleural effusion.  No acute osseous abnormality.    Impression  No acute cardiopulmonary process.  I personally reviewed the images/study and I agree with the findings as stated.     US LIVER/GB/PANCREAS     US LIVER/GALL BLADDER/PANCREAS (4/13/2022, [facility])    CLINICAL HISTORY: Reason for Exam: as recommended per CT; history of inflammatory changes along the falciform ligament in chronically dilated common bile duct   ASSOCIATED DIAGNOSIS:     COMPARISON: Gallbladder ultrasound 07/03/2017; CT abdomen/pelvis 04/11/2022     TECHNIQUE: Ultrasound real time scan with image documentation of the right upper quadrant including the liver,  gallbladder, and pancreas was performed.     FINDINGS:   Liver   Craniocaudal length: 14.8 cm.   Echogenicity:  Normal   Surface nodularity: Present   Mass (size and location): None.     Bile ducts   Intrahepatic ducts: No biliary dilatation.   Common bile duct:  Diameter 14 mm, stable compared to July 2017 ultrasound.     Gallbladder   Size and morphology: The gallbladder is contracted limiting evaluation.   Cholelithiasis:  Multiple echogenic foci with posterior acoustic shadowing, compatible with gallstones.   Pericholecystic fluid: None.   Sonographic Arrington sign: Absent.     Pancreas: Pancreas is not visualized consistent with the complete fatty replacement on recent CT.     Other findings : A right pleural effusion is incidentally noted.     IMPRESSION:   1. Chronically dilated CBD measuring approximately 14 mm. No choledocholithiasis seen.   2.  The liver has a lobulated contour which may relate to underlying cirrhosis. Further evaluation with shear wave elastography or fibroscan can be obtained to obtain a Metavir score.   3.  Contracted gallbladder with cholelithiasis. No sonographic findings of acute cholecystitis.   4.  Partially visualized right pleural effusion.   5.  Pancreas is not visualized consistent with the complete fatty replacement on recent CT.     Chest CT Scan without Contrast     CT CHEST WO IV CONTRAST; 4/26/2024 1:21 pm   1.  Patchy centrilobular nodules throughout the mid to inferior right upper lobe and medial right middle lobe, most consistent with infection.  2. Within the medial aspect of the right upper lobe and right middle lobe, there is are moderately dilated and mucous filled bronchi with surrounding peribronchovascular centrilobular nodules. Findings likely attributable to chronic changes from cystic fibrosis with possible superimposed infection. Attention on follow-up CT studies is  recommended.  3. Diffuse bronchial wall thickening, consistent with the patient's reported  cystic fibrosis. Lungs are otherwise clear.  4. Large hiatal hernia.  5. Non-obstructing 5 mm calculi seen in the visualized left kidney.    CF Sputum Culture     AFB Culture   Date Value Ref Range Status   07/10/2024 No Mycobacteria isolated.  Final   04/03/2024 No Mycobacteria isolated.  Final   02/12/2024 No Mycobacteria isolated.  Final      Respiratory Culture, Cystic Fibrosis   Date/Time Value Ref Range Status   07/10/2024 10:46 AM (A)  Final    (2+) Few Methicillin Resistant Staphylococcus aureus (MRSA)     Comment:     Methicillin (Oxacillin) resistant Staphylococci are resistant to all currently available Penicillins, Beta-lactam/Beta-lactamase inhibitor combinations (including Ampicillin/Sulbactam, Amoxicillin/Clavulanate and Pipercillin/Tazobactam), Carbapenems and   Cephalosporins (except Ceftaroline).   07/10/2024 10:46 AM (2+) Few Normal throat perla  Final     CF respiratory culture (11/28/2022): MRSA  CF respiratory culture (10/10/2022): MRSA, Burkholderia multivorans  CF respiratory culture (5/9/2022): MRSA  CF respiratory culture (6/3/2015): MSSA, Pseudomonas putida    Susceptibility data from last 2000 days.  Collected Specimen Info Organism Amphotericin B Ceftazidime Clindamycin Erythromycin Fluconazole Isavuconazole   07/10/24 Fluid from SPUTUM Methicillin Resistant Staphylococcus aureus (MRSA)    S  R       Normal throat perla         07/10/24 Fluid from SPUTUM Scedosporium boydii  No published     No published     05/29/24 Fluid from Throat Swab Methicillin Resistant Staphylococcus aureus (MRSA)    S  R       Burkholderia cepacia complex   S         Normal throat perla         04/17/24 Fluid from Throat Swab Methicillin Resistant Staphylococcus aureus (MRSA)   S R       Normal throat perla         04/03/24 Swab from SPUTUM Candida albicans           Scedosporium boydii         04/03/24 Fluid from Throat Swab Methicillin Resistant Staphylococcus aureus (MRSA)   S R       Normal throat perla          02/12/24 Fluid from SPUTUM Methicillin Resistant Staphylococcus aureus (MRSA)   S R       Normal throat perla         02/12/24 Swab from SPUTUM Candida albicans           Scedosporium boydii         11/08/23 Fluid from Throat Swab Methicillin Resistant Staphylococcus aureus (MRSA)   S R       Normal throat perla         07/07/23 Respiratory Staphylococcus aureus   R R     07/07/23 Respiratory Candida albicans           Collected Specimen Info Organism Itraconazole Levofloxacin Linezolid Meropenem Micafungin Minocycline Oxacillin   07/10/24 Fluid from SPUTUM Methicillin Resistant Staphylococcus aureus (MRSA)        R     Normal throat perla          07/10/24 Fluid from SPUTUM Scedosporium boydii  No published     No published     05/29/24 Fluid from Throat Swab Methicillin Resistant Staphylococcus aureus (MRSA)        R     Burkholderia cepacia complex   S   S   I      Normal throat perla          04/17/24 Fluid from Throat Swab Methicillin Resistant Staphylococcus aureus (MRSA)       R     Normal throat perla          04/03/24 Swab from SPUTUM Candida albicans            Scedosporium boydii          04/03/24 Fluid from Throat Swab Methicillin Resistant Staphylococcus aureus (MRSA)       R     Normal throat perla          02/12/24 Fluid from SPUTUM Methicillin Resistant Staphylococcus aureus (MRSA)       R     Normal throat perla          02/12/24 Swab from SPUTUM Candida albicans            Scedosporium boydii          11/08/23 Fluid from Throat Swab Methicillin Resistant Staphylococcus aureus (MRSA)       R     Normal throat perla          07/07/23 Respiratory Staphylococcus aureus   S    S   07/07/23 Respiratory Candida albicans            Collected Specimen Info Organism Posaconazole Tetracycline Trimethoprim/Sulfamethoxazole Vancomycin Voriconazole   07/10/24 Fluid from SPUTUM Methicillin Resistant Staphylococcus aureus (MRSA)   S  S  S      Normal throat perla        07/10/24 Fluid from SPUTUM Scedosporium  boymaria del carmen  No published     No published   05/29/24 Fluid from Throat Swab Methicillin Resistant Staphylococcus aureus (MRSA)   S  S  S      Burkholderia cepacia complex          Normal throat perla        04/17/24 Fluid from Throat Swab Methicillin Resistant Staphylococcus aureus (MRSA)  S S S      Normal throat perla        04/03/24 Swab from SPUTUM Candida albicans          Scedosporium boydii        04/03/24 Fluid from Throat Swab Methicillin Resistant Staphylococcus aureus (MRSA)  S S S      Normal throat perla        02/12/24 Fluid from SPUTUM Methicillin Resistant Staphylococcus aureus (MRSA)  S S S      Normal throat perla        02/12/24 Swab from SPUTUM Candida albicans          Scedosporium boydii        11/08/23 Fluid from Throat Swab Methicillin Resistant Staphylococcus aureus (MRSA)  S S S      Normal throat perla        07/07/23 Respiratory Staphylococcus aureus  S S S    07/07/23 Respiratory Candida albicans                  Problem List Items Addressed This Visit       Adult ADHD    Current Assessment & Plan     Seems to be worsened by current depression symptoms attributed to Trikafta.  I hope that reducing the Trikafta dose will alleviate the depressed feelings, memory problems, and lack of motivation.  However, I think you should make an appointment with a psychiatrist at [facility] (or [facility]) in case you need to take an antidepressant medication.  The psychiatrist could also help you with the ADHD by managing a medication like Vyvanse.  Community mental health resources provided by [facility]  .         Alcohol use disorder, moderate, in early remission (Multi)    Current Assessment & Plan     At risk for relapse due to current mood disturbance.  Has her sponsor and avoiding social situations that would expose there to alcohol.         Cystic fibrosis - Primary    Current Assessment & Plan     Excellent physiologic response to Trikafta with ppFEV1 up to 114% (!).   Pulmonary exacerbation absent.  It is acceptable for Lucia to refrain from using Pulmozyme in light of her response to Trikafta.  Xopenex (levalbuterol) has been better for her than racemic albuterol - no tremulousness.  Surveillance respiratory tract culture today.         Relevant Medications    elexacaftor-tezacaftor-ivacaft (Trikafta) 100-50-75 mg tablet    Diabetes mellitus related to CF (cystic fibrosis) (Multi)    Overview     CFRD Hx: Diagnosed in March 2013 when A1c was 7.6%. Fasting glucose in the office in April 2013 was 252. we started her on Levemir.   June 2021: 6.9%  Sept 2021: 6.4%  April 2022: 7.3%  July 2023: 6.5%  Feb 2024: 6.4%  July 2024: 6.7%  Microalbumin: WNL 7/2023, 2/2024 (undetectable)         Current Assessment & Plan     Lucia met with Dr. Ambrose today.  Recommendations:  Continue basaglar 8 units daily.  If having surgery, recommend cutting basaglar dose to 4-5 units the night before.  Check some glucoses after lunch/dinner to assess need for mealtime insulin  Annual eye exam due April 2025  Annual urine albumin (negative on 2/12/2024)  Should follow with PCP to optimize HTN management.   Normal lipids in July 2023         Medication side effect    Current Assessment & Plan     Your response to Trikafta is amazing (except for mental health side effects).  We discussed the fact that Trikafta use has been associated with new-onset depression and/or anxiety, short-term memory loss, and other behavioral side effects (short temper).  We will try to adjust the dose of Trikafta so that you still have a good clinical response and hopefully fewer side effects.  There is no specific regimen for adjusting the Trikafta dose, so we will have to see how things go over time.  Reduce Trikafta dose to two orange pills each morning.  NO BLUE PILL IN THE EVENING.         MRSA (methicillin resistant Staphylococcus aureus) infection    Current Assessment & Plan     Sick plan (if you need an antibiotic):  Doxycycline 100 mg by mouth twice daily for 14 days          Primary hypertension    Overview     Managed [facility] at [facility]            Follow-up:  November 2024 with Dr. Harris.  No future appointments.        Nicolás Harris MD   10/09/2024

## 2024-10-07 ENCOUNTER — OFFICE VISIT (OUTPATIENT)
Dept: CARDIOLOGY | Facility: CLINIC | Age: 64
End: 2024-10-07
Payer: MEDICARE

## 2024-10-07 VITALS
HEART RATE: 72 BPM | BODY MASS INDEX: 21.9 KG/M2 | HEIGHT: 62 IN | DIASTOLIC BLOOD PRESSURE: 77 MMHG | SYSTOLIC BLOOD PRESSURE: 182 MMHG | WEIGHT: 119 LBS | TEMPERATURE: 97.9 F

## 2024-10-07 DIAGNOSIS — I10 PRIMARY HYPERTENSION: Primary | ICD-10-CM

## 2024-10-07 DIAGNOSIS — Z01.810 PREOPERATIVE CARDIOVASCULAR EXAMINATION: ICD-10-CM

## 2024-10-07 DIAGNOSIS — R55 SYNCOPE AND COLLAPSE: ICD-10-CM

## 2024-10-07 PROCEDURE — 3062F POS MACROALBUMINURIA REV: CPT | Performed by: INTERNAL MEDICINE

## 2024-10-07 PROCEDURE — 3044F HG A1C LEVEL LT 7.0%: CPT | Performed by: INTERNAL MEDICINE

## 2024-10-07 PROCEDURE — 4010F ACE/ARB THERAPY RXD/TAKEN: CPT | Performed by: INTERNAL MEDICINE

## 2024-10-07 PROCEDURE — 3078F DIAST BP <80 MM HG: CPT | Performed by: INTERNAL MEDICINE

## 2024-10-07 PROCEDURE — 3077F SYST BP >= 140 MM HG: CPT | Performed by: INTERNAL MEDICINE

## 2024-10-07 PROCEDURE — 99214 OFFICE O/P EST MOD 30 MIN: CPT | Performed by: INTERNAL MEDICINE

## 2024-10-07 PROCEDURE — 99204 OFFICE O/P NEW MOD 45 MIN: CPT | Performed by: INTERNAL MEDICINE

## 2024-10-07 PROCEDURE — 3008F BODY MASS INDEX DOCD: CPT | Performed by: INTERNAL MEDICINE

## 2024-10-07 PROCEDURE — 1036F TOBACCO NON-USER: CPT | Performed by: INTERNAL MEDICINE

## 2024-10-07 RX ORDER — AMLODIPINE BESYLATE 2.5 MG/1
2.5 TABLET ORAL DAILY
Qty: 30 TABLET | Refills: 11 | Status: SHIPPED | OUTPATIENT
Start: 2024-10-07

## 2024-10-07 NOTE — PROGRESS NOTES
Chief Complaint:   Syncope     History of Present Illness     Mansi Morrison is a 64 y.o. female with HTN and CF (controlled) presenting with for evaluation of syncope.  On 9/9/24 at [facility] [place] awaiting for surgery, had no food or fluids since 1900, did not take insulin, 1:00 PM.  Took 100% insulin dose the night prior. Took 100 mg Losartan that AM.  Full fast. Mansi Morrison experienced a syncopal event.  Started feeling dizzy and got up and walked and had syncope. Witnessed loss of consciousness for 1 min.  Upon regaining consciousness, they experienced nausea, vomiting, sweating, confusion, headache but no chest pain.  Was referred to ED. Negative evaluation there than K 3.1. Had uncomplicated appendectomy and aurora on 9/16/24. There has been no recurrence.  No Hx of syncope. No There is no history of syncope.   There was prodromal palpitations, shortness of breath, sweating, nausea. There is no history of coronary artery disease, heart failure, myocardial infarction, left ventricular dysfunction, or atrial fibrillation.  There is no family history of primary electrical cardiac disorders or sudden cardiac death.  An ECG does not demonstrate evidence of long QT syndrome, Brugada syndrome, or Demario-Parkinson-White syndrome.    Review of Systems  All pertinent systems have been reviewed and are negative except for what is stated in the history of present illness.    All other systems have been reviewed and are negative and noncontributory to this patient's current ailments.  .       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    Past Surgical History:  She has a past surgical history that includes Other surgical history (12/04/2013) and Hysterectomy (05/27/2016).      Social History:  She reports that she has never smoked. She has never been exposed to tobacco smoke. She has never used smokeless tobacco. She reports that she does not currently use alcohol. She reports current drug use. Frequency: 2.00 times per week. Drug: Marijuana.    Family History:  Family History   Problem Relation Name Age of Onset    Other (CF Carrier) Mother      Other (CF Carrier) Father      Colon cancer Father      Esophageal cancer Neg Hx      Stomach cancer Neg Hx          Allergies:  Codeine, Linezolid, Lexapro [escitalopram oxalate], Lisinopril, and Moxifloxacin    Outpatient Medications:  Current Outpatient Medications   Medication Instructions    acetaminophen (TYLENOL) 650 mg, oral, Every 6 hours    amLODIPine (NORVASC) 2.5 mg, oral, Daily    Creon 24,000-76,000 -120,000 unit capsule 3-4 capsules, oral, 3 times daily (morning, midday, late afternoon), MOUTH WITH MEALS AND TAKE 1-2 CAPSULES WITH SNACKS    dornase Alpha (Pulmozyme) 1 mg/mL nebulizer solution INHALE CONTENTS OF 1 VIAL VIA NEBULIZER DAILY    elexacaftor-tezacaftor-ivacaft (Trikafta) 100-50-75 mg tablet Take 2 orange tablets by mouth in the morning with fat-containing food and 1 blue tablet by mouth in the evening with fat-containing food, approximately 12 hours apart.    FreeStyle Lite Strips strip  TEST 6-7 TIMES DAILY    insulin glargine (Basaglar KwikPen U-100 Insulin) 100 unit/mL (3 mL) pen Inject 8 units daily as directed    losartan (COZAAR) 100 mg, oral, Daily    mecobalamin, vitamin B12, 1,000 mcg tablet,disintegrating 1 tablet, sublingual, Daily    omeprazole (PRILOSEC) 40 mg, oral, 2 times daily before  "meals, Take at least 30 minutes prior to a meal (before breakfast and before dinner)    traZODone (DESYREL) 100 mg, oral, Nightly PRN    Xopenex HFA 45 mcg/actuation inhaler 2 puffs, inhalation, Every 4 hours PRN       Physical Examination   Vitals:  Visit Vitals  BP (!) 182/77   Pulse 72   Temp 36.6 °C (97.9 °F)   Ht 1.575 m (5' 2\")   Wt 54 kg (119 lb)   BMI 21.77 kg/m²   OB Status Hysterectomy   Smoking Status Never   BSA 1.54 m²    Physical Exam  Vitals reviewed.   Constitutional:       General: She is not in acute distress.     Appearance: Normal appearance.   HENT:      Head: Normocephalic and atraumatic.      Nose: Nose normal.   Eyes:      Conjunctiva/sclera: Conjunctivae normal.   Cardiovascular:      Rate and Rhythm: Normal rate and regular rhythm.      Pulses: Normal pulses.      Heart sounds: No murmur heard.  Pulmonary:      Effort: Pulmonary effort is normal. No respiratory distress.      Breath sounds: Normal breath sounds. No wheezing, rhonchi or rales.   Abdominal:      General: Bowel sounds are normal. There is no distension.      Palpations: Abdomen is soft.      Tenderness: There is no abdominal tenderness.   Musculoskeletal:         General: No swelling.      Right lower leg: No edema.      Left lower leg: No edema.   Skin:     General: Skin is warm and dry.      Capillary Refill: Capillary refill takes less than 2 seconds.   Neurological:      General: No focal deficit present.      Mental Status: She is alert.   Psychiatric:         Mood and Affect: Mood normal.             Labs/Imaging/Cardiac Studies     Last Labs:  CBC -  Lab Results   Component Value Date    WBC 7.6 09/09/2024    HGB 11.7 (L) 09/09/2024    HCT 33.0 (L) 09/09/2024    MCV 82 09/09/2024     09/09/2024       CMP -  Lab Results   Component Value Date    CALCIUM 9.1 09/09/2024    PHOS 3.5 09/09/2024    PROT 6.4 09/09/2024    ALBUMIN 3.9 09/09/2024    AST 12 09/09/2024    ALT 12 09/09/2024    ALKPHOS 63 09/09/2024    " BILITOT 0.5 09/09/2024       LIPID PANEL -   Lab Results   Component Value Date    CHOL 185 07/10/2023    HDL 67.8 07/10/2023    CHHDL 2.7 07/10/2023    VLDL 19 07/10/2023    TRIG 93 07/10/2023       RENAL FUNCTION PANEL -   Lab Results   Component Value Date    K 3.1 (L) 09/09/2024    PHOS 3.5 09/09/2024       Lab Results   Component Value Date    BNP 19 09/09/2024    HGBA1C 6.7 (A) 07/24/2024       ECG:    Echo:  No echocardiogram results found for the past 12 months       Assessment and Recommendations     Assessment/Plan   1. Syncope and collapse  Likely orthostatic hypotension in face of ARB and prolonged NPO status then got vagal.    - Referral to Cardiology  - Transthoracic echo (TTE) complete; Future    2. Primary hypertension  Resume amlodipine.  Keep off hydrochlorothiazide.  - amLODIPine (Norvasc) 2.5 mg tablet; Take 1 tablet (2.5 mg) by mouth once daily.  Dispense: 30 tablet; Refill: 11  - Transthoracic echo (TTE) complete; Future          Dirk Borges MD    Exclusive of any other services or procedures performed, I, Dirk Borges MD , spent 30 minutes in duration for this visit today.  This time consisted of chart review, obtaining history, and/or performing the exam as documented above as well as documenting the clinical information for the encounter in the electronic record, discussing treatment options, plans, and/or goals with patient, family, and/or caregiver, refilling medications, updating the electronic record, ordering medicines, lab work, imaging, referrals, and/or procedures as documented above and communicating with other [facility] professionals. I have discussed the results of laboratory, radiology, and cardiology studies with the patient and their family/caregiver.

## 2024-10-09 ENCOUNTER — SPECIALTY PHARMACY (OUTPATIENT)
Dept: PHARMACY | Facility: CLINIC | Age: 64
End: 2024-10-09

## 2024-10-09 ENCOUNTER — MULTIDISCIPLINARY VISIT (OUTPATIENT)
Dept: PEDIATRIC ENDOCRINOLOGY | Facility: HOSPITAL | Age: 64
End: 2024-10-09
Payer: MEDICARE

## 2024-10-09 ENCOUNTER — HOSPITAL ENCOUNTER (OUTPATIENT)
Dept: RESPIRATORY THERAPY | Facility: HOSPITAL | Age: 64
Discharge: HOME | End: 2024-10-09
Payer: MEDICARE

## 2024-10-09 ENCOUNTER — MULTIDISCIPLINARY VISIT (OUTPATIENT)
Dept: PEDIATRIC PULMONOLOGY | Facility: HOSPITAL | Age: 64
End: 2024-10-09
Payer: MEDICARE

## 2024-10-09 VITALS
RESPIRATION RATE: 17 BRPM | WEIGHT: 119.05 LBS | HEIGHT: 62 IN | SYSTOLIC BLOOD PRESSURE: 126 MMHG | TEMPERATURE: 98.2 F | HEART RATE: 58 BPM | DIASTOLIC BLOOD PRESSURE: 82 MMHG | BODY MASS INDEX: 21.91 KG/M2 | OXYGEN SATURATION: 97 %

## 2024-10-09 DIAGNOSIS — A49.02 MRSA (METHICILLIN RESISTANT STAPHYLOCOCCUS AUREUS) INFECTION: ICD-10-CM

## 2024-10-09 DIAGNOSIS — F10.21 ALCOHOL USE DISORDER, MODERATE, IN EARLY REMISSION (MULTI): ICD-10-CM

## 2024-10-09 DIAGNOSIS — E84.9 CYSTIC FIBROSIS: Primary | ICD-10-CM

## 2024-10-09 DIAGNOSIS — F90.9 ADULT ADHD: ICD-10-CM

## 2024-10-09 DIAGNOSIS — I10 PRIMARY HYPERTENSION: ICD-10-CM

## 2024-10-09 DIAGNOSIS — E84.9 CYSTIC FIBROSIS: ICD-10-CM

## 2024-10-09 DIAGNOSIS — T88.7XXA MEDICATION SIDE EFFECT: ICD-10-CM

## 2024-10-09 DIAGNOSIS — E84.8 DIABETES MELLITUS RELATED TO CF (CYSTIC FIBROSIS) (MULTI): ICD-10-CM

## 2024-10-09 DIAGNOSIS — E84.8 DIABETES MELLITUS RELATED TO CF (CYSTIC FIBROSIS) (MULTI): Primary | ICD-10-CM

## 2024-10-09 DIAGNOSIS — Z79.899 HIGH RISK MEDICATION USE: Primary | ICD-10-CM

## 2024-10-09 DIAGNOSIS — E08.9 DIABETES MELLITUS RELATED TO CF (CYSTIC FIBROSIS) (MULTI): ICD-10-CM

## 2024-10-09 DIAGNOSIS — E08.9 DIABETES MELLITUS RELATED TO CF (CYSTIC FIBROSIS) (MULTI): Primary | ICD-10-CM

## 2024-10-09 PROBLEM — F41.9 ANXIETY: Status: RESOLVED | Noted: 2024-05-29 | Resolved: 2024-10-09

## 2024-10-09 PROBLEM — K38.8 MASS OF APPENDIX: Status: RESOLVED | Noted: 2024-07-03 | Resolved: 2024-10-09

## 2024-10-09 LAB
MGC ASCENT PFT - FEV1 - PRE: 2.47
MGC ASCENT PFT - FEV1 - PREDICTED: 2.16
MGC ASCENT PFT - FVC - PRE: 3.39
MGC ASCENT PFT - FVC - PREDICTED: 2.71
POC HEMOGLOBIN A1C: 6.6 % (ref 4.2–6.5)

## 2024-10-09 PROCEDURE — 99214 OFFICE O/P EST MOD 30 MIN: CPT | Performed by: INTERNAL MEDICINE

## 2024-10-09 PROCEDURE — 99214 OFFICE O/P EST MOD 30 MIN: CPT | Performed by: PEDIATRICS

## 2024-10-09 PROCEDURE — 94010 BREATHING CAPACITY TEST: CPT | Performed by: INTERNAL MEDICINE

## 2024-10-09 PROCEDURE — 94010 BREATHING CAPACITY TEST: CPT

## 2024-10-09 PROCEDURE — 83036 HEMOGLOBIN GLYCOSYLATED A1C: CPT | Mod: QW | Performed by: INTERNAL MEDICINE

## 2024-10-09 RX ORDER — ELEXACAFTOR, TEZACAFTOR, AND IVACAFTOR 100-50-75
KIT ORAL
Qty: 84 TABLET | Refills: 3 | Status: SHIPPED | OUTPATIENT
Start: 2024-10-09

## 2024-10-09 ASSESSMENT — PULMONARY FUNCTION TESTS
FEV1 (LITERS): 2.47
FEV1/FVC: 0.02
FVC (LITERS): 3.39
FVC_PERCENT_PREDICTED: 125
FEV1 (%PREDICTED): 114

## 2024-10-09 NOTE — Clinical Note
Nita/Reba - I'd like to see Lucia back in office next month (November); could you call to schedule?

## 2024-10-09 NOTE — PATIENT INSTRUCTIONS
"    It was very nice to see you today. We can offer you in-person and \"virtual\" appointments with your cystic fibrosis provider.    If you need to cancel or schedule an appointment, please call the  at the [facility] at [phone] between the hours of 8 AM and 5 PM, Monday-Friday.    To reach the CF nurse, Chiquis, please call [phone]. Chiquis has a secure voice mail account if you want to leave a message.    If you need to speak with an adult cystic fibrosis provider between the hours of 5 PM and 8 AM (overnight) or anytime on Saturday or Sunday, please call [phone]. When you call this number, you will be connected to an  with the [facility] answering service. You should tell the  that you're an adult with cystic fibrosis who needs to speak to the \"cystic fibrosis doctor on-call.\" The  will take your contact information. You should then expect a call back from the cystic fibrosis doctor on-call within a short period of time.      Plan from today's visit:    Your response to Trikafta is amazing (except for mental health side effects).  We discussed the fact that Trikafta use has been associated with new-onset depression and/or anxiety, short-term memory loss, and other behavioral side effects (short temper).  We will try to adjust the dose of Trikafta so that you still have a good clinical response and hopefully fewer side effects.  There is no specific regimen for adjusting the Trikafta dose, so we will have to see how things go over time.  Reduce Trikafta dose to two orange pills each morning.  NO BLUE PILL IN THE EVENING.  I hope that reducing the Trikafta dose will alleviate the depressed feelings, memory problems, and lack of motivation.  However, I think you should make an appointment with a psychiatrist at [facility] (or [facility]) in case you need to take an antidepressant medication.  The psychiatrist could also help you " with the ADHD by managing a medication like Vyvanse.  I think it is medically safe and reasonable to remain off the Pulmozyme as long as your lung health is doing well on Trikafta.  I took Pulmozyme off your medication list.  Keep using the Xopenex HFA inhaler as needed.  Sick plan (if you need an antibiotic): Doxycycline 100 mg by mouth twice daily for 14 days     Important Information:  Your CFTR variants (mutations) are: [id number]     Your percent-predicted FEV1 today was: 114%  Your weight adjusted for height (body mass index, BMI) today was: 21.8 kg/m2  (goal for adult males with CF = 23.0 kg/m2, goal for adult females with CF = 22.0 kg/m2)    Next appointment: November 2024

## 2024-10-09 NOTE — PROGRESS NOTES
[facility] PATIENT REASSESSMENT NOTE:  CYSTIC FIBROSIS    Mansi Morrison is a 64 y.o. female seen in clinic .    CFTR Modulator Medication::  Carolin Morrison's care will be continued with the referring prescriber.     GENERAL ASSESSMENT:  Are there any changes to your home medications, OTCs or supplements? Started Trikafta 7/26/24    Current Outpatient Medications on File Prior to Visit   Medication Sig Dispense Refill    acetaminophen (Tylenol) 325 mg tablet Take 2 tablets (650 mg) by mouth every 6 hours. 60 tablet 0    amLODIPine (Norvasc) 2.5 mg tablet Take 1 tablet (2.5 mg) by mouth once daily. 30 tablet 11    Creon 24,000-76,000 -120,000 unit capsule Take 3-4 capsules by mouth 3 times daily (morning, midday, late afternoon). MOUTH WITH MEALS AND TAKE 1-2 CAPSULES WITH SNACKS 300 capsule 11    elexacaftor-tezacaftor-ivacaft (Trikafta) 100-50-75 mg tablet Take 2 orange tablets by mouth in the morning with fat-containing food. 84 tablet 3    FreeStyle Lite Strips strip TEST 6-7 TIMES DAILY      insulin glargine (Basaglar KwikPen U-100 Insulin) 100 unit/mL (3 mL) pen Inject 8 units daily as directed 15 mL 3    losartan (Cozaar) 100 mg tablet TAKE 1 TABLET BY MOUTH EVERY DAY 90 tablet 3    mecobalamin, vitamin B12, 1,000 mcg tablet,disintegrating Place 1 tablet under the tongue once daily.      omeprazole (PriLOSEC) 40 mg DR capsule Take 1 capsule (40 mg) by mouth 2 times a day before meals. Take at least 30 minutes prior to a meal (before breakfast and before dinner) 60 capsule 1    traZODone (Desyrel) 50 mg tablet TAKE 2 TABLETS (100 MG) BY MOUTH AS NEEDED AT BEDTIME FOR SLEEP. 60 tablet 1    Xopenex HFA 45 mcg/actuation inhaler Inhale 2 puffs every 4 hours if needed for wheezing or shortness of breath. 15 g 6    [DISCONTINUED] amLODIPine (Norvasc) 2.5 mg tablet Take 1 tablet (2.5 mg) by mouth once daily. (Patient not taking: Reported on 10/7/2024) 30 tablet 1     [DISCONTINUED] bisacodyl (Dulcolax) 5 mg EC tablet Take 2 tablets (10 mg) by mouth once daily. Do not crush, chew, or split. (Patient not taking: Reported on 7/10/2024) 180 tablet 3    [DISCONTINUED] dornase Alpha (Pulmozyme) 1 mg/mL nebulizer solution INHALE CONTENTS OF 1 VIAL VIA NEBULIZER DAILY 75 mL 11    [DISCONTINUED] elexacaftor-tezacaftor-ivacaft (Trikafta) 100-50-75 mg tablet Take 2 orange tablets by mouth in the morning with fat-containing food and 1 blue tablet by mouth in the evening with fat-containing food, approximately 12 hours apart. 84 tablet 3    [DISCONTINUED] hydroCHLOROthiazide (HYDRODiuril) 25 mg tablet Take 1 tablet (25 mg) by mouth once daily. (Patient not taking: Reported on 10/7/2024)      [DISCONTINUED] methocarbamol (Robaxin) 500 mg tablet Take 1 tablet (500 mg) by mouth 4 times a day as needed for muscle spasms. (Patient not taking: Reported on 10/7/2024) 20 tablet 0    [DISCONTINUED] ondansetron ODT (Zofran-ODT) 4 mg disintegrating tablet Take 1 tablet (4 mg) by mouth every 8 hours if needed for nausea or vomiting. (Patient not taking: Reported on 10/7/2024) 20 tablet 0    [DISCONTINUED] oxyCODONE (Roxicodone) 5 mg immediate release tablet Take 1 tablet (5 mg) by mouth every 6 hours if needed for severe pain (7 - 10) or moderate pain (4 - 6). (Patient not taking: Reported on 10/7/2024) 10 tablet 0    [DISCONTINUED] polyethylene glycol (Glycolax, Miralax) 17 gram/dose powder Take 34 g by mouth 2 times a day.  MIX 34 GM TWICE DAILY AND TAKE AS DIRECTED    Taking every other day (Patient not taking: Mix of powder and drink. Reported on 10/7/2024) 1000 g 2     No current facility-administered medications on file prior to visit.       Do you have any new allergies? no new allergies reported    Allergies as of 10/09/2024 - Reviewed 10/09/2024   Allergen Reaction Noted    Codeine Shortness of breath 10/26/2023    Linezolid Diarrhea, Rash, and Nausea/vomiting 06/28/2024    Lexapro  [escitalopram oxalate] Other 11/06/2023    Lisinopril Cough 11/06/2023    Moxifloxacin Rash 05/19/2022       Have you been diagnosed with any new medical conditions? Recently had surgery, appendectomy and cholecystectomy     Patient Active Problem List   Diagnosis    Constipation    Cystic fibrosis    Diabetes mellitus related to CF (cystic fibrosis) (Multi)    Exocrine pancreatic insufficiency (HHS-HCC)    Anemia    Esophageal dysphagia    Early satiety    Intestinal bacterial overgrowth    Primary hypertension    Healthcare maintenance    Osteopenia determined by x-ray    Tubular adenoma of colon    Brow ptosis, bilateral    Chronic pancreatitis (Multi)    Lichen sclerosus et atrophicus    Dermatochalasis of both upper eyelids    Diabetic nephropathy associated with diabetes mellitus due to underlying condition (Multi)    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)    BRYON (generalized anxiety disorder)    Hiatal hernia    Peripheral polyneuropathy    MRSA (methicillin resistant Staphylococcus aureus) infection    Migraine with aura and without status migrainosus, not intractable    Headache    Primary insomnia    Other fatigue    Gastroesophageal reflux disease without esophagitis    Scedosporiosis (Multi)    Cystic fibrosis with pulmonary exacerbation (Multi)    Cystic fibrosis with pulmonary manifestations (Multi)    Alcohol use disorder, moderate, in early remission (Multi)    PONV (postoperative nausea and vomiting)    Pneumonia    History of screening mammography    High risk medication use    At risk for dehydration due to poor fluid intake    Nausea and vomiting    Calculus of gallbladder without cholecystitis without obstruction    Right upper quadrant abdominal pain    Syncope and collapse    Adult ADHD    Medication side effect       CFTR Genotype: [id number] and [id number]  Current CFTR Modulator: Trikafta  Dose: 2 orange tablets in the morning and 1 blue tablet in the evening with fat containing  "food  Vertex GPS: Yes    TOLERANCE:   Have you experienced any side effects from this medication? Side effects reported include:    feeling down, bad axiety, worse ADHD    Are there any changes to current therapy regimen? Started Trikafta in July    EFFICACY:     How do you feel your medication is affecting your disease state? Feels great on Trikafta, not coughing, no phlegm, \"miracle\" drug    GOALS:  Your goals of therapy are: Improved quality of life, Improve/maintain lung function, and Reduce frequency of illness    COMPLIANCE / ADHERENCE:  Have you had any unplanned missed doses?No  If yes, how often do you miss doses and is there a particular contributing reason?     What barriers to adherence does the patient have? (Answer Y/N for all):  Patient has a difficult time remembering to take medications? No  Difficult administration technique?No  Medication cost? No  Patient does not think medication is beneficial?No  Other?   What actions were taken to address barriers?    Does the patient have any barriers to self-administration (including physical and mental)? No  List barriers:   Describe actions taken to mitigate barriers:    Labs  Lab Results   Component Value Date    WBC 7.6 09/09/2024    HGB 11.7 (L) 09/09/2024    HCT 33.0 (L) 09/09/2024     09/09/2024    CHOL 185 07/10/2023    TRIG 93 07/10/2023    HDL 67.8 07/10/2023    ALT 12 09/09/2024    AST 12 09/09/2024     09/09/2024    K 3.1 (L) 09/09/2024     (H) 09/09/2024    CREATININE 0.86 09/09/2024    BUN 26 (H) 09/09/2024    CO2 25 09/09/2024    TSH 1.02 02/12/2024    INR 0.9 11/16/2020    HGBA1C 6.6 (A) 10/09/2024       Pulmonary Function Tests     FEV1   Date/Time Value Ref Range Status   07/24/2024 03:40 PM 2.39 liters Final     Comment:     105%   07/10/2024 10:15 AM 2.17 liters Final     Comment:     95%   05/29/2024 10:35 AM 2.17 liters Final     Comment:     95%          PATIENT MANAGEMENT:    Do you have any questions regarding your " medications, or care? additional questions: interested to speak with social work    Do you have any concerns with access to your medications? No concerns expressed    Fills medications at: [facility]    IMPRESSION/PLAN:  Is patient high risk (potential patients:  pregnancy, geriatric, pediatric)?  n/a  Is laboratory follow-up needed? Liver function tests due every 3 months for the first year on Trikafta. Last done: Sept 2024, next due Dec 2024  Is a clinical intervention needed? yes - discussed potential management strategies for apparent side effects from Trikafta. Discussed risk/benefit of managing behavioral side effects and potential dose adjustments. Communicated with Dr. Harris.     Additional comments:   -interested in talking with social work today - communicated with Heidi  -17 days out of surgery, feeling better now, had appendix out  -seasonal variables could also be effecting mood    Chantelle Naidu, PharmD   10/11/2024 2:43 PM

## 2024-10-14 PROBLEM — Z91.89 AT RISK FOR DEHYDRATION DUE TO POOR FLUID INTAKE: Status: RESOLVED | Noted: 2024-07-10 | Resolved: 2024-10-14

## 2024-10-14 NOTE — ASSESSMENT & PLAN NOTE
Seems to be worsened by current depression symptoms attributed to Trikafta.  I hope that reducing the Trikafta dose will alleviate the depressed feelings, memory problems, and lack of motivation.  However, I think you should make an appointment with a psychiatrist at [facility] (or [facility]) in case you need to take an antidepressant medication.  The psychiatrist could also help you with the ADHD by managing a medication like Vyvanse.  Community mental health resources provided by [facility] .

## 2024-10-14 NOTE — ASSESSMENT & PLAN NOTE
Excellent physiologic response to Trikafta with ppFEV1 up to 114% (!).  Pulmonary exacerbation absent.  It is acceptable for Lucia to refrain from using Pulmozyme in light of her response to Trikafta.  Xopenex (levalbuterol) has been better for her than racemic albuterol - no tremulousness.  Surveillance respiratory tract culture today.

## 2024-10-14 NOTE — ASSESSMENT & PLAN NOTE
Lucia met with Dr. Ambrose today.  Recommendations:  Continue basaglar 8 units daily.  If having surgery, recommend cutting basaglar dose to 4-5 units the night before.  Check some glucoses after lunch/dinner to assess need for mealtime insulin  Annual eye exam due April 2025  Annual urine albumin (negative on 2/12/2024)  Should follow with PCP to optimize HTN management.   Normal lipids in July 2023

## 2024-10-14 NOTE — ASSESSMENT & PLAN NOTE
At risk for relapse due to current mood disturbance.  Has her sponsor and avoiding social situations that would expose there to alcohol.

## 2024-10-14 NOTE — ASSESSMENT & PLAN NOTE
Your response to Trikafta is amazing (except for mental health side effects).  We discussed the fact that Trikafta use has been associated with new-onset depression and/or anxiety, short-term memory loss, and other behavioral side effects (short temper).  We will try to adjust the dose of Trikafta so that you still have a good clinical response and hopefully fewer side effects.  There is no specific regimen for adjusting the Trikafta dose, so we will have to see how things go over time.  Reduce Trikafta dose to two orange pills each morning.  NO BLUE PILL IN THE EVENING.

## 2024-11-19 ENCOUNTER — TELEPHONE (OUTPATIENT)
Dept: CARDIOLOGY | Facility: CLINIC | Age: 64
End: 2024-11-19
Payer: MEDICARE

## 2024-11-19 NOTE — TELEPHONE ENCOUNTER
Left several messages about scheduling her echo and she has not returned my calls I called her on the 8th of October, 10th of October, 15th of October and November 19th.   Left message that she could message me through my chart also letting me know what she would like me to do.

## 2024-12-11 ENCOUNTER — DOCUMENTATION (OUTPATIENT)
Dept: GASTROENTEROLOGY | Facility: HOSPITAL | Age: 64
End: 2024-12-11
Payer: MEDICARE

## 2024-12-11 NOTE — PROGRESS NOTES
Patient had request for 90 day supply of omeprazole.  Will approve 90 day supply but patient needs follow up appointment with me for additional medication.

## 2025-01-22 ENCOUNTER — NUTRITION (OUTPATIENT)
Dept: PEDIATRIC PULMONOLOGY | Facility: HOSPITAL | Age: 65
End: 2025-01-22

## 2025-01-22 ENCOUNTER — MULTIDISCIPLINARY VISIT (OUTPATIENT)
Dept: PEDIATRIC ENDOCRINOLOGY | Facility: HOSPITAL | Age: 65
End: 2025-01-22
Payer: MEDICARE

## 2025-01-22 ENCOUNTER — APPOINTMENT (OUTPATIENT)
Dept: RESPIRATORY THERAPY | Facility: HOSPITAL | Age: 65
End: 2025-01-22
Payer: MEDICARE

## 2025-01-22 ENCOUNTER — APPOINTMENT (OUTPATIENT)
Dept: PEDIATRIC PULMONOLOGY | Facility: HOSPITAL | Age: 65
End: 2025-01-22
Payer: MEDICARE

## 2025-01-22 VITALS
HEART RATE: 58 BPM | SYSTOLIC BLOOD PRESSURE: 129 MMHG | WEIGHT: 123.79 LBS | TEMPERATURE: 97.8 F | OXYGEN SATURATION: 97 % | BODY MASS INDEX: 22.64 KG/M2 | RESPIRATION RATE: 19 BRPM | DIASTOLIC BLOOD PRESSURE: 69 MMHG

## 2025-01-22 DIAGNOSIS — E08.9 DIABETES MELLITUS RELATED TO CF (CYSTIC FIBROSIS) (MULTI): ICD-10-CM

## 2025-01-22 DIAGNOSIS — E84.8 DIABETES MELLITUS RELATED TO CF (CYSTIC FIBROSIS) (MULTI): ICD-10-CM

## 2025-01-22 LAB
CREAT UR-MCNC: 56 MG/DL (ref 20–320)
MICROALBUMIN UR-MCNC: <7 MG/L
MICROALBUMIN/CREAT UR: NORMAL MG/G{CREAT}
POC HEMOGLOBIN A1C: 6.5 % (ref 4.2–6.5)

## 2025-01-22 PROCEDURE — 99214 OFFICE O/P EST MOD 30 MIN: CPT | Performed by: PEDIATRICS

## 2025-01-22 PROCEDURE — 83036 HEMOGLOBIN GLYCOSYLATED A1C: CPT | Mod: QW | Performed by: PEDIATRICS

## 2025-01-22 PROCEDURE — 82043 UR ALBUMIN QUANTITATIVE: CPT | Performed by: PEDIATRICS

## 2025-01-22 PROCEDURE — 95251 CONT GLUC MNTR ANALYSIS I&R: CPT | Performed by: PEDIATRICS

## 2025-01-22 PROCEDURE — 99214 OFFICE O/P EST MOD 30 MIN: CPT | Mod: 25 | Performed by: PEDIATRICS

## 2025-01-22 RX ORDER — GLUCAGON 3 MG/1
POWDER NASAL
Qty: 2 EACH | Refills: 3 | Status: SHIPPED | OUTPATIENT
Start: 2025-01-22

## 2025-01-22 RX ORDER — BLOOD-GLUCOSE SENSOR
EACH MISCELLANEOUS
Qty: 3 EACH | Refills: 11 | Status: SHIPPED | OUTPATIENT
Start: 2025-01-22

## 2025-01-22 NOTE — Clinical Note
Follow up in 3 months with CF Endocrinology In-person (coordinate with Pulm appointment)  [facility] Clinic: 1st and 3rd Tuesday Morning 2nd and 4th Wednesday Afternoon

## 2025-01-22 NOTE — PATIENT INSTRUCTIONS
Great to see you!   Your A1c today was 6.5%  Your Insulin Instruction/Doses are attached    Recommendations:  --wear Dexcom for 10 days  --we can prescribe a dexcom and see how your insurance coverage is    --yearly labs to check for diabetic kidney disease (urine albumin)  --yearly eye exam to check for diabetic retinopathy (damage to the blood vessels in the back of the eye). Please tell your eye doctor that you have diabetes.   --Check your Glucagon to make sure it is not . (Brand names: Baqsimi, G-vobreanne, Glucagen)    Follow up in 3 months with  Endocrinology  In-person (coordinate with Pulm appointment), In-person (CF endo appointment only), or Virtual    [facility] Endocrine Clinic:   and  Mornings (pediatric)   and  Afternoons (adult)    Please contact us with any concerns or if you notice:  --low glucoses under 70 and not coming up despite 2 treatments  --high glucoses over 300 for more than 4 hours  --average weekly glucose over 250  --if you need any prescriptions    CF Related Diabetes Team Contact Information:  Phone: [phone]  My Chart message our office  Email: [email or web address]  Answering Service: [phone] (evenings and weekends)  Fax: [phone]    [facility] Physician: Lanie Ambrose MD  [facility] Nurses: Virginia Lewis RN, [facility] and Terese Santos, ANIKA, [facility] Medical Assistant: Tamir Malhotra

## 2025-01-22 NOTE — PROGRESS NOTES
Pt. Is here alone today.    She is feeling great since starting Trikafta. She is very active, exercising frequently. She takes Trikaft with fat and enzymes. Pt. Had questions about the change in [place]on. RD explained that  changed and the bead size changed, but the strength of the capsules remains the same. Pt. Does note that sometimes she will have greasy stools, so she takes an extra enzyme. RD explained that pt. Can take up to 5 enzymes with meals (2139.0 u lipase/kg/meal). Pt. Stated that she understood and was agreeable. Pt. Had no further concerns at this time. RD provided contact info and encouraged pt. To reach out as nutritional concerns arise. Pt. Was agreeable.     Impression:  BMI at goal  Pt. Is physically active  Pt. Eats well-balanced diet  Pt. Noticing more GI issues    Recommendations:  Recommend maintain level of physical activity  Recommend continue to eat well-balanced diet  Recommend 4-5 capsules with meals and 2-3 with snacks  Recommend reach out to RD as nutritional concerns arise

## 2025-01-22 NOTE — PROGRESS NOTES
Subjective    Mansi Morrison is a 65 y.o. year old with a history of Cystic Fibrosis (Delta F508 homozygote), exocrine pancreatic insufficiency, presenting for follow up of Cystic Fibrosis-Related diabetes (CFRD).   CFRD was diagnosed in March 2013  Treated with Trikfta. Resolved abdominal pains after appendectomy and gallbladder removal    Last A1c was   Lab Results   Component Value Date    HGBA1C 6.5 01/22/2025        HPI   INTERVAL HISTORY    Feeling great--lots of energy. No abd pain. Lungs feel great. Hasn't gotten sick. Very active--exercising a lot.   Low am and afternoon 300 and then low again   Waking ~ 105  Afternoons: 200-300s    Diet:  Cloud bread, Protein shakes  Not eating as much meat  Rotating meals with friends    Diabetes is treated with:  Insulin Instructions  Fixed Dose Injections   Basaglar KwikPen U-100 Insulin 100 unit/mL (3 mL) insulin pen   Last edited by Lanie Ambrose MD on 4/24/2024 at 3:47 PM      Time of Day Dose (units)   10 pm 8        BG trends:   The patient is currently checking the blood glucose.  Patient is using: glucometer    Diabetes Flowsheet Data:            Hyperglycemia:  Symptoms: none  Timing:    Hypoglycemia:   Hypoglycemia frequency: infrequently  Hypoglycemia awareness: No   Symptoms of Hypoglycemia:   Timing of Hypoglycemia:   Treats hypoglycemia with: Juice and Candy  Glucagon prescription:  No     Nutrition and Pulmonary review:  Diet Hx:   Weight: stable  Pulmonary status: fantastic on trikafta    SCREENING FOR COMPLICATIONS:   Urine albumin:    Lab Results   Component Value Date    MICROALBCREA  02/12/2024      Comment:      One or more analytes used in this calculation is outside of the analytical measurement range. Calculation cannot be performed.      Eye doctor:  4/2024    Family History   Problem Relation Name Age of Onset    Other (CF Carrier) Mother      Other (CF Carrier) Father      Colon cancer Father      Esophageal cancer Neg Hx      Stomach cancer  Neg Hx        The patient does have a known family history of diabetes.    Social History     Tobacco Use    Smoking status: Never     Passive exposure: Never    Smokeless tobacco: Never   Vaping Use    Vaping status: Never Used   Substance Use Topics    Alcohol use: Not Currently    Drug use: Yes     Frequency: 2.0 times per week     Types: Marijuana     Comment: gummies at night; last used 5 days ago        Objective   PHYSICAL EXAM:  /69 (BP Location: Left arm, Patient Position: Sitting, BP Cuff Size: Adult)   Pulse 58   Temp 36.6 °C (97.8 °F) (Oral)   Resp 19   Wt 56.1 kg (123 lb 12.6 oz)   SpO2 97%   BMI 22.64 kg/m²    General: Well nourished, no acute distress  HEENT: NCAT, MMM, eye movements grossly intact  Neck: Supple  Pulm: Non labored breathing  Skin: No visible rash  MSK: normal ROM  Ext: WWP  Neuro: CN grossly intact  Psych: alert, normal mood     LABS:  POC HEMOGLOBIN A1c   Date/Time Value Ref Range Status   01/22/2025 02:17 PM 6.5 4.2 - 6.5 % Final   10/09/2024 02:28 PM 6.6 (A) 4.2 - 6.5 % Final   07/24/2024 04:28 PM 6.7 (A) 4.2 - 6.5 % Final     Albumin/Creatinine Ratio   Date/Time Value Ref Range Status   02/12/2024 02:43 PM   Final     Comment:     One or more analytes used in this calculation is outside of the analytical measurement range. Calculation cannot be performed.     Cholesterol   Date/Time Value Ref Range Status   07/10/2023 01:28  0 - 199 mg/dL Final     Comment:     .      AGE      DESIRABLE   BORDERLINE HIGH   HIGH     0-19 Y     0 - 169       170 - 199     >/= 200    20-24 Y     0 - 189       190 - 224     >/= 225         >24 Y     0 - 199       200 - 239     >/= 240   **All ranges are based on fasting samples. Specific   therapeutic targets will vary based on patient-specific   cardiac risk.  .   Pediatric guidelines reference:Pediatrics 2011, 128(S5).   Adult guidelines reference: NCEP ATPIII Guidelines,     MICHAEL 2001, 258:2486-97  .   Venipuncture immediately  after or during the    administration of Metamizole may lead to falsely   low results. Testing should be performed immediately   prior to Metamizole dosing.       LDL   Date/Time Value Ref Range Status   07/10/2023 01:28 PM 99 0 - 99 mg/dL Final     Comment:     .                           NEAR      BORD      AGE      DESIRABLE  OPTIMAL    HIGH     HIGH     VERY HIGH     0-19 Y     0 - 109     ---    110-129   >/= 130     ----    20-24 Y     0 - 119     ---    120-159   >/= 160     ----      >24 Y     0 -  99   100-129  130-159   160-189     >/=190  .       HDL   Date/Time Value Ref Range Status   07/10/2023 01:28 PM 67.8 mg/dL Final     Comment:     .      AGE      VERY LOW   LOW     NORMAL    HIGH       0-19 Y       < 35   < 40     40-45     ----    20-24 Y       ----   < 40       >45     ----      >24 Y       ----   < 40     40-60      >60  .       Triglycerides   Date/Time Value Ref Range Status   07/10/2023 01:28 PM 93 0 - 149 mg/dL Final     Comment:     .      AGE      DESIRABLE   BORDERLINE HIGH   HIGH     VERY HIGH   0 D-90 D    19 - 174         ----         ----        ----  91 D- 9 Y     0 -  74        75 -  99     >/= 100      ----    10-19 Y     0 -  89        90 - 129     >/= 130      ----    20-24 Y     0 - 114       115 - 149     >/= 150      ----         >24 Y     0 - 149       150 - 199    200- 499    >/= 500  .   Venipuncture immediately after or during the    administration of Metamizole may lead to falsely   low results. Testing should be performed immediately   prior to Metamizole dosing.       FEV1   Date/Time Value Ref Range Status   07/24/2024 03:40 PM 2.39 liters Final     Comment:     105%        GLUCOSE DATA:          ASSESSMENT/PLAN:   65 y.o.  female with CF, PI, CFRD x 11 years, HTN with h/o TIA. A1c of 6.5% which is at target. Her A1c levels have never been significantly elevated. CGM worn last fall shows normal fasting glucoses with some rises post-prandially. However avg glucose is  145 with 79% TIR and 0% low.   No room to increase basal insulin with normal fasting numbers.   Will repeat dexcom now and Rx dexcom for continuous wear with plan to likely add some prandial insulin.     Screening: normal urine albumin, normal RAKEL. No current evidence of diabetic retinopathy (normal exam 4/18/24) or diabetic kidney disease (DKD) other than HTN.   No evidence of DKD now but can have non-proteinuric DKD with decline in GFR aj with HTN. However, GFR has been 70s-80s  HTN on losartan, amlodipine--followed by PCP. Off hydrochlorothiazide now.  Normal lipids July 2023 (LDL 99)  Tingling of extremities improved with dietary changes.      Recommend:  --continue basaglar 8 units daily.  --dexcom placed today to assess glycemia through the day and need for insulin changes  --rx dexcom  --rx baqsimi for hypoglycemic emergencies   --annual eye exam due April 2025  --annual urine albumin today--normal  --Should follow with PCP to continue HTN management.   --normal lipids in July 2023--repeat with annual labs     RTC 3 mos      Assessment/Plan   Problem List Items Addressed This Visit             ICD-10-CM    Diabetes mellitus related to CF (cystic fibrosis) (Multi) E84.8, E08.9    Relevant Medications    blood-glucose sensor (Dexcom G7 Sensor) device    glucagon (Baqsimi) 3 mg/actuation spray,non-aerosol    Other Relevant Orders    Albumin-Creatinine Ratio, Urine Random    Albumin-Creatinine Ratio, Urine Random (Completed)           [facility]  1st and 3rd Tuesday Morning  2nd and 4th Wednesday Afternoon    Insulin Instructions  Fixed Dose Injections   Basaglar KwikPen U-100 Insulin 100 unit/mL (3 mL) insulin pen   Last edited by Lanie Ambrose MD on 4/24/2024 at 3:47 PM      Time of Day Dose (units)   10 pm 8           CGM Interpretation:  14 day CGM download was reviewed in detail as documented above and will be attached to chart.  A minimum of 72 hours of glucose data was used to inform the  management plan outlined above.

## 2025-01-28 ENCOUNTER — TELEPHONE (OUTPATIENT)
Dept: PEDIATRIC PULMONOLOGY | Facility: HOSPITAL | Age: 65
End: 2025-01-28
Payer: MEDICARE

## 2025-01-28 NOTE — TELEPHONE ENCOUNTER
Lucia called to inform RN that she is feeling really lowsy. She had a fever all night, chills, she has a bad headache, having n/v/d and can't keep anything down, and feels very weak. She has an appointment tomorrow with Dr. Harris. RN explained to get tested for the flu to rule that out and it could also be norovirus. Lucia agreed to get tested for the flu and will check in tomorrow morning about possibly re-scheduling appointment.

## 2025-01-29 ENCOUNTER — TELEPHONE (OUTPATIENT)
Dept: PEDIATRIC PULMONOLOGY | Facility: HOSPITAL | Age: 65
End: 2025-01-29
Payer: MEDICARE

## 2025-01-29 ENCOUNTER — TELEMEDICINE (OUTPATIENT)
Dept: PEDIATRIC PULMONOLOGY | Facility: HOSPITAL | Age: 65
End: 2025-01-29
Payer: MEDICARE

## 2025-01-29 ENCOUNTER — SOCIAL WORK (OUTPATIENT)
Dept: PEDIATRIC PULMONOLOGY | Facility: HOSPITAL | Age: 65
End: 2025-01-29
Payer: MEDICARE

## 2025-01-29 ENCOUNTER — APPOINTMENT (OUTPATIENT)
Dept: PEDIATRIC PULMONOLOGY | Facility: HOSPITAL | Age: 65
End: 2025-01-29
Payer: MEDICARE

## 2025-01-29 ENCOUNTER — APPOINTMENT (OUTPATIENT)
Dept: RESPIRATORY THERAPY | Facility: HOSPITAL | Age: 65
End: 2025-01-29
Payer: MEDICARE

## 2025-01-29 DIAGNOSIS — K21.9 GASTROESOPHAGEAL REFLUX DISEASE WITHOUT ESOPHAGITIS: ICD-10-CM

## 2025-01-29 DIAGNOSIS — I10 PRIMARY HYPERTENSION: ICD-10-CM

## 2025-01-29 DIAGNOSIS — F10.90 ALCOHOL USE DISORDER: ICD-10-CM

## 2025-01-29 DIAGNOSIS — E84.8 DIABETES MELLITUS RELATED TO CF (CYSTIC FIBROSIS) (MULTI): ICD-10-CM

## 2025-01-29 DIAGNOSIS — E84.9 CYSTIC FIBROSIS: Primary | ICD-10-CM

## 2025-01-29 DIAGNOSIS — F90.9 ADULT ADHD: ICD-10-CM

## 2025-01-29 DIAGNOSIS — E08.9 DIABETES MELLITUS RELATED TO CF (CYSTIC FIBROSIS) (MULTI): ICD-10-CM

## 2025-01-29 DIAGNOSIS — F41.1 GAD (GENERALIZED ANXIETY DISORDER): ICD-10-CM

## 2025-01-29 DIAGNOSIS — F51.01 PRIMARY INSOMNIA: ICD-10-CM

## 2025-01-29 PROCEDURE — 3062F POS MACROALBUMINURIA REV: CPT | Performed by: NURSE PRACTITIONER

## 2025-01-29 PROCEDURE — 4010F ACE/ARB THERAPY RXD/TAKEN: CPT | Performed by: NURSE PRACTITIONER

## 2025-01-29 PROCEDURE — 99214 OFFICE O/P EST MOD 30 MIN: CPT | Performed by: NURSE PRACTITIONER

## 2025-01-29 PROCEDURE — 99214 OFFICE O/P EST MOD 30 MIN: CPT | Mod: 95 | Performed by: NURSE PRACTITIONER

## 2025-01-29 RX ORDER — TRAZODONE HYDROCHLORIDE 50 MG/1
100 TABLET ORAL NIGHTLY PRN
Qty: 60 TABLET | Refills: 1 | Status: SHIPPED | OUTPATIENT
Start: 2025-01-29

## 2025-01-29 RX ORDER — OMEPRAZOLE 40 MG/1
40 CAPSULE, DELAYED RELEASE ORAL DAILY
Qty: 90 CAPSULE | Refills: 0 | Status: SHIPPED | OUTPATIENT
Start: 2025-01-29 | End: 2025-04-29

## 2025-01-29 NOTE — TELEPHONE ENCOUNTER
Pt called back to let us know that she is feeling much better, but not back to baseline. Afebrile, no nausea, was able to keep soup down, but still has a headache and fatigue. She did not obtain PCR testing for flu, Covid, etc.    Pt would like to switch today's appointment to virtual. Kervin Alexisjaime verbalized that she is able to see pt virtually today at 1330, which pt verbalized worked for her well.    Pt to reach out with any further questions or concerns from now until then.

## 2025-01-29 NOTE — TELEPHONE ENCOUNTER
RN called pt to check in, after she called in yesterday with fever, chills, headache, vomiting, diarrhea, fatigue and spoke with Chiquis Zamudio RN. Had to leave a . Awaiting a call back at this time.

## 2025-01-29 NOTE — PROGRESS NOTES
"    [facility] Fontane[facility] M.D. 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 Historically normal lung function (ppFEV1 100-106%) so Trikafta deferred. 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 EPI on PERT. Constipation. 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 Other problems: alcohol use disorder (AA); GERD with (+) BRAVO pH probe (7/3/2024), 2 cm hiatal hernia and paraesophageal hernia at EGD; osteopenia; generalized anxiety disorder (2017); poorly-controlled hypertension, insomnia treated with trazodone.    She is being seen today via telehealth for a CF follow up visit. Patient consented to the virtual visit and confirmed that patient is in [place].    HPI (1/29/25): She was supposed to be seen in person for CF follow up but is getting over a stomach bug so she switched to virtual. Today she had a piece of toast and tea. Having headaches and body aches. Feeling very fatigued.    Her BP has been okay. Losartan and amlodipine is what she taking. No longer taking hydrochlorothiazide because it was causing hypotension. Her BP runs 145-150/83-86. During her last clinic visit it was 120/80's. Prior to being on meds her SBP was in the 180's.    She saw Dr. Ambrose recently. Getting up to the 240's in the afternoon. Then having some low's. She kept taking her basaglar during her illness. Dr. Ambrose is working on getting the Dexcom covered.    Lungs are feeling \"100%\" and she no longer does her treatments. Ever since she went on Trikafta she feels like it was a \"miracle drug.\" Taking 2 orange pills in the morning. Omitted the blue tablet due to mood. With the blue pill she said it's hard to differentiate if her mood is better. Says she is a pretty happy person but has bouts of depression that are situational.    Baseline cough: \"zero\"  Baseline sputum: no sputum  SOB: No, runs and play pickle ball " (daily runs; once a week pickUpower ball)  Wheezing: no  Chest pain: no    Has a therapist that she sees ever other week. Last Wednesday she had a meltdown and drank alcohol, but was good for a 2 months without any. She has a sponsor and went back to a group meeting.    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 Has been thinking about doing something else because she has been stressed about paying her bills. She and 4 other friends may buy a building and live in it while using the lower level as a commercial space.    Broke up with her girlfriend over natali so that was difficult for her.    Current Medications     Current Outpatient Medications   Medication Instructions    acetaminophen (TYLENOL) 650 mg, oral, Every 6 hours    amLODIPine (NORVASC) 2.5 mg, oral, Daily    blood-glucose sensor (Dexcom G7 Sensor) device Apply 1 sensor every 10 days to monitor glucose    Creon 24,000-76,000 -120,000 unit capsule 3-4 capsules, oral, 3 times daily (morning, midday, late afternoon), MOUTH WITH MEALS AND TAKE 1-2 CAPSULES WITH SNACKS    elexacaftor-tezacaftor-ivacaft (Trikafta) 100-50-75 mg tablet Take 2 orange tablets by mouth in the morning with fat-containing food.    FreeStyle Lite Strips strip  TEST 6-7 TIMES DAILY    glucagon (Baqsimi) 3 mg/actuation spray,non-aerosol Spray 3 mg nasally as needed for severe hypoglycemia    insulin glargine (Basaglar KwikPen U-100 Insulin) 100 unit/mL (3 mL) pen Inject 8 units daily as directed    losartan (COZAAR) 100 mg, oral, Daily    mecobalamin, vitamin B12, 1,000 mcg tablet,disintegrating 1 tablet, sublingual, Daily    omeprazole (PRILOSEC) 40 mg, oral, Daily    traZODone (DESYREL) 100 mg, oral, Nightly PRN    Xopenex HFA 45 mcg/actuation inhaler 2 puffs, inhalation, Every 4 hours PRN     Physical Examination     There were no vitals taken for this visit.    Physical Exam  General: Sitting on her couch in no acute distress  Skin: No visible  rashes  Chest: No coughing during visit; in no respiratory distress  Psych: Mood uplifted but periods of seeming down    Metabolic Parameters     Sodium   Date/Time Value Ref Range Status   09/09/2024 12:42  136 - 145 mmol/L Final     Potassium   Date/Time Value Ref Range Status   09/09/2024 12:42 PM 3.1 (L) 3.5 - 5.3 mmol/L Final     Chloride   Date/Time Value Ref Range Status   09/09/2024 12:42  (H) 98 - 107 mmol/L Final     Bicarbonate   Date/Time Value Ref Range Status   09/09/2024 12:42 PM 25 21 - 32 mmol/L Final     Anion Gap   Date/Time Value Ref Range Status   09/09/2024 12:42 PM 12 10 - 20 mmol/L Final     Urea Nitrogen   Date/Time Value Ref Range Status   09/09/2024 12:42 PM 26 (H) 6 - 23 mg/dL Final     Creatinine   Date/Time Value Ref Range Status   09/09/2024 12:42 PM 0.86 0.50 - 1.05 mg/dL Final     GFR Female   Date/Time Value Ref Range Status   07/10/2023 01:28 PM 64 >90 mL/min/1.73m2 Final     Comment:      CALCULATIONS OF ESTIMATED GFR ARE PERFORMED   USING THE 2021 CKD-EPI STUDY REFIT EQUATION   WITHOUT THE RACE VARIABLE FOR THE IDMS-TRACEABLE   CREATININE METHODS.    https://jasn.asnjournals.org/content/early/2021/09/22/ASN.1022336631       Glucose   Date/Time Value Ref Range Status   09/09/2024 12:42  (H) 74 - 99 mg/dL Final     Calcium   Date/Time Value Ref Range Status   09/09/2024 12:42 PM 9.1 8.6 - 10.6 mg/dL Final     Phosphorus   Date/Time Value Ref Range Status   09/09/2024 12:42 PM 3.5 2.5 - 4.9 mg/dL Final     Comment:     The performance characteristics of phosphorus testing in heparinized plasma have been validated by the individual  laboratory site where testing is performed. Testing on heparinized plasma is not approved by the FDA; however, such approval is not necessary.       Hematologic Parameters     WBC   Date/Time Value Ref Range Status   09/09/2024 12:42 PM 7.6 4.4 - 11.3 x10*3/uL Final     Neutrophils Absolute   Date/Time Value Ref Range Status    09/09/2024 12:42 PM 5.92 1.20 - 7.70 x10*3/uL Final     Comment:     Percent differential counts (%) should be interpreted in the context of the absolute cell counts (cells/uL).     Neutrophils %   Date/Time Value Ref Range Status   09/09/2024 12:42 PM 78.4 40.0 - 80.0 % Final     Lymphocytes %   Date/Time Value Ref Range Status   09/09/2024 12:42 PM 13.9 13.0 - 44.0 % Final     Monocytes %   Date/Time Value Ref Range Status   09/09/2024 12:42 PM 5.4 2.0 - 10.0 % Final     Basophils %   Date/Time Value Ref Range Status   09/09/2024 12:42 PM 0.3 0.0 - 2.0 % Final     Eosinophils Absolute   Date/Time Value Ref Range Status   09/09/2024 12:42 PM 0.14 0.00 - 0.70 x10*3/uL Final     Eosinophils %   Date/Time Value Ref Range Status   09/09/2024 12:42 PM 1.9 0.0 - 6.0 % Final     Hemoglobin   Date/Time Value Ref Range Status   09/09/2024 12:42 PM 11.7 (L) 12.0 - 16.0 g/dL Final     Hematocrit   Date/Time Value Ref Range Status   09/09/2024 12:42 PM 33.0 (L) 36.0 - 46.0 % Final     RBC   Date/Time Value Ref Range Status   09/09/2024 12:42 PM 4.05 4.00 - 5.20 x10*6/uL Final     MCV   Date/Time Value Ref Range Status   09/09/2024 12:42 PM 82 80 - 100 fL Final     MCHC   Date/Time Value Ref Range Status   09/09/2024 12:42 PM 35.5 32.0 - 36.0 g/dL Final     Platelets   Date/Time Value Ref Range Status   09/09/2024 12:42  150 - 450 x10*3/uL Final       Fat-Soluble Vitamin Levels     Vitamin D, 25-Hydroxy, Total   Date/Time Value Ref Range Status   02/12/2024 02:43 PM 52 30 - 100 ng/mL Final     Vitamin A (Retinol)   Date/Time Value Ref Range Status   02/12/2024 02:43 PM 0.52 0.30 - 1.20 mg/L Final     Vitamin A, Interpretation   Date/Time Value Ref Range Status   02/12/2024 02:43 PM Normal  Final     Comment:       This test was developed and its performance characteristics   determined by [facility]. It has not been cleared or   approved by the US Food and Drug Administration. This test was   performed in a CLIA  certified laboratory and is intended for   clinical purposes.  Performed By: [facility]  [address] [place] [id number]  : Tello Pacheco MD, PhD  CLIA Number: [id number]     Vitamin E (Alpha-Tocopherol)   Date/Time Value Ref Range Status   02/12/2024 02:43 PM 6.6 5.5 - 18.0 mg/L Final     Comment:       This test was developed and its performance characteristics   determined by [facility]. It has not been cleared or   approved by the US Food and Drug Administration. This test was   performed in a CLIA certified laboratory and is intended for   clinical purposes.     Vitamin E (Gamma-Tocopherol)   Date/Time Value Ref Range Status   02/12/2024 02:43 PM 1.6 0.0 - 6.0 mg/L Final     Comment:     Performed By: [facility]  [address] [place], [place] [id number]  : Tello Pacheco MD, PhD  CLIA Number: [id number]       LFT and Associated Parameters     AST   Date/Time Value Ref Range Status   09/09/2024 12:42 PM 12 9 - 39 U/L Final     ALT   Date/Time Value Ref Range Status   09/09/2024 12:42 PM 12 7 - 45 U/L Final     Comment:     Patients treated with Sulfasalazine may generate falsely decreased results for ALT.     Alkaline Phosphatase   Date/Time Value Ref Range Status   09/09/2024 12:42 PM 63 33 - 136 U/L Final     Bilirubin, Total   Date/Time Value Ref Range Status   09/09/2024 12:42 PM 0.5 0.0 - 1.2 mg/dL Final     Bilirubin, Direct   Date/Time Value Ref Range Status   07/10/2024 12:22 PM 0.1 0.0 - 0.3 mg/dL Final     GGT   Date/Time Value Ref Range Status   07/10/2024 12:22 PM 26 5 - 55 U/L Final     Albumin   Date/Time Value Ref Range Status   09/09/2024 12:42 PM 3.9 3.4 - 5.0 g/dL Final     Total Protein   Date/Time Value Ref Range Status   09/09/2024 12:42 PM 6.4 6.4 - 8.2 g/dL Final       Coagulation Parameters     Protime   Date/Time Value Ref Range Status   11/16/2020 03:16 PM 11.0 10.1 - 13.3 sec Final     INR   Date/Time  Value Ref Range Status   11/16/2020 03:16 PM 0.9 0.9 - 1.1 Final       Diabetes Laboratory Tests     POC HEMOGLOBIN A1c   Date/Time Value Ref Range Status   01/22/2025 02:17 PM 6.5 4.2 - 6.5 % Final   10/09/2024 02:28 PM 6.6 (A) 4.2 - 6.5 % Final   07/24/2024 04:28 PM 6.7 (A) 4.2 - 6.5 % Final     Albumin/Creatinine Ratio   Date/Time Value Ref Range Status   01/22/2025 03:23 PM   Final     Comment:     One or more analytes used in this calculation is outside of the analytical measurement range. Calculation cannot be performed.        Immunology Laboratory Tests     IgE   Date/Time Value Ref Range Status   02/12/2024 02:43 PM <2 0 - 214 IU/mL Final       DEXA Scan     XR BONE density 10/10/2022    Narrative  Name: OMAR GIPSON  Patient ID: [id number] YOB: 1960 Height: 62.0 in.  Gender:     Female   Exam Date:  10/10/2022 Weight: 118.0 lbs.  Indications: Cystic Fibrosis, DIABETES, family history osteoporosis,  Hysterectomy, Post Menopausal  Fractures:    Treatments:  OMEPRAZOLE      LEFT FEMUR - TOTAL  The bone mineral density : 0.889 g/cm2  T-score : -0.9    % of young normal mean :88%  Z-score : 0.0    % of age matched mean : 100%  % change vs. Previous : N/A    % change vs. Baseline : baseline    LEFT FEMUR - NECK  The bone mineral density : 0.843 g/cm2  T-score : -1.4    % of young normal mean: 81%  Z-score : -0.1    % of age matched mean : 98%  % change vs. Previous : N/A    % change vs. Baseline : baseline    SPINE L1-L4  The bone mineral density is 1.147 g/cm2  T-score : -0.3   % of young normal mean is 97%  Z-score : 1.0    % of age matched mean is 111%  % change vs. Previous : -    % change vs. Baseline : baseline      World Health Organization (WHO) criteria for post-menopausal,   Women:  Normal:       T-score at or above -1 SD  Osteopenia:   T-score between -1 and -2.5 SD  Osteoporosis: T-score at or below -2.5 SD    10-Year Fracture Risk:  Major Osteoporotic Fracture 4.0%  Hip  Fracture                0.4%  Reported Risk Factors       None    Interpretation:  According to World Health Organization criteria, classification is  low bone mass.    Followup recommended on October 2024 or sooner as clinically  warranted.       Chest Radiograph     XR chest 2 views 09/09/2024    Narrative  Interpreted By:  Tiago Sánchez,  and Keisha Beckett  STUDY:  XR CHEST 2 VIEWS;  9/9/2024 2:18 pm    INDICATION:  Signs/Symptoms:syncope.      COMPARISON:  Radiograph of the chest dated 10/10/2022.  CT AP 07/25/2024.    ACCESSION NUMBER(S):  [id number]    ORDERING CLINICIAN:  SANDRA SOARES    FINDINGS:  PA and lateral views of the chest were obtained.      CARDIOMEDIASTINAL SILHOUETTE:  Cardiomediastinal silhouette is normal in size and configuration.    LUNGS:  There is no consolidation, pleural effusion, or pneumothorax.    ABDOMEN:  There is a moderate-sized hiatal hernia.    BONES:  Mild multilevel degenerative changes of the thoracic spine are seen.    Impression  1. No evidence of acute cardiopulmonary process.  2. Moderate-sized hiatal hernia.      I personally reviewed the images/study and I agree with the findings  as stated by Sujit Valdes MD (resident). This study was  interpreted at [place], [place].    MACRO:  None    Signed by: Tiago Sánchez 9/9/2024 3:12 PM  Dictation workstation:   [id number]       CF Sputum Culture     AFB Culture   Date Value Ref Range Status   07/10/2024 No Mycobacteria isolated.  Final   04/03/2024 No Mycobacteria isolated.  Final   02/12/2024 No Mycobacteria isolated.  Final      Respiratory Culture, Cystic Fibrosis   Date/Time Value Ref Range Status   07/10/2024 10:46 AM (A)  Final    (2+) Few Methicillin Resistant Staphylococcus aureus (MRSA)     Comment:     Methicillin (Oxacillin) resistant Staphylococci are resistant to all currently available Penicillins, Beta-lactam/Beta-lactamase inhibitor combinations  (including Ampicillin/Sulbactam, Amoxicillin/Clavulanate and Pipercillin/Tazobactam), Carbapenems and   Cephalosporins (except Ceftaroline).   07/10/2024 10:46 AM (2+) Few Normal throat perla  Final       No results found for the last 90 days.    Problem List Items Addressed This Visit       Cystic fibrosis - Primary    Diabetes mellitus related to CF (cystic fibrosis) (Multi)    Overview     CFRD Hx: Diagnosed in March 2013 when A1c was 7.6%. Fasting glucose in the office in April 2013 was 252. we started her on Levemir.   June 2021: 6.9%  Sept 2021: 6.4%  April 2022: 7.3%  July 2023: 6.5%  Feb 2024: 6.4%  July 2024: 6.7%  Microalbumin: WNL 7/2023, 2/2024 (undetectable)         Primary hypertension    Overview     Managed [facility]         BRYON (generalized anxiety disorder)    Primary insomnia    Relevant Medications    traZODone (Desyrel) 50 mg tablet    Gastroesophageal reflux disease without esophagitis    Relevant Medications    omeprazole (PriLOSEC) 40 mg DR capsule    Alcohol use disorder    Adult ADHD     # Cystic fibrosis pulmonary disease - exacerbation absent  - Continue dose adjusted Trikafta 2 orange tablets daily  - Given omission of the blue pill for mood and feeling so great on Trikafta, I don't think it's appropriate to switch to Alyftrek at this point  - Would like to have Chantelle at least provide education on it when she comes for an in person visit  - Not doing treatments because she feels so well    # GERD  - Symptoms controlled; on omeprazole  - Asked about stopping but I recommended she keep it given her hiatal hernia  - Should discuss with Dr. Snyder at her apt next week    # CFRD  - Seen by Dr. Ambrose last week  - Plan to continue basaglar 8 units  - Has as dexcom and recently had highs and lows with her illness    # Hypertension  - Continue taking amlodipine and losartan  - Off hydrochlorothiazide  - Check BP's a couple times a week and keep track of them     # Alcohol use  disorder  - Relapsed last week but hasn't drank since then  - Attended an AA meeting    # Insomnia  - Needs a refill of trazadone  - Taking 100 mg because she wakes up in the middle of the night     # Anxiety and depression  # ADHD  - Doesn't want to go on medications but I think that   - Encouraged her to call [facility] to schedule an apt with a psychiatrist to manage her ADHD and depression  - Seeing her therapist every other week    Follow-up: 3 months  Future Appointments   Date Time Provider Department Center   2/5/2025  8:30 AM Latonia Snyder MD [id number] Academic     Kervin Sanchez, MARISOL-CNP   01/29/2025

## 2025-01-30 NOTE — PATIENT INSTRUCTIONS
"    It was very nice to see you today. We can offer you in-person and \"virtual\" appointments with your cystic fibrosis provider.    If you need to cancel or schedule an appointment, please call the  at the [facility] at [phone] between the hours of 8 AM and 5 PM, Monday-Friday.    To reach the CF nurse, Chiquis, please call [phone]. Chiquis has a secure voice mail account if you want to leave a message.    If you need to speak with an adult cystic fibrosis provider between the hours of 5 PM and 8 AM (overnight) or anytime on Saturday or Sunday, please call [phone]. When you call this number, you will be connected to an  with the [facility] answering service. You should tell the  that you're an adult with cystic fibrosis who needs to speak to the \"cystic fibrosis doctor on-call.\" The  will take your contact information. You should then expect a call back from the cystic fibrosis doctor on-call within a short period of time.      Here are key take-away points from today's visit:    I'm glad you are doing so well on Trikafta!  With your current viral illness that you are getting over, make sure you are getting rest and drinking plenty of fluids.  Try checking your BP a few times a week at home around the same time and record your readings that way we know where your BP is running.  Call [facility] to establish with a psychiatrist.   I will make sure your lab orders are in so you can go to an outside lab.  Stephanie will reach out to you about your social work needs.  Sick plan (please call our office if you think you need to take antibiotics by mouth or be admitted to the hospital):     Next appointment(s): Follow up in CF clinic in 3 months.  Future Appointments   Date Time Provider Department [place]   2/5/2025  8:30 AM Latonia Snyder MD [id number] Academic       "

## 2025-01-31 ENCOUNTER — SOCIAL WORK (OUTPATIENT)
Dept: PEDIATRIC PULMONOLOGY | Facility: HOSPITAL | Age: 65
End: 2025-01-31
Payer: MEDICARE

## 2025-01-31 NOTE — PROGRESS NOTES
Giovanny spoke to Mansi today. She expressed struggling with getting things completed in the financial son. Mansi feels she has ADHD and treatment would help her get back on track. I gave her the name if 3 counseling agencies([facility], [facility]  , [facility]) )and one psychiatrist name Krystian Reis -[phone].  I also let  her know if she wanted other resources  to call me back. I also let he know there were foundations who may be able to help with everyday bills and how to get an extension on her  bills if need be . Team has been updated

## 2025-02-04 NOTE — TELEPHONE ENCOUNTER
Lucia called to let us know that she is on day 5 of being on Trikafta and she feels great. She stated she has more energy, her headaches went away, she's is not nauseated, she stopped coughing and the pain in her side went away. She stated that she is also doing her respiratory treatments/therapies consistently.     She asked about her CT results and I verbalized to her that Dr. Snyder is out of the office and will review the results when she returns. Lucia verbalized understanding.   
Statement Selected

## 2025-02-05 DIAGNOSIS — F51.01 PRIMARY INSOMNIA: ICD-10-CM

## 2025-02-06 RX ORDER — TRAZODONE HYDROCHLORIDE 50 MG/1
50 TABLET ORAL NIGHTLY PRN
Qty: 90 TABLET | Refills: 2 | OUTPATIENT
Start: 2025-02-06

## 2025-03-17 DIAGNOSIS — Z79.899 HIGH RISK MEDICATION USE: ICD-10-CM

## 2025-03-17 DIAGNOSIS — E84.9 CYSTIC FIBROSIS: Primary | ICD-10-CM

## 2025-03-17 NOTE — TELEPHONE ENCOUNTER
Renewal Request pended to Pedro Luis Lockhart.    RN called and left a  to schedule an appointment with Dr. Harris and Dr. Snyder.

## 2025-03-18 RX ORDER — ELEXACAFTOR, TEZACAFTOR, AND IVACAFTOR 100-50-75
KIT ORAL
Qty: 252 TABLET | Refills: 0 | Status: SHIPPED | OUTPATIENT
Start: 2025-03-18

## 2025-03-18 NOTE — TELEPHONE ENCOUNTER
Trikafta refill request    -Started Trikafta 7/26/24  -Last LFTs drawn 9/9/24 (due every 3 months)  -Last visit with Dr. Harris Oct 2024  -Last visit with Kervin Sanchez CNP Jan 2025  -Next appointment with Dr. Harris scheduled 7/9/25

## 2025-03-18 NOTE — TELEPHONE ENCOUNTER
Pulmonary Attending    Ideally, Mansi would have hepatic function tests drawn well before our visit scheduled in July 2025.  I will ask MsJonathon Zamudio, RN to alert patient to my request for her to have blood drawn at  or [facility] location ASAP.  Orders below.    Requested Prescriptions     Signed Prescriptions Disp Refills    Trikafta 100-50-75 mg(d) /150 mg (n) tablet 252 tablet 0     Sig: TAKE 2 ORANGE TABLETS IN THE MORNING WITH FAT-CONTAINING FOOD. DISCARD BLUE TABLETS     Authorizing Provider: NICOLÁS HARRIS     Orders Placed This Encounter   Procedures    Hepatic Function Panel     Standing Status:   Future     Number of Occurrences:   1     Standing Expiration Date:   3/18/2026     Order Specific Question:   [facility]     Answer:   Immediate [1]    Gamma-Glutamyl Transferase     Standing Status:   Future     Number of Occurrences:   1     Standing Expiration Date:   3/18/2026     Order Specific Question:   Release result to [facility]     Answer:   Immediate     [address] [address]  Phone: [phone] Fax: [phone]    Nicolás Harris MD  3/18/2025  3:47 PM

## 2025-03-24 ENCOUNTER — TELEPHONE (OUTPATIENT)
Dept: PEDIATRIC PULMONOLOGY | Facility: HOSPITAL | Age: 65
End: 2025-03-24
Payer: MEDICARE

## 2025-03-24 NOTE — TELEPHONE ENCOUNTER
RN called and spoke to Lucia. RN reminded her to get annual labs and Lucia verbalized that she has plans to get them this week. RN also asked Lucia how she was doing and she is doing much better. 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 She states she is seeing a counselor weekly. She also stated she scheduled an appointment with Dr. Harris in July. RN verbalized that we would update her with her lab results once she completes them. Lucia verablized understanding and thanked RN for checking in on her.

## 2025-04-06 LAB
25(OH)D3+25(OH)D2 SERPL-MCNC: 54 NG/ML (ref 30–100)
A-TOCOPHEROL VIT E SERPL-MCNC: NORMAL
ALBUMIN SERPL-MCNC: 4.5 G/DL (ref 3.6–5.1)
ALBUMIN SERPL-MCNC: 4.5 G/DL (ref 3.6–5.1)
ALBUMIN/GLOB SERPL: 1.8 (CALC) (ref 1–2.5)
ALP SERPL-CCNC: 87 U/L (ref 37–153)
ALT SERPL-CCNC: 15 U/L (ref 6–29)
AST SERPL-CCNC: 14 U/L (ref 10–35)
BASOPHILS # BLD AUTO: 41 CELLS/UL (ref 0–200)
BASOPHILS NFR BLD AUTO: 0.9 %
BETA+GAMMA TOCOPHEROL SERPL-MCNC: NORMAL MG/DL
BILIRUB DIRECT SERPL-MCNC: 0.1 MG/DL
BILIRUB INDIRECT SERPL-MCNC: 0.4 MG/DL (CALC) (ref 0.2–1.2)
BILIRUB SERPL-MCNC: 0.5 MG/DL (ref 0.2–1.2)
BUN SERPL-MCNC: 16 MG/DL (ref 7–25)
BUN/CREAT SERPL: ABNORMAL (CALC) (ref 6–22)
CALCIUM SERPL-MCNC: 9.6 MG/DL (ref 8.6–10.4)
CHLORIDE SERPL-SCNC: 107 MMOL/L (ref 98–110)
CHOLEST SERPL-MCNC: 171 MG/DL
CHOLEST/HDLC SERPL: 2.3 (CALC)
CO2 SERPL-SCNC: 28 MMOL/L (ref 20–32)
CREAT SERPL-MCNC: 1.05 MG/DL (ref 0.5–1.05)
CYSTATIN C SERPL-MCNC: 1.25 MG/L (ref 0.52–1.14)
EGFRCR SERPLBLD CKD-EPI 2021: 60 ML/MIN/1.73M2
EOSINOPHIL # BLD AUTO: 170 CELLS/UL (ref 15–500)
EOSINOPHIL NFR BLD AUTO: 3.7 %
ERYTHROCYTE [DISTWIDTH] IN BLOOD BY AUTOMATED COUNT: 12.7 % (ref 11–15)
EST. AVERAGE GLUCOSE BLD GHB EST-MCNC: 131 MG/DL
EST. AVERAGE GLUCOSE BLD GHB EST-SCNC: 7.3 MMOL/L
GFR/BSA.PRED SERPLBLD CYS-BASED-ARV: 53 ML/MIN/1.73M2
GGT SERPL-CCNC: 54 U/L (ref 3–65)
GLOBULIN SER CALC-MCNC: 2.5 G/DL (CALC) (ref 1.9–3.7)
GLUCOSE SERPL-MCNC: 121 MG/DL (ref 65–99)
HBA1C MFR BLD: 6.2 % OF TOTAL HGB
HCT VFR BLD AUTO: 40.5 % (ref 35–45)
HDLC SERPL-MCNC: 75 MG/DL
HGB BLD-MCNC: 13.2 G/DL (ref 11.7–15.5)
IGE SERPL-ACNC: 2 KU/L
LDLC SERPL CALC-MCNC: 82 MG/DL (CALC)
LYMPHOCYTES # BLD AUTO: 1431 CELLS/UL (ref 850–3900)
LYMPHOCYTES NFR BLD AUTO: 31.1 %
MCH RBC QN AUTO: 30.3 PG (ref 27–33)
MCHC RBC AUTO-ENTMCNC: 32.6 G/DL (ref 32–36)
MCV RBC AUTO: 93.1 FL (ref 80–100)
MONOCYTES # BLD AUTO: 414 CELLS/UL (ref 200–950)
MONOCYTES NFR BLD AUTO: 9 %
NEUTROPHILS # BLD AUTO: 2544 CELLS/UL (ref 1500–7800)
NEUTROPHILS NFR BLD AUTO: 55.3 %
NONHDLC SERPL-MCNC: 96 MG/DL (CALC)
PHOSPHATE SERPL-MCNC: 3.8 MG/DL (ref 2.5–4.5)
PLATELET # BLD AUTO: 198 THOUSAND/UL (ref 140–400)
PMV BLD REES-ECKER: 10.5 FL (ref 7.5–12.5)
POTASSIUM SERPL-SCNC: 4.6 MMOL/L (ref 3.5–5.3)
PROT SERPL-MCNC: 7 G/DL (ref 6.1–8.1)
RBC # BLD AUTO: 4.35 MILLION/UL (ref 3.8–5.1)
SODIUM SERPL-SCNC: 143 MMOL/L (ref 135–146)
TRIGL SERPL-MCNC: 56 MG/DL
VIT A SERPL-MCNC: NORMAL UG/ML
WBC # BLD AUTO: 4.6 THOUSAND/UL (ref 3.8–10.8)

## 2025-04-09 LAB
25(OH)D3+25(OH)D2 SERPL-MCNC: 54 NG/ML (ref 30–100)
A-TOCOPHEROL VIT E SERPL-MCNC: 14.5 MG/L (ref 5.7–19.9)
ALBUMIN SERPL-MCNC: 4.5 G/DL (ref 3.6–5.1)
ALBUMIN SERPL-MCNC: 4.5 G/DL (ref 3.6–5.1)
ALBUMIN/GLOB SERPL: 1.8 (CALC) (ref 1–2.5)
ALP SERPL-CCNC: 87 U/L (ref 37–153)
ALT SERPL-CCNC: 15 U/L (ref 6–29)
AST SERPL-CCNC: 14 U/L (ref 10–35)
BASOPHILS # BLD AUTO: 41 CELLS/UL (ref 0–200)
BASOPHILS NFR BLD AUTO: 0.9 %
BETA+GAMMA TOCOPHEROL SERPL-MCNC: 2.3 MG/L
BILIRUB DIRECT SERPL-MCNC: 0.1 MG/DL
BILIRUB INDIRECT SERPL-MCNC: 0.4 MG/DL (CALC) (ref 0.2–1.2)
BILIRUB SERPL-MCNC: 0.5 MG/DL (ref 0.2–1.2)
BUN SERPL-MCNC: 16 MG/DL (ref 7–25)
BUN/CREAT SERPL: ABNORMAL (CALC) (ref 6–22)
CALCIUM SERPL-MCNC: 9.6 MG/DL (ref 8.6–10.4)
CHLORIDE SERPL-SCNC: 107 MMOL/L (ref 98–110)
CHOLEST SERPL-MCNC: 171 MG/DL
CHOLEST/HDLC SERPL: 2.3 (CALC)
CO2 SERPL-SCNC: 28 MMOL/L (ref 20–32)
CREAT SERPL-MCNC: 1.05 MG/DL (ref 0.5–1.05)
CYSTATIN C SERPL-MCNC: 1.25 MG/L (ref 0.52–1.14)
EGFRCR SERPLBLD CKD-EPI 2021: 60 ML/MIN/1.73M2
EOSINOPHIL # BLD AUTO: 170 CELLS/UL (ref 15–500)
EOSINOPHIL NFR BLD AUTO: 3.7 %
ERYTHROCYTE [DISTWIDTH] IN BLOOD BY AUTOMATED COUNT: 12.7 % (ref 11–15)
EST. AVERAGE GLUCOSE BLD GHB EST-MCNC: 131 MG/DL
EST. AVERAGE GLUCOSE BLD GHB EST-SCNC: 7.3 MMOL/L
GFR/BSA.PRED SERPLBLD CYS-BASED-ARV: 53 ML/MIN/1.73M2
GGT SERPL-CCNC: 54 U/L (ref 3–65)
GLOBULIN SER CALC-MCNC: 2.5 G/DL (CALC) (ref 1.9–3.7)
GLUCOSE SERPL-MCNC: 121 MG/DL (ref 65–99)
HBA1C MFR BLD: 6.2 % OF TOTAL HGB
HCT VFR BLD AUTO: 40.5 % (ref 35–45)
HDLC SERPL-MCNC: 75 MG/DL
HGB BLD-MCNC: 13.2 G/DL (ref 11.7–15.5)
IGE SERPL-ACNC: 2 KU/L
LDLC SERPL CALC-MCNC: 82 MG/DL (CALC)
LYMPHOCYTES # BLD AUTO: 1431 CELLS/UL (ref 850–3900)
LYMPHOCYTES NFR BLD AUTO: 31.1 %
MCH RBC QN AUTO: 30.3 PG (ref 27–33)
MCHC RBC AUTO-ENTMCNC: 32.6 G/DL (ref 32–36)
MCV RBC AUTO: 93.1 FL (ref 80–100)
MONOCYTES # BLD AUTO: 414 CELLS/UL (ref 200–950)
MONOCYTES NFR BLD AUTO: 9 %
NEUTROPHILS # BLD AUTO: 2544 CELLS/UL (ref 1500–7800)
NEUTROPHILS NFR BLD AUTO: 55.3 %
NONHDLC SERPL-MCNC: 96 MG/DL (CALC)
PHOSPHATE SERPL-MCNC: 3.8 MG/DL (ref 2.5–4.5)
PLATELET # BLD AUTO: 198 THOUSAND/UL (ref 140–400)
PMV BLD REES-ECKER: 10.5 FL (ref 7.5–12.5)
POTASSIUM SERPL-SCNC: 4.6 MMOL/L (ref 3.5–5.3)
PROT SERPL-MCNC: 7 G/DL (ref 6.1–8.1)
QUEST FLEXITEST3 RESULTS:: NORMAL
RBC # BLD AUTO: 4.35 MILLION/UL (ref 3.8–5.1)
SODIUM SERPL-SCNC: 143 MMOL/L (ref 135–146)
TRIGL SERPL-MCNC: 56 MG/DL
VIT A SERPL-MCNC: 77 MCG/DL (ref 38–98)
WBC # BLD AUTO: 4.6 THOUSAND/UL (ref 3.8–10.8)

## 2025-04-22 ENCOUNTER — TELEPHONE (OUTPATIENT)
Dept: PEDIATRIC PULMONOLOGY | Facility: HOSPITAL | Age: 65
End: 2025-04-22
Payer: MEDICARE

## 2025-04-22 ENCOUNTER — DOCUMENTATION (OUTPATIENT)
Dept: PEDIATRIC PULMONOLOGY | Facility: HOSPITAL | Age: 65
End: 2025-04-22
Payer: MEDICARE

## 2025-04-22 NOTE — TELEPHONE ENCOUNTER
"Lucia called [facility] and left a VM stating that her therapist wanted her to contact Heidi about having DR. Harris prescribe Wellbutrin. Lucia stated in VM that she is on \"crisis here on a number of levels\". She stated she hasn't seen her PCP in years.     RN will pass this information along to Dr. Harris.   "

## 2025-04-22 NOTE — PROGRESS NOTES
SW spoke with patient regarding her request for Wellbutrin, as suggested by her therapist. Informed her MD is unable to provide and referred her to make an appt with her PCP or a psychiatrist. Provided referrals to [facility], [facility], and [facility]. SW called [facility], with approval of patient, and placed a referral for them to call patient. Patient reported she had thoughts of [place] but used her resources and called her friends over. Reports no current SH/SI at this time. Reports she has a strong support network including her therapist who she sees weekly, her sponsor, meetings, and her friends.   -CASEY [place]

## 2025-05-19 DIAGNOSIS — E84.9 CYSTIC FIBROSIS: ICD-10-CM

## 2025-05-20 RX ORDER — ELEXACAFTOR, TEZACAFTOR, AND IVACAFTOR 100-50-75
KIT ORAL
Qty: 252 TABLET | Refills: 1 | Status: SHIPPED | OUTPATIENT
Start: 2025-05-20

## 2025-05-20 NOTE — TELEPHONE ENCOUNTER
Trikafta refill request    Last LFTs April 2025, Next LFTs due July 2025  Last visit with CF NP Jan 2025  Next appt scheduled with Dr. Harris July 2025

## 2025-06-16 ENCOUNTER — OFFICE VISIT (OUTPATIENT)
Dept: PEDIATRIC PULMONOLOGY | Facility: HOSPITAL | Age: 65
End: 2025-06-16
Payer: MEDICARE

## 2025-06-16 ENCOUNTER — MULTIDISCIPLINARY VISIT (OUTPATIENT)
Dept: GASTROENTEROLOGY | Facility: HOSPITAL | Age: 65
End: 2025-06-16
Payer: MEDICARE

## 2025-06-16 ENCOUNTER — TRANSCRIBE ORDERS (OUTPATIENT)
Dept: RESPIRATORY THERAPY | Facility: HOSPITAL | Age: 65
End: 2025-06-16
Payer: MEDICARE

## 2025-06-16 ENCOUNTER — ALLIED HEALTH (OUTPATIENT)
Dept: PEDIATRIC PULMONOLOGY | Facility: HOSPITAL | Age: 65
End: 2025-06-16
Payer: MEDICARE

## 2025-06-16 VITALS
WEIGHT: 121.25 LBS | RESPIRATION RATE: 20 BRPM | HEART RATE: 64 BPM | TEMPERATURE: 98.5 F | HEIGHT: 62 IN | BODY MASS INDEX: 22.31 KG/M2 | DIASTOLIC BLOOD PRESSURE: 82 MMHG | SYSTOLIC BLOOD PRESSURE: 189 MMHG | OXYGEN SATURATION: 96 %

## 2025-06-16 DIAGNOSIS — Z86.39 HISTORY OF NON ANEMIC VITAMIN B12 DEFICIENCY: Primary | ICD-10-CM

## 2025-06-16 DIAGNOSIS — E84.0 CYSTIC FIBROSIS WITH PULMONARY EXACERBATION (MULTI): ICD-10-CM

## 2025-06-16 DIAGNOSIS — K21.9 GASTROESOPHAGEAL REFLUX DISEASE WITHOUT ESOPHAGITIS: ICD-10-CM

## 2025-06-16 DIAGNOSIS — Z86.69 HISTORY OF CHOLESTEATOMA: ICD-10-CM

## 2025-06-16 DIAGNOSIS — E84.9 CF (CYSTIC FIBROSIS) (MULTI): Primary | ICD-10-CM

## 2025-06-16 PROCEDURE — 87070 CULTURE OTHR SPECIMN AEROBIC: CPT | Performed by: INTERNAL MEDICINE

## 2025-06-16 PROCEDURE — 99215 OFFICE O/P EST HI 40 MIN: CPT | Performed by: INTERNAL MEDICINE

## 2025-06-16 PROCEDURE — 4010F ACE/ARB THERAPY RXD/TAKEN: CPT | Performed by: INTERNAL MEDICINE

## 2025-06-16 PROCEDURE — 99417 PROLNG OP E/M EACH 15 MIN: CPT | Performed by: INTERNAL MEDICINE

## 2025-06-16 PROCEDURE — 3044F HG A1C LEVEL LT 7.0%: CPT | Performed by: INTERNAL MEDICINE

## 2025-06-16 PROCEDURE — 3062F POS MACROALBUMINURIA REV: CPT | Performed by: INTERNAL MEDICINE

## 2025-06-16 RX ORDER — OMEPRAZOLE 40 MG/1
40 CAPSULE, DELAYED RELEASE ORAL DAILY
Qty: 90 CAPSULE | Refills: 3 | Status: SHIPPED | OUTPATIENT
Start: 2025-06-16 | End: 2026-06-16

## 2025-06-16 NOTE — PROGRESS NOTES
Respiratory Note: 6/16/25  Patient's Airway Clearance: AffloVest  Frequency: BID when sick  Settings: P-V-D x 5 min each on low intensity  Exercise: Walking dog daily (10,000 steps per day)  Oscillating Device: N/A  Vang Cough: PRN  Primary: Exercise  Secondary: AffloVest and Vang Cough when sick    Aerosols: Name of medication, frequency, type of nebulizer used, Mask or Mouthpiece?  Bronchodilators: Xopenex HFA x 2 puffs PRN (currently sick and using x 3 per day)  Pulmozyme: PRN when sick (currently taking)  Hypertonic Saline: No (did not tolerate due to extreme coughing)  Antibiotics: No  ICS/Combinations: No    Order of medication w/airway clearance:  Xopenex, Vest, Pulmozyme    Hearing Test:   Spacer: Yes  Compressor: Has a working nebulizer compressor  Home PFT Device: No. Declined a home spirometer    Method of Cleaning Neb Cups: soap and water, Boil on the stove top x 5 min    Pharmacy for respiratory meds: [facility] and [facility]  Patient Assistance Program: Pulmozyme co-pay card  Oxygen: No  Home Care Company:  LPM:  Continuous, nocturnal, PRN, intermittent w/exacerbation:   CPAP, BIPAP, Assisted Breathing (use at home or in-patient): No  Have you smoked cigarettes in the last year?: No  Are you exposed to second hand smoke or electronic cigarette vapor?: No  Does anyone in your household smoke cigarettes?: No  Did this patient use electronic cigarettes (vape) this year? No  During the reporting year, how often was this patient vaping? Not at all    Teaching: Lucia has a respiratory illness today. She has been having some coughing spells that have led to emesis. She started the Pulmozyme once daily and the AffloVest twice daily and has increased the Xopenex to 2 puffs three times daily. She feels that this is helping. She will see Dr. Harris in two days for a sick visit.

## 2025-06-16 NOTE — PROGRESS NOTES
Lucia came in today for a scheduled visit with Dr. Snyder. Lucia asked to speak with a nurse. RN talked with Lucia and she thinks she is sick. She has been coughing so much that she is vomiting and feels really weak. Lucia asked if she could get a throat culture. RN verbalized that we can get a throat culture and asked if Lucia could see Dr. Harris on Wed 6/18. Lucia verbalized that she could and will get it scheduled when she checks out. Lucia also mentioned that her basement might have mold and was asking how she can get tested. RN verbalized that we can discuss that on Wednesday with Dr. Harris.

## 2025-06-16 NOTE — PROGRESS NOTES
"ESTABLISHED [facility] GI PATIENT NOTE    Mansi Morrison  1960   [id number]     Chief Complaint:   Chief Complaint   Patient presents with    Cystic Fibrosis        HPI:   Patient is a 65 y.o. year old female with a PMH significant for cystic fibrosis (genotype {LCCFGenotype:26491::\"[id number] homozygous\"})    {LCon/offCFTRmodulators:51841::\"on elexacaftor/tezacaftor/ivacaftor\"} here for complaint of ***.   Patient was last seen in [facility] GI clinic *** Visit date not found   Visit date not found     She feels achy, does not feel well.  Started having more cough 3 days ago.    Patient was having a lot of coughing the past month.  Coughing and sneezing so much that she would have emesis of bile.  Sometimes has a tickle or dry spot and then would need to cough.    Patient had appendectomy and cholecystectomy in September 2024.  Pathology showed chronic cholecystitis with cholecystitis.  Appendix pathology showed endometriosis.    She is still taking omeprazole.  She has some concerns about potential side effects.  Was on B12 years ago.  Not taking B12 anymore.    She now has a psychiatrist and a psychologist.      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)  No date: Dysphagia  No date: GERD (gastroesophageal reflux disease)  No date: Heartburn  No date: Hypertension  No date: Insomnia  No date: MRSA (methicillin resistant Staphylococcus aureus)      Comment:  in lungs  05/09/2022: Personal history of other benign neoplasm      Comment:  History of other benign neoplasm  No date: PONV (postoperative nausea and vomiting)  10/07/2024: Preoperative cardiovascular examination  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)      Comment:  Sputum sample collected 2/12/2024 in [facility] grew 1+                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)  No date: " "Syncope  10/07/2024: Syncope and collapse         Current Medications[1]    Past Medical History:  Medical History[2]    Past Surgical History:  Surgical History[3]    Family History:  Family History[4]     Vitals  Visit Vitals  BP (!) 189/82 (BP Location: Right arm, Patient Position: Sitting, BP Cuff Size: Adult)   Pulse 64   Temp 36.9 °C (98.5 °F) (Oral)   Resp 20          9/16/2024    11:20 AM 9/16/2024    11:30 AM 10/1/2024     3:00 PM 10/7/2024     2:47 PM 10/9/2024     2:22 PM 1/22/2025     1:50 PM 6/16/2025    11:13 AM   Vitals   Systolic  141 143 182 126 129 189   Diastolic  75 84 77 82 69 82   BP Location  Left arm   Right arm Left arm Right arm   Heart Rate 64 59 56 72 58 58 64   Temp  36 °C (96.8 °F) 36.3 °C (97.3 °F) 36.6 °C (97.9 °F) 36.8 °C (98.2 °F) 36.6 °C (97.8 °F) 36.9 °C (98.5 °F)   Resp 17 12 16  17 19 20   Height   1.575 m (5' 2\") 1.575 m (5' 2\") 1.575 m (5' 2.01\")  1.575 m (5' 2.01\")   Weight (lb)   115 119 119.05 123.79 121.25   BMI   21.03 kg/m2 21.77 kg/m2 21.77 kg/m2 22.64 kg/m2 22.17 kg/m2   BSA (m2)   1.51 m2 1.54 m2 1.54 m2 1.57 m2 1.55 m2   Visit Report   Report Report          Physical Exam:      Pulmonary Function Testing Results:    FEV1   Date Value Ref Range Status   07/24/2024 2.39 liters Final     Comment:     105%     FVC   Date Value Ref Range Status   07/24/2024 3.06 liters Final     Comment:     106%     FEV1/FVC   Date Value Ref Range Status   07/24/2024 78 % Final       Labs:    WHITE BLOOD CELL COUNT   Date/Time Value Ref Range Status   04/03/2025 08:20 AM 4.6 3.8 - 10.8 Thousand/uL Final     HEMOGLOBIN   Date/Time Value Ref Range Status   04/03/2025 08:20 AM 13.2 11.7 - 15.5 g/dL Final     HEMATOCRIT   Date/Time Value Ref Range Status   04/03/2025 08:20 AM 40.5 35.0 - 45.0 % Final     MCV   Date/Time Value Ref Range Status   04/03/2025 08:20 AM 93.1 80.0 - 100.0 fL Final     PLATELET COUNT   Date/Time Value Ref Range Status   04/03/2025 08:20  140 - 400 Thousand/uL " Final        SODIUM   Date/Time Value Ref Range Status   04/03/2025 08:20  135 - 146 mmol/L Final     POTASSIUM   Date/Time Value Ref Range Status   04/03/2025 08:20 AM 4.6 3.5 - 5.3 mmol/L Final     CHLORIDE   Date/Time Value Ref Range Status   04/03/2025 08:20  98 - 110 mmol/L Final     POCT HCO3 Calculated, Venous   Date/Time Value Ref Range Status   09/09/2024 12:42 PM 24.2 22.0 - 26.0 mmol/L Final     UREA NITROGEN (BUN)   Date/Time Value Ref Range Status   04/03/2025 08:20 AM 16 7 - 25 mg/dL Final     CREATININE   Date/Time Value Ref Range Status   04/03/2025 08:20 AM 1.05 0.50 - 1.05 mg/dL Final     GLUCOSE   Date/Time Value Ref Range Status   04/03/2025 08:20  (H) 65 - 99 mg/dL Final     Comment:                   Fasting reference interval     For someone without known diabetes, a glucose value  between 100 and 125 mg/dL is consistent with  prediabetes and should be confirmed with a  follow-up test.            AST   Date/Time Value Ref Range Status   04/03/2025 08:20 AM 14 10 - 35 U/L Final     ALT   Date/Time Value Ref Range Status   04/03/2025 08:20 AM 15 6 - 29 U/L Final     ALKALINE PHOSPHATASE   Date/Time Value Ref Range Status   04/03/2025 08:20 AM 87 37 - 153 U/L Final     BILIRUBIN, TOTAL   Date/Time Value Ref Range Status   04/03/2025 08:20 AM 0.5 0.2 - 1.2 mg/dL Final     BILIRUBIN, DIRECT   Date/Time Value Ref Range Status   04/03/2025 08:20 AM 0.1 < OR = 0.2 mg/dL Final     GGT   Date/Time Value Ref Range Status   04/03/2025 08:20 AM 54 3 - 65 U/L Final     ALBUMIN   Date/Time Value Ref Range Status   04/03/2025 08:20 AM 4.5 3.6 - 5.1 g/dL Final   04/03/2025 08:20 AM 4.5 3.6 - 5.1 g/dL Final     PROTEIN, TOTAL   Date/Time Value Ref Range Status   04/03/2025 08:20 AM 7.0 6.1 - 8.1 g/dL Final        Protime   Date/Time Value Ref Range Status   11/16/2020 03:16 PM 11.0 10.1 - 13.3 sec Final     INR   Date/Time Value Ref Range Status   11/16/2020 03:16 PM 0.9 0.9 - 1.1 Final         VITAMIN D,25-OH,TOTAL,IA   Date/Time Value Ref Range Status   04/03/2025 08:20 AM 54 30 - 100 ng/mL Final     Comment:     Vitamin D Status         25-OH Vitamin D:     Deficiency:                    <20 ng/mL  Insufficiency:             20 - 29 ng/mL  Optimal:                 > or = 30 ng/mL     For 25-OH Vitamin D testing on patients on   D2-supplementation and patients for whom quantitation   of D2 and D3 fractions is required, the QuestAssureD(TM)  25-OH VIT D, (D2,D3), LC/MS/MS is recommended: order   code 92659 (patients >2yrs).     See Note 1     Note 1     For additional information, please refer to   http://[email or web address][email or web address]/KTB630   (This link is being provided for informational/  educational purposes only.)       VITAMIN A (RETINOL)   Date/Time Value Ref Range Status   04/03/2025 08:20 AM 77 38 - 98 mcg/dL Final     Comment:        Vitamin supplementation within 24 hours prior to  blood draw may affect the accuracy of the results.     This test was developed and its analytical performance  characteristics have been determined by [facility] [place], [place]. It has  not been cleared or approved by the U.S. Food and Drug  Administration. This assay has been validated pursuant  to the CLIA regulations and is used for clinical  purposes.          Vitamin A, Interpretation   Date/Time Value Ref Range Status   02/12/2024 02:43 PM Normal  Final     Comment:       This test was developed and its performance characteristics   determined by [facility]. It has not been cleared or   approved by the US Food and Drug Administration. This test was   performed in a CLIA certified laboratory and is intended for   clinical purposes.  Performed By: [facility]  [address] [place] [id number]  : Tello Pacheco MD, PhD  CLIA Number: [id number]     VITAMIN E, ALPHA TOCOPHEROL   Date/Time Value Ref Range Status   04/03/2025 08:20 AM 14.5  "5.7 - 19.9 mg/L Final     Comment:        Levels of alpha-tocopherol <5 mg/L are consistent  with Vitamin E deficiency in adults.          VITAMIN E, BETA GAMMA TOCOPHEROL   Date/Time Value Ref Range Status   04/03/2025 08:20 AM 2.3 <=4.3 mg/L  Final     Comment:        Vitamin supplementation within 24 hours prior to  blood draw may affect the accuracy of the results.     This test was developed and its analytical performance  characteristics have been determined by [facility] [place], [place]. It has  not been cleared or approved by the U.S. Food and Drug  Administration. This assay has been validated pursuant  to the CLIA regulations and is used for clinical  purposes.             Ferritin   Date/Time Value Ref Range Status   02/21/2022 09:12 AM 47 8 - 150 ug/L Final     Iron   Date/Time Value Ref Range Status   07/10/2023 01:28 PM 59 35 - 150 ug/dL Final     TIBC   Date/Time Value Ref Range Status   07/10/2023 01:28  240 - 445 ug/dL Final     Iron Saturation   Date/Time Value Ref Range Status   07/10/2023 01:28 PM 16 (L) 25 - 45 % Final       No results found for: \"CALPS\"    No results found for: \"PANCRELASTFE\"    Imaging:  ***         === 05/04/16 ===    US RENAL COMPLETE    - Impression -  1.  Redemonstration of multiple renal stones with mild fullness in  the renal calyces without val hydronephrosis.  Inadequate  visualization of the left ureter.  Previously described masslike  focal hypodensity seen on the comparison CT was not visualized on the  provided images.  Correlate with urinalysis and posttreatment  follow-up is suggested.    2.  Unremarkable right kidney.    Dictated at [facility] [place].  I personally reviewed the study and resident interpretation. I agree  with the findings as stated.    === 08/28/24 ===    MR MRCP WITH PANCREAS WO AND W CONTRAST    - Impression -  1. Mild dilatation of the central intrahepatic bile ducts with " no  filling defects or strictures identified, overall similar in  appearance to prior CT considering differences in imaging technique,  which may represent sequelae of prior cholangitis.  2. Mild dilatation of the common bile duct which measures up to 1.1  cm in diameter, similar to prior CTs dating back to 2016 considering  differences in imaging technique. No definite obstructing lesion or  abnormal areas of diffusion restriction identified within the bile  duct.  3. Small amount of layering sludge/small stones within the  gallbladder.  4. Complete fatty replacement of the pancreas, consistent with  patient's known cystic fibrosis. Redemonstration of multiple cystic  lesions within the central mesentery in the region of the pancreas,  similar in size and appearance to prior exam, which may represent  side branch IPMNs.  5. Hepatic steatosis, improved compared to prior exam.  6. Additional chronic findings as above.    I personally reviewed the images/study and I agree with the findings  as stated by Dr. Harpreet Matias M.D. This study was interpreted at  [place], [place].    MACRO:  None    Signed by: Alonzo Millan 9/3/2024 9:41 AM  Dictation workstation:   [id number]    No results found for this or any previous visit from the past 1825 days.       No results found for this or any previous visit from the past 3650 days.       Transient Elastography  No results found for this or any previous visit from the past 365 days.       Previous Endoscopic Evaluation:    Esophagogastroduodenoscopy (EGD) 07/03/2024    Narrative  Table formatting from the original result was not included.  Impression  An inlet patch was visualized in the proximal esophagus.  2 cm hiatal hernia  Diverticulosis in the lower third of the esophagus  Moderate erythematous mucosa with loss of vascular pattern in the antrum  Performed forceps biopsies in the incisura and antrum to rule out H.  pylori  Performed forceps biopsies in the body of the stomach, greater curve of the stomach and lesser curve of the stomach to rule out H. pylori  Intrinsic mild stenosis in the duodenal sweep possibly secondary to prior peptic ulcer disease  Edematous mucosa in the 2nd part of the duodenum  The duodenum was otherwise normal.  Performed forceps biopsies in the duodenal bulb and 2nd part of the duodenum to rule out celiac disease  A pH capsule was placed successfully in the esophagus (27 cm from the incisors, 6 cm from the GE junction).      Findings  Z-line 33 cm from the incisors  An inlet patch was visualized in the proximal esophagus.  2 cm hiatal hernia - GE junction 33 cm from the incisors, diaphragmatic impression 35 cm from the incisors, confirmed by retroflexion. On retroflexion evidence of prior fundoplication was visualized; however, a paraesophageal hernia was also seen.  Single medium diverticulum with no inflammation in the lower third of the esophagus (29 cm from the incisors); no bleeding was identified  Moderate, generalized erythematous mucosa with loss of vascular pattern in the antrum  Performed multiple random forceps biopsies in the incisura and antrum to rule out H. pylori. Rule out intestinal metaplasia  Performed random forceps biopsies in the body of the stomach, greater curve of the stomach and lesser curve of the stomach to rule out H. pylori. Rule out intestinal metaplasia  Benign-appearing mild intrinsic stenosis in the duodenal sweep (scope was able to traverse without any resistance)  Edematous mucosa in the 2nd part of the duodenum  The duodenum was otherwise normal.  Performed multiple random forceps biopsies in the duodenal bulb and 2nd part of the duodenum to rule out celiac disease  A Bravo pH capsule was placed successfully in the esophagus (27 cm from the incisors, 6 cm from the GE junction).  Successful placement was confirmed endoscopically.      Recommendation  Do not resume  omeprazole until Bravo pH study is complete (96 hours)  Await pathology results and Bravo pH results  Proceed with colonoscopy as scheduled      Indication  Gastroesophageal reflux disease without esophagitis    Staff  Staff Role  Latonia Snyder MD Proceduralist      Medications  See Anesthesia Record.    Preprocedure  A history and physical has been performed, and patient medication allergies have been reviewed. The patient's tolerance of previous anesthesia has been reviewed. The risks and benefits of the procedure and the sedation options and risks were discussed with the patient. All questions were answered and informed consent obtained.    Details of the Procedure  The patient underwent monitored anesthesia care, which was administered by an anesthesia professional. The patient's blood pressure, ECG, ETCO2, heart rate, level of consciousness, oxygen and respirations were monitored throughout the procedure. The scope was introduced through the mouth and advanced to the second part of the duodenum. Retroflexion was performed in the cardia. Prior to the procedure, the patient's H. Pylori status was negative. The patient's estimated blood loss was minimal (<5 mL). The procedure was not difficult. The patient tolerated the procedure well. There were no apparent adverse events.    Events  Procedure Events  Event Event Time  ENDO SCOPE IN TIME 7/3/2024 10:39 AM  ENDO SCOPE OUT TIME 7/3/2024 11:06 AM  ENDO SCOPE IN TIME 7/3/2024 11:11 AM  ENDO CECUM REACHED 7/3/2024 11:18 AM  ENDO SCOPE OUT TIME 7/3/2024 11:53 AM      Specimens  ID Type Source Tests Collected by Time  1 : bulb & 2nd part BX Tissue DUODENAL BULB  BIOPSY SURGICAL PATHOLOGY EXAM Mora Joyce RN 7/3/2024 1045  2 : antrum & incisura BX Tissue STOMACH ANTRUM BIOPSY SURGICAL PATHOLOGY EXAM Mora Joyce RN 7/3/2024 1049  3 :  Tissue STOMACH BODY/CORPUS BIOPSY SURGICAL PATHOLOGY EXAM Mora Joyce RN 7/3/2024 1050  4 :  Tissue  TERMINAL ILEUM BIOPSY SURGICAL PATHOLOGY EXAM Mora Joyce RN 7/3/2024 1122  5 : cold snare Tissue COLON - ASCENDING POLYP SURGICAL PATHOLOGY EXAM Mora Joyce RN 7/3/2024 1127  6 : cold snare Tissue COLON - TRANSVERSE POLYP SURGICAL PATHOLOGY EXAM Mora Joyce RN 7/3/2024 1133      Procedure Location  [address] [address] [phone]    Referring Provider  Latonia Snyder MD    Procedure Provider  Latonia Snyder MD          Colonoscopy Screening; High Risk Patient; history of adenomatous polyps 07/03/2024    Narrative  Table formatting from the original result was not included.  Impression  Rectal exam revealed decreased sphincter tone likely secondary to sedation.  Small hemorrhoids  Mild atrophic mucosa in the terminal ileum; performed cold forceps biopsy  Large submucosal mass in the cecum at the appendiceal orifice which could be secondary to stool vs. neoplasm  A moderate amount of semi-liquid stool was visualized throughout the colon.  Lavage was performed with incomplete clearance and adequate visualization.  3 subcentimeter polyps were removed with cold snare      Findings  Rectal exam revealed decreased sphincter tone likely secondary to sedation  External and internal small hemorrhoids observed during retroflexion; no bleeding was identified  Mild, generalized atrophic mucosa in the terminal ileum; performed cold forceps biopsy;  Large submucosal mass at the appendiceal orifice  A moderate amount of semi-liquid stool was visualized throughout the colon.  Lavage was performed with incomplete clearance and adequate visualization.  One 3 mm polyp vs lymphoid follicle in the ascending colon; performed cold snare with complete en bloc removal and retrieved specimen  Two 4 mm sessile polyps in the transverse colon; performed cold snare with complete en bloc removal and retrieved  specimen      Recommendation  Await pathology results  Repeat colonoscopy in 3 years (2 years if all polyps adenomatous).  Obtain CT scan abdomen and pelvis to further assess appendiceal orifice.      Indication  Hx of adenomatous polyp of colon    Staff  Staff Role  Latonia Snyder MD Proceduralist      Medications  See Anesthesia Record.    Preprocedure  A history and physical has been performed, and patient medication allergies have been reviewed. The patient's tolerance of previous anesthesia has been reviewed. The risks and benefits of the procedure and the sedation options and risks were discussed with the patient. All questions were answered and informed consent obtained.    Details of the Procedure  The patient underwent monitored anesthesia care, which was administered by an anesthesia professional. The patient's blood pressure, ECG, ETCO2, heart rate, level of consciousness, oxygen and respirations were monitored throughout the procedure. A digital rectal exam was performed. The scope was introduced through the anus and advanced to the terminal ileum, 7 cm from the ileocecal valve. Retroflexion was performed in the rectum. The quality of bowel preparation was evaluated using the Thor Bowel Preparation Scale with scores of: right colon = 2, transverse colon = 2, left colon = 2. The total BBPS score was 6. Bowel prep was adequate. The patient's estimated blood loss was minimal (<5 mL). The procedure was not difficult. The patient tolerated the procedure well. There were no apparent adverse events.    Events  Procedure Events  Event Event Time  ENDO SCOPE IN TIME 7/3/2024 10:39 AM  ENDO SCOPE OUT TIME 7/3/2024 11:06 AM  ENDO SCOPE IN TIME 7/3/2024 11:11 AM  ENDO CECUM REACHED 7/3/2024 11:18 AM  ENDO SCOPE OUT TIME 7/3/2024 11:53 AM      Specimens  ID Type Source Tests Collected by Time  1 : bulb & 2nd part BX Tissue DUODENAL BULB  BIOPSY SURGICAL PATHOLOGY EXAM Mora Joyce, RN 7/3/2024 1045  2 :  antrum & incisura BX Tissue STOMACH ANTRUM BIOPSY SURGICAL PATHOLOGY EXAM Mora Joyce RN 7/3/2024 1049  3 :  Tissue STOMACH BODY/CORPUS BIOPSY SURGICAL PATHOLOGY EXAM Mora Joyce RN 7/3/2024 1050  4 :  Tissue TERMINAL ILEUM BIOPSY SURGICAL PATHOLOGY EXAM Mora Joyce RN 7/3/2024 1122  5 : cold snare Tissue COLON - ASCENDING POLYP SURGICAL PATHOLOGY EXAM Mora Joyce RN 7/3/2024 1127  6 : cold snare Tissue COLON - TRANSVERSE POLYP SURGICAL PATHOLOGY EXAM Mora Joyce RN 7/3/2024 1133      Procedure Location  [address] [address] [phone]    Referring Provider  Latonia nSyder MD    Procedure Provider  MD Latonia Leiva       No results found for this or any previous visit from the past 365 days.           Assessment and Plan:  Patient is a 65 y.o. year old female with    [facility]      Latonia Snyder MD, MS  Gastroenterologist       [1]   Current Outpatient Medications:     acetaminophen (Tylenol) 325 mg tablet, Take 2 tablets (650 mg) by mouth every 6 hours., Disp: 60 tablet, Rfl: 0    amLODIPine (Norvasc) 2.5 mg tablet, Take 1 tablet (2.5 mg) by mouth once daily., Disp: 30 tablet, Rfl: 11    blood-glucose sensor (Dexcom G7 Sensor) device, Apply 1 sensor every 10 days to monitor glucose, Disp: 3 each, Rfl: 11    Creon 24,000-76,000 -120,000 unit capsule, Take 3-4 capsules by mouth 3 times daily (morning, midday, late afternoon). MOUTH WITH MEALS AND TAKE 1-2 CAPSULES WITH SNACKS, Disp: 300 capsule, Rfl: 11    FreeStyle Lite Strips strip, TEST 6-7 TIMES DAILY, Disp: , Rfl:     glucagon (Baqsimi) 3 mg/actuation spray,non-aerosol, Spray 3 mg nasally as needed for severe hypoglycemia, Disp: 2 each, Rfl: 3    insulin glargine (Basaglar KwikPen U-100 Insulin) 100 unit/mL (3 mL) pen, Inject 8 units daily as directed, Disp: 15 mL, Rfl: 3     losartan (Cozaar) 100 mg tablet, TAKE 1 TABLET BY MOUTH EVERY DAY, Disp: 90 tablet, Rfl: 3    mecobalamin, vitamin B12, 1,000 mcg tablet,disintegrating, Place 1 tablet under the tongue once daily., Disp: , Rfl:     omeprazole (PriLOSEC) 40 mg DR capsule, Take 1 capsule (40 mg) by mouth once daily., Disp: 90 capsule, Rfl: 0    traZODone (Desyrel) 50 mg tablet, TAKE 1 TABLET (50 MG) BY MOUTH AS NEEDED AT BEDTIME FOR SLEEP., Disp: 90 tablet, Rfl: 2    Trikafta 100-50-75 mg(d) /150 mg (n) tablet, TAKE 2 ORANGE TABLETS IN THE MORNING WITH FAT-CONTAINING FOOD. DISCARD BLUE TABLETS, Disp: 252 tablet, Rfl: 1    Xopenex HFA 45 mcg/actuation inhaler, Inhale 2 puffs every 4 hours if needed for wheezing or shortness of breath., Disp: 15 g, Rfl: 6  [2]   Past Medical History:  Diagnosis Date    Alcohol use disorder 11/09/2023    Anemia     Anxiety     Chronic pancreatitis (Multi)     Cystic fibrosis     Diabetes mellitus due to cystic fibrosis (Multi)     Dysphagia     GERD (gastroesophageal reflux disease)     Heartburn     Hypertension     Insomnia     MRSA (methicillin resistant Staphylococcus aureus)     in lungs    Personal history of other benign neoplasm 05/09/2022    History of other benign neoplasm    PONV (postoperative nausea and vomiting)     Preoperative cardiovascular examination 10/07/2024    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        imaging.  Can consider repeat imaging if indicated based on symptoms.  There is description of cystic foci in the pancreas on CT scan from May 2016 suggesting that the appearance has been stable over a long period of time.  H/o cholesteatoma - Referral placed for ENT due to patient concerns regarding numbness around left ear and decreased hearing.    Follow up in 3-6 months.      Latonia Snyder MD, MS  Gastroenterologist         [1]   Current Outpatient Medications:     acetaminophen (Tylenol) 325 mg tablet, Take 2 tablets (650 mg) by mouth every 6 hours., Disp: 60 tablet, Rfl: 0    amLODIPine (Norvasc) 2.5 mg tablet, Take 1 tablet (2.5 mg) by mouth once daily., Disp: 30 tablet, Rfl: 11    blood-glucose sensor (Dexcom G7 Sensor) device, Apply 1 sensor every 10 days to monitor glucose, Disp: 3 each, Rfl: 11    busPIRone (Buspar) 5 mg tablet, Take 1 tablet (5 mg) by mouth 3 times a day as needed (anxiety/panic)., Disp: , Rfl:     calcium citrate-vitamin D3 500 mg-12.5 mcg (500 unit) tablet,chewable, Chew 1 tablet once daily., Disp: 30 tablet, Rfl: 11    Creon 24,000-76,000 -120,000 unit capsule, Take 3-4 capsules by mouth 3 times daily (morning, midday, late afternoon). MOUTH WITH MEALS AND TAKE 1-2 CAPSULES WITH SNACKS, Disp: 300 capsule, Rfl: 11    desvenlafaxine succinate (Pristiq) 25 mg 24 hour tablet, Take 1 tablet (25 mg) by mouth once daily., Disp: , Rfl:     dornase bryan (Pulmozyme) 1 mg/mL nebulizer solution, Take 2.5 mg by nebulization once daily., Disp: 75 mL, Rfl: 11    dornase bryan (Pulmozyme) 1 mg/mL nebulizer solution, Take 2.5 mg by nebulization once daily., Disp: 75 mL, Rfl: 11    FreeStyle Lite Strips strip, TEST 6-7 TIMES DAILY, Disp: , Rfl:     glucagon (Baqsimi) 3 mg/actuation spray,non-aerosol, Spray 3 mg nasally as needed for severe hypoglycemia, Disp: 2 each, Rfl: 3    insulin glargine (Basaglar KwikPen U-100 Insulin) 100 unit/mL (3 mL) pen, Inject 8 units daily as directed, Disp: 15 mL, Rfl:  3    losartan (Cozaar) 100 mg tablet, TAKE 1 TABLET BY MOUTH EVERY DAY, Disp: 90 tablet, Rfl: 3    mometasone-formoterol (Dulera) 100-5 mcg/actuation inhaler, Inhale 2 puffs 2 times a day. Rinse mouth with water after use to reduce aftertaste and incidence of candidiasis. Do not swallow., Disp: 13 g, Rfl: 11    omeprazole (PriLOSEC) 40 mg DR capsule, Take 1 capsule (40 mg) by mouth once daily., Disp: 90 capsule, Rfl: 3    traZODone (Desyrel) 50 mg tablet, TAKE 1 TABLET (50 MG) BY MOUTH AS NEEDED AT BEDTIME FOR SLEEP., Disp: 90 tablet, Rfl: 2    Trikafta 100-50-75 mg(d) /150 mg (n) tablet, TAKE 2 ORANGE TABLETS IN THE MORNING WITH FAT-CONTAINING FOOD. DISCARD BLUE TABLETS, Disp: 252 tablet, Rfl: 1    Xopenex HFA 45 mcg/actuation inhaler, Inhale 2 puffs every 4 hours if needed for wheezing or shortness of breath., Disp: 15 g, Rfl: 6  [2]   Past Medical History:  Diagnosis Date    Alcohol use disorder 11/09/2023    Anemia     Anxiety     Chronic pancreatitis (Multi)     Cystic fibrosis     Diabetes mellitus due to cystic fibrosis (Multi)     Dysphagia     GERD (gastroesophageal reflux disease)     Heartburn     Hypertension     Insomnia     MRSA (methicillin resistant Staphylococcus aureus)     in lungs    Personal history of other benign neoplasm 05/09/2022    History of other benign neoplasm    PONV (postoperative nausea and vomiting)     Preoperative cardiovascular examination 10/07/2024    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

## 2025-06-16 NOTE — PATIENT INSTRUCTIONS
I have placed a referral for ENT due to your history of cholesteatoma and concerns regarding numbness around your left ear.    I am recommending you have B12 checked - this could be drawn when you come back in a couple of days.      Follow in 3-6 months.

## 2025-06-18 ENCOUNTER — EDUCATION (OUTPATIENT)
Dept: PHARMACY | Facility: CLINIC | Age: 65
End: 2025-06-18

## 2025-06-18 ENCOUNTER — DOCUMENTATION (OUTPATIENT)
Dept: PEDIATRIC PULMONOLOGY | Facility: HOSPITAL | Age: 65
End: 2025-06-18

## 2025-06-18 ENCOUNTER — MULTIDISCIPLINARY VISIT (OUTPATIENT)
Dept: PEDIATRIC PULMONOLOGY | Facility: HOSPITAL | Age: 65
End: 2025-06-18
Payer: MEDICARE

## 2025-06-18 ENCOUNTER — NUTRITION (OUTPATIENT)
Dept: PEDIATRIC PULMONOLOGY | Facility: HOSPITAL | Age: 65
End: 2025-06-18

## 2025-06-18 ENCOUNTER — SOCIAL WORK (OUTPATIENT)
Dept: PEDIATRIC PULMONOLOGY | Facility: HOSPITAL | Age: 65
End: 2025-06-18

## 2025-06-18 ENCOUNTER — HOSPITAL ENCOUNTER (OUTPATIENT)
Dept: RESPIRATORY THERAPY | Facility: HOSPITAL | Age: 65
Discharge: HOME | End: 2025-06-18
Payer: MEDICARE

## 2025-06-18 VITALS
DIASTOLIC BLOOD PRESSURE: 81 MMHG | OXYGEN SATURATION: 96 % | RESPIRATION RATE: 16 BRPM | TEMPERATURE: 97.8 F | BODY MASS INDEX: 22.31 KG/M2 | SYSTOLIC BLOOD PRESSURE: 152 MMHG | HEART RATE: 55 BPM | HEIGHT: 62 IN | WEIGHT: 121.25 LBS

## 2025-06-18 DIAGNOSIS — E84.0 CYSTIC FIBROSIS WITH PULMONARY MANIFESTATIONS (MULTI): ICD-10-CM

## 2025-06-18 DIAGNOSIS — F32.1 CURRENT MODERATE EPISODE OF MAJOR DEPRESSIVE DISORDER, UNSPECIFIED WHETHER RECURRENT (MULTI): ICD-10-CM

## 2025-06-18 DIAGNOSIS — F10.90 ALCOHOL USE DISORDER: ICD-10-CM

## 2025-06-18 DIAGNOSIS — E84.9 CF (CYSTIC FIBROSIS) (MULTI): ICD-10-CM

## 2025-06-18 DIAGNOSIS — E84.9 CYSTIC FIBROSIS: ICD-10-CM

## 2025-06-18 DIAGNOSIS — E84.9 CYSTIC FIBROSIS: Primary | ICD-10-CM

## 2025-06-18 DIAGNOSIS — M85.80 OSTEOPENIA DETERMINED BY X-RAY: ICD-10-CM

## 2025-06-18 DIAGNOSIS — J45.31 MILD PERSISTENT ASTHMA WITH ACUTE EXACERBATION (HHS-HCC): ICD-10-CM

## 2025-06-18 DIAGNOSIS — I10 PRIMARY HYPERTENSION: ICD-10-CM

## 2025-06-18 DIAGNOSIS — Z00.00 HEALTHCARE MAINTENANCE: ICD-10-CM

## 2025-06-18 DIAGNOSIS — R63.4 WEIGHT LOSS: ICD-10-CM

## 2025-06-18 DIAGNOSIS — A49.02 MRSA (METHICILLIN RESISTANT STAPHYLOCOCCUS AUREUS) INFECTION: ICD-10-CM

## 2025-06-18 LAB
MGC ASCENT PFT - FEV1 - PRE: 2.51
MGC ASCENT PFT - FEV1 - PREDICTED: 2.15
MGC ASCENT PFT - FVC - PRE: 3.44
MGC ASCENT PFT - FVC - PREDICTED: 2.69

## 2025-06-18 PROCEDURE — 94010 BREATHING CAPACITY TEST: CPT

## 2025-06-18 RX ORDER — DESVENLAFAXINE SUCCINATE 25 MG/1
25 TABLET, EXTENDED RELEASE ORAL DAILY
COMMUNITY
Start: 2025-06-18

## 2025-06-18 RX ORDER — CALCIUM CITRATE/VITAMIN D3 500MG-12.5
1 TABLET,CHEWABLE ORAL DAILY
Qty: 30 TABLET | Refills: 11 | Status: SHIPPED | OUTPATIENT
Start: 2025-06-18 | End: 2026-06-18

## 2025-06-18 RX ORDER — BUSPIRONE HYDROCHLORIDE 5 MG/1
5 TABLET ORAL 3 TIMES DAILY PRN
COMMUNITY

## 2025-06-18 RX ORDER — DOXYCYCLINE 100 MG/1
100 TABLET ORAL 2 TIMES DAILY
Qty: 28 TABLET | Refills: 0 | Status: SHIPPED | OUTPATIENT
Start: 2025-06-18 | End: 2025-07-02

## 2025-06-18 RX ORDER — DORNASE ALFA 1 MG/ML
2.5 SOLUTION RESPIRATORY (INHALATION) DAILY
Qty: 75 ML | Refills: 11 | Status: SHIPPED | OUTPATIENT
Start: 2025-06-18 | End: 2026-06-18

## 2025-06-18 RX ORDER — MOMETASONE FUROATE AND FORMOTEROL FUMARATE DIHYDRATE 100; 5 UG/1; UG/1
2 AEROSOL RESPIRATORY (INHALATION) 2 TIMES DAILY
Qty: 13 G | Refills: 11 | Status: SHIPPED | OUTPATIENT
Start: 2025-06-18 | End: 2026-03-11

## 2025-06-18 SDOH — ECONOMIC STABILITY: FOOD INSECURITY: WITHIN THE PAST 12 MONTHS, THE FOOD YOU BOUGHT JUST DIDN'T LAST AND YOU DIDN'T HAVE MONEY TO GET MORE.: NEVER TRUE

## 2025-06-18 SDOH — ECONOMIC STABILITY: FOOD INSECURITY: WITHIN THE PAST 12 MONTHS, YOU WORRIED THAT YOUR FOOD WOULD RUN OUT BEFORE YOU GOT MONEY TO BUY MORE.: NEVER TRUE

## 2025-06-18 ASSESSMENT — PATIENT HEALTH QUESTIONNAIRE - PHQ9
1. LITTLE INTEREST OR PLEASURE IN DOING THINGS: NEARLY EVERY DAY
2. FEELING DOWN, DEPRESSED OR HOPELESS: NEARLY EVERY DAY
6. FEELING BAD ABOUT YOURSELF - OR THAT YOU ARE A FAILURE OR HAVE LET YOURSELF OR YOUR FAMILY DOWN: NEARLY EVERY DAY
9. THOUGHTS THAT YOU WOULD BE BETTER OFF DEAD, OR OF HURTING YOURSELF: MORE THAN HALF THE DAYS
8. MOVING OR SPEAKING SO SLOWLY THAT OTHER PEOPLE COULD HAVE NOTICED. OR THE OPPOSITE, BEING SO FIGETY OR RESTLESS THAT YOU HAVE BEEN MOVING AROUND A LOT MORE THAN USUAL: MORE THAN HALF THE DAYS
3. TROUBLE FALLING OR STAYING ASLEEP OR SLEEPING TOO MUCH: MORE THAN HALF THE DAYS
5. POOR APPETITE OR OVEREATING: MORE THAN HALF THE DAYS
7. TROUBLE CONCENTRATING ON THINGS, SUCH AS READING THE NEWSPAPER OR WATCHING TELEVISION: NEARLY EVERY DAY
4. FEELING TIRED OR HAVING LITTLE ENERGY: NEARLY EVERY DAY

## 2025-06-18 ASSESSMENT — ANXIETY QUESTIONNAIRES
1. FEELING NERVOUS, ANXIOUS, OR ON EDGE: NEARLY EVERY DAY
5. BEING SO RESTLESS THAT IT IS HARD TO SIT STILL: NOT AT ALL
6. BECOMING EASILY ANNOYED OR IRRITABLE: NEARLY EVERY DAY
3. WORRYING TOO MUCH ABOUT DIFFERENT THINGS: MORE THAN HALF THE DAYS
GAD7 TOTAL SCORE: 16
4. TROUBLE RELAXING: MORE THAN HALF THE DAYS
2. NOT BEING ABLE TO STOP OR CONTROL WORRYING: NEARLY EVERY DAY
7. FEELING AFRAID AS IF SOMETHING AWFUL MIGHT HAPPEN: NEARLY EVERY DAY

## 2025-06-18 NOTE — ASSESSMENT & PLAN NOTE
DEXA scan (10/10/2022): Osteopenia  Colonoscopy (7/3/2024): tubular adenomas (repeat colonoscopy in 2026)  Influenza vaccination given on 11/8/2023

## 2025-06-18 NOTE — PROGRESS NOTES
Refilled pulmozyme - [facility]     Dr. Harris like Lucia try an ICS - Lucia said she was on advair disk in the past - tried and failed did not relieve symptoms / reviewed X2 puffs twice a day / confirmed she has a spacer / reviewed rinsing and spitting or brushing teeth - Lucia confirmed understanding - Dulera ordered / prior auth approved / 6/19/25 ready for  at local pharmacy - no co pay    *Friends rented a big house in [place], [place] first week of September - really looking forward to it

## 2025-06-18 NOTE — PROGRESS NOTES
"    [facility]ane[facility] M.D[facility]    Provider Listing:  Primary Care: Elizabeth Braswell M.D. ([facility])  Psychiatry: Khadar Aguilar M.D. ([facility]; [place], [place])  Gastroenterology: Latonia Snyder M.D. 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 Historically normal lung function (ppFEV1 100-106%) so Trikafta deferred. 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 EPI on PERT. Constipation.  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  Other problems: alcohol use disorder (AA); GERD with (+) BRAVO pH probe (7/3/2024), 2 cm hiatal hernia and paraesophageal hernia at EGD; osteopenia; generalized anxiety disorder (2017); poorly-controlled hypertension, insomnia treated with trazodone    HPI (6/18/2025): Sick visit.  She was here two days ago for appointment with Dr. Snyder but reported chest congestion, mild wheezing and chest tightness, mild dyspnea.  No fevers, chills, sweating, hemoptysis, or pleuritic chest pain.  Throat swab culture obtained and has grown normal perla.  Cough is paroxysmal and rattling but non-productive.  Felt a tickle in throat over past few days.  Mentioned to Dr. Snyder.  Other major development is establishment of care with a psychiatrist (see above).  Dr. Aguilar prescribed desvenlafaxine and buspirone, but she has only taken the former for 2-3 weeks.  Lucia has been experiencing depression and vegetative symptoms (anhedonia, impaired executive functioning, lassitude, loss of appetite, desire to sleep).  Found herself drinking a couple of beers at a bar recently and wondered why she went that route to process emotions.  She still has \"brain fog\" after starting Trikafta, but she also wonders whether Trikafta is contributing to her current mood disturbance.  She has passive suicidal ideation but she denies any plan or " attempt at self-harm.  She thinks that the trazodone we started a few months ago has promoted sleep without grogginess in the morning.  Lucia's friends have recently said that her respiratory symptoms are due to mold in her basement.  Lucia does not feel as concerned about this notion.  She has old carpeting down there, but she denies any visible mold on the walls or standing water.  On average, Lucia is only in the basement once every couple of weeks to do the laundry.    HPI (10/9/2024): Had episode of syncope on 9/9/2024 after overnight fast for surgery.  Sent to ED and had negative work-up.  Dr. Dirk Borges from cardiology at [facility] saw Lucia in clinic on 10/7/2024 and concluded that she experienced orthostatic hypotension in face of antihypertensive medications with vagal reaction.  Recommended resumption of amlodipine but avoidance of hydrochlorothiazide.  Laparoscopic appendectomy and cholecystectomy with Dr. Canales on 9/16/2024.  Surgical pathology showed:  A.  Appendix, appendectomy:  Patchy acute appendicitis with partial fibrous obliteration of the tip and endometriosis.  See note.  NOTE: Multiple sections show foci of endometriosis confirmed by immunostains estrogen receptor and PAX-8 (blocks A1 and A2).  B.  Gallbladder, cholecystectomy:  Chronic cholecystitis with cholelithiasis.  Since starting Trikafta, has essentially no cough or sputum expectoration.  Energy level is better.  Feels a bit guilty for not doing Pulmozyme, but she does not think it does anything for her now.  Nonetheless, she has experienced MAJOR mood disturbance since starting Trikafta.  (+) vegetative features, (+) anhedonia, (+) sadness, (+) sleeping longer than usual, (+) worried about relapsing with alcohol use.  Denies suicidal or homicidal ideation.  Reports remote diagnosis of ADHD and recent worsening of executive functioning and short-term memory.    HPI (7/24/2024): As of today's visit she had not yet started taking  Trikafta.  Needed to fill out [facility] enrollment form.  Per Qiana Naidu, CF pharmacist, medication currently no pay at [facility].  Scheduled for abdominopelvic CT scan tomorrow.  Has been doing AffloVEST and Pulmozyme with subjective improvement in mucus burden.  Still having fatigue, body aches, and subjective fever usually at night.  She has also been having some postprandial vomiting and right upper quadrant pain.  The pain has a colicky character.  Acknowledges drinking 1 alcoholic beverage on 7/4/2024.    HPI (7/10/2024): Telephone call on 6/12/2024 to report 3 days of fevers and body aches, chest heaviness, dyspnea, and cough.  Fatigue prominent.  Had started taking doxycycline 100 mg twice daily 3 days earlier.  Some discussion in chart about switching her to Bactrim DS but ultimately patient started to feel better on doxycycline.  We had considered linezolid for MRSA coverage but DDI with trazodone (small risk of serotonin syndrome), ultimately tried it anyway, and she had diarrhea, so stopped it after 3-4 days.  She subsequently had EGD with Bravo pH probe and colonoscopy with Dr. Snyder on 7/3/2024 (results below).  Multiple findings, but notably mild GERD and temporal association with symptoms, 2 cm hiatal hernia, paraesophageal hernia, several subcentimeter colon polyps (removed, pathology pending). (+) esophageal dysphagia (bite of bagel for example).  Still having episodes of feeling feverish, (++) fatigue.  Says that she is taking all of the anti-hypertensive medications.  Recent headaches.  Does not feel pulse pounding in her ears.  No photophobia, hyperacusis.     HPI (5/29/2024): Unaccompanied.  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  Has been doing the aerosolized mucoactive medications and HFCWO with The Vest, although burdensome.  Still has not started Trikafta.  She had one alcoholic beverage on Memorial Day.  I asked her how she managed to stop at  "one, and she said that she had her support system and felt guilty about it.  Generally more anxious.  Had telehealth visit with Dr. Ambrose in April.  Plan to continue Basaglar 8 units daily and perform prandial FSG measurements.  She felt feverish again a couple times since last visit.  Sputum is light yellow, no hemoptysis.  Not expectorating all the time.     HPI (5/1/2024): Patient recently had chest CT scan done to work-up more frequent pulmonary exacerbations over the past 6-12 months and evaluate lung parenchyma in light of (+) sputum cultures for Scedosporium boydii on 2/12/2024 and 4/3/2024.  The lung parenchyma has changes consistent with CF bronchiectasis. No cavitary fungal disease.  The literature pertaining to Scedosporium boydii infection is largely populated by case-series and retrospective case-control studies (and their attendant limitations to causal inference).  This fungal species is not clearly associated with worse health outcomes in people with CF.  The more curious finding on the scan is the hiatal hernia.  This is not new, but my question now is whether she is having frequent and perhaps subclinical gastroesophageal reflux and/or aspiration events causing symptoms that \"look like\" a CF pulmonary exacerbation.  She had an EGD in May 2016 by Dr. Greenberg.  The study showed diverticulum in lower third of esophagus, Nissen fundoplication, antral gastritis, and erosive gastropathy in the fundus.  I recommend evaluation by Dr. Latonia Snyder at an upcoming CF clinic with question of whether she agrees with the aforementioned hypothesis and recommendations for diagnosis and treatment.     HPI (4/17/2024): Two-week sick follow-up.  Lucia met with Kervin Sanchez on 4/3/2024.  Symptoms as below.  They discussed the sputum culture in February 2024 (+) for Scedosporium.  Another sputum sample was collected on 4/3/2024, and it also grew Scedosporium and Candida albicans.  CT chest ordered.  Treated with " "2-week course of doxycycline targeting MRSA. Due to the possibility of starting Trikafta, hepatic function panel and phosphatidylethanol (Peth) were checked, and the latter suggested recent alcohol ingestion.  Lucia subsequently acknowledged a relapse, now has counseling and sponsor contact.  Today, Lucia says that she never started the nebulized 3% hypertonic saline; took doxycycline course prescribed by Kervin Sanchez; ended 4/12/2024; felt a lot better after the doxycycline, but she felt unusually fatigued yesterday afternoon, which is unlike her. Denies hemoptysis, fevers, chills, pleuritic chest pain.  Occasional wheezing alleviated by Xopenex.  Sputum is light yellow but less voluminous.  She has been doing HFCWO, but she has one of the first Hill-ROM \"The Vest\" models. Also doing Pulmozyme twice daily.  Blood sugars have been pretty good.  Reports -120 before breakfast.  Needs to see Dr. Ambrose for routine follow-up.    HPI (4/4/2024, Kervin Sanchez): \"Since December she has been sick. Once she gets off the abx, she starts getting sick about a week later. Gets a high fever, headache, trouble breathing and feels very fatigued. Fever gets up to 103, then breaks after a day.  All last week she was in bed. Lungs are filling up. Can't breathe. Recently, she started using Pulmozyme and her vest. Has also noticed pain in her joints when she feels sick. Feels mucous is stuck and lungs feel sticky. Something in there and she can't cough it up. Adding the doxycyline stopped her from coughing up as much. Was able to work all day the other day which is an improvement. Did not start Trikafta because she was waiting to see if she continues getting sick.\"    HPI (2/12/2024): ED at [facility] on [facility]23 with hypertensive urgency (SBP ~200 at home, /94 documented); had headache and chest pain; troponin, BNP, EKG (sinus bradycardia), CXR were unremarkable.  Saw PCP, tried amlodipine added to the losartan, was " having symptomatic hypotension, switched to hydrochlorothiazide plus losartan and stopped the amlodipine, much better.  Started seeing new therapist after last visit, very helpful, good relationship.  Says that she has been sober for 3 months and 4 days.  Brought on a colleague to help with work.  Had influenza diagnosed by NP rapid-antigen test at a pharmacy, around 12/20/2023. Felt really poorly, had fever, cough, post-tussive emesis, dyspnea, anorexia. Started doing Pulmozyme and HFCWO again on most days. Prescribed course of doxycycline. Since that infection, however, generally feels more central chest mucus and pressure. Because ppFEV1 was also down today, she is concerned about deteriorating lung function. Generally feels tired since the influenza. Weight down 1.3 kg since August 2023.     HPI (11/8/2023): Plan at last visit was to increase losartan to 100 mg/day, home BP monitoring. Discussed referral to psychiatry to manage anxiety and depression. Increased trazodone to 100 mg at HS for insomnia. Needed to stick with MiraLAX for prophylaxis. Had poison ivy and put on brief course of prednisone.  Few weeks ago had relapse of drinking. Went out to bar, drank enough to black out, somehow drove home. Scared her. She connected with her sponsor and actually has appointment with a counselor this evening. Feeling increased chest congestion. Coughing occasionally. Thinks she might be headed toward lung infection. MiraLAX has prevented RLQ cramping.     HPI (8/8/2023): Had fun in [facility] but has been sick from respiratory perspective twice. I gave her a prescription for doxycycline to take with her on the trip. She started after returning due to chest congestion. Cleared. Then got poison ivy. Took 3 days of a steroid she had on-hand. Flared up after stopping. Currently having mild chest tightness and wheezing. Only using albuterol once daily, though. BP still higher than desirable. Has been on losartan 50 mg daily.  Sleeping poorly on trazodone 50 mg. Took 100 mg one night and slept better.     HPI (7/10/2023): First visit, transfer care from Dr. Fortune. Last seen in November 2022. Issues at that point included intermittent abdominal pain (likely constipation), uncontrolled hypertension despite taking losartan 50 mg daily, CFRD followed by Dr. Ambrose, and recent Burkholderia multivorans infection, consideration of nebulized meropenem. Colonoscopy discussed, would apparently need to be done in [facility] system for insurance reasons. No interval need for antibiotics. Used Pulmozyme in January for a few weeks due to congestion. Helpful. Still having tendency toward constipation, thinking about using MiraLAX. Sometimes experiences left-sided headache and blurry vision in the left eye when hypertensive. Frequency not accelerating. Thinks that meditation well help with her blood pressure. Leaving for trip to [place] tomorrow.    Current Use of Health Management Strategies:    Respiratory Interventions  Albuterol: Two times per day  Pulmozyme: Prescribed, does not use   Hypertonic saline: Does not use  HFCWO (percussive vest): HillROM The Vest - once daily since 7/10/2024  Oscillatory PEP: Does not use  Exercise: No Regular Exercise  LTE antagonist: No  Azithromycin: Does not use  Aztreonam lysine ([place]): Does not use  Tobramycin: Does not use  Colistin: Does not use  Meropenem (inhaled): Does not use  Vancomycin (inhaled): Does not use  ICS: Does not use  ICS/LABA: Does not use  LAMA: No  CFTR modulator:   Oxygen: Does not use    Digestive Health Interventions    Acid-suppressing medication?: Yes  Using CF vitamins?: No  Taking pancreatic enzymes?: Yes - Creon 24; 3 to 4 capsules with meals and 1 to 2 capsules with snacks   Any diarrhea?: No  Any steatorrhea?: No  Any constipation?: Yes - does better with more regular use of MiraLAX  Using laxatives?: Yes  Feeding tube?: No  Supplemental enteral nutrition?:  No    Pulmonary Function Test Results    PFT (6/18/2025, on Trikafta): FEV1 2.51 L (116%), FVC 3.44 L (127%), FEV1/FVC 73; normal spirometry  PFT (10/9/2024, on Trikafta): FEV1 2.47 L (114%), FVC 3.39 L (125%), FEV1/FVC 73; normal spirometry  PFT (7/24/2024): FEV1 2.39 L (105%), FVC 3.06 L (106%), FEV1/FVC 78; normal spirometry  PFT (7/10/2024): FEV1 2.17 L (95%), FVC 2.89 L (100%), FEV1/FVC 75; normal spirometry  PFT (5/29/2024): FEV1 2.17 L (95%), FVC 3.07 L (106%), FEV1/FVC 71; normal spirometry  PFT (4/17/2024): FEV1 2.42 L (106%), FVC 3.27 L (113%), FEV1/FVC 74; normal spirometry  PFT (4/3/2024): did not perform, feeling poorly  PFT (2/12/2024): FEV1 2.29 L (94%), FVC 3.19 L (109%), FEV1/FVC 72; normal spirometry  PFT (11/8/2023): FEV1 2.29 L (100%), FVC 3.19 L (109%), FEV1/FVC 72; normal spirometry  PFT (7/10/2023): FEV1 2.45 L (106%), FVC 3.27 L (112%), FEV1/FVC 75; normal spirometry  PFT (10/10/2022): FEV1 2.47 L (106%), FVC 3.25 L (110%), FEV1/FVC 76; normal spirometry  PFT (2/21/2022): FEV1 2.48 L (106%), FVC 3.28 L (110%), FEV1/FVC 76; normal spirometry  PFT (6/23/2021): FEV1 2.48 L (103%), FVC 3.39 L (109%), FEV1/FVC 73; normal spirometry    Current Medications     Current Outpatient Medications   Medication Instructions    acetaminophen (TYLENOL) 650 mg, oral, Every 6 hours    amLODIPine (NORVASC) 2.5 mg, oral, Daily    blood-glucose sensor (Dexcom G7 Sensor) device Apply 1 sensor every 10 days to monitor glucose    Creon 24,000-76,000 -120,000 unit capsule 3-4 capsules, oral, 3 times daily (morning, midday, late afternoon), MOUTH WITH MEALS AND TAKE 1-2 CAPSULES WITH SNACKS    FreeStyle Lite Strips strip  TEST 6-7 TIMES DAILY    glucagon (Baqsimi) 3 mg/actuation spray,non-aerosol Spray 3 mg nasally as needed for severe hypoglycemia    insulin glargine (Basaglar KwikPen U-100 Insulin) 100 unit/mL (3 mL) pen Inject 8 units daily as directed    losartan (COZAAR) 100 mg, oral, Daily    omeprazole  "(PRILOSEC) 40 mg, oral, Daily    traZODone (DESYREL) 50 mg, oral, Nightly PRN    Trikafta 100-50-75 mg(d) /150 mg (n) tablet TAKE 2 ORANGE TABLETS IN THE MORNING WITH FAT-CONTAINING FOOD. DISCARD BLUE TABLETS    Xopenex HFA 45 mcg/actuation inhaler 2 puffs, inhalation, Every 4 hours PRN       Physical Examination     /81 (BP Location: Right arm, Patient Position: Sitting, BP Cuff Size: Adult)   Pulse 55   Temp 36.6 °C (97.8 °F) (Oral)   Resp 16   Ht 1.575 m (5' 2.01\")   Wt 55 kg (121 lb 4.1 oz)   SpO2 96%   BMI 22.17 kg/m²     Daily Weight  06/18/25 : 55 kg (121 lb 4.1 oz)  06/16/25 : 55 kg (121 lb 4.1 oz)  01/22/25 : 56.1 kg (123 lb 12.6 oz)  10/09/24 : 54 kg (119 lb 0.8 oz)  10/07/24 : 54 kg (119 lb)  10/01/24 : 52.2 kg (115 lb)     General: Tired, thin, some weight loss evident in facial musculature.  No coughing.  No sputum expectoration.  HEENT: normocephalic; anicteric sclerae; conjunctivae not injected; nasal mucosa was unremarkable; oropharynx was clear without evidence of thrush; (+) dental plate  Neck: supple; no lymphadenopathy or thyromegaly.  Chest: clear to auscultation bilaterally.  Cardiac: regular rhythm; no gallop or murmur.  Abdomen: soft; non-tender; non-distended; no hepatosplenomegaly, no renal bruit.  Extremities: no leg edema; no digital clubbing; 2+ pulses  Psychiatric: (+) restricted affect.     Metabolic Parameters     SODIUM   Date/Time Value Ref Range Status   04/03/2025 08:20  135 - 146 mmol/L Final     POTASSIUM   Date/Time Value Ref Range Status   04/03/2025 08:20 AM 4.6 3.5 - 5.3 mmol/L Final     CHLORIDE   Date/Time Value Ref Range Status   04/03/2025 08:20  98 - 110 mmol/L Final     CARBON DIOXIDE   Date/Time Value Ref Range Status   04/03/2025 08:20 AM 28 20 - 32 mmol/L Final     Anion Gap   Date/Time Value Ref Range Status   09/09/2024 12:42 PM 12 10 - 20 mmol/L Final     UREA NITROGEN (BUN)   Date/Time Value Ref Range Status   04/03/2025 08:20 AM 16 7 - " 25 mg/dL Final     CREATININE   Date/Time Value Ref Range Status   04/03/2025 08:20 AM 1.05 0.50 - 1.05 mg/dL Final     GFR Female   Date/Time Value Ref Range Status   07/10/2023 01:28 PM 64 >90 mL/min/1.73m2 Final     Comment:      CALCULATIONS OF ESTIMATED GFR ARE PERFORMED   USING THE 2021 CKD-EPI STUDY REFIT EQUATION   WITHOUT THE RACE VARIABLE FOR THE IDMS-TRACEABLE   CREATININE METHODS.    https://jasn.asnjournals.org/content/early/2021/09/22/ASN.[email or web address]       GLUCOSE   Date/Time Value Ref Range Status   04/03/2025 08:20  (H) 65 - 99 mg/dL Final     Comment:                   Fasting reference interval     For someone without known diabetes, a glucose value  between 100 and 125 mg/dL is consistent with  prediabetes and should be confirmed with a  follow-up test.          CALCIUM   Date/Time Value Ref Range Status   04/03/2025 08:20 AM 9.6 8.6 - 10.4 mg/dL Final     TSH   Date/Time Value Ref Range Status   07/10/2023 01:28 PM 1.13 0.44 - 3.98 mIU/L Final     Comment:      TSH testing is performed using different testing    methodology at [facility] than at other    system hospitals. Direct result comparisons should    only be made within the same method.     02/21/2022 09:12 AM 2.97 0.44 - 3.98 mIU/L Final     Comment:      TSH testing is performed using different testing    methodology at [facility] than at other    [facility]. Direct result comparisons should    only be made within the same method.       Thyroid Stimulating Hormone   Date/Time Value Ref Range Status   02/12/2024 02:47 PM 1.02 0.44 - 3.98 mIU/L Final     PHOSPHATE (AS PHOSPHORUS)   Date/Time Value Ref Range Status   04/03/2025 08:20 AM 3.8 2.5 - 4.5 mg/dL Final       Hematologic Parameters     WHITE BLOOD CELL COUNT   Date/Time Value Ref Range Status   04/03/2025 08:20 AM 4.6 3.8 - 10.8 Thousand/uL Final     Neutrophils Absolute   Date/Time Value Ref Range Status   09/09/2024 12:42 PM 5.92 1.20 - 7.70  x10*3/uL Final     Comment:     Percent differential counts (%) should be interpreted in the context of the absolute cell counts (cells/uL).     Neutrophils %   Date/Time Value Ref Range Status   09/09/2024 12:42 PM 78.4 40.0 - 80.0 % Final     ABSOLUTE LYMPHOCYTES   Date/Time Value Ref Range Status   04/03/2025 08:20 AM 1,431 850 - 3,900 cells/uL Final     LYMPHOCYTES   Date/Time Value Ref Range Status   04/03/2025 08:20 AM 31.1 % Final     ABSOLUTE MONOCYTES   Date/Time Value Ref Range Status   04/03/2025 08:20  200 - 950 cells/uL Final     MONOCYTES   Date/Time Value Ref Range Status   04/03/2025 08:20 AM 9.0 % Final     BASOPHILS   Date/Time Value Ref Range Status   04/03/2025 08:20 AM 0.9 % Final     ABSOLUTE EOSINOPHILS   Date/Time Value Ref Range Status   04/03/2025 08:20  15 - 500 cells/uL Final     EOSINOPHILS   Date/Time Value Ref Range Status   04/03/2025 08:20 AM 3.7 % Final     HEMOGLOBIN   Date/Time Value Ref Range Status   04/03/2025 08:20 AM 13.2 11.7 - 15.5 g/dL Final     HEMATOCRIT   Date/Time Value Ref Range Status   04/03/2025 08:20 AM 40.5 35.0 - 45.0 % Final     RED BLOOD CELL COUNT   Date/Time Value Ref Range Status   04/03/2025 08:20 AM 4.35 3.80 - 5.10 Million/uL Final     MCV   Date/Time Value Ref Range Status   04/03/2025 08:20 AM 93.1 80.0 - 100.0 fL Final     MCHC   Date/Time Value Ref Range Status   04/03/2025 08:20 AM 32.6 32.0 - 36.0 g/dL Final     Comment:     For adults, a slight decrease in the calculated MCHC  value (in the range of 30 to 32 g/dL) is most likely  not clinically significant; however, it should be  interpreted with caution in correlation with other  red cell parameters and the patient's clinical  condition.       PLATELET COUNT   Date/Time Value Ref Range Status   04/03/2025 08:20  140 - 400 Thousand/uL Final       Fat-Soluble Vitamin Levels     VITAMIN D,25-OH,TOTAL,IA   Date/Time Value Ref Range Status   04/03/2025 08:20 AM 54 30 - 100 ng/mL  Final     Comment:     Vitamin D Status         25-OH Vitamin D:     Deficiency:                    <20 ng/mL  Insufficiency:             20 - 29 ng/mL  Optimal:                 > or = 30 ng/mL     For 25-OH Vitamin D testing on patients on   D2-supplementation and patients for whom quantitation   of D2 and D3 fractions is required, the QuestAssureD(TM)  25-OH VIT D, (D2,D3), LC/MS/MS is recommended: order   code 01951 (patients >2yrs).     See Note 1     Note 1     For additional information, please refer to   http://education.FormaFina/faq/DMB851   (This link is being provided for informational/  educational purposes only.)       VITAMIN A (RETINOL)   Date/Time Value Ref Range Status   04/03/2025 08:20 AM 77 38 - 98 mcg/dL Final     Comment:        Vitamin supplementation within 24 hours prior to  blood draw may affect the accuracy of the results.     This test was developed and its analytical performance  characteristics have been determined by [facility] [place], [place]. It has  not been cleared or approved by the U.S. Food and Drug  Administration. This assay has been validated pursuant  to the CLIA regulations and is used for clinical  purposes.          Vitamin A, Interpretation   Date/Time Value Ref Range Status   02/12/2024 02:43 PM Normal  Final     Comment:       This test was developed and its performance characteristics   determined by [facility]. It has not been cleared or   approved by the US Food and Drug Administration. This test was   performed in a CLIA certified laboratory and is intended for   clinical purposes.  Performed By: [facility]  [address] [place] [address]  : Tello Pacheco MD, PhD  CLIA Number: [id number]     VITAMIN E, ALPHA TOCOPHEROL   Date/Time Value Ref Range Status   04/03/2025 08:20 AM 14.5 5.7 - 19.9 mg/L Final     Comment:        Levels of alpha-tocopherol <5 mg/L are consistent  with  Vitamin E deficiency in adults.          VITAMIN E, BETA GAMMA TOCOPHEROL   Date/Time Value Ref Range Status   04/03/2025 08:20 AM 2.3 <=4.3 mg/L  Final     Comment:        Vitamin supplementation within 24 hours prior to  blood draw may affect the accuracy of the results.     This test was developed and its analytical performance  characteristics have been determined by [facility] [place], [place]. It has  not been cleared or approved by the U.S. Food and Drug  Administration. This assay has been validated pursuant  to the CLIA regulations and is used for clinical  purposes.            LFT and Associated Parameters     AST   Date/Time Value Ref Range Status   04/03/2025 08:20 AM 14 10 - 35 U/L Final   09/09/2024 12:42 PM 12 9 - 39 U/L Final   07/10/2024 12:22 PM 15 9 - 39 U/L Final   04/03/2024 03:02 PM 19 9 - 39 U/L Final     ALT   Date/Time Value Ref Range Status   04/03/2025 08:20 AM 15 6 - 29 U/L Final   09/09/2024 12:42 PM 12 7 - 45 U/L Final     Comment:     Patients treated with Sulfasalazine may generate falsely decreased results for ALT.   07/10/2024 12:22 PM 18 7 - 45 U/L Final     Comment:     Patients treated with Sulfasalazine may generate falsely decreased results for ALT.   04/03/2024 03:02 PM 16 7 - 45 U/L Final     Comment:     Patients treated with Sulfasalazine may generate falsely decreased results for ALT.     ALKALINE PHOSPHATASE   Date/Time Value Ref Range Status   04/03/2025 08:20 AM 87 37 - 153 U/L Final     Alkaline Phosphatase   Date/Time Value Ref Range Status   09/09/2024 12:42 PM 63 33 - 136 U/L Final   07/10/2024 12:22  33 - 136 U/L Final   04/03/2024 03:02 PM 91 33 - 136 U/L Final     BILIRUBIN, TOTAL   Date/Time Value Ref Range Status   04/03/2025 08:20 AM 0.5 0.2 - 1.2 mg/dL Final     Bilirubin, Total   Date/Time Value Ref Range Status   09/09/2024 12:42 PM 0.5 0.0 - 1.2 mg/dL Final   07/10/2024 12:22 PM 0.6 0.0 - 1.2 mg/dL Final   04/03/2024 03:02 PM  0.5 0.0 - 1.2 mg/dL Final     BILIRUBIN, DIRECT   Date/Time Value Ref Range Status   04/03/2025 08:20 AM 0.1 < OR = 0.2 mg/dL Final     Bilirubin, Direct   Date/Time Value Ref Range Status   07/10/2024 12:22 PM 0.1 0.0 - 0.3 mg/dL Final   04/03/2024 03:02 PM 0.1 0.0 - 0.3 mg/dL Final   02/12/2024 02:43 PM 0.1 0.0 - 0.3 mg/dL Final     GGT   Date/Time Value Ref Range Status   04/03/2025 08:20 AM 54 3 - 65 U/L Final   07/10/2024 12:22 PM 26 5 - 55 U/L Final   02/12/2024 02:43 PM 26 5 - 55 U/L Final   07/10/2023 01:28 PM 22 5 - 55 U/L Final   02/21/2022 09:12 AM 33 5 - 55 U/L Final   11/16/2020 03:16 PM 34 5 - 55 U/L Final     ALBUMIN   Date/Time Value Ref Range Status   04/03/2025 08:20 AM 4.5 3.6 - 5.1 g/dL Final   04/03/2025 08:20 AM 4.5 3.6 - 5.1 g/dL Final     Albumin   Date/Time Value Ref Range Status   09/09/2024 12:42 PM 3.9 3.4 - 5.0 g/dL Final   07/10/2024 12:22 PM 4.3 3.4 - 5.0 g/dL Final   04/03/2024 03:02 PM 4.7 3.4 - 5.0 g/dL Final     PROTEIN, TOTAL   Date/Time Value Ref Range Status   04/03/2025 08:20 AM 7.0 6.1 - 8.1 g/dL Final     Total Protein   Date/Time Value Ref Range Status   09/09/2024 12:42 PM 6.4 6.4 - 8.2 g/dL Final   07/10/2024 12:22 PM 7.5 6.4 - 8.2 g/dL Final   04/03/2024 03:02 PM 7.7 6.4 - 8.2 g/dL Final       Coagulation Parameters           Diabetes Laboratory Tests     POC HEMOGLOBIN A1c   Date/Time Value Ref Range Status   01/22/2025 02:17 PM 6.5 4.2 - 6.5 % Final   10/09/2024 02:28 PM 6.6 (A) 4.2 - 6.5 % Final   07/24/2024 04:28 PM 6.7 (A) 4.2 - 6.5 % Final     HEMOGLOBIN A1c   Date/Time Value Ref Range Status   04/03/2025 08:20 AM 6.2 (H) <5.7 % of total Hgb Final     Comment:     For someone without known diabetes, a hemoglobin   A1c value between 5.7% and 6.4% is consistent with  prediabetes and should be confirmed with a   follow-up test.     For someone with known diabetes, a value <7%  indicates that their diabetes is well controlled. A1c  targets should be individualized based  on duration of  diabetes, age, comorbid conditions, and other  considerations.     This assay result is consistent with an increased risk  of diabetes.     Currently, no consensus exists regarding use of  hemoglobin A1c for diagnosis of diabetes for children.          Albumin, Urine Random   Date/Time Value Ref Range Status   01/22/2025 03:23 PM <7.0 Not established mg/L Final   02/12/2024 02:43 PM <7.0 Not established mg/L Final     Creatinine, Urine Random   Date/Time Value Ref Range Status   01/22/2025 03:23 PM 56.0 20.0 - 320.0 mg/dL Final   02/12/2024 02:43 PM 67.4 20.0 - 320.0 mg/dL Final     Albumin/Creatinine Ratio   Date/Time Value Ref Range Status   01/22/2025 03:23 PM   Final     Comment:     One or more analytes used in this calculation is outside of the analytical measurement range. Calculation cannot be performed.   02/12/2024 02:43 PM   Final     Comment:     One or more analytes used in this calculation is outside of the analytical measurement range. Calculation cannot be performed.        Immunology Laboratory Tests     IMMUNOGLOBULIN E   Date/Time Value Ref Range Status   04/03/2025 08:20 AM 2 <QK=072 kU/L Final     Alcohol Abstinence Monitoring     EER PEth   Date Value Ref Range Status   07/10/2024 See Note  Final     Comment:     Authorized individuals can access the [facility]   Enhanced Report using the following link:      [email or web address]   04/03/2024 See Note  Final     Comment:     Authorized individuals can access the [facility]   Enhanced Report using the following link:      [email or web address]     PEth 16:0/18:1 (POPEth)   Date Value Ref Range Status   07/10/2024 27 ng/mL Final     Comment:     PEth 16:0/18:1 (POPEth)  Less than 10 ng/mL............Not detected  Less than 20 ng/mL............Abstinence or light alcohol   consumption  20 - 200 ng/mL................Moderate alcohol consumption  Greater than 200 ng/mL........Heavy  alcohol consumption or chronic   alcohol use    (Reference: DANTE Carballo 2018 J. Forensic Sci)   04/03/2024 64 ng/mL Final     Comment:     PEth 16:0/18:1 (POPEth)  Less than 10 ng/mL............Not detected  Less than 20 ng/mL............Abstinence or light alcohol   consumption  20 - 200 ng/mL................Moderate alcohol consumption  Greater than 200 ng/mL........Heavy alcohol consumption or chronic   alcohol use    (Reference: DANTE Carballo 2018 J. Forensic Sci)     PEth 16:0/18:2 (PLPEth)   Date Value Ref Range Status   07/10/2024 22 ng/mL Final     Comment:     Reference ranges are not well established.   04/03/2024 65 ng/mL Final     Comment:     Reference ranges are not well established.     PEth Interpretation   Date Value Ref Range Status   07/10/2024 See Comment  Final     Comment:     Phosphatidylethanol (PEth) is a group of phospholipids formed in   the presence of ethanol, phospholipase D and phosphatidylcholine.   PEth is known to be a direct alcohol biomarker. The predominant   PEth homologues are PEth 16:0/18:1 (POPEth) and PEth 16:0/18:2   (PLPEth), which account for 37-46% and 26-28% of the total PEth   homologues, respectively. PEth is incorporated into the   phospholipid membrane of red blood cells and has a general   half-life of 4-10 days and a window of detection of 2-4 weeks.   However, the window of detection is longer in individuals who   chronically or excessively consume alcohol. The limit of   quantification is 10 ng/mL. Serial monitoring of PEth may be   helpful in monitoring alcohol abstinence over time. PEth results   should be interpreted in the context of the patient's clinical and   behavioral history.  Patients with advanced liver disease may have falsely elevated   PEth concentrations (Senait STAUFFER et al 2018, Alcoholism Clinical &   Experimental Research).    This test was developed and its performance characteristics   determined by [facility]. It  has not been cleared or   approved by the U.S. Food and Drug Administration. This test was   performed in a CLIA-certified laboratory and is intended for   clinical purposes.  Performed By: [facility]  [address] [place] [id number]  : Tello Pacheco MD, PhD  CLIA Number: [id number]   04/03/2024 See Comment  Final     Comment:     Phosphatidylethanol (PEth) is a group of phospholipids formed in   the presence of ethanol, phospholipase D and phosphatidylcholine.   PEth is known to be a direct alcohol biomarker. The predominant   PEth homologues are PEth 16:0/18:1 (POPEth) and PEth 16:0/18:2   (PLPEth), which account for 37-46% and 26-28% of the total PEth   homologues, respectively. PEth is incorporated into the   phospholipid membrane of red blood cells and has a general   half-life of 4-10 days and a window of detection of 2-4 weeks.   However, the window of detection is longer in individuals who   chronically or excessively consume alcohol. The limit of   quantification is 10 ng/mL. Serial monitoring of PEth may be   helpful in monitoring alcohol abstinence over time. PEth results   should be interpreted in the context of the patient's clinical and   behavioral history.  Patients with advanced liver disease may have falsely elevated   PEth concentrations (Senait STAUFFER et al 2018, Alcoholism Clinical &   Experimental Research).    This test was developed and its performance characteristics   determined by [facility]. It has not been cleared or   approved by the U.S. Food and Drug Administration. This test was   performed in a CLIA-certified laboratory and is intended for   clinical purposes.  Performed By: [facility]  [address] [place] [id number]  : Tello Pacheco MD, PhD  CLIA Number: [id number]     DEXA Scan     XR BONE density 10/10/2022    Narrative  Name: OMAR GIPSON  Patient ID: [id number] YOB: 1960  Height: 62.0 in.  Gender:     Female   Exam Date:  10/10/2022 Weight: 118.0 lbs.  Indications: Cystic Fibrosis, DIABETES, family history osteoporosis,  Hysterectomy, Post Menopausal  Fractures:    Treatments:  OMEPRAZOLE    LEFT FEMUR - TOTAL  The bone mineral density : 0.889 g/cm2  T-score : -0.9    % of young normal mean :88%  Z-score : 0.0    % of age matched mean : 100%  % change vs. Previous : N/A    % change vs. Baseline : baseline    LEFT FEMUR - NECK  The bone mineral density : 0.843 g/cm2  T-score : -1.4    % of young normal mean: 81%  Z-score : -0.1    % of age matched mean : 98%  % change vs. Previous : N/A    % change vs. Baseline : baseline    SPINE L1-L4  The bone mineral density is 1.147 g/cm2  T-score : -0.3   % of young normal mean is 97%  Z-score : 1.0    % of age matched mean is 111%  % change vs. Previous : -    % change vs. Baseline : baseline    World Health Organization (WHO) criteria for post-menopausal,   Women:  Normal:       T-score at or above -1 SD  Osteopenia:   T-score between -1 and -2.5 SD  Osteoporosis: T-score at or below -2.5 SD    10-Year Fracture Risk:  Major Osteoporotic Fracture 4.0%  Hip Fracture                0.4%  Reported Risk Factors       None    Interpretation:  According to World Health Organization criteria, classification is low bone mass.  Followup recommended on October 2024 or sooner as clinically warranted.     Endoscopies     Colonoscopy (7/3/2024)  Findings  Rectal exam revealed decreased sphincter tone likely secondary to sedation  External and internal small hemorrhoids observed during retroflexion; no bleeding was identified  Mild, generalized atrophic mucosa in the terminal ileum; performed cold forceps biopsy;  Large submucosal mass at the appendiceal orifice  A moderate amount of semi-liquid stool was visualized throughout the colon.  Lavage was performed with incomplete clearance and adequate visualization.  One 3 mm polyp vs lymphoid follicle  in the ascending colon; performed cold snare with complete en bloc removal and retrieved specimen  Two 4 mm sessile polyps in the transverse colon; performed cold snare with complete en bloc removal and retrieved specimen  Impression  Rectal exam revealed decreased sphincter tone likely secondary to sedation.  Small hemorrhoids  Mild atrophic mucosa in the terminal ileum; performed cold forceps biopsy  Large submucosal mass in the cecum at the appendiceal orifice which could be secondary to stool vs. neoplasm  A moderate amount of semi-liquid stool was visualized throughout the colon.  Lavage was performed with incomplete clearance and adequate visualization.  3 subcentimeter polyps were removed with cold snare  Recommendation    Await pathology results     Repeat colonoscopy in 3 years (2 years if all polyps adenomatous).  Obtain CT scan abdomen and pelvis to further assess appendiceal orifice.     Esophagogastroduodenoscopy (EGD) (7/3/2024)  Findings  Z-line 33 cm from the incisors  An inlet patch was visualized in the proximal esophagus.  2 cm hiatal hernia - GE junction 33 cm from the incisors, diaphragmatic impression 35 cm from the incisors, confirmed by retroflexion. On retroflexion evidence of prior fundoplication was visualized; however, a paraesophageal hernia was also seen.  Single medium diverticulum with no inflammation in the lower third of the esophagus (29 cm from the incisors); no bleeding was identified  Moderate, generalized erythematous mucosa with loss of vascular pattern in the antrum  Performed multiple random forceps biopsies in the incisura and antrum to rule out H. pylori. Rule out intestinal metaplasia  Performed random forceps biopsies in the body of the stomach, greater curve of the stomach and lesser curve of the stomach to rule out H. pylori. Rule out intestinal metaplasia  Benign-appearing mild intrinsic stenosis in the duodenal sweep (scope was able to traverse without any  resistance)  Edematous mucosa in the 2nd part of the duodenum  The duodenum was otherwise normal.  Performed multiple random forceps biopsies in the duodenal bulb and 2nd part of the duodenum to rule out celiac disease  A Bravo pH capsule was placed successfully in the esophagus (27 cm from the incisors, 6 cm from the GE junction).  Successful placement was confirmed endoscopically.  Impression  An inlet patch was visualized in the proximal esophagus.  2 cm hiatal hernia  Diverticulosis in the lower third of the esophagus  Moderate erythematous mucosa with loss of vascular pattern in the antrum  Performed forceps biopsies in the incisura and antrum to rule out H. pylori  Performed forceps biopsies in the body of the stomach, greater curve of the stomach and lesser curve of the stomach to rule out H. pylori  Intrinsic mild stenosis in the duodenal sweep possibly secondary to prior peptic ulcer disease  Edematous mucosa in the 2nd part of the duodenum  The duodenum was otherwise normal.  Performed forceps biopsies in the duodenal bulb and 2nd part of the duodenum to rule out celiac disease  A pH capsule was placed successfully in the esophagus (27 cm from the incisors, 6 cm from the GE junction).     BRAVO Esophageal pH Probe (7/3/2024)  Findings:  -The total AET is 6.9% and the DeMeester score is 26.5 indicating presence of mild to moderate reflux  - Reflux occurred on both days of the study and in both upright and supine positions  - The SAP for regurgitation was 100% indicating high symptom correlation   Impressions:  Overall this is study is positive for mild to moderate acid reflux  There is high symptom correlation, reflux is the cause of the symptoms     Chest Radiograph     XR chest 2 view 10/10/2022    Narrative  MRN: [id number]  Patient Name: OMAR GIPSON    STUDY:   CHEST 2 VIEW PA AND LAT;    INDICATION:  cystic fibrosis, malabsorption  E84.9: Cystic fibrosis E84.0: Cystic fibrosis with pulmonary  manifestations.    COMPARISON:  Chest radiograph 05/09/2016    ACCESSION NUMBER(S):  [id number]    ORDERING CLINICIAN:  CELIA MIN    FINDINGS:  Frontal, lateral, and dual energy radiographs of the chest were  provided.  The cardiac silhouette size is within normal limits.  There is no focal consolidation, edema or pneumothorax.  No sizeable pleural effusion.  No acute osseous abnormality.    Impression  No acute cardiopulmonary process.  I personally reviewed the images/study and I agree with the findings as stated.     US LIVER/GB/PANCREAS     US LIVER/GALL BLADDER/PANCREAS (4/13/2022, [facility])    CLINICAL HISTORY: Reason for Exam: as recommended per CT; history of inflammatory changes along the falciform ligament in chronically dilated common bile duct   ASSOCIATED DIAGNOSIS:     COMPARISON: Gallbladder ultrasound 07/03/2017; CT abdomen/pelvis 04/11/2022     TECHNIQUE: Ultrasound real time scan with image documentation of the right upper quadrant including the liver, gallbladder, and pancreas was performed.     FINDINGS:   Liver   Craniocaudal length: 14.8 cm.   Echogenicity:  Normal   Surface nodularity: Present   Mass (size and location): None.     Bile ducts   Intrahepatic ducts: No biliary dilatation.   Common bile duct:  Diameter 14 mm, stable compared to July 2017 ultrasound.     Gallbladder   Size and morphology: The gallbladder is contracted limiting evaluation.   Cholelithiasis:  Multiple echogenic foci with posterior acoustic shadowing, compatible with gallstones.   Pericholecystic fluid: None.   Sonographic Arrington sign: Absent.     Pancreas: Pancreas is not visualized consistent with the complete fatty replacement on recent CT.     Other findings : A right pleural effusion is incidentally noted.     IMPRESSION:   1. Chronically dilated CBD measuring approximately 14 mm. No choledocholithiasis seen.   2.  The liver has a lobulated contour which may relate to underlying cirrhosis. Further  evaluation with shear wave elastography or fibroscan can be obtained to obtain a Metavir score.   3.  Contracted gallbladder with cholelithiasis. No sonographic findings of acute cholecystitis.   4.  Partially visualized right pleural effusion.   5.  Pancreas is not visualized consistent with the complete fatty replacement on recent CT.     Chest CT Scan without Contrast     CT CHEST WO IV CONTRAST; 4/26/2024 1:21 pm   1.  Patchy centrilobular nodules throughout the mid to inferior right upper lobe and medial right middle lobe, most consistent with infection.  2. Within the medial aspect of the right upper lobe and right middle lobe, there is are moderately dilated and mucous filled bronchi with surrounding peribronchovascular centrilobular nodules. Findings likely attributable to chronic changes from cystic fibrosis with possible superimposed infection. Attention on follow-up CT studies is  recommended.  3. Diffuse bronchial wall thickening, consistent with the patient's reported cystic fibrosis. Lungs are otherwise clear.  4. Large hiatal hernia.  5. Non-obstructing 5 mm calculi seen in the visualized left kidney.    CF Sputum Culture     AFB Culture   Date Value Ref Range Status   07/10/2024 No Mycobacteria isolated.  Final   04/03/2024 No Mycobacteria isolated.  Final   02/12/2024 No Mycobacteria isolated.  Final      Respiratory Culture, Cystic Fibrosis   Date/Time Value Ref Range Status   06/16/2025 11:49 AM (2+) Few Normal throat perla  Preliminary   07/10/2024 10:46 AM (A)  Final    (2+) Few Methicillin Resistant Staphylococcus aureus (MRSA)     Comment:     Methicillin (Oxacillin) resistant Staphylococci are resistant to all currently available Penicillins, Beta-lactam/Beta-lactamase inhibitor combinations (including Ampicillin/Sulbactam, Amoxicillin/Clavulanate and Pipercillin/Tazobactam), Carbapenems and   Cephalosporins (except Ceftaroline).   07/10/2024 10:46 AM (2+) Few Normal throat perla  Final      CF respiratory culture (11/28/2022): MRSA  CF respiratory culture (10/10/2022): MRSA, Burkholderia multivorans  CF respiratory culture (5/9/2022): MRSA  CF respiratory culture (6/3/2015): MSSA, Pseudomonas putida    Susceptibility data from last 2000 days.  Collected Specimen Info Organism Amphotericin B Ceftazidime Clindamycin Erythromycin Fluconazole Isavuconazole   06/16/25 Fluid from Throat Swab Normal throat perla         07/10/24 Fluid from SPUTUM Methicillin Resistant Staphylococcus aureus (MRSA)    S  R       Normal throat perla         07/10/24 Fluid from SPUTUM Scedosporium boydii  No published     No published     05/29/24 Fluid from Throat Swab Methicillin Resistant Staphylococcus aureus (MRSA)    S  R       Burkholderia cepacia complex   S         Normal throat perla         04/17/24 Fluid from Throat Swab Methicillin Resistant Staphylococcus aureus (MRSA)   S R       Normal throat perla         04/03/24 Swab from SPUTUM Candida albicans           Scedosporium boydii         04/03/24 Fluid from Throat Swab Methicillin Resistant Staphylococcus aureus (MRSA)   S R       Normal throat perla         02/12/24 Fluid from SPUTUM Methicillin Resistant Staphylococcus aureus (MRSA)   S R       Normal throat perla         02/12/24 Swab from SPUTUM Candida albicans           Scedosporium boydii         11/08/23 Fluid from Throat Swab Methicillin Resistant Staphylococcus aureus (MRSA)   S R       Normal throat perla         07/07/23 Respiratory Staphylococcus aureus   R R     07/07/23 Respiratory Candida albicans           Collected Specimen Info Organism Itraconazole Levofloxacin Linezolid Meropenem Micafungin Minocycline Oxacillin   06/16/25 Fluid from Throat Swab Normal throat perla          07/10/24 Fluid from SPUTUM Methicillin Resistant Staphylococcus aureus (MRSA)        R     Normal throat perla          07/10/24 Fluid from SPUTUM Scedosporium boydii  No published     No published     05/29/24 Fluid from  Throat Swab Methicillin Resistant Staphylococcus aureus (MRSA)        R     Burkholderia cepacia complex   S   S   I      Normal throat perla          04/17/24 Fluid from Throat Swab Methicillin Resistant Staphylococcus aureus (MRSA)       R     Normal throat perla          04/03/24 Swab from SPUTUM Candida albicans            Scedosporium boydii          04/03/24 Fluid from Throat Swab Methicillin Resistant Staphylococcus aureus (MRSA)       R     Normal throat perla          02/12/24 Fluid from SPUTUM Methicillin Resistant Staphylococcus aureus (MRSA)       R     Normal throat perla          02/12/24 Swab from SPUTUM Candida albicans            Scedosporium boydii          11/08/23 Fluid from Throat Swab Methicillin Resistant Staphylococcus aureus (MRSA)       R     Normal throat perla          07/07/23 Respiratory Staphylococcus aureus   S    S   07/07/23 Respiratory Candida albicans            Collected Specimen Info Organism Posaconazole Tetracycline Trimethoprim/Sulfamethoxazole Vancomycin Voriconazole   06/16/25 Fluid from Throat Swab Normal throat perla        07/10/24 Fluid from SPUTUM Methicillin Resistant Staphylococcus aureus (MRSA)   S  S  S      Normal throat perla        07/10/24 Fluid from SPUTUM Scedosporium boydii  No published     No published   05/29/24 Fluid from Throat Swab Methicillin Resistant Staphylococcus aureus (MRSA)   S  S  S      Burkholderia cepacia complex          Normal throat perla        04/17/24 Fluid from Throat Swab Methicillin Resistant Staphylococcus aureus (MRSA)  S S S      Normal throat perla        04/03/24 Swab from SPUTUM Candida albicans          Scedosporium boydii        04/03/24 Fluid from Throat Swab Methicillin Resistant Staphylococcus aureus (MRSA)  S S S      Normal throat perla        02/12/24 Fluid from SPUTUM Methicillin Resistant Staphylococcus aureus (MRSA)  S S S      Normal throat perla        02/12/24 Swab from SPUTUM Candida albicans           Dustinsporium sami        11/08/23 Fluid from Throat Swab Methicillin Resistant Staphylococcus aureus (MRSA)  S S S      Normal throat perla        07/07/23 Respiratory Staphylococcus aureus  S S S    07/07/23 Respiratory Candida albicans                  Problem List Items Addressed This Visit       Alcohol use disorder    Current Assessment & Plan   Plans to re-engage with AA meetings and has sponsor.         Current moderate episode of major depressive disorder (Multi)    Current Assessment & Plan   Difficult to know at this point whether Trikafta is causing depression or worsening depression that predates initiation of treatment.  She is already taking a modified Trikafta in which the blue evening pill (ivacaftor) is omitted.  We discussed a plan of further modifying the dose, perhaps taking 2 orange tablets on 3 or 4 days of the week alternating with 1 orange tablet on intervening days.  The problem with arbitrary dose reductions to mitigate side effects is that we cannot know whether she is getting enough restoration of CFTR function.  We agreed to continue the 2 orange tablets daily while she reaches steady state with other medications  I have added the desvenlafaxine (Pristiq) and buspirone (BuSpar) to her medication list.            Relevant Medications    desvenlafaxine succinate (Pristiq) 25 mg 24 hour tablet    Other Relevant Orders    TSH with reflex to Free T4 if abnormal    Cystic fibrosis - Primary    Current Assessment & Plan   Excellent physiologic response to Trikafta with ppFEV1 up to 116%.  Chest congestion today, but hard pressed to call it an exacerbation.  Nonetheless, treating with doxycycline.  It is acceptable for Lucia to refrain from using Pulmozyme in light of her response to Trikafta.  Xopenex (levalbuterol) has been better for her than racemic albuterol - no tremulousness.  Surveillance respiratory tract culture today.  For wheezing and chest tightness, start Dulera -5, 2  puffs inhaled twice daily.         Cystic fibrosis with pulmonary manifestations (Multi)    Relevant Medications    dornase bryan (Pulmozyme) 1 mg/mL nebulizer solution    doxycycline (Adoxa) 100 mg tablet    Healthcare maintenance    Current Assessment & Plan   DEXA scan (10/10/2022): Osteopenia  Colonoscopy (7/3/2024): tubular adenomas (repeat colonoscopy in 2026)  Influenza vaccination given on 11/8/2023         MRSA (methicillin resistant Staphylococcus aureus) infection    Current Assessment & Plan   Due to increased chest congestion, start doxycycline 100 mg by mouth twice daily for 14 days          Relevant Medications    doxycycline (Adoxa) 100 mg tablet    Primary hypertension    Overview   Managed by her PCP at [facility]         Current Assessment & Plan   Regimen: amlodipine 2.5 mg daily and losartan 100 mg daily         Weight loss    Current Assessment & Plan   Likely a vegetative symptom of depression, but will check thyroid function tests.         Relevant Orders    TSH with reflex to Free T4 if abnormal     Follow-up:    Future Appointments   Date Time Provider Department Center   7/9/2025 10:00 AM Nicolás Harris MD [id number] Academic   7/9/2025 11:30 AM Latonia Snyder MD [id number] Academic     Nicolás Harris MD   06/18/2025

## 2025-06-18 NOTE — PROGRESS NOTES
"Subjective   Patient ID: Mansi Morrison is a 65 y.o. female who presents for Cystic Fibrosis in outpatient clinic.    Social Work Visit Type: This is a Follow-Up visit for Mansi Morrison  Location: [facility]    Identifying Information                              : 1960  Assessment date: 2025    Objective     Patient accompanied by:  Self    Family System / Parents:    Raised by:  with biological parent(s)  Mother:  Name:  Cleopatra ()  Father:  Name:  Arun ()  Siblings & Children Information:  Siblings: brothers: 2, 1  and sisters: 1  Had stepson for 14 years with ex, until he passed away, was killed when a electric wire fell on him    Relationships / Social History:  Patient lives with: patient lives with dog Oly  Relationship status: Relationship Status / Family Dynamic: Single:  Yes  Marital/relationship status:  stabilized since last visit  Does patient have adequate housing?:Owns home  Does patient feel safe in home?: Yes  Supportive Relationships: friends/neighbors, demetrius community, and CAROLINE groups    Education:  Education: Highest Level of Education: Some College  Employer information: 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 no,   Adherence and Understanding of Health:  Knowledge of health:  fair, Overall adherence:  good, Adherence with medications:  good, Managed by: patient, Understanding of medication:  good, and Adherence with appointments:   good    Cognition/Mood/Affect:  Current Stressors -- feeling more depressed recently .  Has shame and guilt when  she has a relapse     Mood:   How has your mood been lately?: \"good\"  How do you manage stress?: Working to \"let go\" and codependency, pickleball, yoga, walk dog on beach, finding new ways to connect with people   What are your goals " for this year?: Feel better   What about having CF do you find challenging?  fatigue, lung capacity  How does living with CF affect your social life?  feels tired to hang out with friends    Thought Processes   Organized?  yes  Have you ever been hospitalized in a psychiatric hospital? yes  *If yes, date: age 23 Symptoms: suicide attempt, to monitor, over dose and first time got sober   Do you currently or have you ever seen a therapist/counselor/psychiatrist? yes  *If yes, indicate name/contact information: Gail Davidson in [facility]  Have you used medications for mental health issues, sleep and/or pain now or in the past?  yes  *If yes, please indicate names/dosages/prescribing provider information:  see chart - counselor mentioned interest in medication for focus.     Substance Use:  Work in progress    Advance Directives  Do you have an advance directive/living will?  no  has NO advanced directive  - add't info requested. Referral to SW: yes - can complete when ready  Strengths:  Working on boundaries, sobriety, sees a counselor she likes, great group of friends.  What are the patient's coping behaviors?  Keeps busy, has peace and quiet time, works on emotional regulation.     Barriers:  working on codependency    Impression: Pt seems well connected to a therapist and will be starting to go to virtual AA meeting too  She expressed being depressed  and not like how she is feeling . We talked about all the positive steps  she is taking to help her feel better to hopefully reduce her shame and guilt. Pt did acknowledge that she is moving in the right direction Pt denied any current suicidal ideation or plan       SW engaged in active listening and supportive counseling. SW facilitated the expression of thoughts and feeling related to the issues noted above. SW reviewed contact information and availability for on-going support in between CF clinic appointments.      SW participated in a CF clinic huddle during  which time the patients care plan, treatment needs and issues were discussed. The information noted above has been shared with the CF team during CF clinic.     PHQ9: 23 (2025)     7(2024)  (July 2023 was 18)  GAD7:  16 (2025)    4 (2024) (July 2023 was 14)    Pt did have a positive suicide screen  and will re screened  at next visit .   Pt denied any suicidal ideation  or any plans of self      Assessment/Plan   Will continue to support pt and provide resources  as indicated     Plan:    On-going psychosocial and emotional support, supportive counseling and referrals as indicated to maximize adjustment to living with cystic fibrosis..  Team has timothy updated on the above assessment     AMINAH Tan  June 18, 2025

## 2025-06-18 NOTE — ASSESSMENT & PLAN NOTE
Difficult to know at this point whether Trikafta is causing depression or worsening depression that predates initiation of treatment.  She is already taking a modified Trikafta in which the blue evening pill (ivacaftor) is omitted.  We discussed a plan of further modifying the dose, perhaps taking 2 orange tablets on 3 or 4 days of the week alternating with 1 orange tablet on intervening days.  The problem with arbitrary dose reductions to mitigate side effects is that we cannot know whether she is getting enough restoration of CFTR function.  We agreed to continue the 2 orange tablets daily while she reaches steady state with other medications  I have added the desvenlafaxine (Pristiq) and buspirone (BuSpar) to her medication list.

## 2025-06-18 NOTE — ASSESSMENT & PLAN NOTE
Excellent physiologic response to Trikafta with ppFEV1 up to 116%.  Chest congestion today, but hard pressed to call it an exacerbation.  Nonetheless, treating with doxycycline.  It is acceptable for Lucia to refrain from using Pulmozyme in light of her response to Trikafta.  Xopenex (levalbuterol) has been better for her than racemic albuterol - no tremulousness.  Surveillance respiratory tract culture today.  For wheezing and chest tightness, start Dulera -5, 2 puffs inhaled twice daily.

## 2025-06-18 NOTE — PROGRESS NOTES
"Cystic Fibrosis Nutrition Consult  Subjective    She is going to [place], [place] with friends for a trip.    Nutrition Concerns/Questions:    Pt. Is concerned that she might have low B12 levels, which might be impacting her memory. She was depressed, so she was only eating PB&J for a while due to lack of motivation to cook.    Previous needs/interventions/goals:  Maintain level of physical activity  Continue to eat well-balanced diet  Take 4-5 capsules enzymes with meals and 2-3 with snacks    Exercise:  Walks the dog daily, reaches 8000-11070 steps/day  Plays Gotta[employer] on Mondays  Plays shuffleboard with friends periodically      Anthropometrics:  BMI      Estimated body mass index is 22.17 kg/m² as calculated from the following:    Height as of an earlier encounter on 6/18/25: 1.575 m (5' 2.01\").    Weight as of an earlier encounter on 6/18/25: 55 kg (121 lb 4.1 oz).     Highest wt? 56.6 kg  Weight change from last visit: -1.1 kg since 1/22/25  Weight/BMI trend: Increasing since 10/2024    Pt goal for wt: Maintain     Comment: Feeling good at current wt.  Body image and/or weight concerns: None    Nutrition Risk Assessment:  Unintentional progressive weight loss >/=6 months (based on clinic data or self report) No  Unintentional weight loss >/=5# No  BMI <20 No  FEV1</=30 No      Seeing GI? Yes      (last appt: 6/16/25)  Follow up scheduled, if applicable? No, due 9/25  Liver US: 2022  Colonoscopy: TAs 24; Repeat 2026 per Dr. Snyder recommendation    GI / Absorption (GI symptom tracker date: 6/18/25)  Sx:    Constipation      # stools/d: 1     Description Easy, no issues      Additional Diagnosis  Diabetes  Low bone density/Osteoporosis: Osteopenia    Special diet: None  Food allergies: NKFA  Alcohol intake: Impulsively drinks once a week-working in a program with a psychiatrist and sponsor  Food frequency: Low fruit and vegetable intake  Dairy/calcium intake: Starting calcium supplement  Food insecurity? " No    Hospitalizations/ER in the past year  9/9/24, ED, Syncopal episode    Telephone check in:  None    Oral Supplements: None  CF Specific Vitamins: None     Other: vitamin E, magnesium  Acid Blocker: Omeprazole  Modulators (taking w fat/enzymes?): Trikafta, fat and enzymes  Other PERT medications / Appetite stimulants / insulin / ADHD / CAM: Dexcom, Glucagon, Basaglar    Gastrointestinal History:  Enzyme: Brand Creon  Dose: w/meal 3-4     w/snacks: 2         Lipase units/kg/meal: 1745.5 Lipase units/kg/day: 7854.5  Takes enzymes at: Beginning  Adjusting dose for size/fat content? Yes    Healthcare Utilization/Resources: Medicaid           CFRD/Endocrine  CFRD: Yes  Endo MD: Arnol  Last seen: 1/22/25  Follow up scheduled? No  Blood sugar range: Not tracking  Lows? No-Does not feel symptoms of low BS      Reactive Hypoglycemia? No      Objective   Labs:         Fat soluble Vits Measured: WNL    Liver Enzymes: WNL  DEXA: Osteopenia 22; Repeat 25  OGTT: CFRD  Fasting Glu / HgbA1C: 121/6.2    APRI: 0.20 (WNL)  GPR: 0.42 (Elevated)  Other: WNL    PF Readings from Last 1 Encounters:   No data found for PF       VITAMIN D,25-OH,TOTAL,IA   Date/Time Value Ref Range Status   04/03/2025 08:20 AM 54 30 - 100 ng/mL Final     Comment:     Vitamin D Status         25-OH Vitamin D:     Deficiency:                    <20 ng/mL  Insufficiency:             20 - 29 ng/mL  Optimal:                 > or = 30 ng/mL     For 25-OH Vitamin D testing on patients on   D2-supplementation and patients for whom quantitation   of D2 and D3 fractions is required, the QuestAssureD()  25-OH VIT D, (D2,D3), LC/MS/MS is recommended: order   code 52182 (patients >2yrs).     See Note 1     Note 1     For additional information, please refer to   http://education.lark.BetaVersity/faq/[email or web address]   (This link is being provided for informational/  educational purposes only.)       VITAMIN A (RETINOL)   Date/Time Value Ref Range Status   04/03/2025  08:20 AM 77 38 - 98 mcg/dL Final     Comment:        Vitamin supplementation within 24 hours prior to  blood draw may affect the accuracy of the results.     This test was developed and its analytical performance  characteristics have been determined by [employer] [place], [place]. It has  not been cleared or approved by the U.S. Food and Drug  Administration. This assay has been validated pursuant  to the CLIA regulations and is used for clinical  purposes.          Vitamin A, Interpretation   Date/Time Value Ref Range Status   02/12/2024 02:43 PM Normal  Final     Comment:       This test was developed and its performance characteristics   determined by [facility]. It has not been cleared or   approved by the US Food and Drug Administration. This test was   performed in a CLIA certified laboratory and is intended for   clinical purposes.  Performed By: [facility]  [address] [place] [address]  : Tello Pacheco MD, PhD  CLIA Number: [id number]     VITAMIN E, ALPHA TOCOPHEROL   Date/Time Value Ref Range Status   04/03/2025 08:20 AM 14.5 5.7 - 19.9 mg/L Final     Comment:        Levels of alpha-tocopherol <5 mg/L are consistent  with Vitamin E deficiency in adults.          VITAMIN E, BETA GAMMA TOCOPHEROL   Date/Time Value Ref Range Status   04/03/2025 08:20 AM 2.3 <=4.3 mg/L  Final     Comment:        Vitamin supplementation within 24 hours prior to  blood draw may affect the accuracy of the results.     This test was developed and its analytical performance  characteristics have been determined by [employer] [place], [place]. It has  not been cleared or approved by the U.S. Food and Drug  Administration. This assay has been validated pursuant  to the CLIA regulations and is used for clinical  purposes.           DCP (MARIA G GAMMA CARBOXY PROTHROMBIN)  TEST NAME               RESULT      FLAG UNITS        REF  RANGE  =========               ======      ==== =====        =========     Acarboxyprothrombin   SerPl-mCnc             0.3              ng/mL        <7.5                    The PerSay DCP Immunological Test System is a clinical  device used to quantitatively measure, by immunochemical  technique, mackenzie-gamma-carboxy-prothrombin (DCP) in serum. The  assay uses  DNA-coupled antibodies and dye labeled  antibodies, which react with proteins in liquid phase within  the microchannels. Both analytes are quantified using  laser-induced fluorescence. Instrument and associated  reagents are supplied by [employer] [place], [place], [place].     DCP levels increase in patients with hepatocellular  carcinoma (HCC) and liver cirrhosis. DCP does not correlate  with AFP/AFPL3% but appears to be a complementary assay for  assessing, risk of developing HCC. In a selected group of  patients, the risk of developing HCC was 36.5% with an  elevated DCP result and was 7.6% with a negative DCP result.      Limitations of Procedure:     1. Heterophilic antibodies in human serum can react with the  immunoglobulins included in the assay components causing  interference with in vitro immunoassays. Sample from  patients routinely exposed to animals or animal serum  products can demonstrate this type of interference  potentially causing an anomalous result. The PerSay DCP  has been formulated to minimize the risk of interference;  however, potential interactions between rare sera and  ingredients can occur. 2. For diagnostic purposes, the  results obtained from this assay should always be used and  interpreted in conjunction with clinical examination,  patient medical history,and other findings. 3. It is  recommended that this assay be used in conjunction with  imaging studies for clinical diagnosis. 4. DCP producing  tumors other than HCC can show elevated values of DCP. 5.  Liver disease caused by other etiologies such as alcoholic  liver  disease, hemachromatosis, Javan's disease, autoimmune  hepatitis and steatohepatitis have not been studied with  this assay. 6. Medication containing vitamin K preparations  may cause a negative bias on the DCP values. 7. Medication  containing vitamin K antagonists or antibiotic may cause a  positive bias on the DCP values. 8. Values obtained with  different assay methods or kits cannot be used  interchangeably.     Test Performed at:  [facility],  [address] [place]  [address]     Alexandria Muñoz MD,PhD,SHAKEEL   Resulting Agency [facility],              Narrative  Performed by: [email or web address]  FASTING:YES    FASTING: YES   Specimen Collected: 04/03/25 08:20 Last Resulted: 04/09/25 20:19     AST   Date/Time Value Ref Range Status   04/03/2025 08:20 AM 14 10 - 35 U/L Final     ALT   Date/Time Value Ref Range Status   04/03/2025 08:20 AM 15 6 - 29 U/L Final     ALKALINE PHOSPHATASE   Date/Time Value Ref Range Status   04/03/2025 08:20 AM 87 37 - 153 U/L Final     BILIRUBIN, TOTAL   Date/Time Value Ref Range Status   04/03/2025 08:20 AM 0.5 0.2 - 1.2 mg/dL Final     BILIRUBIN, DIRECT   Date/Time Value Ref Range Status   04/03/2025 08:20 AM 0.1 < OR = 0.2 mg/dL Final     GGT   Date/Time Value Ref Range Status   04/03/2025 08:20 AM 54 3 - 65 U/L Final     ALBUMIN   Date/Time Value Ref Range Status   04/03/2025 08:20 AM 4.5 3.6 - 5.1 g/dL Final   04/03/2025 08:20 AM 4.5 3.6 - 5.1 g/dL Final     PROTEIN, TOTAL   Date/Time Value Ref Range Status   04/03/2025 08:20 AM 7.0 6.1 - 8.1 g/dL Final       WHITE BLOOD CELL COUNT   Date/Time Value Ref Range Status   04/03/2025 08:20 AM 4.6 3.8 - 10.8 Thousand/uL Final     Neutrophils Absolute   Date/Time Value Ref Range Status   09/09/2024 12:42 PM 5.92 1.20 - 7.70 x10*3/uL Final     Comment:     Percent differential counts (%) should be interpreted in the context of the absolute cell  counts (cells/uL).     Neutrophils %   Date/Time Value Ref Range Status   09/09/2024 12:42 PM 78.4 40.0 - 80.0 % Final     ABSOLUTE LYMPHOCYTES   Date/Time Value Ref Range Status   04/03/2025 08:20 AM 1,431 850 - 3,900 cells/uL Final     LYMPHOCYTES   Date/Time Value Ref Range Status   04/03/2025 08:20 AM 31.1 % Final     ABSOLUTE MONOCYTES   Date/Time Value Ref Range Status   04/03/2025 08:20  200 - 950 cells/uL Final     MONOCYTES   Date/Time Value Ref Range Status   04/03/2025 08:20 AM 9.0 % Final     BASOPHILS   Date/Time Value Ref Range Status   04/03/2025 08:20 AM 0.9 % Final     ABSOLUTE EOSINOPHILS   Date/Time Value Ref Range Status   04/03/2025 08:20  15 - 500 cells/uL Final     EOSINOPHILS   Date/Time Value Ref Range Status   04/03/2025 08:20 AM 3.7 % Final     HEMOGLOBIN   Date/Time Value Ref Range Status   04/03/2025 08:20 AM 13.2 11.7 - 15.5 g/dL Final     HEMATOCRIT   Date/Time Value Ref Range Status   04/03/2025 08:20 AM 40.5 35.0 - 45.0 % Final     RED BLOOD CELL COUNT   Date/Time Value Ref Range Status   04/03/2025 08:20 AM 4.35 3.80 - 5.10 Million/uL Final     MCV   Date/Time Value Ref Range Status   04/03/2025 08:20 AM 93.1 80.0 - 100.0 fL Final     MCHC   Date/Time Value Ref Range Status   04/03/2025 08:20 AM 32.6 32.0 - 36.0 g/dL Final     Comment:     For adults, a slight decrease in the calculated MCHC  value (in the range of 30 to 32 g/dL) is most likely  not clinically significant; however, it should be  interpreted with caution in correlation with other  red cell parameters and the patient's clinical  condition.       PLATELET COUNT   Date/Time Value Ref Range Status   04/03/2025 08:20  140 - 400 Thousand/uL Final       SODIUM   Date/Time Value Ref Range Status   04/03/2025 08:20  135 - 146 mmol/L Final     POTASSIUM   Date/Time Value Ref Range Status   04/03/2025 08:20 AM 4.6 3.5 - 5.3 mmol/L Final     CHLORIDE   Date/Time Value Ref Range Status   04/03/2025 08:20   98 - 110 mmol/L Final     CARBON DIOXIDE   Date/Time Value Ref Range Status   04/03/2025 08:20 AM 28 20 - 32 mmol/L Final     Anion Gap   Date/Time Value Ref Range Status   09/09/2024 12:42 PM 12 10 - 20 mmol/L Final     UREA NITROGEN (BUN)   Date/Time Value Ref Range Status   04/03/2025 08:20 AM 16 7 - 25 mg/dL Final     CREATININE   Date/Time Value Ref Range Status   04/03/2025 08:20 AM 1.05 0.50 - 1.05 mg/dL Final     GFR Female   Date/Time Value Ref Range Status   07/10/2023 01:28 PM 64 >90 mL/min/1.73m2 Final     Comment:      CALCULATIONS OF ESTIMATED GFR ARE PERFORMED   USING THE 2021 CKD-EPI STUDY REFIT EQUATION   WITHOUT THE RACE VARIABLE FOR THE IDMS-TRACEABLE   CREATININE METHODS.    https://jasn.asnjournals.org/content/early/2021/09/22/ASN.7164616187       GLUCOSE   Date/Time Value Ref Range Status   04/03/2025 08:20  (H) 65 - 99 mg/dL Final     Comment:                   Fasting reference interval     For someone without known diabetes, a glucose value  between 100 and 125 mg/dL is consistent with  prediabetes and should be confirmed with a  follow-up test.          CALCIUM   Date/Time Value Ref Range Status   04/03/2025 08:20 AM 9.6 8.6 - 10.4 mg/dL Final     PHOSPHATE (AS PHOSPHORUS)   Date/Time Value Ref Range Status   04/03/2025 08:20 AM 3.8 2.5 - 4.5 mg/dL Final       Protime   Date/Time Value Ref Range Status   11/16/2020 03:16 PM 11.0 10.1 - 13.3 sec Final     INR   Date/Time Value Ref Range Status   11/16/2020 03:16 PM 0.9 0.9 - 1.1 Final       POC HEMOGLOBIN A1c   Date/Time Value Ref Range Status   01/22/2025 02:17 PM 6.5 4.2 - 6.5 % Final     HEMOGLOBIN A1c   Date/Time Value Ref Range Status   04/03/2025 08:20 AM 6.2 (H) <5.7 % of total Hgb Final     Comment:     For someone without known diabetes, a hemoglobin   A1c value between 5.7% and 6.4% is consistent with  prediabetes and should be confirmed with a   follow-up test.     For someone with known diabetes, a value  "<7%  indicates that their diabetes is well controlled. A1c  targets should be individualized based on duration of  diabetes, age, comorbid conditions, and other  considerations.     This assay result is consistent with an increased risk  of diabetes.     Currently, no consensus exists regarding use of  hemoglobin A1c for diagnosis of diabetes for children.          Albumin/Creatinine Ratio   Date/Time Value Ref Range Status   01/22/2025 03:23 PM   Final     Comment:     One or more analytes used in this calculation is outside of the analytical measurement range. Calculation cannot be performed.       Lab Results   Component Value Date    CHOL 171 04/03/2025    CHOL 185 07/10/2023    CHOL 198 02/21/2022     Lab Results   Component Value Date    HDL 75 04/03/2025    HDL 67.8 07/10/2023    HDL 74.2 02/21/2022     Lab Results   Component Value Date    LDLCALC 82 04/03/2025     Lab Results   Component Value Date    TRIG 56 04/03/2025    TRIG 93 07/10/2023    TRIG 75 02/21/2022     No components found for: \"CHOLHDL\"    No results found for this or any previous visit from the past 1000 days.      Assessment/Plan     Assessment:  Nutritional Status Category: low  Low-BMI 20-29    Vitamins WNL  Lipids WNL compared to chart    NFPE (visual exam: lean body mass, adipose stores, edema/rash): Adequate adipose and muscle stores, no edema/rash    Malnutrition present (based on malnutrition risk assessment)? No    Target Lipid Levels      General Diet Quality:  Overall Balance: Low fruit and vegetable intake  Calcium intake: Starting calcium supplement  Sodium intake: Salting foods    SMART Goals:  Meal prep for the week 2 times a month    Plan/Recommendations:  Complete DEXA scan  Meal prep to increase variety in diet  Maintain level of physical activity to boost mood  Start taking calcium citrate supplements  Have B12 labs drawn at next appointment    Education provided: Calcium intake for bone health    Understanding of " Education: verbalized good understanding    Follow-Up:   Schedule GI and Endo F/U  RD to see pt. In clinic next year  Pt. Is encouraged to reach out to RD as nutritional concerns arise between clinic visits

## 2025-06-18 NOTE — PROGRESS NOTES
[facility] SPECIALTY PHARMACY PATIENT REASSESSMENT NOTE:  CYSTIC FIBROSIS    Mansi Morrison is a 65 y.o. female seen in clinic .    CFTR Modulator Medication::  Trikafta    Mansi Morrison's care will be continued with the referring prescriber.     GENERAL ASSESSMENT:  Are there any changes to your home medications, OTCs or supplements? Changes as follows:   pristiq and buspar    Medications Ordered Prior to Encounter[1]    Do you have any new allergies? no new allergies reported    Allergies as of 06/18/2025 - Reviewed 06/18/2025   Allergen Reaction Noted    Codeine Shortness of breath 10/26/2023    Linezolid Diarrhea, Rash, and Nausea/vomiting 06/28/2024    Lexapro [escitalopram oxalate] Other 11/06/2023    Lisinopril Cough 11/06/2023    Moxifloxacin Rash 05/19/2022       Have you been diagnosed with any new medical conditions? Change in medical conditions as follows: coughing, will start antibiotic at this visit    Problem List[2]    CFTR Genotype: [id number] and [id number]  Current CFTR Modulator: Trikafta  Dose: 2 orange tablets in the morning      TOLERANCE:   Have you experienced any side effects from this medication? Side effects reported include:   severe ADHD, brain fog, memory issues. Had side effects at the beginning of taking Pristiq, but getting better. Been on pristiq/buspar for about 3 weeks. Will be following up with psych about 6 weeks after starting.    Are there any changes to current therapy regimen? Discussed risk/benefit of changes to therapy, will continue without changes for now    EFFICACY:    Have you developed any new symptoms of your condition? Depression, seeing psychiatrist    How do you feel your medication is affecting your disease state? Life changing in a good way, sick much less frequently, healthier on Trikafta    GOALS:  Your goals of therapy are: Improved quality of life, Improve/maintain lung function, and Reduce frequency of illness    MEDICATION USE:  Have you had any  unplanned missed doses?No  If yes, how often do you miss doses and is there a particular contributing reason?     What barriers to adherence does the patient have? (Answer Y/N for all):  Patient has a difficult time remembering to take medications? No  Difficult administration technique?No  Medication cost? No  Patient does not think medication is beneficial?No  Other?   What actions were taken to address barriers?    Does the patient have any barriers to self-administration (including physical and mental)? No  List barriers:   Describe actions taken to mitigate barriers:    Labs  Lab Results   Component Value Date    WBC 4.6 04/03/2025    HGB 13.2 04/03/2025    HCT 40.5 04/03/2025     04/03/2025    CHOL 171 04/03/2025    TRIG 56 04/03/2025    HDL 75 04/03/2025    ALT 15 04/03/2025    AST 14 04/03/2025     04/03/2025    K 4.6 04/03/2025     04/03/2025    CREATININE 1.05 04/03/2025    BUN 16 04/03/2025    CO2 28 04/03/2025    TSH 1.02 02/12/2024    INR 0.9 11/16/2020    HGBA1C 6.2 (H) 04/03/2025       Pulmonary Function Tests     FEV1   Date/Time Value Ref Range Status   07/24/2024 03:40 PM 2.39 liters Final     Comment:     105%   07/10/2024 10:15 AM 2.17 liters Final     Comment:     95%   05/29/2024 10:35 AM 2.17 liters Final     Comment:     95%          PATIENT MANAGEMENT:    Do you have any questions regarding your medications, or care? no additional questions    Do you have any concerns with access to your medications? No concerns expressed    Fills medications at: [facility]    IMPRESSION/PLAN:  Is patient high risk (potential patients:  pregnancy, geriatric, pediatric)?  n/a  Is laboratory follow-up needed? Liver function tests due in July 2025 next  Is a clinical intervention needed? n/a  Continue therapies    Additional comments:   -acid reflux - omeprazole - discussed if this could be impacting depression symptoms, has been on this for a long time, Lucia discussed with Dr. Snyder as  well, hiatal hernia, unsure if this is impacting memory loss  -depression symptoms started in March    Chantelle Naidu, PharmD   6/18/2025 1:26 PM          [1]   Current Outpatient Medications on File Prior to Visit   Medication Sig Dispense Refill    acetaminophen (Tylenol) 325 mg tablet Take 2 tablets (650 mg) by mouth every 6 hours. 60 tablet 0    amLODIPine (Norvasc) 2.5 mg tablet Take 1 tablet (2.5 mg) by mouth once daily. 30 tablet 11    blood-glucose sensor (Dexcom G7 Sensor) device Apply 1 sensor every 10 days to monitor glucose 3 each 11    Creon 24,000-76,000 -120,000 unit capsule Take 3-4 capsules by mouth 3 times daily (morning, midday, late afternoon). MOUTH WITH MEALS AND TAKE 1-2 CAPSULES WITH SNACKS 300 capsule 11    FreeStyle Lite Strips strip TEST 6-7 TIMES DAILY      glucagon (Baqsimi) 3 mg/actuation spray,non-aerosol Spray 3 mg nasally as needed for severe hypoglycemia 2 each 3    insulin glargine (Basaglar KwikPen U-100 Insulin) 100 unit/mL (3 mL) pen Inject 8 units daily as directed 15 mL 3    losartan (Cozaar) 100 mg tablet TAKE 1 TABLET BY MOUTH EVERY DAY 90 tablet 3    omeprazole (PriLOSEC) 40 mg DR capsule Take 1 capsule (40 mg) by mouth once daily. 90 capsule 3    traZODone (Desyrel) 50 mg tablet TAKE 1 TABLET (50 MG) BY MOUTH AS NEEDED AT BEDTIME FOR SLEEP. 90 tablet 2    Trikafta 100-50-75 mg(d) /150 mg (n) tablet TAKE 2 ORANGE TABLETS IN THE MORNING WITH FAT-CONTAINING FOOD. DISCARD BLUE TABLETS 252 tablet 1    Xopenex HFA 45 mcg/actuation inhaler Inhale 2 puffs every 4 hours if needed for wheezing or shortness of breath. 15 g 6    [DISCONTINUED] mecobalamin, vitamin B12, 1,000 mcg tablet,disintegrating Place 1 tablet under the tongue once daily.      [DISCONTINUED] omeprazole (PriLOSEC) 40 mg DR capsule Take 1 capsule (40 mg) by mouth once daily. 90 capsule 0     No current facility-administered medications on file prior to visit.   [2]   Patient Active Problem List  Diagnosis     Constipation    Cystic fibrosis    Diabetes mellitus related to CF (cystic fibrosis) (Multi)    Exocrine pancreatic insufficiency (HHS-HCC)    Anemia    Esophageal dysphagia    Early satiety    Intestinal bacterial overgrowth    Primary hypertension    Healthcare maintenance    Osteopenia determined by x-ray    Tubular adenoma of colon    Brow ptosis, bilateral    Chronic pancreatitis (Multi)    Lichen sclerosus et atrophicus    Dermatochalasis of both upper eyelids    Diabetic nephropathy associated with diabetes mellitus due to underlying condition    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)    BRYON (generalized anxiety disorder)    Hiatal hernia    Peripheral polyneuropathy    MRSA (methicillin resistant Staphylococcus aureus) infection    Migraine with aura and without status migrainosus, not intractable    Headache    Primary insomnia    Other fatigue    Gastroesophageal reflux disease without esophagitis    Scedosporiosis (Multi)    Cystic fibrosis with pulmonary exacerbation (Multi)    Cystic fibrosis with pulmonary manifestations (Multi)    Alcohol use disorder    PONV (postoperative nausea and vomiting)    Pneumonia    History of screening mammography    High risk medication use    Nausea and vomiting    Calculus of gallbladder without cholecystitis without obstruction    Right upper quadrant abdominal pain    Syncope and collapse    Adult ADHD    Medication side effect

## 2025-06-19 LAB — BACTERIA SPT CF RESP CULT: NORMAL

## 2025-07-01 DIAGNOSIS — K86.81 EXOCRINE PANCREATIC INSUFFICIENCY (HHS-HCC): ICD-10-CM

## 2025-07-01 DIAGNOSIS — E84.9 CYSTIC FIBROSIS: Primary | ICD-10-CM

## 2025-07-02 NOTE — PROGRESS NOTES
Pulmonary Attending    Orders Placed This Encounter   Procedures    XR DEXA bone density     Standing Status:   Future     Expected Date:   7/1/2025     Expiration Date:   7/1/2026     Reason for exam::   Cystic fibrosis with exocrine pancreatic insufficiency and fat-soluble vitamin deficiences; screen for osteopenia/osteoporosis     Did the patient have a similar exam previously at a location outside of ?:   No     Radiologist to Determine Optimal Study:   Yes     [facility] result to [facility]:   Immediate [1]     Is this exam part of a Research Study? If Yes, link this order to the research study:   No       Nicolás Harris MD  7/1/2025  8:14 PM

## 2025-07-09 ENCOUNTER — APPOINTMENT (OUTPATIENT)
Dept: GASTROENTEROLOGY | Facility: HOSPITAL | Age: 65
End: 2025-07-09
Payer: MEDICARE

## 2025-07-09 ENCOUNTER — APPOINTMENT (OUTPATIENT)
Dept: PEDIATRIC PULMONOLOGY | Facility: HOSPITAL | Age: 65
End: 2025-07-09
Payer: MEDICARE

## 2025-07-13 DIAGNOSIS — F51.01 PRIMARY INSOMNIA: ICD-10-CM

## 2025-07-14 RX ORDER — TRAZODONE HYDROCHLORIDE 50 MG/1
100 TABLET ORAL NIGHTLY PRN
Qty: 60 TABLET | Refills: 1 | Status: SHIPPED | OUTPATIENT
Start: 2025-07-14

## 2025-07-16 ENCOUNTER — TRANSCRIBE ORDERS (OUTPATIENT)
Dept: RESPIRATORY THERAPY | Facility: HOSPITAL | Age: 65
End: 2025-07-16
Payer: MEDICARE

## 2025-07-16 ENCOUNTER — HOSPITAL ENCOUNTER (OUTPATIENT)
Dept: RESPIRATORY THERAPY | Facility: HOSPITAL | Age: 65
Discharge: HOME | End: 2025-07-16
Payer: MEDICARE

## 2025-07-16 ENCOUNTER — NUTRITION (OUTPATIENT)
Dept: PEDIATRIC PULMONOLOGY | Facility: HOSPITAL | Age: 65
End: 2025-07-16
Payer: MEDICARE

## 2025-07-16 ENCOUNTER — MULTIDISCIPLINARY VISIT (OUTPATIENT)
Dept: GASTROENTEROLOGY | Facility: HOSPITAL | Age: 65
End: 2025-07-16
Payer: MEDICARE

## 2025-07-16 ENCOUNTER — DOCUMENTATION (OUTPATIENT)
Dept: PEDIATRIC PULMONOLOGY | Facility: HOSPITAL | Age: 65
End: 2025-07-16
Payer: MEDICARE

## 2025-07-16 ENCOUNTER — MULTIDISCIPLINARY VISIT (OUTPATIENT)
Dept: PEDIATRIC PULMONOLOGY | Facility: HOSPITAL | Age: 65
End: 2025-07-16
Payer: MEDICARE

## 2025-07-16 VITALS
TEMPERATURE: 98.2 F | WEIGHT: 123.46 LBS | HEART RATE: 53 BPM | OXYGEN SATURATION: 95 % | SYSTOLIC BLOOD PRESSURE: 162 MMHG | HEIGHT: 62 IN | DIASTOLIC BLOOD PRESSURE: 81 MMHG | RESPIRATION RATE: 16 BRPM | BODY MASS INDEX: 22.72 KG/M2

## 2025-07-16 DIAGNOSIS — Z86.0101 HX OF ADENOMATOUS POLYP OF COLON: ICD-10-CM

## 2025-07-16 DIAGNOSIS — K21.9 GASTROESOPHAGEAL REFLUX DISEASE WITHOUT ESOPHAGITIS: Primary | ICD-10-CM

## 2025-07-16 DIAGNOSIS — E84.9 CYSTIC FIBROSIS: Primary | ICD-10-CM

## 2025-07-16 DIAGNOSIS — R63.4 WEIGHT LOSS: ICD-10-CM

## 2025-07-16 DIAGNOSIS — M85.80 OSTEOPENIA DETERMINED BY X-RAY: ICD-10-CM

## 2025-07-16 DIAGNOSIS — Z79.899 HIGH RISK MEDICATION USE: ICD-10-CM

## 2025-07-16 DIAGNOSIS — F10.90 ALCOHOL USE DISORDER: ICD-10-CM

## 2025-07-16 DIAGNOSIS — K86.81 EXOCRINE PANCREATIC INSUFFICIENCY (HHS-HCC): ICD-10-CM

## 2025-07-16 DIAGNOSIS — E84.9 CF (CYSTIC FIBROSIS) (MULTI): Primary | ICD-10-CM

## 2025-07-16 DIAGNOSIS — G89.29 CHRONIC LEFT HIP PAIN: ICD-10-CM

## 2025-07-16 DIAGNOSIS — Z86.39 HISTORY OF NON ANEMIC VITAMIN B12 DEFICIENCY: ICD-10-CM

## 2025-07-16 DIAGNOSIS — F41.1 GAD (GENERALIZED ANXIETY DISORDER): ICD-10-CM

## 2025-07-16 DIAGNOSIS — M25.552 CHRONIC LEFT HIP PAIN: ICD-10-CM

## 2025-07-16 DIAGNOSIS — E84.9 CYSTIC FIBROSIS: ICD-10-CM

## 2025-07-16 DIAGNOSIS — J45.40 ASTHMA IN ADULT, MODERATE PERSISTENT, UNCOMPLICATED (HHS-HCC): ICD-10-CM

## 2025-07-16 DIAGNOSIS — K21.9 GASTROESOPHAGEAL REFLUX DISEASE WITHOUT ESOPHAGITIS: ICD-10-CM

## 2025-07-16 DIAGNOSIS — I10 PRIMARY HYPERTENSION: ICD-10-CM

## 2025-07-16 PROCEDURE — 99215 OFFICE O/P EST HI 40 MIN: CPT | Performed by: INTERNAL MEDICINE

## 2025-07-16 RX ORDER — DESVENLAFAXINE 50 MG/1
50 TABLET, FILM COATED, EXTENDED RELEASE ORAL DAILY
COMMUNITY
Start: 2025-07-14

## 2025-07-16 RX ORDER — ACETAMINOPHEN AND IBUPROFEN 250; 125 MG/1; MG/1
1 TABLET, FILM COATED ORAL 2 TIMES DAILY PRN
COMMUNITY

## 2025-07-16 RX ORDER — AMLODIPINE BESYLATE 2.5 MG/1
2.5 TABLET ORAL DAILY
Qty: 90 TABLET | Refills: 3 | Status: SHIPPED | OUTPATIENT
Start: 2025-07-16 | End: 2026-07-16

## 2025-07-16 RX ORDER — FAMOTIDINE 40 MG/1
40 TABLET, FILM COATED ORAL 2 TIMES DAILY
Qty: 60 TABLET | Refills: 5 | Status: SHIPPED | OUTPATIENT
Start: 2025-07-16 | End: 2026-01-12

## 2025-07-16 ASSESSMENT — PAIN SCALES - GENERAL: PAINLEVEL_OUTOF10: 5

## 2025-07-16 NOTE — PROGRESS NOTES
Pt. Completed ASHISH today.    SMM 48.3 (fit), PBF 27.1%    Pt. had increase in SMM (46.1) and decrease in PBF (31.6%).    Pt. Is encouraged to maintain strength training and to incorporate protein into diet to maintain muscle. Pt. Was agreeable.

## 2025-07-16 NOTE — ASSESSMENT & PLAN NOTE
Has been taking losartan 100 mg daily  BP still elevated at recent visits this year.  Says that she ran out of amlodipine a few months ago. I suspect that she requires multi-class anti-hypertensive regimen.  I have refilled the amlodipine prescription today.  She should take 2.5 mg daily.  I counseled her about potential side effects of leg edema, constipation, and orthostasis.

## 2025-07-16 NOTE — ASSESSMENT & PLAN NOTE
Vitamin D sufficient  Due for DEXA scan; this was ordered at last month's appointment; unclear why patient has not heard from radiology scheduling; I told Lucia that she will need to call central scheduling to inquire about scheduling the procedure.  No indication for antiresorptive therapy.  Weightbearing exercise

## 2025-07-16 NOTE — PATIENT INSTRUCTIONS
"    It was very nice to see you today. We can offer you in-person and \"virtual\" appointments with your cystic fibrosis provider.    If you need to cancel or schedule an appointment, please call the  at the [facility] at [phone] between the hours of 8 AM and 5 PM, Monday-Friday.    To reach the CF nurse, Chiquis, please call [phone]. Chiquis has a secure voice mail account if you want to leave a message.    If you need to speak with an adult cystic fibrosis provider between the hours of 5 PM and 8 AM (overnight) or anytime on Saturday or Sunday, please call [phone]. When you call this number, you will be connected to an  with the [facility] and [facility] answering service. You should tell the  that you're an adult with cystic fibrosis who needs to speak to the \"cystic fibrosis doctor on-call.\" The  will take your contact information. You should then expect a call back from the cystic fibrosis doctor on-call within a short period of time.      If you have experienced any errors and/or have concerns about blood or urine tests performed by [facility], you can submit feedback via email at the following address: [email or web address]    Plan from today's visit:    Talk to Natalie Lira, our physical therapist, about your hip pain.  I have ordered some x-rays for your left hip.  To schedule your hip x-rays and the bone density test, please call radiology scheduling at [phone].   To schedule your appointment with the ear, nose, and throat (ENT) office, please call [phone].  This number is for  Central Scheduling.   Continue taking Trikafta dose to two orange pills each morning.  NO BLUE PILL IN THE EVENING.  Talk to Chantelle Naidu, our pharmacist, about running out of Trikafta.  Not sure what has caused your gap in supply.  We will get you back on it ASAP.  Start taking amlodipine (Norvasc) 2.5 mg daily for " high blood pressure.  Take the amlodipine in addition to the losartan (Cozaar).  Keep using the Xopenex HFA inhaler as needed.  Sick plan (if you need an antibiotic): Doxycycline 100 mg by mouth twice daily for 14 days     Important Information:  Your CFTR variants (mutations) are: [id number]     Your percent-predicted FEV1 today was: not measured  Your weight adjusted for height (body mass index, BMI) today was: 22.6 kg/m2  (goal for adult males with CF = 23.0 kg/m2, goal for adult females with CF = 22.0 kg/m2)    Experience of Care (XoC) Survey - We want to hear from you!    [email or web address]        Next appointment: September 2025

## 2025-07-16 NOTE — ASSESSMENT & PLAN NOTE
No wheezing, chest tightness, or coughing since she started the Dulera -5, 2 puffs inhaled twice daily, after visit in June 2025.  Continue Dulera.

## 2025-07-16 NOTE — PROGRESS NOTES
ESTABLISHED [facility] PATIENT NOTE    Mansi Morrison  1960   [id number]     Chief Complaint:   Chief Complaint   Patient presents with    Cystic Fibrosis    GERD        HPI:   Patient is a 65 y.o. year old female with a PMH significant for cystic fibrosis (genotype [id number] homozygous) on Trikafta reduced dose (2 orange pills once a day), exocrine pancreatic insufficiency, adenomatous colon polyp, FHx CRC, constipation, GERD here for followup.   Patient was last seen in [facility] clinic  6/16/2025.     Patient tried stopping omeprazole for 3 days to see what would happen, because she has some concerns about being on long term PPI therapy.  On the 2nd night, she noticed she had bad acid reflux and pain.  Gets heartburn in the middle of the night off the omeprazole.  She has been eating smaller more frequent meals.  No specific foods bother her when she is on omeprazole, however.    Ear symptoms are about the same, but she has not gotten any contact regarding scheduling with ENT although I placed a referral last month.  When she pushes on the left base of her skull, she doesn't feel it because it's numb, whereas she used to be fabian to feel it.    She is taking calcium citrate.    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)  No date: Dysphagia  No date: GERD (gastroesophageal reflux disease)  No date: Heartburn  No date: Hypertension  No date: Insomnia  No date: MRSA (methicillin resistant Staphylococcus aureus)      Comment:  in lungs  05/09/2022: Personal history of other benign neoplasm      Comment:  History of other benign neoplasm  No date: PONV (postoperative nausea and vomiting)  10/07/2024: Preoperative cardiovascular examination  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)      Comment:  Sputum sample collected 2/12/2024 in [facility] grew 1+                 "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)  No date: Syncope  10/07/2024: Syncope and collapse         Current Medications[1]    Past Medical History:  Medical History[2]    Past Surgical History:  Surgical History[3]    Family History:  Family History[4]     Vitals        10/1/2024     3:00 PM 10/7/2024     2:47 PM 10/9/2024     2:22 PM 1/22/2025     1:50 PM 6/16/2025    11:13 AM 6/18/2025     9:54 AM 7/16/2025     8:11 AM   Vitals   Systolic 143 182 126 129 189 152 162   Diastolic 84 77 82 69 82 81 81   BP Location   Right arm Left arm Right arm Right arm Right arm   Heart Rate 56 72 58 58 64 55 53   Temp 36.3 °C (97.3 °F) 36.6 °C (97.9 °F) 36.8 °C (98.2 °F) 36.6 °C (97.8 °F) 36.9 °C (98.5 °F) 36.6 °C (97.8 °F) 36.8 °C (98.2 °F)   Resp 16  17 19 20 16 16   Height 1.575 m (5' 2\") 1.575 m (5' 2\") 1.575 m (5' 2.01\")  1.575 m (5' 2.01\") 1.575 m (5' 2.01\") 1.575 m (5' 2.01\")   Weight (lb) 115 119 119.05 123.79 121.25 121.25 123.46   BMI 21.03 kg/m2 21.77 kg/m2 21.77 kg/m2 22.64 kg/m2 22.17 kg/m2 22.17 kg/m2 22.57 kg/m2   BSA (m2) 1.51 m2 1.54 m2 1.54 m2 1.57 m2 1.55 m2 1.55 m2 1.57 m2   Visit Report Report Report   Report         Physical Exam:    NAD  Oropharynx clear  Lungs CTA bilaterally  RRR S1 S2  Abd soft, NT ND     Pulmonary Function Testing Results:    FEV1   Date Value Ref Range Status   07/24/2024 2.39 liters Final     Comment:     105%     FVC   Date Value Ref Range Status   07/24/2024 3.06 liters Final     Comment:     106%     FEV1/FVC   Date Value Ref Range Status   07/24/2024 78 % Final       Labs:    WHITE BLOOD CELL COUNT   Date/Time Value Ref Range Status   04/03/2025 08:20 AM 4.6 3.8 - 10.8 Thousand/uL Final     HEMOGLOBIN   Date/Time Value Ref Range Status   04/03/2025 08:20 AM 13.2 11.7 - 15.5 g/dL Final     HEMATOCRIT   Date/Time Value Ref Range Status   04/03/2025 08:20 AM 40.5 35.0 - 45.0 % Final     MCV   Date/Time Value Ref Range Status   04/03/2025 08:20 AM 93.1 80.0 - 100.0 fL Final "     PLATELET COUNT   Date/Time Value Ref Range Status   04/03/2025 08:20  140 - 400 Thousand/uL Final        SODIUM   Date/Time Value Ref Range Status   04/03/2025 08:20  135 - 146 mmol/L Final     POTASSIUM   Date/Time Value Ref Range Status   04/03/2025 08:20 AM 4.6 3.5 - 5.3 mmol/L Final     CHLORIDE   Date/Time Value Ref Range Status   04/03/2025 08:20  98 - 110 mmol/L Final     POCT HCO3 Calculated, Venous   Date/Time Value Ref Range Status   09/09/2024 12:42 PM 24.2 22.0 - 26.0 mmol/L Final     UREA NITROGEN (BUN)   Date/Time Value Ref Range Status   04/03/2025 08:20 AM 16 7 - 25 mg/dL Final     CREATININE   Date/Time Value Ref Range Status   04/03/2025 08:20 AM 1.05 0.50 - 1.05 mg/dL Final     GLUCOSE   Date/Time Value Ref Range Status   04/03/2025 08:20  (H) 65 - 99 mg/dL Final     Comment:                   Fasting reference interval     For someone without known diabetes, a glucose value  between 100 and 125 mg/dL is consistent with  prediabetes and should be confirmed with a  follow-up test.            AST   Date/Time Value Ref Range Status   04/03/2025 08:20 AM 14 10 - 35 U/L Final     ALT   Date/Time Value Ref Range Status   04/03/2025 08:20 AM 15 6 - 29 U/L Final     ALKALINE PHOSPHATASE   Date/Time Value Ref Range Status   04/03/2025 08:20 AM 87 37 - 153 U/L Final     BILIRUBIN, TOTAL   Date/Time Value Ref Range Status   04/03/2025 08:20 AM 0.5 0.2 - 1.2 mg/dL Final     BILIRUBIN, DIRECT   Date/Time Value Ref Range Status   04/03/2025 08:20 AM 0.1 < OR = 0.2 mg/dL Final     GGT   Date/Time Value Ref Range Status   04/03/2025 08:20 AM 54 3 - 65 U/L Final     ALBUMIN   Date/Time Value Ref Range Status   04/03/2025 08:20 AM 4.5 3.6 - 5.1 g/dL Final   04/03/2025 08:20 AM 4.5 3.6 - 5.1 g/dL Final     PROTEIN, TOTAL   Date/Time Value Ref Range Status   04/03/2025 08:20 AM 7.0 6.1 - 8.1 g/dL Final        Protime   Date/Time Value Ref Range Status   11/16/2020 03:16 PM 11.0 10.1 - 13.3  sec Final     INR   Date/Time Value Ref Range Status   11/16/2020 03:16 PM 0.9 0.9 - 1.1 Final        VITAMIN D,25-OH,TOTAL,IA   Date/Time Value Ref Range Status   04/03/2025 08:20 AM 54 30 - 100 ng/mL Final     Comment:     Vitamin D Status         25-OH Vitamin D:     Deficiency:                    <20 ng/mL  Insufficiency:             20 - 29 ng/mL  Optimal:                 > or = 30 ng/mL     For 25-OH Vitamin D testing on patients on   D2-supplementation and patients for whom quantitation   of D2 and D3 fractions is required, the QuestAssureD(TM)  25-OH VIT D, (D2,D3), LC/MS/MS is recommended: order   code 33626 (patients >2yrs).     See Note 1     Note 1     For additional information, please refer to   http://education.eoSemi/faq/NST393   (This link is being provided for informational/  educational purposes only.)       VITAMIN A (RETINOL)   Date/Time Value Ref Range Status   04/03/2025 08:20 AM 77 38 - 98 mcg/dL Final     Comment:        Vitamin supplementation within 24 hours prior to  blood draw may affect the accuracy of the results.     This test was developed and its analytical performance  characteristics have been determined by [facility] [place], [place]. It has  not been cleared or approved by the U.S. Food and Drug  Administration. This assay has been validated pursuant  to the CLIA regulations and is used for clinical  purposes.          Vitamin A, Interpretation   Date/Time Value Ref Range Status   02/12/2024 02:43 PM Normal  Final     Comment:       This test was developed and its performance characteristics   determined by [facility]. It has not been cleared or   approved by the US Food and Drug Administration. This test was   performed in a CLIA certified laboratory and is intended for   clinical purposes.  Performed By: [facility]  [address] [place] [id number]  : Tello Pacheco MD, PhD  CLIA Number:  "[id number]     VITAMIN E, ALPHA TOCOPHEROL   Date/Time Value Ref Range Status   2025 08:20 AM 14.5 5.7 - 19.9 mg/L Final     Comment:        Levels of alpha-tocopherol <5 mg/L are consistent  with Vitamin E deficiency in adults.          VITAMIN E, BETA GAMMA TOCOPHEROL   Date/Time Value Ref Range Status   2025 08:20 AM 2.3 <=4.3 mg/L  Final     Comment:        Vitamin supplementation within 24 hours prior to  blood draw may affect the accuracy of the results.     This test was developed and its analytical performance  characteristics have been determined by [facility], [place]. It has  not been cleared or approved by the U.S. Food and Drug  Administration. This assay has been validated pursuant  to the CLIA regulations and is used for clinical  purposes.             Ferritin   Date/Time Value Ref Range Status   2022 09:12 AM 47 8 - 150 ug/L Final     Iron   Date/Time Value Ref Range Status   07/10/2023 01:28 PM 59 35 - 150 ug/dL Final     TIBC   Date/Time Value Ref Range Status   07/10/2023 01:28  240 - 445 ug/dL Final     Iron Saturation   Date/Time Value Ref Range Status   07/10/2023 01:28 PM 16 (L) 25 - 45 % Final       No results found for: \"CALPS\"    No results found for: \"PANCRELASTFE\"    Imagin/3/11 MRI pancreas  Impression:  1. No definite evidence of filling defect within the common bile  duct. Bedded appearance is noted within the intrahepatic biliary  ducts, this could be a sequela of multiple episodes of prior  cholangitis and/or primary sclerosing cholangitis. Correlation with  the patient history is needed. Minimal reactive enhancement of the  distal duct, likely a sequela of a component of cholangitis.  2. Diffuse fatty infiltration of the liver, as well as complete fatty  replacement of the pancreas.  3. Scarred kidneys.  4. Partial visualization of questionable lesion (cyst) within the  right breast, correlation with " ultrasound.    4/1/13 Gastric emptying study - normal    4/12/22 - Abd ultrasound ([facility]):  EXAMINATION: US LIVER/GALL BLADDER/PANCREAS  CLINICAL HISTORY: Reason for Exam: as recommended per CT; history of inflammatory changes along the falciform  ligament in chronically dilated common bile duct  ASSOCIATED DIAGNOSIS:  COMPARISON: Gallbladder ultrasound 07/03/2017; CT abdomen/pelvis 04/11/2022  TECHNIQUE: Ultrasound real time scan with image documentation of the right upper quadrant including the liver,  gallbladder, and pancreas was performed.  FINDINGS:  Liver  Craniocaudal length: 14.8 cm.  Echogenicity: Normal  Surface nodularity: Present  Mass (size and location): None.  Bile ducts  Intrahepatic ducts: No biliary dilatation.  Common bile duct: Diameter 14 mm, stable compared to July 2017 ultrasound.  Gallbladder  Size and morphology: The gallbladder is contracted limiting evaluation.  Cholelithiasis: Multiple echogenic foci with posterior acoustic shadowing, compatible with gallstones.  Pericholecystic fluid: None.  Sonographic Arringotn sign: Absent.  Pancreas  Pancreas is not visualized consistent with the complete fatty replacement on recent CT.  Other findings  A right pleural effusion is incidentally noted.  IMPRESSION:  1. Chronically dilated CBD measuring approximately 14 mm. No choledocholithiasis seen.  2. The liver has a lobulated contour which may relate to underlying cirrhosis. Further evaluation with shear  wave elastography or fibroscan can be obtained to obtain a Metavir score.  3. Contracted gallbladder with cholelithiasis. No sonographic findings of acute cholecystitis.  4. Partially visualized right pleural effusion.  5. Pancreas is not visualized consistent with the complete fatty replacement on recent CT.     4/11/22 ([facility]) Renal stone protocol CT  FINDINGS:  Included images of the lower thorax: No focal lung consolidation or pleural effusion.  Hepatobiliary: There is nonspecific  fat stranding in the fissure for the falciform ligament and adjacent to the  falciform ligament (series 2 image 11). There appears to be mild edematous gallbladder wall thickening. Common  bile duct measures up to 1.5 cm, previously 0.7 cm.  Pancreas: Fatty replacement of the pancreas consistent with history of cystic fibrosis. The previously described  cystic lesion in the region of the inferior pancreatic head is stable measuring up to 1.6 cm series 2 image 36.  Spleen: Unremarkable  Adrenal Glands: Unremarkable  Kidneys, ureters, and bladder: There is a 1.5 x 1.0 x 2.1 cm obstructing calculus just distal to the left  ureteropelvic junction causing moderate left hydronephrosis. There are multiple other nonobstructing calculi in  the left kidney collecting system measuring up to 0.7 cm. Stable left kidney upper pole cortical scarring. No  right-sided urinary tract stones are identified.  Abdominal and pelvic vasculature: Unremarkable  GI tract: No evidence of obstruction. The appendix is within normal limits.  Peritoneum and retroperitoneum: There is small volume ascites in the pelvis.  Lymph Nodes: No abdominal or pelvic lymphadenopathy is evident.  Uterus and adnexa: Status post hysterectomy.  Visualized musculoskeletal structures: No acute fracture or destructive osseous lesion is identified.  IMPRESSION:  1. There is a 2.1 cm obstructing proximal left ureteral stone causing moderate left hydronephrosis. The stone is  just distal to the left ureteropelvic junction.  2. Multiple left-sided kidney stones.  3. Nonspecific fat stranding about the falciform ligament, potentially due to fluid overload. Increased common  bile duct dilatation and possible gallbladder wall thickening. Correlation with liver function tests is  recommended. Findings may also be further evaluated by right upper quadrant ultrasound if clinically indicated.  4. Small volume ascites in the pelvis.     4/26/24 Chest CT without IV contrast -  large hiatal hernia, fatty replacement of the pancreas, colonic diverticulosis    7/25/24 CT scan abd pelvis with IV contrast  IMPRESSION:  1. There is a focus of soft tissue thickening in the region of the  appendiceal orifice with diffuse thickening of the appendix and  bulbous low-density appearance of the appendiceal tip, possibly  representing mucin or fluid. There is otherwise no significant  distention of the appendix. This cannot be excluded as a possible  neoplastic process and tissue sampling is recommended. Mild  prominence of few pericecal lymph nodes without discrete  abdominopelvic lymphadenopathy to suggest metastasis.  2. Mild dilation of the intrahepatic and extrahepatic bile ducts with  absence of the pancreas.  3. Slightly decreased degree of bronchiectasis and mucous plugging  with tree-in-bud nodularity involving the right middle lobe. There is  otherwise persistent mucoid impaction within distal right middle lobe  bronchial branches.  4. Stable appearance of a moderate paraesophageal hernia.        === 08/28/24 ===    MR MRCP WITH PANCREAS WO AND W CONTRAST    - Impression -  1. Mild dilatation of the central intrahepatic bile ducts with no  filling defects or strictures identified, overall similar in  appearance to prior CT considering differences in imaging technique,  which may represent sequelae of prior cholangitis.  2. Mild dilatation of the common bile duct which measures up to 1.1  cm in diameter, similar to prior CTs dating back to 2016 considering  differences in imaging technique. No definite obstructing lesion or  abnormal areas of diffusion restriction identified within the bile  duct.  3. Small amount of layering sludge/small stones within the  gallbladder.  4. Complete fatty replacement of the pancreas, consistent with  patient's known cystic fibrosis. Redemonstration of multiple cystic  lesions within the central mesentery in the region of the pancreas,  similar in size and  appearance to prior exam, which may represent  side branch IPMNs.  5. Hepatic steatosis, improved compared to prior exam.  6. Additional chronic findings as above.    I personally reviewed the images/study and I agree with the findings  as stated by Dr. Harpreet Matias M.D. This study was interpreted at  [facility], [place], [place].    MACRO:  None    Signed by: Alonzo Brown Monty 9/3/2024 9:41 AM       Transient Elastography  No results found for this or any previous visit from the past 365 days.       Previous Endoscopic Evaluation:    10/4/11 Colonoscopy (Dr. Snyder) - prep good in colon except cecum which had fair prep; 2 mm transverse colon tubular adenoma; solid stool at IC valve    4/30/13 EGD (Dr. Santoyo) - large 6 cm hiatal hernia, atrophic gastritis (biopsies showed reactive gastropathy), normal duodenum     11/13/13 EGD (Dr. Casillas) - normal esophagus, erosions in antrum, normal duodenum, small posterior paraesophageal hernia on retroflexion (gastric biopsies showed reactive gastropathy; esophageal biopsies revealed esophageal squamous mucosa and gastric type mucosa, negative for intestinal metaplasia     5/12/16 - EGD - evidence of Nissen fundoplication; erosive gastropathy; antral gastritis; normal duodenum    5/12/16 - Colonoscopy - BBPS 6, fair prep, normal TI, stool lavaged resulting in clearance with fair visualization; internal and external hemorrhoids     A. DUODENUM, BIOPSY:  --PEPTIC DUODENITIS.     B. STOMACH, ANTRUM, BIOPSY:  --CHEMICAL GASTROPATHY, NO HELICOBACTER PYLORI ORGANISMS IDENTIFIED.     C. TERMINAL ILEUM, BIOPSY:  --ILEAL MUCOSA WITH NO SIGNIFICANT PATHOLOGIC FINDINGS.     D. RIGHT COLON, BIOPSY:  --COLONIC MUCOSA WITH NO SIGNIFICANT PATHOLOGIC FINDINGS.     E. TRANSVERSE, BIOPSY:  --COLONIC MUCOSA WITH NO SIGNIFICANT PATHOLOGIC FINDINGS.     F. LEFT COLON, BIOPSY:  --COLONIC MUCOSA WITH NO SIGNIFICANT PATHOLOGIC FINDINGS.    6/3/16  Hydrogen breath test showed high baseline hydrogen level (12 ppm) without any increase which could be consistent with small intestinal bacterial overgrowth due to elevated baseline level.   Esophagogastroduodenoscopy (EGD) 07/03/2024    Findings  Z-line 33 cm from the incisors  An inlet patch was visualized in the proximal esophagus.  2 cm hiatal hernia - GE junction 33 cm from the incisors, diaphragmatic impression 35 cm from the incisors, confirmed by retroflexion. On retroflexion evidence of prior fundoplication was visualized; however, a paraesophageal hernia was also seen.  Single medium diverticulum with no inflammation in the lower third of the esophagus (29 cm from the incisors); no bleeding was identified  Moderate, generalized erythematous mucosa with loss of vascular pattern in the antrum  Performed multiple random forceps biopsies in the incisura and antrum to rule out H. pylori. Rule out intestinal metaplasia  Performed random forceps biopsies in the body of the stomach, greater curve of the stomach and lesser curve of the stomach to rule out H. pylori. Rule out intestinal metaplasia  Benign-appearing mild intrinsic stenosis in the duodenal sweep (scope was able to traverse without any resistance)  Edematous mucosa in the 2nd part of the duodenum  The duodenum was otherwise normal.  Performed multiple random forceps biopsies in the duodenal bulb and 2nd part of the duodenum to rule out celiac disease  A Bravo pH capsule was placed successfully in the esophagus (27 cm from the incisors, 6 cm from the GE junction).  Successful placement was confirmed endoscopically.      Recommendation  Do not resume omeprazole until Bravo pH study is complete (96 hours)  Await pathology results and Bravo pH results  Proceed with colonoscopy as scheduled      Indication  Gastroesophageal reflux disease without esophagitis    BRAVO 7/3/24  Findings:   -The total AET is 6.9% and the DeMeester score is 26.5 indicating  presence   of mild to moderate reflux   - Reflux occurred on both days of the study and in both upright and supine   positions   - The SAP for regurgitation was 100% indicating high symptom correlation     Impressions:   Overall this is study is positive for mild to moderate acid reflux   There is high symptom correlation, reflux is the cause of the symptoms     Riccardo Dumot, DO     Colonoscopy Screening; High Risk Patient; history of adenomatous polyps 07/03/2024    BBPS 6 (2 RC, 2 TC, 2 LC)  Findings  Rectal exam revealed decreased sphincter tone likely secondary to sedation  External and internal small hemorrhoids observed during retroflexion; no bleeding was identified  Mild, generalized atrophic mucosa in the terminal ileum; performed cold forceps biopsy;  Large submucosal mass at the appendiceal orifice  A moderate amount of semi-liquid stool was visualized throughout the colon.  Lavage was performed with incomplete clearance and adequate visualization.  One 3 mm polyp vs lymphoid follicle in the ascending colon; performed cold snare with complete en bloc removal and retrieved specimen  Two 4 mm sessile polyps in the transverse colon; performed cold snare with complete en bloc removal and retrieved specimen      Recommendation  Await pathology results  Repeat colonoscopy in 3 years (2 years if all polyps adenomatous).  Obtain CT scan abdomen and pelvis to further assess appendiceal orifice.      Indication  Hx of adenomatous polyp of colon    A. DUODENAL BULB AND SECOND PART BIOPSY:   -- Duodenal mucosa with no significant pathologic change.  -- No morphologic evidence of Celiac disease.       B. STOMACH ANTRUM AND INCISURA BIOPSY:   -- Gastric antral- and oxyntic-type mucosa with mild reactive epithelial change and mild chronic inflammation (see note).     Note:  Helicobacter pylori immunohistochemical stain is performed and is negative for Helicobacter pylori organisms.       C. STOMACH BODY BIOPSY:   --  Gastric fundic-type mucosa with focal minimal chronic inflammation and few slightly dilated oxyntic glands.    -- No Helicobacter pylori organisms identified on routine stained slides.       D. TERMINAL ILEUM BIOPSY:   -- Terminal ileal mucosa with no significant pathologic change.       E. COLON, ASCENDING, POLYP:   -- Morphologic features suggestive of tubular adenoma (see note).     Note:  Deeper levels are obtained and examined.     : Dr. GURDEEP WIGGINS COLON, TRANSVERSE, POLYP:   -- Fragments of tubular adenoma (see note).     Note:  Deeper levels are obtained and examined.      Assessment and Plan:  Patient is a 65 y.o. year old female with cystic fibrosis (genotype [id number] homozygous) on Trikafta reduced dose (2 orange pills once a day), exocrine pancreatic insufficiency, adenomatous colon polyp, FHx CRC, constipation, GERD here for followup.    GERD - BRAVO 7/3/24 consistent with mild to moderate acid reflux.  I discussed these findings with the patient today and showed her endoscopic image from her EGD July 2024 showing that on retroflexed view a hiatal hernia is visible.  The patient has concerns about being on long term PPI therapy.  Discussed long-term risks of acid suppression including C. difficile infection and potential impact on calcium and magnesium absorption.  The patient is taking calcium citrate supplementation.  She is willing to try famotidine as an alternative to omeprazole.  Prescription placed for famotidine 40 mg twice daily.  Patient instructed to stop omeprazole while she is trying the famotidine, but omeprazole was not removed from the medication list in case she has to resume omeprazole.  We discussed surgical options for her reflux - I explained that Linx procedure could be a consideration but I am unsure whether that is an option for her due to her history of Nissen fundoplication.  Also, due to her previous surgical history and her lung disease, a re-do operation for  her recurrent paraesophageal hernia would be higher risk.  Discussed lifestyle measures for controlling reflux symptoms.  History of adenomatous colon polyps - Patient with 3 adenomatous polyps July 2024.  Due for surveillance July 2026 (more intensive surveillance interval due to history of cystic fibrosis).  Cystic lesions in pancreas - Possible side branch IPMNs, stable in appearance on MRI/MRCP August 2024 from prior imaging.  Can consider repeat imaging if indicated based on symptoms.  There is description of cystic foci in the pancreas on CT scan from May 2016 suggesting that the appearance has been stable over a long period of time.  H/o cholesteatoma - Numbers given for Dr. Matthews and Dr. Matute since patient has not heard from ENT scheduling.  Will try to ask Dr. Matute for ENT recommendations with expertise in cholesteatoma.    Follow up in 2-3 months.    Latonia Snyder MD, MS  Gastroenterologist         [1]   Current Outpatient Medications:     acetaminophen (Tylenol) 325 mg tablet, Take 2 tablets (650 mg) by mouth every 6 hours., Disp: 60 tablet, Rfl: 0    amLODIPine (Norvasc) 2.5 mg tablet, Take 1 tablet (2.5 mg) by mouth once daily., Disp: 90 tablet, Rfl: 3    blood-glucose sensor (Dexcom G7 Sensor) device, Apply 1 sensor every 10 days to monitor glucose (Patient not taking: Reported on 7/16/2025), Disp: 3 each, Rfl: 11    calcium citrate-vitamin D3 500 mg-12.5 mcg (500 unit) tablet,chewable, Chew 1 tablet once daily., Disp: 30 tablet, Rfl: 11    Creon 24,000-76,000 -120,000 unit capsule, Take 3-4 capsules by mouth 3 times daily (morning, midday, late afternoon). MOUTH WITH MEALS AND TAKE 1-2 CAPSULES WITH SNACKS, Disp: 300 capsule, Rfl: 11    desvenlafaxine 50 mg 24 hr tablet, Take 1 tablet (50 mg) by mouth once daily., Disp: , Rfl:     dornase bryan (Pulmozyme) 1 mg/mL nebulizer solution, Take 2.5 mg by nebulization once daily. (Patient not taking: Reported on 7/16/2025), Disp: 75 mL, Rfl:  11    dornase bryan (Pulmozyme) 1 mg/mL nebulizer solution, Take 2.5 mg by nebulization once daily. (Patient not taking: Reported on 7/16/2025), Disp: 75 mL, Rfl: 11    famotidine (Pepcid) 40 mg tablet, Take 1 tablet (40 mg) by mouth 2 times a day., Disp: 60 tablet, Rfl: 5    FreeStyle Lite Strips strip, TEST 6-7 TIMES DAILY, Disp: , Rfl:     glucagon (Baqsimi) 3 mg/actuation spray,non-aerosol, Spray 3 mg nasally as needed for severe hypoglycemia (Patient not taking: Reported on 7/16/2025), Disp: 2 each, Rfl: 3    ibuprofen-acetaminophen 125-250 mg tablet per tablet, Take 1 tablet by mouth 2 times a day as needed (hip pain)., Disp: , Rfl:     insulin glargine (Basaglar KwikPen U-100 Insulin) 100 unit/mL (3 mL) pen, Inject 8 units daily as directed, Disp: 15 mL, Rfl: 3    losartan (Cozaar) 100 mg tablet, TAKE 1 TABLET BY MOUTH EVERY DAY, Disp: 90 tablet, Rfl: 3    mometasone-formoterol (Dulera) 100-5 mcg/actuation inhaler, Inhale 2 puffs 2 times a day. Rinse mouth with water after use to reduce aftertaste and incidence of candidiasis. Do not swallow. (Patient not taking: Reported on 7/16/2025), Disp: 13 g, Rfl: 11    omeprazole (PriLOSEC) 40 mg DR capsule, Take 1 capsule (40 mg) by mouth once daily., Disp: 90 capsule, Rfl: 3    traZODone (Desyrel) 50 mg tablet, TAKE 2 TABLETS (100 MG) BY MOUTH AS NEEDED AT BEDTIME FOR SLEEP., Disp: 60 tablet, Rfl: 1    Trikafta 100-50-75 mg(d) /150 mg (n) tablet, TAKE 2 ORANGE TABLETS IN THE MORNING WITH FAT-CONTAINING FOOD. DISCARD BLUE TABLETS, Disp: 252 tablet, Rfl: 1    Xopenex HFA 45 mcg/actuation inhaler, Inhale 2 puffs every 4 hours if needed for wheezing or shortness of breath., Disp: 15 g, Rfl: 6  [2]   Past Medical History:  Diagnosis Date    Alcohol use disorder 11/09/2023    Anemia     Anxiety     Chronic pancreatitis (Multi)     Cystic fibrosis     Diabetes mellitus due to cystic fibrosis (Multi)     Dysphagia     GERD (gastroesophageal reflux disease)     Heartburn      Hypertension     Insomnia     MRSA (methicillin resistant Staphylococcus aureus)     in lungs    Personal history of other benign neoplasm 05/09/2022    History of other benign neoplasm    PONV (postoperative nausea and vomiting)     Preoperative cardiovascular examination 10/07/2024    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

## 2025-07-16 NOTE — ASSESSMENT & PLAN NOTE
Continue Creon 24, 3-4 capsules by mouth with meals.  Serum levels of fat-soluble vitamins were in good range in April 2025.

## 2025-07-16 NOTE — PROGRESS NOTES
[facility]    Provider Listing:  Primary Care: Elizabeth Braswell M.D. ([facility])  Psychiatry: Khadar Aguilar M.D. ([facility]; [place], [place])  Gastroenterology: Latonia Snyder M.D. 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 Historically normal lung function (ppFEV1 100-106%) so Trikafta deferred. 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 EPI on PERT. Constipation.  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  Other problems: alcohol use disorder (AA); GERD with (+) BRAVO pH probe (7/3/2024), 2 cm hiatal hernia and paraesophageal hernia at EGD; osteopenia; generalized anxiety disorder (2017); poorly-controlled hypertension, insomnia treated with trazodone    HPI (7/16/2025): Mood is much better since the last visit.  Pristiq is helping, dose increased to 50 mg daily by her psychiatrist.  She stopped taking buspirone because it paradoxically increased anxiety.  She is now leading support group for alcohol abstinence and endorses sobriety for the past month.  Feels good from respiratory perspective on Trikafta, 2 orange tablets daily.  She also started Dulera HFA twice daily since the last visit and has not had any wheezing or chest tightness.  Left hip pain - since Winter 2024; ibuprofen/acetaminophen twice daily has helped; no obvious inciting event like a fall or other trauma.  Questions whether she should get her bone density checked because of the pain.  Says that she ran out of amlodipine for her hypertension.  She has been taking the losartan.  Did not hear anything about scheduling ENT appointment (cholesteatoma).    HPI (6/18/2025): Sick visit.  She was here two days ago for appointment with Dr. Snyder but reported chest congestion, mild wheezing and chest tightness, mild dyspnea.  No fevers, chills, sweating,  "hemoptysis, or pleuritic chest pain.  Throat swab culture obtained and has grown normal perla.  Cough is paroxysmal and rattling but non-productive.  Felt a tickle in throat over past few days.  Mentioned to Dr. Snyder.  Other major development is establishment of care with a psychiatrist (see above).  Dr. Aguilar prescribed desvenlafaxine and buspirone, but she has only taken the former for 2-3 weeks.  Lucia has been experiencing depression and vegetative symptoms (anhedonia, impaired executive functioning, lassitude, loss of appetite, desire to sleep).  Found herself drinking a couple of beers at a bar recently and wondered why she went that route to process emotions.  She still has \"brain fog\" after starting Trikafta, but she also wonders whether Trikafta is contributing to her current mood disturbance.  She has passive suicidal ideation but she denies any plan or attempt at self-harm.  She thinks that the trazodone we started a few months ago has promoted sleep without grogginess in the morning.  Lucia's friends have recently said that her respiratory symptoms are due to mold in her basement.  Lucia does not feel as concerned about this notion.  She has old carpeting down there, but she denies any visible mold on the walls or standing water.  On average, Lucia is only in the basement once every couple of weeks to do the laundry.    HPI (10/9/2024): Had episode of syncope on 9/9/2024 after overnight fast for surgery.  Sent to ED and had negative work-up.  Dr. Dirk Borges from cardiology at [facility] saw Lucia in clinic on 10/7/2024 and concluded that she experienced orthostatic hypotension in face of antihypertensive medications with vagal reaction.  Recommended resumption of amlodipine but avoidance of hydrochlorothiazide.  Laparoscopic appendectomy and cholecystectomy with Dr. Canales on 9/16/2024.  Surgical pathology showed:  A.  Appendix, appendectomy:  Patchy acute appendicitis with partial fibrous " obliteration of the tip and endometriosis.  See note.  NOTE: Multiple sections show foci of endometriosis confirmed by immunostains estrogen receptor and PAX-8 (blocks A1 and A2).  B.  Gallbladder, cholecystectomy:  Chronic cholecystitis with cholelithiasis.  Since starting Trikafta, has essentially no cough or sputum expectoration.  Energy level is better.  Feels a bit guilty for not doing Pulmozyme, but she does not think it does anything for her now.  Nonetheless, she has experienced MAJOR mood disturbance since starting Trikafta.  (+) vegetative features, (+) anhedonia, (+) sadness, (+) sleeping longer than usual, (+) worried about relapsing with alcohol use.  Denies suicidal or homicidal ideation.  Reports remote diagnosis of ADHD and recent worsening of executive functioning and short-term memory.    HPI (7/24/2024): As of today's visit she had not yet started taking Trikafta.  Needed to fill out [employer] enrollment form.  Per Qiana Naidu,  pharmacist, medication currently no pay at [facility].  Scheduled for abdominopelvic CT scan tomorrow.  Has been doing AffloVEST and Pulmozyme with subjective improvement in mucus burden.  Still having fatigue, body aches, and subjective fever usually at night.  She has also been having some postprandial vomiting and right upper quadrant pain.  The pain has a colicky character.  Acknowledges drinking 1 alcoholic beverage on 7/4/2024.    HPI (7/10/2024): Telephone call on 6/12/2024 to report 3 days of fevers and body aches, chest heaviness, dyspnea, and cough.  Fatigue prominent.  Had started taking doxycycline 100 mg twice daily 3 days earlier.  Some discussion in chart about switching her to Bactrim DS but ultimately patient started to feel better on doxycycline.  We had considered linezolid for MRSA coverage but DDI with trazodone (small risk of serotonin syndrome), ultimately tried it anyway, and she had diarrhea, so stopped it after 3-4 days.  She subsequently had EGD  with Bravo pH probe and colonoscopy with Dr. Snyder on 7/3/2024 (results below).  Multiple findings, but notably mild GERD and temporal association with symptoms, 2 cm hiatal hernia, paraesophageal hernia, several subcentimeter colon polyps (removed, pathology pending). (+) esophageal dysphagia (bite of bagel for example).  Still having episodes of feeling feverish, (++) fatigue.  Says that she is taking all of the anti-hypertensive medications.  Recent headaches.  Does not feel pulse pounding in her ears.  No photophobia, hyperacusis.     HPI (5/29/2024): Unaccompanied.  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  Has been doing the aerosolized mucoactive medications and HFCWO with The Vest, although burdensome.  Still has not started Trikafta.  She had one alcoholic beverage on Memorial Day.  I asked her how she managed to stop at one, and she said that she had her support system and felt guilty about it.  Generally more anxious.  Had telehealth visit with Dr. Ambrose in April.  Plan to continue Basaglar 8 units daily and perform prandial FSG measurements.  She felt feverish again a couple times since last visit.  Sputum is light yellow, no hemoptysis.  Not expectorating all the time.     HPI (5/1/2024): Patient recently had chest CT scan done to work-up more frequent pulmonary exacerbations over the past 6-12 months and evaluate lung parenchyma in light of (+) sputum cultures for Scedosporium boydii on 2/12/2024 and 4/3/2024.  The lung parenchyma has changes consistent with CF bronchiectasis. No cavitary fungal disease.  The literature pertaining to Scedosporium boydii infection is largely populated by case-series and retrospective case-control studies (and their attendant limitations to causal inference).  This fungal species is not clearly associated with worse health outcomes in people with CF.  The more curious finding on the scan is the hiatal hernia.  This is not new,  "but my question now is whether she is having frequent and perhaps subclinical gastroesophageal reflux and/or aspiration events causing symptoms that \"look like\" a CF pulmonary exacerbation.  She had an EGD in May 2016 by Dr. Greenberg.  The study showed diverticulum in lower third of esophagus, Nissen fundoplication, antral gastritis, and erosive gastropathy in the fundus.  I recommend evaluation by Dr. Latonia Snyder at an upcoming CF clinic with question of whether she agrees with the aforementioned hypothesis and recommendations for diagnosis and treatment.     HPI (4/17/2024): Two-week sick follow-up.  Lucia met with Kervin Sanchez on 4/3/2024.  Symptoms as below.  They discussed the sputum culture in February 2024 (+) for Scedosporium.  Another sputum sample was collected on 4/3/2024, and it also grew Scedosporium and Candida albicans.  CT chest ordered.  Treated with 2-week course of doxycycline targeting MRSA. Due to the possibility of starting Trikafta, hepatic function panel and phosphatidylethanol (Peth) were checked, and the latter suggested recent alcohol ingestion.  Lucia subsequently acknowledged a relapse, now has counseling and sponsor contact.  Today, Lucia says that she never started the nebulized 3% hypertonic saline; took doxycycline course prescribed by Kervin Sanchez; ended 4/12/2024; felt a lot better after the doxycycline, but she felt unusually fatigued yesterday afternoon, which is unlike her. Denies hemoptysis, fevers, chills, pleuritic chest pain.  Occasional wheezing alleviated by Xopenex.  Sputum is light yellow but less voluminous.  She has been doing HFCWO, but she has one of the first [employer] \"The Vest\" models. Also doing Pulmozyme twice daily.  Blood sugars have been pretty good.  Reports -120 before breakfast.  Needs to see Dr. Ambrose for routine follow-up.    HPI (4/4/2024, Kervin Sanchez): \"Since December she has been sick. Once she gets off the abx, she starts getting sick about " "a week later. Gets a high fever, headache, trouble breathing and feels very fatigued. Fever gets up to 103, then breaks after a day.  All last week she was in bed. Lungs are filling up. Can't breathe. Recently, she started using Pulmozyme and her vest. Has also noticed pain in her joints when she feels sick. Feels mucous is stuck and lungs feel sticky. Something in there and she can't cough it up. Adding the doxycyline stopped her from coughing up as much. Was able to work all day the other day which is an improvement. Did not start Trikafta because she was waiting to see if she continues getting sick.\"    HPI (2/12/2024): ED at [facility] on 11/17/2023 with hypertensive urgency (SBP ~200 at home, /94 documented); had headache and chest pain; troponin, BNP, EKG (sinus bradycardia), CXR were unremarkable.  Saw PCP, tried amlodipine added to the losartan, was having symptomatic hypotension, switched to hydrochlorothiazide plus losartan and stopped the amlodipine, much better.  Started seeing new therapist after last visit, very helpful, good relationship.  Says that she has been sober for 3 months and 4 days.  Brought on a colleague to help with work.  Had influenza diagnosed by NP rapid-antigen test at a pharmacy, around 12/20/2023. Felt really poorly, had fever, cough, post-tussive emesis, dyspnea, anorexia. Started doing Pulmozyme and HFCWO again on most days. Prescribed course of doxycycline. Since that infection, however, generally feels more central chest mucus and pressure. Because ppFEV1 was also down today, she is concerned about deteriorating lung function. Generally feels tired since the influenza. Weight down 1.3 kg since August 2023.     HPI (11/8/2023): Plan at last visit was to increase losartan to 100 mg/day, home BP monitoring. Discussed referral to psychiatry to manage anxiety and depression. Increased trazodone to 100 mg at HS for insomnia. Needed to stick with MiraLAX for prophylaxis. Had " poison ivy and put on brief course of prednisone.  Few weeks ago had relapse of drinking. Went out to bar, drank enough to black out, somehow drove home. Scared her. She connected with her sponsor and actually has appointment with a counselor this evening. Feeling increased chest congestion. Coughing occasionally. Thinks she might be headed toward lung infection. MiraLAX has prevented RLQ cramping.     HPI (8/8/2023): Had fun in [facility] but has been sick from respiratory perspective twice. I gave her a prescription for doxycycline to take with her on the trip. She started after returning due to chest congestion. Cleared. Then got poison ivy. Took 3 days of a steroid she had on-hand. Flared up after stopping. Currently having mild chest tightness and wheezing. Only using albuterol once daily, though. BP still higher than desirable. Has been on losartan 50 mg daily. Sleeping poorly on trazodone 50 mg. Took 100 mg one night and slept better.     HPI (7/10/2023): First visit, transfer care from Dr. Fortune. Last seen in November 2022. Issues at that point included intermittent abdominal pain (likely constipation), uncontrolled hypertension despite taking losartan 50 mg daily, CFRD followed by Dr. Ambrose, and recent Burkholderia multivorans infection, consideration of nebulized meropenem. Colonoscopy discussed, would apparently need to be done in [facility] system for insurance reasons. No interval need for antibiotics. Used Pulmozyme in January for a few weeks due to congestion. Helpful. Still having tendency toward constipation, thinking about using MiraLAX. Sometimes experiences left-sided headache and blurry vision in the left eye when hypertensive. Frequency not accelerating. Thinks that meditation well help with her blood pressure. Leaving for trip to [place] tomorrow.    Current Use of Health Management Strategies:    Respiratory Interventions  Albuterol: Two times per day  Pulmozyme: Prescribed, does  not use   Hypertonic saline: Does not use  HFCWO (percussive vest): [employer] The Vest - once daily since 7/10/2024  Oscillatory PEP: Does not use  Exercise: No Regular Exercise  LTE antagonist: No  Azithromycin: Does not use  Aztreonam lysine ([place]): Does not use  Tobramycin: Does not use  Colistin: Does not use  Meropenem (inhaled): Does not use  Vancomycin (inhaled): Does not use  ICS: Does not use  ICS/LABA: Mometasone-formoterol (Dulera HFA)  LAMA: No  CFTR modulator: Trikafta, 2 orange tablets by mouth daily (does not take blue tablet in the evening due to possible contribution to mood disorder).  Oxygen: Does not use    Digestive Health Interventions    Acid-suppressing medication?: Yes  Using CF vitamins?: No  Taking pancreatic enzymes?: Yes - Creon 24; 3 to 4 capsules with meals and 1 to 2 capsules with snacks   Any diarrhea?: No  Any steatorrhea?: No  Any constipation?: Yes - does better with more regular use of MiraLAX  Using laxatives?: Yes  Feeding tube?: No  Supplemental enteral nutrition?: No    Pulmonary Function Test Results    PFT (6/18/2025, on Trikafta): FEV1 2.51 L (116%), FVC 3.44 L (127%), FEV1/FVC 73; normal spirometry  PFT (10/9/2024, on Trikafta): FEV1 2.47 L (114%), FVC 3.39 L (125%), FEV1/FVC 73; normal spirometry  PFT (7/24/2024): FEV1 2.39 L (105%), FVC 3.06 L (106%), FEV1/FVC 78; normal spirometry  PFT (7/10/2024): FEV1 2.17 L (95%), FVC 2.89 L (100%), FEV1/FVC 75; normal spirometry  PFT (5/29/2024): FEV1 2.17 L (95%), FVC 3.07 L (106%), FEV1/FVC 71; normal spirometry  PFT (4/17/2024): FEV1 2.42 L (106%), FVC 3.27 L (113%), FEV1/FVC 74; normal spirometry  PFT (4/3/2024): did not perform, feeling poorly  PFT (2/12/2024): FEV1 2.29 L (94%), FVC 3.19 L (109%), FEV1/FVC 72; normal spirometry  PFT (11/8/2023): FEV1 2.29 L (100%), FVC 3.19 L (109%), FEV1/FVC 72; normal spirometry  PFT (7/10/2023): FEV1 2.45 L (106%), FVC 3.27 L (112%), FEV1/FVC 75; normal spirometry  PFT (10/10/2022): FEV1  2.47 L (106%), FVC 3.25 L (110%), FEV1/FVC 76; normal spirometry  PFT (2/21/2022): FEV1 2.48 L (106%), FVC 3.28 L (110%), FEV1/FVC 76; normal spirometry  PFT (6/23/2021): FEV1 2.48 L (103%), FVC 3.39 L (109%), FEV1/FVC 73; normal spirometry    Current Medications     Current Outpatient Medications   Medication Instructions    acetaminophen (TYLENOL) 650 mg, oral, Every 6 hours    amLODIPine (NORVASC) 2.5 mg, oral, Daily    blood-glucose sensor (Dexcom G7 Sensor) device Apply 1 sensor every 10 days to monitor glucose    calcium citrate-vitamin D3 500 mg-12.5 mcg (500 unit) tablet,chewable 1 tablet, oral, Daily    Creon 24,000-76,000 -120,000 unit capsule 3-4 capsules, oral, 3 times daily (morning, midday, late afternoon), MOUTH WITH MEALS AND TAKE 1-2 CAPSULES WITH SNACKS    desvenlafaxine (PRISTIQ) 50 mg, oral, Daily    dornase bryan (PULMOZYME) 2.5 mg, nebulization, Daily RT    famotidine (PEPCID) 40 mg, oral, 2 times daily    FreeStyle Lite Strips strip TEST 6-7 TIMES DAILY    glucagon (Baqsimi) 3 mg/actuation spray,non-aerosol Spray 3 mg nasally as needed for severe hypoglycemia    ibuprofen-acetaminophen 125-250 mg tablet per tablet 1 tablet, 2 times daily PRN    insulin glargine (Basaglar KwikPen U-100 Insulin) 100 unit/mL (3 mL) pen Inject 8 units daily as directed    losartan (COZAAR) 100 mg, oral, Daily    mometasone-formoterol (Dulera) 100-5 mcg/actuation inhaler 2 puffs, inhalation, 2 times daily, Rinse mouth with water after use to reduce aftertaste and incidence of candidiasis. Do not swallow.    omeprazole (PRILOSEC) 40 mg, oral, Daily    Pulmozyme 2.5 mg, nebulization, Daily    traZODone (DESYREL) 100 mg, oral, Nightly PRN    Trikafta 100-50-75 mg(d) /150 mg (n) tablet TAKE 2 ORANGE TABLETS IN THE MORNING WITH FAT-CONTAINING FOOD. DISCARD BLUE TABLETS    Xopenex HFA 45 mcg/actuation inhaler 2 puffs, inhalation, Every 4 hours PRN       Physical Examination     /81 (BP Location: Right arm, Patient  "Position: Sitting, BP Cuff Size: Adult)   Pulse 53   Temp 36.8 °C (98.2 °F) (Oral)   Resp 16   Ht 1.575 m (5' 2.01\")   Wt 56 kg (123 lb 7.3 oz)   SpO2 95%   BMI 22.57 kg/m²     Daily Weight  07/16/25 : 56 kg (123 lb 7.3 oz)  06/18/25 : 55 kg (121 lb 4.1 oz)  06/16/25 : 55 kg (121 lb 4.1 oz)  01/22/25 : 56.1 kg (123 lb 12.6 oz)  10/09/24 : 54 kg (119 lb 0.8 oz)  10/07/24 : 54 kg (119 lb)     General: Pleasant, no acute respiratory distress, unaccompanied.  HEENT: normocephalic; anicteric sclerae; conjunctivae not injected; nasal mucosa was unremarkable; oropharynx was clear without evidence of thrush; (+) dental plate  Neck: supple; no lymphadenopathy or thyromegaly.  Chest: clear to auscultation bilaterally.  Cardiac: regular rhythm; no gallop or murmur.  Abdomen: soft; non-tender; non-distended; no hepatosplenomegaly, no renal bruit.  Extremities: no leg edema; no digital clubbing; 2+ pulses  Psychiatric: Resolution of restricted affect; better demeanor.     Metabolic Parameters     SODIUM   Date/Time Value Ref Range Status   04/03/2025 08:20  135 - 146 mmol/L Final     POTASSIUM   Date/Time Value Ref Range Status   04/03/2025 08:20 AM 4.6 3.5 - 5.3 mmol/L Final     CHLORIDE   Date/Time Value Ref Range Status   04/03/2025 08:20  98 - 110 mmol/L Final     CARBON DIOXIDE   Date/Time Value Ref Range Status   04/03/2025 08:20 AM 28 20 - 32 mmol/L Final     Anion Gap   Date/Time Value Ref Range Status   09/09/2024 12:42 PM 12 10 - 20 mmol/L Final     UREA NITROGEN (BUN)   Date/Time Value Ref Range Status   04/03/2025 08:20 AM 16 7 - 25 mg/dL Final     CREATININE   Date/Time Value Ref Range Status   04/03/2025 08:20 AM 1.05 0.50 - 1.05 mg/dL Final     GFR Female   Date/Time Value Ref Range Status   07/10/2023 01:28 PM 64 >90 mL/min/1.73m2 Final     Comment:      CALCULATIONS OF ESTIMATED GFR ARE PERFORMED   USING THE 2021 CKD-EPI STUDY REFIT EQUATION   WITHOUT THE RACE VARIABLE FOR THE IDMS-TRACEABLE   " CREATININE METHODS.    https://jasn.asnjournals.org/content/early/2021/09/22/[email or web address]       GLUCOSE   Date/Time Value Ref Range Status   04/03/2025 08:20  (H) 65 - 99 mg/dL Final     Comment:                   Fasting reference interval     For someone without known diabetes, a glucose value  between 100 and 125 mg/dL is consistent with  prediabetes and should be confirmed with a  follow-up test.          CALCIUM   Date/Time Value Ref Range Status   04/03/2025 08:20 AM 9.6 8.6 - 10.4 mg/dL Final     TSH   Date/Time Value Ref Range Status   07/10/2023 01:28 PM 1.13 0.44 - 3.98 mIU/L Final     Comment:      TSH testing is performed using different testing    methodology at [facility] than at other    [facility]. Direct result comparisons should    only be made within the same method.     02/21/2022 09:12 AM 2.97 0.44 - 3.98 mIU/L Final     Comment:      TSH testing is performed using different testing    methodology at [facility] than at other    [facility]. Direct result comparisons should    only be made within the same method.       TSH W/REFLEX TO FT4   Date/Time Value Ref Range Status   07/16/2025 11:31 AM 2.37 0.40 - 4.50 mIU/L Final     Thyroid Stimulating Hormone   Date/Time Value Ref Range Status   02/12/2024 02:47 PM 1.02 0.44 - 3.98 mIU/L Final     PHOSPHATE (AS PHOSPHORUS)   Date/Time Value Ref Range Status   04/03/2025 08:20 AM 3.8 2.5 - 4.5 mg/dL Final       Hematologic Parameters     WHITE BLOOD CELL COUNT   Date/Time Value Ref Range Status   04/03/2025 08:20 AM 4.6 3.8 - 10.8 Thousand/uL Final     Neutrophils Absolute   Date/Time Value Ref Range Status   09/09/2024 12:42 PM 5.92 1.20 - 7.70 x10*3/uL Final     Comment:     Percent differential counts (%) should be interpreted in the context of the absolute cell counts (cells/uL).     Neutrophils %   Date/Time Value Ref Range Status   09/09/2024 12:42 PM 78.4 40.0 - 80.0 % Final     ABSOLUTE LYMPHOCYTES    Date/Time Value Ref Range Status   04/03/2025 08:20 AM 1,431 850 - 3,900 cells/uL Final     LYMPHOCYTES   Date/Time Value Ref Range Status   04/03/2025 08:20 AM 31.1 % Final     ABSOLUTE MONOCYTES   Date/Time Value Ref Range Status   04/03/2025 08:20  200 - 950 cells/uL Final     MONOCYTES   Date/Time Value Ref Range Status   04/03/2025 08:20 AM 9.0 % Final     BASOPHILS   Date/Time Value Ref Range Status   04/03/2025 08:20 AM 0.9 % Final     ABSOLUTE EOSINOPHILS   Date/Time Value Ref Range Status   04/03/2025 08:20  15 - 500 cells/uL Final     EOSINOPHILS   Date/Time Value Ref Range Status   04/03/2025 08:20 AM 3.7 % Final     HEMOGLOBIN   Date/Time Value Ref Range Status   04/03/2025 08:20 AM 13.2 11.7 - 15.5 g/dL Final     HEMATOCRIT   Date/Time Value Ref Range Status   04/03/2025 08:20 AM 40.5 35.0 - 45.0 % Final     RED BLOOD CELL COUNT   Date/Time Value Ref Range Status   04/03/2025 08:20 AM 4.35 3.80 - 5.10 Million/uL Final     MCV   Date/Time Value Ref Range Status   04/03/2025 08:20 AM 93.1 80.0 - 100.0 fL Final     MCHC   Date/Time Value Ref Range Status   04/03/2025 08:20 AM 32.6 32.0 - 36.0 g/dL Final     Comment:     For adults, a slight decrease in the calculated MCHC  value (in the range of 30 to 32 g/dL) is most likely  not clinically significant; however, it should be  interpreted with caution in correlation with other  red cell parameters and the patient's clinical  condition.       PLATELET COUNT   Date/Time Value Ref Range Status   04/03/2025 08:20  140 - 400 Thousand/uL Final       Fat-Soluble Vitamin Levels     VITAMIN D,25-OH,TOTAL,IA   Date/Time Value Ref Range Status   04/03/2025 08:20 AM 54 30 - 100 ng/mL Final     Comment:     Vitamin D Status         25-OH Vitamin D:     Deficiency:                    <20 ng/mL  Insufficiency:             20 - 29 ng/mL  Optimal:                 > or = 30 ng/mL     For 25-OH Vitamin D testing on patients on   D2-supplementation and  patients for whom quantitation   of D2 and D3 fractions is required, the QuestAssureD(TM)  25-OH VIT D, (D2,D3), LC/MS/MS is recommended: order   code 17148 (patients >2yrs).     See Note 1     Note 1     For additional information, please refer to   http://education.[employer].Peeridea/[email or web address]   (This link is being provided for informational/  educational purposes only.)       VITAMIN A (RETINOL)   Date/Time Value Ref Range Status   04/03/2025 08:20 AM 77 38 - 98 mcg/dL Final     Comment:        Vitamin supplementation within 24 hours prior to  blood draw may affect the accuracy of the results.     This test was developed and its analytical performance  characteristics have been determined by [facility] [place]. It has  not been cleared or approved by the U.S. Food and Drug  Administration. This assay has been validated pursuant  to the CLIA regulations and is used for clinical  purposes.          Vitamin A, Interpretation   Date/Time Value Ref Range Status   02/12/2024 02:43 PM Normal  Final     Comment:       This test was developed and its performance characteristics   determined by [facility]. It has not been cleared or   approved by the US Food and Drug Administration. This test was   performed in a CLIA certified laboratory and is intended for   clinical purposes.  Performed By: [facility]  [address] [place] [id number]  : Tello Pacheco MD, PhD  CLIA Number: [id number]     VITAMIN E, ALPHA TOCOPHEROL   Date/Time Value Ref Range Status   04/03/2025 08:20 AM 14.5 5.7 - 19.9 mg/L Final     Comment:        Levels of alpha-tocopherol <5 mg/L are consistent  with Vitamin E deficiency in adults.          VITAMIN E, BETA GAMMA TOCOPHEROL   Date/Time Value Ref Range Status   04/03/2025 08:20 AM 2.3 <=4.3 mg/L  Final     Comment:        Vitamin supplementation within 24 hours prior to  blood draw may affect the accuracy of the  results.     This test was developed and its analytical performance  characteristics have been determined by [facility] [place], [place] It has  not been cleared or approved by the U.S. Food and Drug  Administration. This assay has been validated pursuant  to the CLIA regulations and is used for clinical  purposes.            LFT and Associated Parameters     AST   Date/Time Value Ref Range Status   07/16/2025 11:31 AM 18 10 - 35 U/L Final   04/03/2025 08:20 AM 14 10 - 35 U/L Final   09/09/2024 12:42 PM 12 9 - 39 U/L Final   07/10/2024 12:22 PM 15 9 - 39 U/L Final   04/03/2024 03:02 PM 19 9 - 39 U/L Final     ALT   Date/Time Value Ref Range Status   07/16/2025 11:31 AM 14 6 - 29 U/L Final   04/03/2025 08:20 AM 15 6 - 29 U/L Final   09/09/2024 12:42 PM 12 7 - 45 U/L Final     Comment:     Patients treated with Sulfasalazine may generate falsely decreased results for ALT.   07/10/2024 12:22 PM 18 7 - 45 U/L Final     Comment:     Patients treated with Sulfasalazine may generate falsely decreased results for ALT.   04/03/2024 03:02 PM 16 7 - 45 U/L Final     Comment:     Patients treated with Sulfasalazine may generate falsely decreased results for ALT.     ALKALINE PHOSPHATASE   Date/Time Value Ref Range Status   07/16/2025 11:31 AM 82 37 - 153 U/L Final   04/03/2025 08:20 AM 87 37 - 153 U/L Final     Alkaline Phosphatase   Date/Time Value Ref Range Status   09/09/2024 12:42 PM 63 33 - 136 U/L Final   07/10/2024 12:22  33 - 136 U/L Final   04/03/2024 03:02 PM 91 33 - 136 U/L Final     BILIRUBIN, TOTAL   Date/Time Value Ref Range Status   07/16/2025 11:31 AM 0.5 0.2 - 1.2 mg/dL Final   04/03/2025 08:20 AM 0.5 0.2 - 1.2 mg/dL Final     Bilirubin, Total   Date/Time Value Ref Range Status   09/09/2024 12:42 PM 0.5 0.0 - 1.2 mg/dL Final   07/10/2024 12:22 PM 0.6 0.0 - 1.2 mg/dL Final   04/03/2024 03:02 PM 0.5 0.0 - 1.2 mg/dL Final     BILIRUBIN, DIRECT   Date/Time Value Ref Range Status    07/16/2025 11:31 AM 0.1 < OR = 0.2 mg/dL Final   04/03/2025 08:20 AM 0.1 < OR = 0.2 mg/dL Final     Bilirubin, Direct   Date/Time Value Ref Range Status   07/10/2024 12:22 PM 0.1 0.0 - 0.3 mg/dL Final   04/03/2024 03:02 PM 0.1 0.0 - 0.3 mg/dL Final   02/12/2024 02:43 PM 0.1 0.0 - 0.3 mg/dL Final     GGT   Date/Time Value Ref Range Status   07/16/2025 11:31 AM 19 3 - 65 U/L Final   04/03/2025 08:20 AM 54 3 - 65 U/L Final   07/10/2024 12:22 PM 26 5 - 55 U/L Final   02/12/2024 02:43 PM 26 5 - 55 U/L Final   07/10/2023 01:28 PM 22 5 - 55 U/L Final   02/21/2022 09:12 AM 33 5 - 55 U/L Final   11/16/2020 03:16 PM 34 5 - 55 U/L Final     ALBUMIN   Date/Time Value Ref Range Status   07/16/2025 11:31 AM 4.6 3.6 - 5.1 g/dL Final   04/03/2025 08:20 AM 4.5 3.6 - 5.1 g/dL Final   04/03/2025 08:20 AM 4.5 3.6 - 5.1 g/dL Final     PROTEIN, TOTAL   Date/Time Value Ref Range Status   07/16/2025 11:31 AM 7.3 6.1 - 8.1 g/dL Final   04/03/2025 08:20 AM 7.0 6.1 - 8.1 g/dL Final     Total Protein   Date/Time Value Ref Range Status   09/09/2024 12:42 PM 6.4 6.4 - 8.2 g/dL Final   07/10/2024 12:22 PM 7.5 6.4 - 8.2 g/dL Final   04/03/2024 03:02 PM 7.7 6.4 - 8.2 g/dL Final       Coagulation Parameters           Diabetes Laboratory Tests     POC HEMOGLOBIN A1c   Date/Time Value Ref Range Status   01/22/2025 02:17 PM 6.5 4.2 - 6.5 % Final   10/09/2024 02:28 PM 6.6 (A) 4.2 - 6.5 % Final   07/24/2024 04:28 PM 6.7 (A) 4.2 - 6.5 % Final     HEMOGLOBIN A1c   Date/Time Value Ref Range Status   04/03/2025 08:20 AM 6.2 (H) <5.7 % of total Hgb Final     Comment:     For someone without known diabetes, a hemoglobin   A1c value between 5.7% and 6.4% is consistent with  prediabetes and should be confirmed with a   follow-up test.     For someone with known diabetes, a value <7%  indicates that their diabetes is well controlled. A1c  targets should be individualized based on duration of  diabetes, age, comorbid conditions, and other  considerations.      This assay result is consistent with an increased risk  of diabetes.     Currently, no consensus exists regarding use of  hemoglobin A1c for diagnosis of diabetes for children.          Albumin, Urine Random   Date/Time Value Ref Range Status   01/22/2025 03:23 PM <7.0 Not established mg/L Final   02/12/2024 02:43 PM <7.0 Not established mg/L Final     Creatinine, Urine Random   Date/Time Value Ref Range Status   01/22/2025 03:23 PM 56.0 20.0 - 320.0 mg/dL Final   02/12/2024 02:43 PM 67.4 20.0 - 320.0 mg/dL Final     Albumin/Creatinine Ratio   Date/Time Value Ref Range Status   01/22/2025 03:23 PM   Final     Comment:     One or more analytes used in this calculation is outside of the analytical measurement range. Calculation cannot be performed.   02/12/2024 02:43 PM   Final     Comment:     One or more analytes used in this calculation is outside of the analytical measurement range. Calculation cannot be performed.        Immunology Laboratory Tests     IMMUNOGLOBULIN E   Date/Time Value Ref Range Status   04/03/2025 08:20 AM 2 <NJ=232 kU/L Final     Alcohol Abstinence Monitoring     EER PEth   Date Value Ref Range Status   07/10/2024 See Note  Final     Comment:     Authorized individuals can access the [facility]   Enhanced Report using the following link:      [email or web address]   04/03/2024 See Note  Final     Comment:     Authorized individuals can access the [facility]   Enhanced Report using the following link:      [email or web address]     PEth 16:0/18:1 (POPEth)   Date Value Ref Range Status   07/10/2024 27 ng/mL Final     Comment:     PEth 16:0/18:1 (POPEth)  Less than 10 ng/mL............Not detected  Less than 20 ng/mL............Abstinence or light alcohol   consumption  20 - 200 ng/mL................Moderate alcohol consumption  Greater than 200 ng/mL........Heavy alcohol consumption or chronic   alcohol use    (Reference: DANTE Wynn SUHAS Carballo  2018 J. Forensic Sci)   04/03/2024 64 ng/mL Final     Comment:     PEth 16:0/18:1 (POPEth)  Less than 10 ng/mL............Not detected  Less than 20 ng/mL............Abstinence or light alcohol   consumption  20 - 200 ng/mL................Moderate alcohol consumption  Greater than 200 ng/mL........Heavy alcohol consumption or chronic   alcohol use    (Reference: DANTE Stacy and SUHAS Carballo 2018 J. Forensic Sci)     PEth 16:0/18:2 (PLPEth)   Date Value Ref Range Status   07/10/2024 22 ng/mL Final     Comment:     Reference ranges are not well established.   04/03/2024 65 ng/mL Final     Comment:     Reference ranges are not well established.     PEth Interpretation   Date Value Ref Range Status   07/10/2024 See Comment  Final     Comment:     Phosphatidylethanol (PEth) is a group of phospholipids formed in   the presence of ethanol, phospholipase D and phosphatidylcholine.   PEth is known to be a direct alcohol biomarker. The predominant   PEth homologues are PEth 16:0/18:1 (POPEth) and PEth 16:0/18:2   (PLPEth), which account for 37-46% and 26-28% of the total PEth   homologues, respectively. PEth is incorporated into the   phospholipid membrane of red blood cells and has a general   half-life of 4-10 days and a window of detection of 2-4 weeks.   However, the window of detection is longer in individuals who   chronically or excessively consume alcohol. The limit of   quantification is 10 ng/mL. Serial monitoring of PEth may be   helpful in monitoring alcohol abstinence over time. PEth results   should be interpreted in the context of the patient's clinical and   behavioral history.  Patients with advanced liver disease may have falsely elevated   PEth concentrations (Senait STAUFFER et al 2018, Alcoholism Clinical &   Experimental Research).    This test was developed and its performance characteristics   determined by [email or web address]. It has not been cleared or   approved by the U.S. Food and Drug Administration. This  test was   performed in a CLIA-certified laboratory and is intended for   clinical purposes.  Performed By: [facility]  [address] [place] [id number]  : Tello Pacheco MD, PhD  CLIA Number: [id number]   04/03/2024 See Comment  Final     Comment:     Phosphatidylethanol (PEth) is a group of phospholipids formed in   the presence of ethanol, phospholipase D and phosphatidylcholine.   PEth is known to be a direct alcohol biomarker. The predominant   PEth homologues are PEth 16:0/18:1 (POPEth) and PEth 16:0/18:2   (PLPEth), which account for 37-46% and 26-28% of the total PEth   homologues, respectively. PEth is incorporated into the   phospholipid membrane of red blood cells and has a general   half-life of 4-10 days and a window of detection of 2-4 weeks.   However, the window of detection is longer in individuals who   chronically or excessively consume alcohol. The limit of   quantification is 10 ng/mL. Serial monitoring of PEth may be   helpful in monitoring alcohol abstinence over time. PEth results   should be interpreted in the context of the patient's clinical and   behavioral history.  Patients with advanced liver disease may have falsely elevated   PEth concentrations (Senait STAUFFER et al 2018, Alcoholism Clinical &   Experimental Research).    This test was developed and its performance characteristics   determined by [facility]. It has not been cleared or   approved by the U.S. Food and Drug Administration. This test was   performed in a CLIA-certified laboratory and is intended for   clinical purposes.  Performed By: [facility]  [address] [place] [id number]  : Tello Pacheco MD, PhD  CLIA Number: [id number]     DEXA Scan     XR BONE density 10/10/2022    Narrative  Name: OMAR GIPSON  Patient ID: [id number] YOB: 1960 Height: 62.0 in.  Gender:     Female   Exam Date:  10/10/2022 Weight: 118.0  lbs.  Indications: Cystic Fibrosis, DIABETES, family history osteoporosis,  Hysterectomy, Post Menopausal  Fractures:    Treatments:  OMEPRAZOLE    LEFT FEMUR - TOTAL  The bone mineral density : 0.889 g/cm2  T-score : -0.9    % of young normal mean :88%  Z-score : 0.0    % of age matched mean : 100%  % change vs. Previous : N/A    % change vs. Baseline : baseline    LEFT FEMUR - NECK  The bone mineral density : 0.843 g/cm2  T-score : -1.4    % of young normal mean: 81%  Z-score : -0.1    % of age matched mean : 98%  % change vs. Previous : N/A    % change vs. Baseline : baseline    SPINE L1-L4  The bone mineral density is 1.147 g/cm2  T-score : -0.3   % of young normal mean is 97%  Z-score : 1.0    % of age matched mean is 111%  % change vs. Previous : -    % change vs. Baseline : baseline    World Health Organization (WHO) criteria for post-menopausal,   Women:  Normal:       T-score at or above -1 SD  Osteopenia:   T-score between -1 and -2.5 SD  Osteoporosis: T-score at or below -2.5 SD    10-Year Fracture Risk:  Major Osteoporotic Fracture 4.0%  Hip Fracture                0.4%  Reported Risk Factors       None    Interpretation:  According to World Health Organization criteria, classification is low bone mass.  Followup recommended on October 2024 or sooner as clinically warranted.     Endoscopies     Colonoscopy (7/3/2024)  Findings  Rectal exam revealed decreased sphincter tone likely secondary to sedation  External and internal small hemorrhoids observed during retroflexion; no bleeding was identified  Mild, generalized atrophic mucosa in the terminal ileum; performed cold forceps biopsy;  Large submucosal mass at the appendiceal orifice  A moderate amount of semi-liquid stool was visualized throughout the colon.  Lavage was performed with incomplete clearance and adequate visualization.  One 3 mm polyp vs lymphoid follicle in the ascending colon; performed cold snare with complete en bloc removal  and retrieved specimen  Two 4 mm sessile polyps in the transverse colon; performed cold snare with complete en bloc removal and retrieved specimen  Impression  Rectal exam revealed decreased sphincter tone likely secondary to sedation.  Small hemorrhoids  Mild atrophic mucosa in the terminal ileum; performed cold forceps biopsy  Large submucosal mass in the cecum at the appendiceal orifice which could be secondary to stool vs. neoplasm  A moderate amount of semi-liquid stool was visualized throughout the colon.  Lavage was performed with incomplete clearance and adequate visualization.  3 subcentimeter polyps were removed with cold snare  Recommendation    Await pathology results     Repeat colonoscopy in 3 years (2 years if all polyps adenomatous).  Obtain CT scan abdomen and pelvis to further assess appendiceal orifice.     Esophagogastroduodenoscopy (EGD) (7/3/2024)  Findings  Z-line 33 cm from the incisors  An inlet patch was visualized in the proximal esophagus.  2 cm hiatal hernia - GE junction 33 cm from the incisors, diaphragmatic impression 35 cm from the incisors, confirmed by retroflexion. On retroflexion evidence of prior fundoplication was visualized; however, a paraesophageal hernia was also seen.  Single medium diverticulum with no inflammation in the lower third of the esophagus (29 cm from the incisors); no bleeding was identified  Moderate, generalized erythematous mucosa with loss of vascular pattern in the antrum  Performed multiple random forceps biopsies in the incisura and antrum to rule out H. pylori. Rule out intestinal metaplasia  Performed random forceps biopsies in the body of the stomach, greater curve of the stomach and lesser curve of the stomach to rule out H. pylori. Rule out intestinal metaplasia  Benign-appearing mild intrinsic stenosis in the duodenal sweep (scope was able to traverse without any resistance)  Edematous mucosa in the 2nd part of the duodenum  The duodenum was  otherwise normal.  Performed multiple random forceps biopsies in the duodenal bulb and 2nd part of the duodenum to rule out celiac disease  A Bravo pH capsule was placed successfully in the esophagus (27 cm from the incisors, 6 cm from the GE junction).  Successful placement was confirmed endoscopically.  Impression  An inlet patch was visualized in the proximal esophagus.  2 cm hiatal hernia  Diverticulosis in the lower third of the esophagus  Moderate erythematous mucosa with loss of vascular pattern in the antrum  Performed forceps biopsies in the incisura and antrum to rule out H. pylori  Performed forceps biopsies in the body of the stomach, greater curve of the stomach and lesser curve of the stomach to rule out H. pylori  Intrinsic mild stenosis in the duodenal sweep possibly secondary to prior peptic ulcer disease  Edematous mucosa in the 2nd part of the duodenum  The duodenum was otherwise normal.  Performed forceps biopsies in the duodenal bulb and 2nd part of the duodenum to rule out celiac disease  A pH capsule was placed successfully in the esophagus (27 cm from the incisors, 6 cm from the GE junction).     BRAVO Esophageal pH Probe (7/3/2024)  Findings:  -The total AET is 6.9% and the DeMeester score is 26.5 indicating presence of mild to moderate reflux  - Reflux occurred on both days of the study and in both upright and supine positions  - The SAP for regurgitation was 100% indicating high symptom correlation   Impressions:  Overall this is study is positive for mild to moderate acid reflux  There is high symptom correlation, reflux is the cause of the symptoms     Chest Radiograph     XR chest 2 view 10/10/2022    Narrative  MRN: [id number]  Patient Name: OMAR GIPSON    STUDY:  TH CHEST 2 VIEW PA AND LAT;    INDICATION:  cystic fibrosis, malabsorption  E84.9: Cystic fibrosis E84.0: Cystic fibrosis with pulmonary manifestations.    COMPARISON:  Chest radiograph 05/09/2016    ACCESSION  NUMBER(S):  [id number]    ORDERING CLINICIAN:  CELIA MIN    FINDINGS:  Frontal, lateral, and dual energy radiographs of the chest were  provided.  The cardiac silhouette size is within normal limits.  There is no focal consolidation, edema or pneumothorax.  No sizeable pleural effusion.  No acute osseous abnormality.    Impression  No acute cardiopulmonary process.  I personally reviewed the images/study and I agree with the findings as stated.     US LIVER/GB/PANCREAS     US LIVER/GALL BLADDER/PANCREAS (4/13/2022, [facility])    CLINICAL HISTORY: Reason for Exam: as recommended per CT; history of inflammatory changes along the falciform ligament in chronically dilated common bile duct   ASSOCIATED DIAGNOSIS:     COMPARISON: Gallbladder ultrasound 07/03/2017; CT abdomen/pelvis 04/11/2022     TECHNIQUE: Ultrasound real time scan with image documentation of the right upper quadrant including the liver, gallbladder, and pancreas was performed.     FINDINGS:   Liver   Craniocaudal length: 14.8 cm.   Echogenicity:  Normal   Surface nodularity: Present   Mass (size and location): None.     Bile ducts   Intrahepatic ducts: No biliary dilatation.   Common bile duct:  Diameter 14 mm, stable compared to July 2017 ultrasound.     Gallbladder   Size and morphology: The gallbladder is contracted limiting evaluation.   Cholelithiasis:  Multiple echogenic foci with posterior acoustic shadowing, compatible with gallstones.   Pericholecystic fluid: None.   Sonographic Arrington sign: Absent.     Pancreas: Pancreas is not visualized consistent with the complete fatty replacement on recent CT.     Other findings : A right pleural effusion is incidentally noted.     IMPRESSION:   1. Chronically dilated CBD measuring approximately 14 mm. No choledocholithiasis seen.   2.  The liver has a lobulated contour which may relate to underlying cirrhosis. Further evaluation with shear wave elastography or fibroscan can be obtained to obtain a  Metavir score.   3.  Contracted gallbladder with cholelithiasis. No sonographic findings of acute cholecystitis.   4.  Partially visualized right pleural effusion.   5.  Pancreas is not visualized consistent with the complete fatty replacement on recent CT.     Chest CT Scan without Contrast     CT CHEST WO IV CONTRAST; 4/26/2024 1:21 pm   1.  Patchy centrilobular nodules throughout the mid to inferior right upper lobe and medial right middle lobe, most consistent with infection.  2. Within the medial aspect of the right upper lobe and right middle lobe, there is are moderately dilated and mucous filled bronchi with surrounding peribronchovascular centrilobular nodules. Findings likely attributable to chronic changes from cystic fibrosis with possible superimposed infection. Attention on follow-up CT studies is  recommended.  3. Diffuse bronchial wall thickening, consistent with the patient's reported cystic fibrosis. Lungs are otherwise clear.  4. Large hiatal hernia.  5. Non-obstructing 5 mm calculi seen in the visualized left kidney.    CF Sputum Culture     AFB Culture   Date Value Ref Range Status   07/10/2024 No Mycobacteria isolated.  Final   04/03/2024 No Mycobacteria isolated.  Final   02/12/2024 No Mycobacteria isolated.  Final      Respiratory Culture, Cystic Fibrosis   Date/Time Value Ref Range Status   06/16/2025 11:49 AM (2+) Few Normal throat perla  Final   07/10/2024 10:46 AM (A)  Final    (2+) Few Methicillin Resistant Staphylococcus aureus (MRSA)     Comment:     Methicillin (Oxacillin) resistant Staphylococci are resistant to all currently available Penicillins, Beta-lactam/Beta-lactamase inhibitor combinations (including Ampicillin/Sulbactam, Amoxicillin/Clavulanate and Pipercillin/Tazobactam), Carbapenems and   Cephalosporins (except Ceftaroline).   07/10/2024 10:46 AM (2+) Few Normal throat perla  Final     CF respiratory culture (11/28/2022): MRSA  CF respiratory culture (10/10/2022): MRSA,  Burkholderia multivorans  CF respiratory culture (5/9/2022): MRSA  CF respiratory culture (6/3/2015): MSSA, Pseudomonas putida    Susceptibility data from last 2000 days.  Collected Specimen Info Organism Amphotericin B Ceftazidime Clindamycin Erythromycin Fluconazole Isavuconazole   06/16/25 Fluid from Throat Swab Normal throat perla         07/10/24 Fluid from SPUTUM Methicillin Resistant Staphylococcus aureus (MRSA)    S  R       Normal throat perla         07/10/24 Fluid from SPUTUM Scedosporium boydii  No published     No published     05/29/24 Fluid from Throat Swab Methicillin Resistant Staphylococcus aureus (MRSA)    S  R       Burkholderia cepacia complex   S         Normal throat perla         04/17/24 Fluid from Throat Swab Methicillin Resistant Staphylococcus aureus (MRSA)   S R       Normal throat perla         04/03/24 Swab from SPUTUM Candida albicans           Scedosporium boydii         04/03/24 Fluid from Throat Swab Methicillin Resistant Staphylococcus aureus (MRSA)   S R       Normal throat perla         02/12/24 Fluid from SPUTUM Methicillin Resistant Staphylococcus aureus (MRSA)   S R       Normal throat perla         02/12/24 Swab from SPUTUM Candida albicans           Scedosporium boydii         11/08/23 Fluid from Throat Swab Methicillin Resistant Staphylococcus aureus (MRSA)   S R       Normal throat perla         07/07/23 Respiratory Staphylococcus aureus   R R     07/07/23 Respiratory Candida albicans           Collected Specimen Info Organism Itraconazole Levofloxacin Linezolid Meropenem Micafungin Minocycline Oxacillin   06/16/25 Fluid from Throat Swab Normal throat perla          07/10/24 Fluid from SPUTUM Methicillin Resistant Staphylococcus aureus (MRSA)        R     Normal throat perla          07/10/24 Fluid from SPUTUM Scedosporium boydii  No published     No published     05/29/24 Fluid from Throat Swab Methicillin Resistant Staphylococcus aureus (MRSA)        R     Burkholderia  cepacia complex   S   S   I      Normal throat perla          04/17/24 Fluid from Throat Swab Methicillin Resistant Staphylococcus aureus (MRSA)       R     Normal throat perla          04/03/24 Swab from SPUTUM Candida albicans            Scedosporium boydii          04/03/24 Fluid from Throat Swab Methicillin Resistant Staphylococcus aureus (MRSA)       R     Normal throat perla          02/12/24 Fluid from SPUTUM Methicillin Resistant Staphylococcus aureus (MRSA)       R     Normal throat perla          02/12/24 Swab from SPUTUM Candida albicans            Scedosporium boydii          11/08/23 Fluid from Throat Swab Methicillin Resistant Staphylococcus aureus (MRSA)       R     Normal throat perla          07/07/23 Respiratory Staphylococcus aureus   S    S   07/07/23 Respiratory Candida albicans            Collected Specimen Info Organism Posaconazole Tetracycline Trimethoprim/Sulfamethoxazole Vancomycin Voriconazole   06/16/25 Fluid from Throat Swab Normal throat perla        07/10/24 Fluid from SPUTUM Methicillin Resistant Staphylococcus aureus (MRSA)   S  S  S      Normal throat perla        07/10/24 Fluid from SPUTUM Scedosporium boydii  No published     No published   05/29/24 Fluid from Throat Swab Methicillin Resistant Staphylococcus aureus (MRSA)   S  S  S      Burkholderia cepacia complex          Normal throat perla        04/17/24 Fluid from Throat Swab Methicillin Resistant Staphylococcus aureus (MRSA)  S S S      Normal throat perla        04/03/24 Swab from SPUTUM Candida albicans          Scedosporium boydii        04/03/24 Fluid from Throat Swab Methicillin Resistant Staphylococcus aureus (MRSA)  S S S      Normal throat perla        02/12/24 Fluid from SPUTUM Methicillin Resistant Staphylococcus aureus (MRSA)  S S S      Normal throat perla        02/12/24 Swab from SPUTUM Candida albicans          Scedosporium boydii        11/08/23 Fluid from Throat Swab Methicillin Resistant Staphylococcus  aureus (MRSA)  S S S      Normal throat perla        07/07/23 Respiratory Staphylococcus aureus  S S S    07/07/23 Respiratory Candida albicans                  Problem List Items Addressed This Visit       Alcohol use disorder    Current Assessment & Plan   Now attending and leading AA meetings and has sponsor  Reports sobriety.         Asthma in adult, moderate persistent, uncomplicated ([facility]-HCC)    Current Assessment & Plan   No wheezing, chest tightness, or coughing since she started the Dulera -5, 2 puffs inhaled twice daily, after visit in June 2025.  Continue Dulera.         Chronic left hip pain    Current Assessment & Plan   Suspect related to osteoarthritis.  I have ordered radiographs of the left hip.  If available, I will have our outpatient physical therapist evaluate her in the office today.  Taking reasonable dose of ibuprofen-acetaminophen with benefit.  May need to be evaluated by outpatient PT and/or orthopaedics.         Relevant Orders    XR hip left with pelvis when performed 2 or 3 views    Cystic fibrosis - Primary    Current Assessment & Plan   Excellent physiologic response to Trikafta with ppFEV1 up to 116%.  We did not perform spirometry today due to symptom stability.  It is acceptable for Lucia to refrain from using Pulmozyme in light of her response to Trikafta.  Xopenex (levalbuterol) has been better for her than racemic albuterol - no tremulousness.  Surveillance respiratory tract culture today.         Relevant Orders    Hepatic Function Panel (Completed)    Gamma-Glutamyl Transferase (Completed)    Exocrine pancreatic insufficiency ([facility])    Current Assessment & Plan   Continue Creon 24, 3-4 capsules by mouth with meals.  Serum levels of fat-soluble vitamins were in good range in April 2025.         BRYON (generalized anxiety disorder)    Current Assessment & Plan   Doing much better with increased dose of Pristiq 50 mg daily managed by her psychiatrist.  I added buspirone  to her allergy/intolerance list (paradoxical worsening of anxiety).  Much more functional in her daily activities.         Gastroesophageal reflux disease without esophagitis    Relevant Medications    amLODIPine (Norvasc) 2.5 mg tablet    Other Relevant Orders    Vitamin B12 (Completed)    High risk medication use    Relevant Orders    Hepatic Function Panel (Completed)    Gamma-Glutamyl Transferase (Completed)    History of non anemic vitamin B12 deficiency    Relevant Orders    Vitamin B12 (Completed)    Osteopenia determined by x-ray    Current Assessment & Plan   Vitamin D sufficient  Due for DEXA scan; this was ordered at last month's appointment; unclear why patient has not heard from radiology scheduling; I told Lucia that she will need to call central scheduling to inquire about scheduling the procedure.  No indication for antiresorptive therapy.  Weightbearing exercise         Primary hypertension    Overview   Managed by [facility]CP at [facility]         Current Assessment & Plan   Has been taking losartan 100 mg daily  BP still elevated at recent visits this year.  Says that she ran out of amlodipine a few months ago. I suspect that she requires multi-class anti-hypertensive regimen.  I have refilled the amlodipine prescription today.  She should take 2.5 mg daily.  I counseled her about potential side effects of leg edema, constipation, and orthostasis.          Relevant Medications    amLODIPine (Norvasc) 2.5 mg tablet    Weight loss    Current Assessment & Plan   She has gained a couple of pounds over the past month.  Now that her mood is stabilized, and in light of normal TSH today, I am confident that the basis for weight loss was depression.         Relevant Orders    TSH with reflex to Free T4 if abnormal (Completed)     Follow-up:    Future Appointments   Date Time Provider Department Center   9/17/2025 12:45 PM  RESP PFT TECH [id number] Academic   9/17/2025  1:00 PM Nicolás Harris MD [id number]  Academic   9/24/2025  1:00 PM Latonia Snyder MD [id number] Academic     Nicolás Harris MD   07/16/2025

## 2025-07-16 NOTE — ASSESSMENT & PLAN NOTE
Doing much better with increased dose of Pristiq 50 mg daily managed by her psychiatrist.  I added buspirone to her allergy/intolerance list (paradoxical worsening of anxiety).  Much more functional in her daily activities.

## 2025-07-16 NOTE — ASSESSMENT & PLAN NOTE
Suspect related to osteoarthritis.  I have ordered radiographs of the left hip.  If available, I will have our outpatient physical therapist evaluate her in the office today.  Taking reasonable dose of ibuprofen-acetaminophen with benefit.  May need to be evaluated by outpatient PT and/or orthopaedics.

## 2025-07-16 NOTE — PROGRESS NOTES
Respiratory Note:    Using new inhaler - Dulera X2 puffs once a day with spacer - she was not rinsing and spitting afterwards    I reviewed the importance of rinsing and spitting afterwards or brushing her teeth, I suggested keeping her inhaler by her toothbrush

## 2025-07-16 NOTE — ASSESSMENT & PLAN NOTE
Excellent physiologic response to Trikafta with ppFEV1 up to 116%.  We did not perform spirometry today due to symptom stability.  It is acceptable for Lucia to refrain from using Pulmozyme in light of her response to Trikafta.  Xopenex (levalbuterol) has been better for her than racemic albuterol - no tremulousness.  Surveillance respiratory tract culture today.

## 2025-07-17 PROBLEM — Z86.39 HISTORY OF NON ANEMIC VITAMIN B12 DEFICIENCY: Status: ACTIVE | Noted: 2025-07-17

## 2025-07-17 LAB
ALBUMIN SERPL-MCNC: 4.6 G/DL (ref 3.6–5.1)
ALBUMIN/GLOB SERPL: 1.7 (CALC) (ref 1–2.5)
ALP SERPL-CCNC: 82 U/L (ref 37–153)
ALT SERPL-CCNC: 14 U/L (ref 6–29)
AST SERPL-CCNC: 18 U/L (ref 10–35)
BILIRUB DIRECT SERPL-MCNC: 0.1 MG/DL
BILIRUB INDIRECT SERPL-MCNC: 0.4 MG/DL (CALC) (ref 0.2–1.2)
BILIRUB SERPL-MCNC: 0.5 MG/DL (ref 0.2–1.2)
GGT SERPL-CCNC: 19 U/L (ref 3–65)
GLOBULIN SER CALC-MCNC: 2.7 G/DL (CALC) (ref 1.9–3.7)
PROT SERPL-MCNC: 7.3 G/DL (ref 6.1–8.1)
TSH SERPL-ACNC: 2.37 MIU/L (ref 0.4–4.5)
VIT B12 SERPL-MCNC: 387 PG/ML (ref 200–1100)

## 2025-07-17 NOTE — ASSESSMENT & PLAN NOTE
She has gained a couple of pounds over the past month.  Now that her mood is stabilized, and in light of normal TSH today, I am confident that the basis for weight loss was depression.

## 2025-08-05 DIAGNOSIS — I10 PRIMARY HYPERTENSION: ICD-10-CM

## 2025-08-05 RX ORDER — LOSARTAN POTASSIUM 100 MG/1
100 TABLET ORAL DAILY
Qty: 90 TABLET | Refills: 3 | Status: SHIPPED | OUTPATIENT
Start: 2025-08-05

## 2025-08-07 ENCOUNTER — HOSPITAL ENCOUNTER (OUTPATIENT)
Dept: RADIOLOGY | Facility: CLINIC | Age: 65
Discharge: HOME | End: 2025-08-07
Payer: MEDICARE

## 2025-08-07 DIAGNOSIS — E84.9 CYSTIC FIBROSIS: ICD-10-CM

## 2025-08-07 DIAGNOSIS — K86.81 EXOCRINE PANCREATIC INSUFFICIENCY (HHS-HCC): ICD-10-CM

## 2025-08-07 DIAGNOSIS — G89.29 CHRONIC LEFT HIP PAIN: ICD-10-CM

## 2025-08-07 DIAGNOSIS — M25.552 CHRONIC LEFT HIP PAIN: ICD-10-CM

## 2025-08-07 PROCEDURE — 77080 DXA BONE DENSITY AXIAL: CPT | Performed by: STUDENT IN AN ORGANIZED HEALTH CARE EDUCATION/TRAINING PROGRAM

## 2025-08-07 PROCEDURE — 77080 DXA BONE DENSITY AXIAL: CPT

## 2025-08-07 PROCEDURE — 73502 X-RAY EXAM HIP UNI 2-3 VIEWS: CPT | Mod: LT

## 2025-08-07 PROCEDURE — 73502 X-RAY EXAM HIP UNI 2-3 VIEWS: CPT | Mod: LEFT SIDE | Performed by: RADIOLOGY

## 2025-08-11 ENCOUNTER — RESULTS FOLLOW-UP (OUTPATIENT)
Dept: PEDIATRIC PULMONOLOGY | Facility: HOSPITAL | Age: 65
End: 2025-08-11
Payer: MEDICARE

## 2025-08-11 DIAGNOSIS — E84.9 CYSTIC FIBROSIS: ICD-10-CM

## 2025-08-11 DIAGNOSIS — J45.41: Primary | ICD-10-CM

## 2025-08-11 DIAGNOSIS — J30.9 ALLERGIC RHINITIS, UNSPECIFIED SEASONALITY, UNSPECIFIED TRIGGER: ICD-10-CM

## 2025-08-12 ENCOUNTER — ALLIED HEALTH (OUTPATIENT)
Dept: PEDIATRIC PULMONOLOGY | Facility: HOSPITAL | Age: 65
End: 2025-08-12
Payer: MEDICARE

## 2025-08-12 DIAGNOSIS — J45.41: ICD-10-CM

## 2025-08-12 DIAGNOSIS — E84.0 CYSTIC FIBROSIS WITH PULMONARY EXACERBATION (MULTI): ICD-10-CM

## 2025-08-12 RX ORDER — FLUTICASONE PROPIONATE 50 MCG
1 SPRAY, SUSPENSION (ML) NASAL DAILY
Qty: 16 G | Refills: 0 | Status: SHIPPED | OUTPATIENT
Start: 2025-08-12 | End: 2026-08-12

## 2025-08-12 RX ORDER — PREDNISONE 20 MG/1
40 TABLET ORAL DAILY
Qty: 10 TABLET | Refills: 0 | Status: SHIPPED | OUTPATIENT
Start: 2025-08-12 | End: 2025-08-17

## 2025-08-13 ENCOUNTER — TELEPHONE (OUTPATIENT)
Dept: PEDIATRIC PULMONOLOGY | Facility: HOSPITAL | Age: 65
End: 2025-08-13
Payer: MEDICARE

## 2025-08-13 DIAGNOSIS — M25.552 CHRONIC LEFT HIP PAIN: ICD-10-CM

## 2025-08-13 DIAGNOSIS — G89.29 CHRONIC LEFT HIP PAIN: ICD-10-CM

## 2025-08-15 ENCOUNTER — PATIENT MESSAGE (OUTPATIENT)
Dept: PEDIATRIC PULMONOLOGY | Facility: HOSPITAL | Age: 65
End: 2025-08-15
Payer: MEDICARE

## 2025-08-20 ENCOUNTER — ALLIED HEALTH (OUTPATIENT)
Dept: PEDIATRIC PULMONOLOGY | Facility: HOSPITAL | Age: 65
End: 2025-08-20
Payer: MEDICARE

## 2025-08-20 DIAGNOSIS — E84.0 CYSTIC FIBROSIS WITH PULMONARY EXACERBATION (MULTI): ICD-10-CM

## 2025-08-20 DIAGNOSIS — J45.41: ICD-10-CM

## 2025-09-22 ENCOUNTER — APPOINTMENT (OUTPATIENT)
Dept: OTOLARYNGOLOGY | Facility: CLINIC | Age: 65
End: 2025-09-22
Payer: MEDICARE

## (undated) DEVICE — Device

## (undated) DEVICE — COVER, TRANSDUCER, LAPROSCOPIC, FOLDED, STERILE, LF

## (undated) DEVICE — SUTURE, VICRYL, 0, 27 IN, UR-6, VIOLET

## (undated) DEVICE — SHEARS, HARMONIC 5 X 36 CVD ACE+7

## (undated) DEVICE — STAPLER, LINEAR ENDO RELOAD, 60MM, BLUE, DISP

## (undated) DEVICE — NEEDLE, INSUFFLATION, 14 G, 100 MM

## (undated) DEVICE — SCISSORS, EPIX, LAPOROSCOPIC, 5MM X 35CM

## (undated) DEVICE — COVER, CART, 45 X 27 X 48 IN, CLEAR

## (undated) DEVICE — ENDO, PORT VERSASTEP 5MM

## (undated) DEVICE — PAD, GROUNDING, ELECTROSURGICAL, W/9 FT CABLE, POLYHESIVE II, ADULT, LF

## (undated) DEVICE — SYRINGE, 10 CC, LUER LOCK

## (undated) DEVICE — CLIP, ENDO APPLIER LIGAMAX 5MM

## (undated) DEVICE — TOWEL, SURGICAL, NEURO, O/R, 16 X 26, BLUE, STERILE

## (undated) DEVICE — MANIFOLD, 4 PORT NEPTUNE STANDARD

## (undated) DEVICE — TROCAR, BLUNTPORT,  W/THREADED ANCHOR, 12MM, LF

## (undated) DEVICE — TUBE SET, PNEUMOCLEAR, SMOKE EVACU, HIGH-FLOW

## (undated) DEVICE — SPONGE GAUZE, XRAY SC+RFID, 4X4 16 PLY, STERILE

## (undated) DEVICE — PUMP, STRYKERFLOW 2 & HANDPIECE W/10FT. IRRIGATION TUBING

## (undated) DEVICE — REST, HEAD, BAGEL, 9 IN

## (undated) DEVICE — SHEARS, CURVED HARMONIC ACE 36CM

## (undated) DEVICE — SUTURE, VICRYL, 4-0, 18 IN, UNDYED BR PS-2

## (undated) DEVICE — STAPLER, ECHELON 3000, 60MM STD

## (undated) DEVICE — POUCH, SPECIMEN, ENDOCATCH, 10 MM